# Patient Record
Sex: MALE | Race: WHITE | NOT HISPANIC OR LATINO | Employment: OTHER | URBAN - METROPOLITAN AREA
[De-identification: names, ages, dates, MRNs, and addresses within clinical notes are randomized per-mention and may not be internally consistent; named-entity substitution may affect disease eponyms.]

---

## 2017-02-27 ENCOUNTER — OFFICE VISIT (OUTPATIENT)
Dept: CARDIOLOGY | Facility: CLINIC | Age: 77
End: 2017-02-27

## 2017-02-27 VITALS
WEIGHT: 212 LBS | HEART RATE: 92 BPM | OXYGEN SATURATION: 98 % | SYSTOLIC BLOOD PRESSURE: 118 MMHG | DIASTOLIC BLOOD PRESSURE: 64 MMHG | HEIGHT: 68 IN | BODY MASS INDEX: 32.13 KG/M2

## 2017-02-27 DIAGNOSIS — I51.89 DIASTOLIC DYSFUNCTION: Primary | ICD-10-CM

## 2017-02-27 DIAGNOSIS — I77.9 BILATERAL CAROTID ARTERY DISEASE (HCC): ICD-10-CM

## 2017-02-27 DIAGNOSIS — E11.59 TYPE 2 DIABETES MELLITUS WITH OTHER CIRCULATORY COMPLICATION: ICD-10-CM

## 2017-02-27 PROCEDURE — 99213 OFFICE O/P EST LOW 20 MIN: CPT | Performed by: INTERNAL MEDICINE

## 2017-02-27 NOTE — PROGRESS NOTES
Date of Office Visit: 2017  Encounter Provider: Bennie Paul MD  Place of Service: [facility] CARDIOLOGY  Patient Name: Santino Retana  :1940    Chief complaint: Follow-up for diastolic dysfunction, carotid artery disease    History of Present Illness:    Dear Dr. Christina:      I again had the pleasure of seeing your patient in cardiology office on 2017. As  you well know, he is a very pleasant, 76 year-old white male with a past medical history  significant for diabetes, hypertension, and chronic kidney disease who presents for  follow-up. The patient initially saw me on 2014, with complaints of dyspnea on  exertion, which had not changed significantly. However, given that he was diabetic, he  did undergo an exercise stress echocardiogram on 2014. This showed an ejection  fraction of 58% with grade 1 diastolic dysfunction and no significant valvular disease.   There was no evidence of ischemia on the study.    The patient was to undergo an elective right hip replacement, and as part of his work-up,   had screening ultrasound of his carotid arteries.  This showed significant bilateral   disease.  A CT angiogram in 2016 confirmed 85-90% stenosis in the right   carotid artery and 70-75% stenosis in the left carotid artery.  He subsequently   underwent a right carotid endarterectomy on 2016 at [facility].    He did eventually also have his right hip replacement on 2016 at [facility].     The patient presents today for follow-up.  He is still regaining strength from his   surgeries.  However, he has not had any new chest discomfort.  He also states that   his shortness of breath has remained at baseline and has not changed in the last   several years.    Past Medical History   Diagnosis Date   • Arthritis    • CKD (chronic kidney disease)    • Diabetes mellitus    • Hyperlipidemia    • Hypertension    • SOB  (shortness of breath)        Past Surgical History   Procedure Laterality Date   • Cholecystectomy  11/1989     [facility]   • Appendectomy  02/19/2016     [facility], Dr. Zimmerman   • Total hip arthroplasty Right 11/16/2016     [facility]   • Carotid endarterectomy Right 09/26/2016     [facility]        Current Outpatient Prescriptions on File Prior to Visit   Medication Sig Dispense Refill   • amLODIPine (NORVASC) 5 MG tablet   0   • aspirin 81 MG tablet Take 81 mg by mouth daily.     • atorvastatin (LIPITOR) 20 MG tablet   0   • Cholecalciferol (VITAMIN D3) 2000 UNITS tablet Take  by mouth.     • gabapentin (NEURONTIN) 400 MG capsule Take 400 mg by mouth.     • glipiZIDE (GLUCOTROL) 5 MG tablet take 1 tablet by mouth twice a day with food  0   • ketoconazole (NIZORAL) 2 % cream apply to affected area once daily  0   • lisinopril (PRINIVIL,ZESTRIL) 5 MG tablet Take 5 mg by mouth daily.     • mupirocin (BACTROBAN) 2 % ointment apply to NOSTRIL WITH Q-TIP TWICE DAILY FOR 5 CONSECUTIVE DAYS PRIOR TO SURGERY  0   • Omega-3 Fatty Acids (FISH OIL CONCENTRATE PO) Take  by mouth.     • TRADJENTA 5 MG tablet tablet   0   • VITAMIN D, CHOLECALCIFEROL, PO Take  by mouth.       No current facility-administered medications on file prior to visit.      Allergies as of 02/27/2017 - Miah as Reviewed 02/27/2017   Allergen Reaction Noted   • Lamisil [terbinafine hcl] Rash 02/26/2016     Social History     Social History   • Marital status:      Spouse name: Kesha   • Number of children: N/A   • Years of education: N/A     Occupational History   • Retired       Social History Main Topics   • Smoking status: Former Smoker     Quit date: 1989   • Smokeless tobacco: Never Used   • Alcohol use No   • Drug use: No   • Sexual activity: Not on file     Other Topics Concern   • Not on file     Social History Narrative    Enjoys golf.     Family History   Problem Relation  "Age of Onset   • Lymphoma Brother 65      at age 71   • Coronary artery disease Neg Hx        Review of Systems   Respiratory: Positive for shortness of breath, snoring and wheezing.    Musculoskeletal: Positive for joint pain and myalgias.   Neurological: Positive for excessive daytime sleepiness.   All other systems reviewed and are negative.    Objective:     Vitals:    17 1310   BP: 118/64   Pulse: 92   SpO2: 98%   Weight: 212 lb (96.2 kg)   Height: 68.11\" (173 cm)     Body mass index is 32.13 kg/(m^2).    Physical Exam   Constitutional: He is oriented to person, place, and time. He appears well-developed and well-nourished.   HENT:   Head: Normocephalic and atraumatic.   Eyes: Conjunctivae are normal.   Neck: Neck supple.   Cardiovascular: Normal rate and regular rhythm.  Exam reveals no gallop and no friction rub.    No murmur heard.  Pulmonary/Chest: Effort normal and breath sounds normal.   Abdominal: Soft. There is no tenderness.   Musculoskeletal: He exhibits no edema.   Neurological: He is alert and oriented to person, place, and time.   Skin: Skin is warm.   Psychiatric: He has a normal mood and affect. His behavior is normal.     Lab Review:   Procedures    Cardiac Procedures:  1. Stress echocardiogram on 2014, showed the following. There was no evidence   of ischemia. The ejection fraction was 58%. There was mild concentric left ventricular  hypertrophy with grade 1 diastolic dysfunction. There was no significant valvular disease.      Assessment:       Diagnosis Plan   1. Diastolic dysfunction     2. Type 2 diabetes mellitus with other circulatory complication     3. Bilateral carotid artery disease       Plan:       As noted, the patient did recently have a carotid endarterectomy on the right.  He still has   residual disease of up to 70-75% on the left.  He is going to follow-up with vascular surgery   at [facility] regarding this.  He has not had any chest " discomfort.  His   shortness of breath has remained at baseline, and has been stable for years.  He is   already taking aspirin and Lipitor, and he will remain on these given the vascular history.    His blood pressure is excellent on lisinopril and amlodipine.  For now, I do not feel he   requires further cardiac testing.  I did not change any of his medications.  I will see him   back in the office within one year unless other issues arise.

## 2017-03-15 ENCOUNTER — HOSPITAL ENCOUNTER (EMERGENCY)
Facility: HOSPITAL | Age: 77
Discharge: HOME | End: 2017-03-15
Attending: EMERGENCY MEDICINE | Admitting: EMERGENCY MEDICINE

## 2017-03-15 ENCOUNTER — APPOINTMENT (OUTPATIENT)
Dept: GENERAL RADIOLOGY | Facility: HOSPITAL | Age: 77
End: 2017-03-15

## 2017-03-15 VITALS
BODY MASS INDEX: 32.58 KG/M2 | OXYGEN SATURATION: 94 % | HEIGHT: 68 IN | WEIGHT: 215 LBS | SYSTOLIC BLOOD PRESSURE: 108 MMHG | HEART RATE: 86 BPM | DIASTOLIC BLOOD PRESSURE: 71 MMHG | RESPIRATION RATE: 18 BRPM | TEMPERATURE: 97.2 F

## 2017-03-15 DIAGNOSIS — S86.912A KNEE STRAIN, LEFT, INITIAL ENCOUNTER: Primary | ICD-10-CM

## 2017-03-15 PROCEDURE — 73590 X-RAY EXAM OF LOWER LEG: CPT

## 2017-03-15 PROCEDURE — 99283 EMERGENCY DEPT VISIT LOW MDM: CPT

## 2017-03-15 NOTE — ED NOTES
Pt had hip replaced in November. Pt was out golfing on Thursday and twisted his left leg. Pt now c/o stiffness, pain, and difficulty walking.      Sujey Burns RN  03/15/17 1123

## 2017-03-15 NOTE — ED PROVIDER NOTES
EMERGENCY DEPARTMENT ENCOUNTER    CHIEF COMPLAINT  Chief Complaint: LLE pain  History given by:patient, spouse  History limited by:none  Room Number: 23/23  PMD: Santino Christina MD   Ortho - Dr. Mays      HPI:  Pt is a 76 y.o. male  with h/o right hip replacement in nov 2016 by Dr. Almaguer, c/o LLE pain and stiffness onset 6 days ago. The pt states he was golfing 6 days ago and twisted his left leg and now c/o pain and stiffness just below his left knee. Denies falling during trauma.  Denies hip pain, back pain, head trauma or LOC.  The pt denies other injury or trauma.     HTN diabetest chronic kidney dz.     Duration: 6 days  Timing:constant  Location:Below left knee  Radiation: none  Quality:aching, stiffness  Intensity/Severity:mild  Progression:unchanged  Associated Symptoms:denies  Aggravating Factors:bearing weight, ambulation  Alleviating Factors:rest  Previous Episodes:h/o right hip replacement 11/2016  Treatment before arrival:none    MEDICAL RECORD REVIEW  HTN   diabetes   chronic kidney dz.     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   • Appendectomy  02/19/2016     [facility], Dr. Zimmerman   • Total hip arthroplasty Right 11/16/2016     [facility]   • Carotid endarterectomy  Right 2016     [facility]        FAMILY HISTORY  Family History   Problem Relation Age of Onset   • Lymphoma Brother 65      at age 71   • Coronary artery disease Neg Hx        SOCIAL HISTORY  Social History     Social History   • Marital status:      Spouse name: Kesha   • Number of children: N/A   • Years of education: N/A     Occupational History   • Retired       Social History Main Topics   • Smoking status: Former Smoker     Quit date:    • Smokeless tobacco: Never Used   • Alcohol use No   • Drug use: No   • Sexual activity: Not on file     Other Topics Concern   • Not on file     Social History Narrative    Enjoys golf.       ALLERGIES  Lamisil [terbinafine hcl]    REVIEW OF SYSTEMS  Review of Systems   Constitutional: Negative.    HENT: Negative.    Respiratory: Negative.    Cardiovascular: Negative.    Gastrointestinal: Negative.    Genitourinary: Negative.    Musculoskeletal: Positive for arthralgias and myalgias.        LLE   All other systems reviewed and are negative.      PHYSICAL EXAM  ED Triage Vitals   Temp Heart Rate Resp BP SpO2   03/15/17 1108 03/15/17 1108 03/15/17 1108 03/15/17 1126 03/15/17 1108   96.6 °F (35.9 °C) 104 18 106/66 94 %      Temp src Heart Rate Source Patient Position BP Location FiO2 (%)   03/15/17 1108 03/15/17 1108 03/15/17 1126 03/15/17 1126 --   Tympanic Monitor Sitting Right arm        Physical Exam   Constitutional: He is oriented to person, place, and time and well-developed, well-nourished, and in no distress. No distress.   HENT:   Head: Normocephalic and atraumatic.   Mouth/Throat: Oropharynx is clear and moist.   Eyes: EOM are normal.   Neck: Normal range of motion.   Pulmonary/Chest: Effort normal. No respiratory distress.   Musculoskeletal: Normal range of motion. He exhibits tenderness.   NVID of LLE.   Prominence on lateral aspect of tibia that is tender.  No medial fibula or tibia tenderness.  No joint effusion of  left knee  No medial or lateral joint line tenderness.    Neurological: He is alert and oriented to person, place, and time.   Skin: Skin is warm and dry. No rash noted. No pallor.   Psychiatric: Mood, memory, affect and judgment normal.   Nursing note and vitals reviewed.      RADIOLOGY  XR Tibia Fibula 2 View Left   Final Result        XR Left Tib Fib - negative acute    I ordered the above noted radiological studies and reviewed the images on the PACS system.       PROCEDURES      COURSE & MEDICAL DECISION MAKING  Pertinent Labs and Imaging studies that were ordered and reviewed are noted above.  Results were reviewed/discussed with the patient and they were also made aware of online assess.  Pt also made aware that some labs, such as cultures, will not be resulted during ER visit and follow up with PMD is necessary.       PROGRESS AND CONSULTS    Progress Notes:    1412  Ordered XR Tibia Fibula Left to r/o fx.     1423  Reviewed pt's history and workup with Dr. Liu.  After a bedside evaluation; Dr. Liu agrees with the plan of care.    1535  I offered the patient crutches.  He declined, stating that he liked using cane.  He has a cane at home.  We discussed the imaging studies at bedside.  The patient's history, physical exam, and image findings were discussed with the physician, who also performed a face to face history and physical exam.  I discussed all results and noted any abnormalities with patient.  Discussed absoute need to recheck abnormalities with their family physician.  I answered any of the patient's questions.  Discussed plan for discharge, as there is no emergent indication for admission.  Pt is agreeable and understands need for follow up and repeat testing.  Pt is aware that discharge does not mean that nothing is wrong but it indicates no emergency is present and they must continue care with their family physician.  Pt is discharged with instructions to follow up with primary care  "doctor to have their blood pressure rechecked.         FOLLOW-UP  Jamarcus Mays MD  [address] [id number] [phone]    Schedule an appointment as soon as possible for a visit  For follow-up         Medication List      Notice     No changes were made to your prescriptions during this visit.            MEDICATIONS GIVEN IN ER  Medications - No data to display    Visit Vitals   • /66 (BP Location: Right arm, Patient Position: Sitting)   • Pulse 104   • Temp 96.6 °F (35.9 °C) (Tympanic)   • Resp 18   • Ht 68\" (172.7 cm)   • Wt 215 lb (97.5 kg)   • SpO2 94%   • BMI 32.69 kg/m2         DIAGNOSIS  Final diagnoses:   Knee strain, left, initial encounter       FOLLOW UP   Jamarcus Mays MD  [address] [address]  [phone]    Schedule an appointment as soon as possible for a visit  For follow-up      RX     Medication List      Notice     No changes were made to your prescriptions during this visit.          I personally scribed for Faith Love PA-C on 3/15/2017 at 3:34 PM.  Electronically signed by Nate Owens on 3/15/2017 at time 3:34 PM         Nate Owens  03/15/17 1455         Nate Owens  03/15/17 1534       Faith Love PA-C  03/15/17 1539       Faith Love PA-C  03/15/17 1557    "

## 2017-03-15 NOTE — ED NOTES
Pt able to walk back to room from lobby. Pt states when he puts pressure on left leg, it hurts.     Sujey Burns RN  03/15/17 1682

## 2017-03-15 NOTE — ED PROVIDER NOTES
I supervised care provided by the midlevel provider.    We have discussed this patient's history, physical exam, and treatment plan.   I have reviewed the note and personally saw and examined the patient and agree with the plan of care.    Pt reports that he is s/p left hip replacement performed in 11/2016. Pt presents to the ED c/o left leg pain onset about a week ago after pt twisted it while playing golf last week. Pt denies CP, dyspnea, and focal weakness/numbness, but reports that he has had painful ROM of the LLE. On physical exam, there is mild swelling to the upper portion of the left lower leg (directly inferior to the left knee). There is no erythema. Left DP and PT pulses are palpable. Left tib/fib X-Ray shows degenerative changes at the knee, but no acute fracture.             Documentation assistance provided by Aiden Mcmanus. Information recorded by the scribe was done at my direction and has been verified and validated by me.     Entered by Aiden Mcmanus, acting as scribe for Dr. Alxea MD.        Aiden Mcmanus  03/15/17 2228       Vikash Liu MD  03/15/17 0320

## 2017-03-15 NOTE — DISCHARGE INSTRUCTIONS
PLEASE READ AND REVIEW ALL DISCHARGE INSTRUCTIONS.     Please follow up with your primary care physician for any further evaluation/treatment and further management of your blood pressure.     Recheck in emergency department for any worsening and/or concerning symptoms.     Take all prescribed medicine as written and continue chronic medication.    Follow-up with Dr. Almaguer for recheck of no improvement in one week.

## 2017-05-24 VITALS
OXYGEN SATURATION: 97 % | DIASTOLIC BLOOD PRESSURE: 82 MMHG | HEART RATE: 93 BPM | HEART RATE: 97 BPM | RESPIRATION RATE: 16 BRPM | HEART RATE: 88 BPM | DIASTOLIC BLOOD PRESSURE: 80 MMHG | RESPIRATION RATE: 25 BRPM | HEIGHT: 68 IN | SYSTOLIC BLOOD PRESSURE: 146 MMHG | OXYGEN SATURATION: 95 % | DIASTOLIC BLOOD PRESSURE: 61 MMHG | DIASTOLIC BLOOD PRESSURE: 76 MMHG | SYSTOLIC BLOOD PRESSURE: 142 MMHG | HEART RATE: 90 BPM | RESPIRATION RATE: 18 BRPM | OXYGEN SATURATION: 83 % | RESPIRATION RATE: 17 BRPM | SYSTOLIC BLOOD PRESSURE: 119 MMHG | HEART RATE: 95 BPM | HEART RATE: 82 BPM | HEART RATE: 77 BPM | SYSTOLIC BLOOD PRESSURE: 157 MMHG | OXYGEN SATURATION: 99 % | RESPIRATION RATE: 24 BRPM | SYSTOLIC BLOOD PRESSURE: 168 MMHG | SYSTOLIC BLOOD PRESSURE: 117 MMHG | TEMPERATURE: 97.9 F | HEART RATE: 79 BPM | DIASTOLIC BLOOD PRESSURE: 78 MMHG | WEIGHT: 215 LBS | DIASTOLIC BLOOD PRESSURE: 62 MMHG | DIASTOLIC BLOOD PRESSURE: 59 MMHG | DIASTOLIC BLOOD PRESSURE: 81 MMHG | HEART RATE: 76 BPM | OXYGEN SATURATION: 93 % | SYSTOLIC BLOOD PRESSURE: 153 MMHG | OXYGEN SATURATION: 92 % | DIASTOLIC BLOOD PRESSURE: 68 MMHG | TEMPERATURE: 96.1 F

## 2017-05-24 PROBLEM — Z86.010 PERSONAL HISTORY OF COLONIC POLYPS: Status: ACTIVE | Noted: 2017-05-26

## 2017-05-26 ENCOUNTER — AMBULATORY SURGICAL CENTER (AMBULATORY)
Dept: URBAN - METROPOLITAN AREA SURGERY 17 | Facility: SURGERY | Age: 77
End: 2017-05-26
Payer: COMMERCIAL

## 2017-05-26 ENCOUNTER — OFFICE (AMBULATORY)
Dept: URBAN - METROPOLITAN AREA CLINIC 64 | Facility: CLINIC | Age: 77
End: 2017-05-26
Payer: COMMERCIAL

## 2017-05-26 DIAGNOSIS — D12.0 BENIGN NEOPLASM OF CECUM: ICD-10-CM

## 2017-05-26 DIAGNOSIS — D12.7 BENIGN NEOPLASM OF RECTOSIGMOID JUNCTION: ICD-10-CM

## 2017-05-26 DIAGNOSIS — Z86.010 PERSONAL HISTORY OF COLONIC POLYPS: ICD-10-CM

## 2017-05-26 DIAGNOSIS — K63.5 POLYP OF COLON: ICD-10-CM

## 2017-05-26 LAB
GI HISTOLOGY: A. UNSPECIFIED: (no result)
GI HISTOLOGY: B. UNSPECIFIED: (no result)
GI HISTOLOGY: PDF REPORT: (no result)

## 2017-05-26 PROCEDURE — 45385 COLONOSCOPY W/LESION REMOVAL: CPT | Mod: 33 | Performed by: INTERNAL MEDICINE

## 2017-05-26 PROCEDURE — 45380 COLONOSCOPY AND BIOPSY: CPT | Mod: 59 | Performed by: INTERNAL MEDICINE

## 2017-05-26 PROCEDURE — 88305 TISSUE EXAM BY PATHOLOGIST: CPT | Performed by: INTERNAL MEDICINE

## 2017-05-26 RX ADMIN — LIDOCAINE HYDROCHLORIDE 25 MG: 10 INJECTION, SOLUTION EPIDURAL; INFILTRATION; INTRACAUDAL; PERINEURAL at 09:47

## 2017-05-26 RX ADMIN — PROPOFOL 50 MG: 10 INJECTION, EMULSION INTRAVENOUS at 09:53

## 2017-05-26 RX ADMIN — PROPOFOL 100 MG: 10 INJECTION, EMULSION INTRAVENOUS at 09:47

## 2017-05-26 RX ADMIN — PROPOFOL 25 MG: 10 INJECTION, EMULSION INTRAVENOUS at 10:02

## 2017-05-26 RX ADMIN — PROPOFOL 50 MG: 10 INJECTION, EMULSION INTRAVENOUS at 10:02

## 2017-08-03 ENCOUNTER — TELEPHONE (OUTPATIENT)
Dept: CARDIOLOGY | Facility: CLINIC | Age: 77
End: 2017-08-03

## 2017-08-03 NOTE — TELEPHONE ENCOUNTER
Spoke with Dr Paul. He advises to monitor weight and he will evaluate the need for medication changes at his visit on Monday.     Spoke with Ginny and advised.

## 2017-08-03 NOTE — TELEPHONE ENCOUNTER
Ginny from [facility] called. Pt was recently admitted to [facility] for carotid surgery, stroke, and MI. While there they dx'd him with chf. He was not placed on a diuretic.     Per her, pt does have some coughing and she can hear fluid in his lungs. Pt has not been weighing himself but she did advise him to begin.     Please advise. Records from [facility] are in your cubby.     Ginny can be reached at [phone].

## 2017-08-07 ENCOUNTER — OFFICE VISIT (OUTPATIENT)
Dept: CARDIOLOGY | Facility: CLINIC | Age: 77
End: 2017-08-07

## 2017-08-07 VITALS
HEIGHT: 68 IN | RESPIRATION RATE: 22 BRPM | HEART RATE: 90 BPM | SYSTOLIC BLOOD PRESSURE: 128 MMHG | DIASTOLIC BLOOD PRESSURE: 58 MMHG | WEIGHT: 213 LBS | BODY MASS INDEX: 32.28 KG/M2

## 2017-08-07 DIAGNOSIS — I77.9 BILATERAL CAROTID ARTERY DISEASE (HCC): ICD-10-CM

## 2017-08-07 DIAGNOSIS — I63.132 CEREBROVASCULAR ACCIDENT (CVA) DUE TO EMBOLISM OF LEFT CAROTID ARTERY (HCC): ICD-10-CM

## 2017-08-07 DIAGNOSIS — Z00.00 HEALTHCARE MAINTENANCE: ICD-10-CM

## 2017-08-07 DIAGNOSIS — I51.89 DIASTOLIC DYSFUNCTION: ICD-10-CM

## 2017-08-07 DIAGNOSIS — I21.4 NSTEMI (NON-ST ELEVATED MYOCARDIAL INFARCTION) (HCC): Primary | ICD-10-CM

## 2017-08-07 PROCEDURE — 99214 OFFICE O/P EST MOD 30 MIN: CPT | Performed by: INTERNAL MEDICINE

## 2017-08-07 PROCEDURE — 93000 ELECTROCARDIOGRAM COMPLETE: CPT | Performed by: INTERNAL MEDICINE

## 2017-08-07 RX ORDER — CLOPIDOGREL BISULFATE 75 MG/1
75 TABLET ORAL DAILY
COMMUNITY
End: 2018-12-12

## 2017-08-07 RX ORDER — TAMSULOSIN HYDROCHLORIDE 0.4 MG/1
1 CAPSULE ORAL NIGHTLY
COMMUNITY
End: 2018-01-30 | Stop reason: SDUPTHER

## 2017-08-07 RX ORDER — CARVEDILOL 3.12 MG/1
3.12 TABLET ORAL
Qty: 30 TABLET | Refills: 11 | Status: SHIPPED | OUTPATIENT
Start: 2017-08-07 | End: 2018-07-24 | Stop reason: SDUPTHER

## 2017-08-07 RX ORDER — MELATONIN
1000 DAILY
COMMUNITY

## 2017-08-14 NOTE — PROGRESS NOTES
Date of Office Visit: 2017  Encounter Provider: Bennie Paul MD  Place of Service: [facility] CARDIOLOGY  Patient Name: Santino Retana  :1940    Chief complaint: Follow-up for carotid artery disease.  Recent CVA and NSTEMI.      History of Present Illness:    I again had the pleasure of seeing your patient in cardiology office on 2017. As  you well know, he is a very pleasant, 77 year-old white male with a past medical history  significant for diabetes, hypertension, and chronic kidney disease who presents for  follow-up. The patient initially saw me on 2014, with complaints of dyspnea on  exertion, which had not changed significantly. However, given that he was diabetic, he  did undergo an exercise stress echocardiogram on 2014. This showed an   ejection fraction of 58% with grade 1 diastolic dysfunction and no significant valvular   disease. There was no evidence of ischemia on the study.     The patient was to undergo an elective right hip replacement, and as part of his   work-up, had screening ultrasound of his carotid arteries.  This showed significant   bilateral disease.  A CT angiogram in 2016 confirmed 85-90% stenosis in   the right carotid artery and 70-75% stenosis in the left carotid artery.  He subsequently   underwent a right carotid endarterectomy on 2016 at [facility].    He did eventually also have his right hip replacement on 2016 at [facility].    The patient underwent a left carotid endarterectomy electively on 2017 by Dr. Washington   at [facility].  Postoperatively, he was found to have right sided   hemiparesis and aphasia.  He was subsequently found to have a significant   perioperative stroke.  He had multiple complications afterwards, including hypoxic   respiratory failure, urinary retention, acute on chronic kidney injury, and a non-ST   elevation myocardial  infarction.  His troponin peaked at 1.04, and he did have some   EKG changes as well.  He was treated aggressively with heparin, IV Lopressor, and   aspirin.  An echocardiogram performed on 7/19/2017 showed an ejection fraction of   62%, mild LVH, and no significant valve disease.  He did not undergo a cardiac   catheterization at that time given his lack of symptoms and the recent stroke.    The patient presents today for follow-up.  He is still recovering from his stroke.  He   still does have residual right arm and hand weakness, as well as some speech   issues.  He has not had any chest discomfort since being discharged from the hospital,   and actually states that he did not feel any chest discomfort while in the hospital.  He is   taking aspirin and Plavix currently.    Past Medical History:   Diagnosis Date   • Arthritis    • Carotid artery disease     Right CEA 9/26/16.  Left CEA 7/18/17 (with dionte-op CVA)   • CKD (chronic kidney disease)    • CVA (cerebral vascular accident)     on 7/18/17 dionte-operatively from left CEA.     • Diabetes mellitus    • Hyperlipidemia    • Hypertension    • NSTEMI (non-ST elevated myocardial infarction)     NSTEMI following left CEA and dionte-op stroke in 7/18 ([facility]).     • SOB (shortness of breath)        Past Surgical History:   Procedure Laterality Date   • [facility]   • CAROTID ENDARTERECTOMY Right 09/26/2016    [facility]    • CAROTID ENDARTERECTOMY Left 07/18/2017    [facility]    • CHOLECYSTECTOMY  11/1989    [facility]   • TOTAL HIP ARTHROPLASTY Right 11/16/2016    [facility]       Current Outpatient Prescriptions on File Prior to Visit   Medication Sig Dispense Refill   • amLODIPine (NORVASC) 5 MG tablet Take 5 mg by mouth Daily.  0   • aspirin 81 MG tablet Take 81 mg by mouth daily.     • atorvastatin (LIPITOR) 20 MG tablet Take 20 mg by mouth  "Every Night.  0   • CINNAMON PO Take  by mouth Daily.     • gabapentin (NEURONTIN) 400 MG capsule Take 400 mg by mouth 2 (Two) Times a Day.     • glipiZIDE (GLUCOTROL) 5 MG tablet take 1 tablet by mouth twice a day with food  0   • lisinopril (PRINIVIL,ZESTRIL) 5 MG tablet Take 5 mg by mouth daily.       No current facility-administered medications on file prior to visit.      Allergies as of 2017 - Miah as Reviewed 2017   Allergen Reaction Noted   • Lamisil [terbinafine hcl] Rash 2016     Social History     Social History   • Marital status:      Spouse name: Kesha   • Number of children: N/A   • Years of education: N/A     Occupational History   • Retired       Social History Main Topics   • Smoking status: Former Smoker     Quit date:    • Smokeless tobacco: Never Used   • Alcohol use No   • Drug use: No   • Sexual activity: Not on file     Other Topics Concern   • Not on file     Social History Narrative    Enjoys golf.     Family History   Problem Relation Age of Onset   • Lymphoma Brother 65      at age 71   • Coronary artery disease Neg Hx        Review of Systems   Neurological: Positive for numbness.   All other systems reviewed and are negative.    Objective:     Vitals:    17 1456   BP: 128/58   Pulse: 90   Resp: 22   Weight: 213 lb (96.6 kg)   Height: 68\" (172.7 cm)     Body mass index is 32.39 kg/(m^2).    Physical Exam   Constitutional: He is oriented to person, place, and time. He appears well-developed and well-nourished.   HENT:   Head: Normocephalic and atraumatic.   Eyes: Conjunctivae are normal.   Neck: Neck supple.   Cardiovascular: Normal rate and regular rhythm.  Exam reveals no gallop and no friction rub.    No murmur heard.  Pulmonary/Chest: Effort normal and breath sounds normal.   Abdominal: Soft. There is no tenderness.   Musculoskeletal: He exhibits no edema.   Neurological: He is alert and oriented to person, place, and time.   Right " arm and hand weakness.     Skin: Skin is warm.   Psychiatric: He has a normal mood and affect. His behavior is normal.     Lab Review:     ECG 12 Lead  Date/Time: 8/7/2017 2:55 PM  Performed by: MICAELA ZEPEDA  Authorized by: MICAELA ZEPEDA   Comparison: compared with previous ECG from 7/13/2017  Similar to previous ECG  Rhythm: sinus rhythm  Rate: normal  BPM: 90  Other findings: LAE  Clinical impression: non-specific ECG            Cardiac Procedures:  1. Stress echocardiogram on 02/28/2014, showed the following. There was no   evidence of ischemia. The ejection fraction was 58%. There was mild concentric   left ventricular hypertrophy with grade 1 diastolic dysfunction. There was no   significant valvular disease.   2.  Echocardiogram on 7/19/2017: The ejection fraction was 62%.  There was   mild LVH.  There was no significant valvular disease.    Assessment:       Diagnosis Plan   1. NSTEMI (non-ST elevated myocardial infarction)     2. [facility] maintenance  Ambulatory Referral to Internal Medicine   3. Cerebrovascular accident (CVA) due to embolism of left carotid artery     4. Bilateral carotid artery disease     5. Diastolic dysfunction       Plan:       Again, the patient did suffer a substantial stroke perioperatively with the left carotid   endarterectomy on 7/18/2017.  He also had a subsequent non-ST elevation   myocardial infarction.  He has been completely asymptomatic since being   discharged from a heart perspective, and has had no chest pain or significant   shortness of breath.  He is taking aspirin and Lipitor currently, as well as Plavix.    I do feel that a beta blocker would be beneficial in this circumstance, and I have   started him on carvedilol 3.125 mg once a day for now (which can be increased   to twice a day if he tolerates it).  His echocardiogram showed no evidence of   wall motion abnormalities.  However, his troponin did peak at over 1 at [facility].  Given  his recent surgery and stroke, I do not feel that a   cardiac catheterization is warranted at this point (he has also been completely   asymptomatic without chest pain).  However, I will see him back in the office in   one month, and if he is doing well, I would consider a Lexiscan Myoview stress   test as an outpatient.  I did discuss this in detail with the patient and his wife,   and they agree with the plan.  He will call in the meantime with any issues.

## 2017-09-08 ENCOUNTER — OFFICE VISIT (OUTPATIENT)
Dept: CARDIOLOGY | Facility: CLINIC | Age: 77
End: 2017-09-08

## 2017-09-08 VITALS
HEART RATE: 88 BPM | DIASTOLIC BLOOD PRESSURE: 62 MMHG | HEIGHT: 68 IN | SYSTOLIC BLOOD PRESSURE: 122 MMHG | BODY MASS INDEX: 32.28 KG/M2 | WEIGHT: 213 LBS | OXYGEN SATURATION: 96 %

## 2017-09-08 DIAGNOSIS — I63.132 CEREBROVASCULAR ACCIDENT (CVA) DUE TO EMBOLISM OF LEFT CAROTID ARTERY (HCC): ICD-10-CM

## 2017-09-08 DIAGNOSIS — I77.9 BILATERAL CAROTID ARTERY DISEASE (HCC): ICD-10-CM

## 2017-09-08 DIAGNOSIS — E11.59 TYPE 2 DIABETES MELLITUS WITH OTHER CIRCULATORY COMPLICATION: ICD-10-CM

## 2017-09-08 DIAGNOSIS — I21.4 NSTEMI (NON-ST ELEVATED MYOCARDIAL INFARCTION) (HCC): Primary | ICD-10-CM

## 2017-09-08 PROCEDURE — 99214 OFFICE O/P EST MOD 30 MIN: CPT | Performed by: INTERNAL MEDICINE

## 2017-09-12 NOTE — PROGRESS NOTES
Date of Office Visit: 2017  Encounter Provider: Bennie Paul MD  Place of Service: [facility] CARDIOLOGY  Patient Name: Santino Retana  :1940    Chief complaint: Follow-up for carotid artery disease, CVA, NSTEMI.    History of Present Illness:    I again had the pleasure of seeing your patient in cardiology office on 2017. As  you well know, he is a very pleasant, 77 year-old white male with a medical history  significant for diabetes, hypertension, and chronic kidney disease who presents for  follow-up. The patient initially saw me on 2014, with complaints of dyspnea on  exertion, which had not changed significantly. However, given that he was diabetic, he  did undergo an exercise stress echocardiogram on 2014. This showed an   ejection fraction of 58% with grade 1 diastolic dysfunction and no significant valvular   disease. There was no evidence of ischemia on the study.      The patient was to undergo an elective right hip replacement, and as part of his   work-up, had screening ultrasound of his carotid arteries.  This showed significant   bilateral disease.  A CT angiogram in 2016 confirmed 85-90% stenosis in   the right carotid artery and 70-75% stenosis in the left carotid artery.  He subsequently   underwent a right carotid endarterectomy on 2016 at [facility].    He did eventually also have his right hip replacement on 2016 at [facility].     The patient underwent a left carotid endarterectomy electively on 2017 by Dr. Washington   at [facility].  Postoperatively, he was found to have right sided   hemiparesis and aphasia.  He was subsequently found to have a significant   perioperative stroke.  He had multiple complications afterwards, including hypoxic   respiratory failure, urinary retention, acute on chronic kidney injury, and a non-ST   elevation myocardial infarction.  His  troponin peaked at 1.04, and he did have some   EKG changes as well.  He was treated aggressively with heparin, IV Lopressor, and   aspirin.  An echocardiogram performed on 7/19/2017 showed an ejection fraction of   62%, mild LVH, and no significant valve disease.  He did not undergo a cardiac   catheterization at that time given his lack of symptoms and the recent stroke.     The patient presents today for follow-up.  He is doing much better.  His speech is   improved.  However, he is still having difficulty moving his right hand following the   stroke.  He has had no chest pain or exertional shortness of breath since his last visit.    Past Medical History:   Diagnosis Date   • Arthritis    • Carotid artery disease     Right CEA 9/26/16.  Left CEA 7/18/17 (with dionte-op CVA)   • CKD (chronic kidney disease)    • CVA (cerebral vascular accident)     on 7/18/17 dionte-operatively from left CEA.     • Diabetes mellitus    • Hyperlipidemia    • Hypertension    • NSTEMI (non-ST elevated myocardial infarction)     NSTEMI following left CEA and dionte-op stroke in 7/18 ([facility]).     • SOB (shortness of breath)        Past Surgical History:   Procedure Laterality Date   • APPENDECTOMY  02/19/2016    [facility], Dr. Zimmerman   • CAROTID ENDARTERECTOMY Right 09/26/2016    [facility]    • CAROTID ENDARTERECTOMY Left 07/18/2017    [facility]    • CHOLECYSTECTOMY  11/1989    [facility]   • TOTAL HIP ARTHROPLASTY Right 11/16/2016    [facility]       Current Outpatient Prescriptions on File Prior to Visit   Medication Sig Dispense Refill   • amLODIPine (NORVASC) 5 MG tablet Take 5 mg by mouth Daily.  0   • aspirin 81 MG tablet Take 81 mg by mouth daily.     • atorvastatin (LIPITOR) 20 MG tablet Take 20 mg by mouth Every Night.  0   • carvedilol (COREG) 3.125 MG tablet Take 1 tablet by mouth Every Morning Before Breakfast. 30 tablet 11   •  "cholecalciferol (VITAMIN D3) 1000 UNITS tablet Take 1,000 Units by mouth Daily.     • CINNAMON PO Take  by mouth Daily.     • clopidogrel (PLAVIX) 75 MG tablet Take 75 mg by mouth Daily.     • gabapentin (NEURONTIN) 400 MG capsule Take 400 mg by mouth 2 (Two) Times a Day.     • glipiZIDE (GLUCOTROL) 5 MG tablet take 1 tablet by mouth twice a day with food  0   • lisinopril (PRINIVIL,ZESTRIL) 5 MG tablet Take 5 mg by mouth daily.     • tamsulosin (FLOMAX) 0.4 MG capsule 24 hr capsule Take 1 capsule by mouth Every Night.       No current facility-administered medications on file prior to visit.      Allergies as of 2017 - Miah as Reviewed 2017   Allergen Reaction Noted   • Lamisil [terbinafine hcl] Rash 2016     Social History     Social History   • Marital status:      Spouse name: Kesha   • Number of children: N/A   • Years of education: N/A     Occupational History   • Retired       Social History Main Topics   • Smoking status: Former Smoker     Quit date:    • Smokeless tobacco: Never Used   • Alcohol use No   • Drug use: No   • Sexual activity: Not on file     Other Topics Concern   • Not on file     Social History Narrative    Enjoys golf.     Family History   Problem Relation Age of Onset   • Lymphoma Brother 65      at age 71   • Coronary artery disease Neg Hx        Review of Systems   Neurological: Positive for numbness and paresthesias.   All other systems reviewed and are negative.    Objective:     Vitals:    17 1301   BP: 122/62   Pulse: 88   SpO2: 96%   Weight: 213 lb (96.6 kg)   Height: 68\" (172.7 cm)     Body mass index is 32.39 kg/(m^2).    Physical Exam   Constitutional: He is oriented to person, place, and time. He appears well-developed and well-nourished.   HENT:   Head: Normocephalic and atraumatic.   Eyes: Conjunctivae are normal.   Neck: Neck supple.   Cardiovascular: Normal rate and regular rhythm.  Exam reveals no gallop and no friction " rub.    No murmur heard.  Pulmonary/Chest: Effort normal and breath sounds normal.   Abdominal: Soft. There is no tenderness.   Musculoskeletal: He exhibits no edema.   Neurological: He is alert and oriented to person, place, and time.   Right arm and hand weakness.   Skin: Skin is warm.   Psychiatric: He has a normal mood and affect. His behavior is normal.     Lab Review:   Procedures    Cardiac Procedures:  1. Stress echocardiogram on 02/28/2014, showed the following. There was no   evidence of ischemia. The ejection fraction was 58%. There was mild concentric   left ventricular hypertrophy with grade 1 diastolic dysfunction. There was no   significant valvular disease.   2.  Echocardiogram on 7/19/2017: The ejection fraction was 62%.  There was   mild LVH.  There was no significant valvular disease.    Assessment:       Diagnosis Plan   1. NSTEMI (non-ST elevated myocardial infarction)  Stress Test With Myocardial Perfusion One Day   2. Bilateral carotid artery disease     3. Cerebrovascular accident (CVA) due to embolism of left carotid artery     4. Type 2 diabetes mellitus with other circulatory complication       Plan:       The patient seems to be more stable at this point.  He did suffer a substantial   stroke perioperatively with his left carotid endarterectomy on 7/18/2017.  He also   had a subsequent non-ST elevation myocardial infarction.  He is going to continue   on aspirin, Plavix, and Lipitor.  He is also taking Coreg at 3.125 mg per day, which   I added at his last visit.  He has had no issues with this.  I am going to proceed   with a Lexiscan Myoview stress test at this point now that he has recovered more   fully from his stroke.  This will be scheduled in the near future.  Otherwise, I will   see him back in 4 months unless other issues arise.

## 2017-10-02 ENCOUNTER — HOSPITAL ENCOUNTER (OUTPATIENT)
Dept: CARDIOLOGY | Facility: HOSPITAL | Age: 77
Discharge: HOME | End: 2017-10-02
Attending: INTERNAL MEDICINE | Admitting: INTERNAL MEDICINE

## 2017-10-02 DIAGNOSIS — I21.4 NSTEMI (NON-ST ELEVATED MYOCARDIAL INFARCTION) (HCC): ICD-10-CM

## 2017-10-02 DIAGNOSIS — R94.39 ABNORMAL NUCLEAR STRESS TEST: Primary | ICD-10-CM

## 2017-10-02 DIAGNOSIS — I25.5 ISCHEMIC CARDIOMYOPATHY: ICD-10-CM

## 2017-10-02 LAB
BH CV NUCLEAR PRIOR STUDY: 3
LV EF NUC BP: 44 %
MAXIMAL PREDICTED HEART RATE: 145 BPM
STRESS TARGET HR: 123 BPM

## 2017-10-02 PROCEDURE — 93017 CV STRESS TEST TRACING ONLY: CPT

## 2017-10-02 PROCEDURE — 93018 CV STRESS TEST I&R ONLY: CPT | Performed by: INTERNAL MEDICINE

## 2017-10-02 PROCEDURE — 78451 HT MUSCLE IMAGE SPECT SING: CPT

## 2017-10-02 PROCEDURE — 0 TECHNETIUM TETROFOSMIN KIT: Performed by: INTERNAL MEDICINE

## 2017-10-02 PROCEDURE — 93016 CV STRESS TEST SUPVJ ONLY: CPT | Performed by: INTERNAL MEDICINE

## 2017-10-02 PROCEDURE — 78452 HT MUSCLE IMAGE SPECT MULT: CPT | Performed by: INTERNAL MEDICINE

## 2017-10-02 PROCEDURE — A9502 TC99M TETROFOSMIN: HCPCS | Performed by: INTERNAL MEDICINE

## 2017-10-02 PROCEDURE — 25010000002 REGADENOSON 0.4 MG/5ML SOLUTION: Performed by: INTERNAL MEDICINE

## 2017-10-02 PROCEDURE — 78452 HT MUSCLE IMAGE SPECT MULT: CPT

## 2017-10-02 RX ADMIN — TETROFOSMIN 1 DOSE: 1.38 INJECTION, POWDER, LYOPHILIZED, FOR SOLUTION INTRAVENOUS at 08:50

## 2017-10-02 RX ADMIN — REGADENOSON 0.4 MG: 0.08 INJECTION, SOLUTION INTRAVENOUS at 08:50

## 2017-10-02 RX ADMIN — TETROFOSMIN 1 DOSE: 1.38 INJECTION, POWDER, LYOPHILIZED, FOR SOLUTION INTRAVENOUS at 07:42

## 2017-10-04 ENCOUNTER — TELEPHONE (OUTPATIENT)
Dept: CARDIOLOGY | Facility: CLINIC | Age: 77
End: 2017-10-04

## 2017-10-04 NOTE — TELEPHONE ENCOUNTER
Faith   Will you please scheduled pt for Echo. Dr Paul has placed order.   Pt's wife called because they had not heard anything about it being scheduled.   Thanks Moses [facility]

## 2017-10-12 ENCOUNTER — HOSPITAL ENCOUNTER (OUTPATIENT)
Dept: CARDIOLOGY | Facility: HOSPITAL | Age: 77
Discharge: HOME | End: 2017-10-12
Attending: INTERNAL MEDICINE | Admitting: INTERNAL MEDICINE

## 2017-10-12 VITALS
DIASTOLIC BLOOD PRESSURE: 60 MMHG | WEIGHT: 213 LBS | HEIGHT: 68 IN | SYSTOLIC BLOOD PRESSURE: 128 MMHG | HEART RATE: 60 BPM | BODY MASS INDEX: 32.28 KG/M2

## 2017-10-12 DIAGNOSIS — I25.5 ISCHEMIC CARDIOMYOPATHY: ICD-10-CM

## 2017-10-12 DIAGNOSIS — R94.39 ABNORMAL NUCLEAR STRESS TEST: ICD-10-CM

## 2017-10-12 LAB
BH CV ECHO MEAS - ACS: 1.6 CM
BH CV ECHO MEAS - AO MAX PG (FULL): 3.7 MMHG
BH CV ECHO MEAS - AO MAX PG: 6 MMHG
BH CV ECHO MEAS - AO MEAN PG (FULL): 1.9 MMHG
BH CV ECHO MEAS - AO MEAN PG: 3 MMHG
BH CV ECHO MEAS - AO ROOT AREA (BSA CORRECTED): 1.7
BH CV ECHO MEAS - AO ROOT AREA: 10 CM^2
BH CV ECHO MEAS - AO ROOT DIAM: 3.6 CM
BH CV ECHO MEAS - AO V2 MAX: 122.7 CM/SEC
BH CV ECHO MEAS - AO V2 MEAN: 79.8 CM/SEC
BH CV ECHO MEAS - AO V2 VTI: 23.3 CM
BH CV ECHO MEAS - AVA(I,A): 3.1 CM^2
BH CV ECHO MEAS - AVA(I,D): 3.1 CM^2
BH CV ECHO MEAS - AVA(V,A): 2.1 CM^2
BH CV ECHO MEAS - AVA(V,D): 2.1 CM^2
BH CV ECHO MEAS - BSA(HAYCOCK): 2.2 M^2
BH CV ECHO MEAS - BSA: 2.1 M^2
BH CV ECHO MEAS - BZI_BMI: 32.4 KILOGRAMS/M^2
BH CV ECHO MEAS - BZI_METRIC_HEIGHT: 172.7 CM
BH CV ECHO MEAS - BZI_METRIC_WEIGHT: 96.6 KG
BH CV ECHO MEAS - CONTRAST EF (2CH): 55.4 ML/M^2
BH CV ECHO MEAS - CONTRAST EF 4CH: 56 ML/M^2
BH CV ECHO MEAS - EDV(MOD-SP2): 83 ML
BH CV ECHO MEAS - EDV(MOD-SP4): 91 ML
BH CV ECHO MEAS - EDV(TEICH): 127.6 ML
BH CV ECHO MEAS - EF(CUBED): 79.4 %
BH CV ECHO MEAS - EF(MOD-SP2): 55.4 %
BH CV ECHO MEAS - EF(MOD-SP4): 56 %
BH CV ECHO MEAS - EF(TEICH): 71.4 %
BH CV ECHO MEAS - ESV(MOD-SP2): 37 ML
BH CV ECHO MEAS - ESV(MOD-SP4): 40 ML
BH CV ECHO MEAS - ESV(TEICH): 36.5 ML
BH CV ECHO MEAS - FS: 40.9 %
BH CV ECHO MEAS - IVS/LVPW: 1
BH CV ECHO MEAS - IVSD: 1.3 CM
BH CV ECHO MEAS - LAT PEAK E' VEL: 7 CM/SEC
BH CV ECHO MEAS - LV DIASTOLIC VOL/BSA (35-75): 43.3 ML/M^2
BH CV ECHO MEAS - LV MASS(C)D: 258.1 GRAMS
BH CV ECHO MEAS - LV MASS(C)DI: 122.9 GRAMS/M^2
BH CV ECHO MEAS - LV MAX PG: 2.3 MMHG
BH CV ECHO MEAS - LV MEAN PG: 1.1 MMHG
BH CV ECHO MEAS - LV SYSTOLIC VOL/BSA (12-30): 19.1 ML/M^2
BH CV ECHO MEAS - LV V1 MAX: 76.3 CM/SEC
BH CV ECHO MEAS - LV V1 MEAN: 47.5 CM/SEC
BH CV ECHO MEAS - LV V1 VTI: 21.1 CM
BH CV ECHO MEAS - LVIDD: 5.2 CM
BH CV ECHO MEAS - LVIDS: 3.1 CM
BH CV ECHO MEAS - LVLD AP2: 7.9 CM
BH CV ECHO MEAS - LVLD AP4: 7.4 CM
BH CV ECHO MEAS - LVLS AP2: 6.4 CM
BH CV ECHO MEAS - LVLS AP4: 6.8 CM
BH CV ECHO MEAS - LVOT AREA (M): 3.5 CM^2
BH CV ECHO MEAS - LVOT AREA: 3.4 CM^2
BH CV ECHO MEAS - LVOT DIAM: 2.1 CM
BH CV ECHO MEAS - LVPWD: 1.2 CM
BH CV ECHO MEAS - MED PEAK E' VEL: 6 CM/SEC
BH CV ECHO MEAS - MR MAX PG: 60.4 MMHG
BH CV ECHO MEAS - MR MAX VEL: 388.6 CM/SEC
BH CV ECHO MEAS - MV A DUR: 0.12 SEC
BH CV ECHO MEAS - MV A MAX VEL: 100.9 CM/SEC
BH CV ECHO MEAS - MV DEC SLOPE: 294.2 CM/SEC^2
BH CV ECHO MEAS - MV DEC TIME: 0.21 SEC
BH CV ECHO MEAS - MV E MAX VEL: 62.1 CM/SEC
BH CV ECHO MEAS - MV E/A: 0.62
BH CV ECHO MEAS - MV P1/2T MAX VEL: 64 CM/SEC
BH CV ECHO MEAS - MV P1/2T: 63.7 MSEC
BH CV ECHO MEAS - MVA P1/2T LCG: 3.4 CM^2
BH CV ECHO MEAS - MVA(P1/2T): 3.5 CM^2
BH CV ECHO MEAS - PA MAX PG (FULL): 2.4 MMHG
BH CV ECHO MEAS - PA MAX PG: 3.1 MMHG
BH CV ECHO MEAS - PA V2 MAX: 88 CM/SEC
BH CV ECHO MEAS - PULM A REVS DUR: 0.12 SEC
BH CV ECHO MEAS - PULM A REVS VEL: 28.4 CM/SEC
BH CV ECHO MEAS - PULM DIAS VEL: 28.9 CM/SEC
BH CV ECHO MEAS - PULM S/D: 1.5
BH CV ECHO MEAS - PULM SYS VEL: 41.9 CM/SEC
BH CV ECHO MEAS - PVA(V,A): 1.4 CM^2
BH CV ECHO MEAS - PVA(V,D): 1.4 CM^2
BH CV ECHO MEAS - QP/QS: 0.32
BH CV ECHO MEAS - RAP SYSTOLE: 3 MMHG
BH CV ECHO MEAS - RV MAX PG: 0.73 MMHG
BH CV ECHO MEAS - RV MEAN PG: 0.3 MMHG
BH CV ECHO MEAS - RV V1 MAX: 42.7 CM/SEC
BH CV ECHO MEAS - RV V1 MEAN: 23.6 CM/SEC
BH CV ECHO MEAS - RV V1 VTI: 8.2 CM
BH CV ECHO MEAS - RVOT AREA: 2.8 CM^2
BH CV ECHO MEAS - RVOT DIAM: 1.9 CM
BH CV ECHO MEAS - SI(AO): 111.1 ML/M^2
BH CV ECHO MEAS - SI(CUBED): 52.2 ML/M^2
BH CV ECHO MEAS - SI(LVOT): 34.4 ML/M^2
BH CV ECHO MEAS - SI(MOD-SP2): 21.9 ML/M^2
BH CV ECHO MEAS - SI(MOD-SP4): 24.3 ML/M^2
BH CV ECHO MEAS - SI(TEICH): 43.4 ML/M^2
BH CV ECHO MEAS - SUP REN AO DIAM: 1.9 CM
BH CV ECHO MEAS - SV(AO): 233.2 ML
BH CV ECHO MEAS - SV(CUBED): 109.5 ML
BH CV ECHO MEAS - SV(LVOT): 72.2 ML
BH CV ECHO MEAS - SV(MOD-SP2): 46 ML
BH CV ECHO MEAS - SV(MOD-SP4): 51 ML
BH CV ECHO MEAS - SV(RVOT): 23.1 ML
BH CV ECHO MEAS - SV(TEICH): 91.1 ML
BH CV ECHO MEAS - TAPSE (>1.6): 2.4 CM2
BH CV XLRA - RV BASE: 3.3 CM
BH CV XLRA - TDI S': 12 CM/SEC
E/E' RATIO: 9.5
LEFT ATRIUM VOLUME INDEX: 19 ML/M2
SINUS: 3.2 CM
STJ: 2.7 CM

## 2017-10-12 PROCEDURE — 93306 TTE W/DOPPLER COMPLETE: CPT

## 2017-10-12 PROCEDURE — 93306 TTE W/DOPPLER COMPLETE: CPT | Performed by: INTERNAL MEDICINE

## 2017-10-12 PROCEDURE — 25010000002 PERFLUTREN (DEFINITY) 8.476 MG IN SODIUM CHLORIDE 0.9 % 10 ML INJECTION: Performed by: INTERNAL MEDICINE

## 2017-10-12 RX ADMIN — PERFLUTREN 1.5 ML: 6.52 INJECTION, SUSPENSION INTRAVENOUS at 09:15

## 2017-10-27 ENCOUNTER — OFFICE VISIT (OUTPATIENT)
Dept: INTERNAL MEDICINE | Facility: CLINIC | Age: 77
End: 2017-10-27

## 2017-10-27 VITALS
TEMPERATURE: 96.7 F | BODY MASS INDEX: 33.04 KG/M2 | WEIGHT: 218 LBS | HEART RATE: 88 BPM | OXYGEN SATURATION: 95 % | HEIGHT: 68 IN | DIASTOLIC BLOOD PRESSURE: 56 MMHG | SYSTOLIC BLOOD PRESSURE: 124 MMHG

## 2017-10-27 DIAGNOSIS — Z86.73 HISTORY OF ISCHEMIC STROKE WITHOUT RESIDUAL DEFICITS: ICD-10-CM

## 2017-10-27 DIAGNOSIS — D63.1 ANEMIA IN STAGE 3 CHRONIC KIDNEY DISEASE (HCC): ICD-10-CM

## 2017-10-27 DIAGNOSIS — I10 ESSENTIAL HYPERTENSION: Primary | ICD-10-CM

## 2017-10-27 DIAGNOSIS — J01.10 ACUTE FRONTAL SINUSITIS, RECURRENCE NOT SPECIFIED: ICD-10-CM

## 2017-10-27 DIAGNOSIS — N18.30 CKD (CHRONIC KIDNEY DISEASE) STAGE 3, GFR 30-59 ML/MIN (HCC): ICD-10-CM

## 2017-10-27 DIAGNOSIS — N18.30 ANEMIA IN STAGE 3 CHRONIC KIDNEY DISEASE (HCC): ICD-10-CM

## 2017-10-27 DIAGNOSIS — E11.40 TYPE 2 DIABETES MELLITUS WITH DIABETIC NEUROPATHY, WITHOUT LONG-TERM CURRENT USE OF INSULIN (HCC): ICD-10-CM

## 2017-10-27 PROCEDURE — 99214 OFFICE O/P EST MOD 30 MIN: CPT | Performed by: FAMILY MEDICINE

## 2017-10-27 RX ORDER — AMOXICILLIN 500 MG/1
500 CAPSULE ORAL 2 TIMES DAILY
Qty: 20 CAPSULE | Refills: 0 | Status: SHIPPED | OUTPATIENT
Start: 2017-10-27 | End: 2018-02-06 | Stop reason: DRUGHIGH

## 2017-10-27 RX ORDER — AMOXICILLIN AND CLAVULANATE POTASSIUM 875; 125 MG/1; MG/1
1 TABLET, FILM COATED ORAL 2 TIMES DAILY
Qty: 20 TABLET | Refills: 0 | Status: SHIPPED | OUTPATIENT
Start: 2017-10-27 | End: 2018-02-06 | Stop reason: DRUGHIGH

## 2017-10-27 NOTE — PROGRESS NOTES
Subjective   Santino Retana is a 77 y.o. male.     Chief Complaint   Patient presents with   • Coronary Artery Disease   • Cerebrovascular Accident   • Hypertension   • Hyperlipidemia   • Sinusitis         History of Present Illness   Patient is a new patient with a history of cerebrovascular accident with right hemiparesis following carotid endarterectomy and he also had CAD.  He sees Dr. Deshpande cardiology.  He is here as a new patient.  Since being in the hospital in July he has had persistent postnasal sinus drainage.  He has clinical evidence of a sinusitis and frontal sinuses.  Otherwise he is quite sharp.  Medications are reviewed including for hypertension hyperlipidemia and also diabetes type 2.  He will come back in 6 months for labs.  Labs were done recently was in the hospital as well.    He is up-to-date on immunizations.      The following portions of the patient's history were reviewed and updated as appropriate: allergies, current medications, past social history and problem list.    Review of Systems   HENT: Positive for postnasal drip and rhinorrhea.    Eyes: Negative.    Respiratory: Negative.    Cardiovascular: Negative.    Gastrointestinal: Negative.    Endocrine: Negative.    Genitourinary: Negative.    Musculoskeletal: Negative.    Skin: Negative.    Allergic/Immunologic: Negative.    Neurological: Positive for weakness.   Hematological: Negative.    Psychiatric/Behavioral: Negative.        Objective   Vitals:    10/27/17 1315   BP: 124/56   Pulse: 88   Temp: 96.7 °F (35.9 °C)   SpO2: 95%     Physical Exam   Constitutional: He is oriented to person, place, and time. He appears well-developed and well-nourished.   HENT:   Head: Normocephalic and atraumatic.   Right Ear: Tympanic membrane and external ear normal.   Left Ear: Tympanic membrane and external ear normal.   Nose: Nose normal.   Mouth/Throat: Posterior oropharyngeal edema and posterior oropharyngeal erythema present.   Eyes:  Conjunctivae and EOM are normal. Pupils are equal, round, and reactive to light.   Neck: Normal range of motion. Neck supple. No JVD present. No thyromegaly present.   Cardiovascular: Normal rate, regular rhythm, normal heart sounds and intact distal pulses.    Pulmonary/Chest: Effort normal and breath sounds normal.   Abdominal: Soft. Bowel sounds are normal.   Musculoskeletal: Normal range of motion.   Lymphadenopathy:     He has no cervical adenopathy.   Neurological: He is alert and oriented to person, place, and time. No cranial nerve deficit. He exhibits abnormal muscle tone. Coordination and gait abnormal.   Right hemiparesis which is partial.   Skin: Skin is warm and dry. No rash noted.   Psychiatric: He has a normal mood and affect. His behavior is normal. Judgment and thought content normal.   Vitals reviewed.      Assessment/Plan   Problem List Items Addressed This Visit        Cardiovascular and Mediastinum    Hypertension - Primary       Endocrine    Type 2 diabetes mellitus with diabetic neuropathy, without long-term current use of insulin       Genitourinary    CKD (chronic kidney disease) stage 3, GFR 30-59 ml/min       Hematopoietic and Hemostatic    Anemia in stage 3 chronic kidney disease       Other    History of ischemic stroke without residual deficits      Other Visit Diagnoses     Acute frontal sinusitis, recurrence not specified          Plan: Meds continued add amoxicillin 500 twice a day plus Augmentin 875 twice a day for 10 days for sinusitis.  Recheck in 6 months with labs that time.  Advised shingles vaccine at the pharmacy.  He is up-to-date on flu and Prevnar 13 as well as Pneumovax.

## 2017-12-22 RX ORDER — GABAPENTIN 400 MG/1
CAPSULE ORAL
Qty: 180 CAPSULE | Refills: 1 | Status: SHIPPED | OUTPATIENT
Start: 2017-12-22 | End: 2018-05-16

## 2018-01-30 RX ORDER — TAMSULOSIN HYDROCHLORIDE 0.4 MG/1
1 CAPSULE ORAL NIGHTLY
Qty: 90 CAPSULE | Refills: 1 | Status: SHIPPED | OUTPATIENT
Start: 2018-01-30 | End: 2018-07-23 | Stop reason: SDUPTHER

## 2018-02-06 ENCOUNTER — OFFICE VISIT (OUTPATIENT)
Dept: INTERNAL MEDICINE | Facility: CLINIC | Age: 78
End: 2018-02-06

## 2018-02-06 VITALS
TEMPERATURE: 97 F | BODY MASS INDEX: 34.21 KG/M2 | DIASTOLIC BLOOD PRESSURE: 52 MMHG | HEART RATE: 85 BPM | WEIGHT: 225 LBS | SYSTOLIC BLOOD PRESSURE: 116 MMHG | OXYGEN SATURATION: 95 %

## 2018-02-06 DIAGNOSIS — I10 ESSENTIAL HYPERTENSION: ICD-10-CM

## 2018-02-06 DIAGNOSIS — D63.1 ANEMIA IN STAGE 3 CHRONIC KIDNEY DISEASE (HCC): ICD-10-CM

## 2018-02-06 DIAGNOSIS — N18.30 ANEMIA IN STAGE 3 CHRONIC KIDNEY DISEASE (HCC): ICD-10-CM

## 2018-02-06 DIAGNOSIS — N18.30 CKD (CHRONIC KIDNEY DISEASE) STAGE 3, GFR 30-59 ML/MIN (HCC): ICD-10-CM

## 2018-02-06 DIAGNOSIS — E11.40 TYPE 2 DIABETES MELLITUS WITH DIABETIC NEUROPATHY, WITHOUT LONG-TERM CURRENT USE OF INSULIN (HCC): ICD-10-CM

## 2018-02-06 DIAGNOSIS — M47.26 OSTEOARTHRITIS OF SPINE WITH RADICULOPATHY, LUMBAR REGION: Primary | ICD-10-CM

## 2018-02-06 LAB
ALBUMIN SERPL-MCNC: 4.3 G/DL (ref 3.5–5.2)
ALBUMIN/GLOB SERPL: 1.7 G/DL
ALP SERPL-CCNC: 82 U/L (ref 39–117)
ALT SERPL-CCNC: 14 U/L (ref 1–41)
APPEARANCE UR: CLEAR
AST SERPL-CCNC: 10 U/L (ref 1–40)
BACTERIA #/AREA URNS HPF: NORMAL /HPF
BASOPHILS # BLD AUTO: 0.03 10*3/MM3 (ref 0–0.2)
BASOPHILS NFR BLD AUTO: 0.4 % (ref 0–1.5)
BILIRUB SERPL-MCNC: 0.3 MG/DL (ref 0.1–1.2)
BILIRUB UR QL STRIP: NEGATIVE
BUN SERPL-MCNC: 31 MG/DL (ref 8–23)
BUN/CREAT SERPL: 18.1 (ref 7–25)
CALCIUM SERPL-MCNC: 9.6 MG/DL (ref 8.6–10.5)
CASTS URNS MICRO: NORMAL
CHLORIDE SERPL-SCNC: 104 MMOL/L (ref 98–107)
CHOLEST SERPL-MCNC: 152 MG/DL (ref 0–200)
CHOLEST/HDLC SERPL: 3.9 {RATIO}
CO2 SERPL-SCNC: 30.6 MMOL/L (ref 22–29)
COLOR UR: YELLOW
CREAT SERPL-MCNC: 1.71 MG/DL (ref 0.76–1.27)
EOSINOPHIL # BLD AUTO: 0.25 10*3/MM3 (ref 0–0.7)
EOSINOPHIL NFR BLD AUTO: 3.2 % (ref 0.3–6.2)
EPI CELLS #/AREA URNS HPF: NORMAL /HPF
ERYTHROCYTE [DISTWIDTH] IN BLOOD BY AUTOMATED COUNT: 13.3 % (ref 11.5–14.5)
GFR SERPLBLD CREATININE-BSD FMLA CKD-EPI: 39 ML/MIN/1.73
GFR SERPLBLD CREATININE-BSD FMLA CKD-EPI: 48 ML/MIN/1.73
GLOBULIN SER CALC-MCNC: 2.5 GM/DL
GLUCOSE SERPL-MCNC: 283 MG/DL (ref 65–99)
GLUCOSE UR QL: ABNORMAL
HBA1C MFR BLD: 8.76 % (ref 4.8–5.6)
HCT VFR BLD AUTO: 41.3 % (ref 40.4–52.2)
HDLC SERPL-MCNC: 39 MG/DL (ref 40–60)
HGB BLD-MCNC: 12.6 G/DL (ref 13.7–17.6)
HGB UR QL STRIP: NEGATIVE
IMM GRANULOCYTES # BLD: 0.02 10*3/MM3 (ref 0–0.03)
IMM GRANULOCYTES NFR BLD: 0.3 % (ref 0–0.5)
KETONES UR QL STRIP: NEGATIVE
LDLC SERPL CALC-MCNC: 68 MG/DL (ref 0–100)
LEUKOCYTE ESTERASE UR QL STRIP: NEGATIVE
LYMPHOCYTES # BLD AUTO: 2.16 10*3/MM3 (ref 0.9–4.8)
LYMPHOCYTES NFR BLD AUTO: 28 % (ref 19.6–45.3)
MCH RBC QN AUTO: 30.3 PG (ref 27–32.7)
MCHC RBC AUTO-ENTMCNC: 30.5 G/DL (ref 32.6–36.4)
MCV RBC AUTO: 99.3 FL (ref 79.8–96.2)
MONOCYTES # BLD AUTO: 0.71 10*3/MM3 (ref 0.2–1.2)
MONOCYTES NFR BLD AUTO: 9.2 % (ref 5–12)
NEUTROPHILS # BLD AUTO: 4.54 10*3/MM3 (ref 1.9–8.1)
NEUTROPHILS NFR BLD AUTO: 58.9 % (ref 42.7–76)
NITRITE UR QL STRIP: NEGATIVE
PH UR STRIP: 5.5 [PH] (ref 5–8)
PLATELET # BLD AUTO: 238 10*3/MM3 (ref 140–500)
POTASSIUM SERPL-SCNC: 5.5 MMOL/L (ref 3.5–5.2)
PROT SERPL-MCNC: 6.8 G/DL (ref 6–8.5)
PROT UR QL STRIP: ABNORMAL
RBC # BLD AUTO: 4.16 10*6/MM3 (ref 4.6–6)
RBC #/AREA URNS HPF: NORMAL /HPF
SODIUM SERPL-SCNC: 143 MMOL/L (ref 136–145)
SP GR UR: 1.02 (ref 1–1.03)
TRIGL SERPL-MCNC: 225 MG/DL (ref 0–150)
UROBILINOGEN UR STRIP-MCNC: ABNORMAL MG/DL
VLDLC SERPL CALC-MCNC: 45 MG/DL (ref 5–40)
WBC # BLD AUTO: 7.71 10*3/MM3 (ref 4.5–10.7)
WBC #/AREA URNS HPF: NORMAL /HPF

## 2018-02-06 PROCEDURE — 99214 OFFICE O/P EST MOD 30 MIN: CPT | Performed by: FAMILY MEDICINE

## 2018-02-06 RX ORDER — KETOCONAZOLE 20 MG/G
CREAM TOPICAL DAILY
Qty: 30 G | Refills: 1 | Status: SHIPPED | OUTPATIENT
Start: 2018-02-06 | End: 2018-02-07 | Stop reason: SDUPTHER

## 2018-02-06 RX ORDER — KETOCONAZOLE 20 MG/G
CREAM TOPICAL DAILY
Qty: 30 G | Refills: 1 | Status: SHIPPED | OUTPATIENT
Start: 2018-02-06 | End: 2018-02-06 | Stop reason: SDUPTHER

## 2018-02-06 RX ORDER — TRAMADOL HYDROCHLORIDE 50 MG/1
TABLET ORAL
Qty: 24 TABLET | Refills: 0 | Status: SHIPPED | OUTPATIENT
Start: 2018-02-06 | End: 2018-05-16

## 2018-02-06 RX ORDER — LISINOPRIL 10 MG/1
TABLET ORAL
Refills: 0 | COMMUNITY
Start: 2017-11-17 | End: 2018-05-20 | Stop reason: HOSPADM

## 2018-02-06 NOTE — PROGRESS NOTES
Subjective   Santino Retana is a 77 y.o. male.     Chief Complaint   Patient presents with   • Back Pain   • Diabetes         History of Present Illness   Patient has progressive continued back pain with a distant x-ray done in 2013 showing multilevel DJD with a history of heavy lifting with plumbing.  He has increasing back pain every day and has at night is worse with standing.    Otherwise he has diabetic peripheral neuropathy unrelated to back pain.    Reviewed his treatment of diabetes hypertension hyperlipidemia we'll get labs today as well.      The following portions of the patient's history were reviewed and updated as appropriate: allergies, current medications, past social history and problem list.    Review of Systems   Constitutional: Negative.    HENT: Negative.    Eyes: Negative.    Respiratory: Negative.    Cardiovascular: Negative.    Gastrointestinal: Negative.    Endocrine: Negative.    Genitourinary: Negative.    Musculoskeletal: Positive for back pain.   Skin: Negative.    Allergic/Immunologic: Negative.    Neurological: Positive for numbness.   Hematological: Negative.    Psychiatric/Behavioral: Negative.        Objective   Vitals:    02/06/18 1303   BP: 116/52   Pulse: 85   Temp: 97 °F (36.1 °C)   SpO2: 95%     Physical Exam   Constitutional: He is oriented to person, place, and time. He appears well-developed and well-nourished.   HENT:   Head: Normocephalic and atraumatic.   Right Ear: Tympanic membrane and external ear normal.   Left Ear: Tympanic membrane and external ear normal.   Nose: Nose normal.   Mouth/Throat: Oropharynx is clear and moist.   Eyes: Conjunctivae and EOM are normal. Pupils are equal, round, and reactive to light.   Neck: Normal range of motion. Neck supple. No JVD present. No thyromegaly present.   Cardiovascular: Normal rate, regular rhythm, normal heart sounds and intact distal pulses.    Pulmonary/Chest: Effort normal and breath sounds normal.   Abdominal: Soft.  Bowel sounds are normal.   Musculoskeletal:        Lumbar back: He exhibits decreased range of motion.   Lymphadenopathy:     He has no cervical adenopathy.   Neurological: He is alert and oriented to person, place, and time. No cranial nerve deficit. Coordination normal.   Skin: Skin is warm and dry. No rash noted.   Psychiatric: He has a normal mood and affect. His behavior is normal. Judgment and thought content normal.   Vitals reviewed.      Assessment/Plan   Problem List Items Addressed This Visit        Cardiovascular and Mediastinum    Hypertension    Relevant Medications    lisinopril (PRINIVIL,ZESTRIL) 10 MG tablet    Other Relevant Orders    CBC & Differential    Comprehensive Metabolic Panel    Hemoglobin A1c    Lipid Panel With / Chol / HDL Ratio    Urinalysis With / Microscopic If Indicated - Urine, Clean Catch       Endocrine    Type 2 diabetes mellitus with diabetic neuropathy, without long-term current use of insulin    Relevant Orders    CBC & Differential    Comprehensive Metabolic Panel    Hemoglobin A1c    Lipid Panel With / Chol / HDL Ratio    Urinalysis With / Microscopic If Indicated - Urine, Clean Catch       Genitourinary    CKD (chronic kidney disease) stage 3, GFR 30-59 ml/min    Relevant Orders    CBC & Differential    Comprehensive Metabolic Panel    Hemoglobin A1c    Lipid Panel With / Chol / HDL Ratio    Urinalysis With / Microscopic If Indicated - Urine, Clean Catch       Hematopoietic and Hemostatic    Anemia in stage 3 chronic kidney disease    Relevant Orders    CBC & Differential    Comprehensive Metabolic Panel    Hemoglobin A1c    Lipid Panel With / Chol / HDL Ratio    Urinalysis With / Microscopic If Indicated - Urine, Clean Catch      Other Visit Diagnoses     Osteoarthritis of spine with radiculopathy, lumbar region    -  Primary    Relevant Orders    MRI Lumbar Spine Without Contrast    CBC & Differential    Comprehensive Metabolic Panel    Hemoglobin A1c    Lipid Panel  With / Chol / HDL Ratio    Urinalysis With / Microscopic If Indicated - Urine, Clean Catch      Plan: Screening labs regarding diabetes type 2 diabetes hyperlipidemia.  Meds when the same.  According to [place] state law will add 3 days of tramadol 50 mg 1-2 every 4 when necessary #24 no refill.  We have an MRI him back in a month.  See if any further intervention is necessary and consider chronic pain management agreement with tramadol.  Continue gabapentin for diabetic peripheral neuropathy.

## 2018-02-07 RX ORDER — KETOCONAZOLE 20 MG/G
CREAM TOPICAL DAILY
Qty: 30 G | Refills: 1 | OUTPATIENT
Start: 2018-02-07 | End: 2018-06-14 | Stop reason: ALTCHOICE

## 2018-02-13 ENCOUNTER — HOSPITAL ENCOUNTER (OUTPATIENT)
Dept: MRI IMAGING | Facility: HOSPITAL | Age: 78
Discharge: HOME | End: 2018-02-13
Admitting: FAMILY MEDICINE

## 2018-02-13 DIAGNOSIS — M47.26 OSTEOARTHRITIS OF SPINE WITH RADICULOPATHY, LUMBAR REGION: ICD-10-CM

## 2018-02-13 PROCEDURE — 72148 MRI LUMBAR SPINE W/O DYE: CPT

## 2018-02-14 ENCOUNTER — TELEPHONE (OUTPATIENT)
Dept: INTERNAL MEDICINE | Facility: CLINIC | Age: 78
End: 2018-02-14

## 2018-02-14 NOTE — TELEPHONE ENCOUNTER
Please send the pt a copy of his labs with a note explaining his results and if any changes need to be made.

## 2018-02-19 DIAGNOSIS — M79.605 LEFT LEG PAIN: Primary | ICD-10-CM

## 2018-02-19 DIAGNOSIS — R93.89 ABNORMAL MRI: ICD-10-CM

## 2018-02-19 RX ORDER — GLIPIZIDE 5 MG/1
TABLET ORAL
Qty: 60 TABLET | Refills: 5 | Status: SHIPPED | OUTPATIENT
Start: 2018-02-19 | End: 2018-05-16

## 2018-02-27 ENCOUNTER — OFFICE VISIT (OUTPATIENT)
Dept: CARDIOLOGY | Facility: CLINIC | Age: 78
End: 2018-02-27

## 2018-02-27 VITALS
BODY MASS INDEX: 33.95 KG/M2 | DIASTOLIC BLOOD PRESSURE: 74 MMHG | WEIGHT: 224 LBS | HEART RATE: 76 BPM | HEIGHT: 68 IN | SYSTOLIC BLOOD PRESSURE: 122 MMHG

## 2018-02-27 DIAGNOSIS — I21.4 NSTEMI (NON-ST ELEVATION MYOCARDIAL INFARCTION) (HCC): Primary | ICD-10-CM

## 2018-02-27 DIAGNOSIS — E11.59 TYPE 2 DIABETES MELLITUS WITH OTHER CIRCULATORY COMPLICATION, WITHOUT LONG-TERM CURRENT USE OF INSULIN (HCC): ICD-10-CM

## 2018-02-27 DIAGNOSIS — I77.9 BILATERAL CAROTID ARTERY DISEASE (HCC): ICD-10-CM

## 2018-02-27 DIAGNOSIS — I63.132 CEREBROVASCULAR ACCIDENT (CVA) DUE TO EMBOLISM OF LEFT CAROTID ARTERY (HCC): ICD-10-CM

## 2018-02-27 PROCEDURE — 93000 ELECTROCARDIOGRAM COMPLETE: CPT | Performed by: INTERNAL MEDICINE

## 2018-02-27 PROCEDURE — 99213 OFFICE O/P EST LOW 20 MIN: CPT | Performed by: INTERNAL MEDICINE

## 2018-03-02 DIAGNOSIS — R93.89 ABNORMAL MRI: ICD-10-CM

## 2018-03-02 DIAGNOSIS — M79.605 LEFT LEG PAIN: Primary | ICD-10-CM

## 2018-03-09 ENCOUNTER — OFFICE VISIT (OUTPATIENT)
Dept: INTERNAL MEDICINE | Facility: CLINIC | Age: 78
End: 2018-03-09

## 2018-03-09 VITALS
SYSTOLIC BLOOD PRESSURE: 132 MMHG | TEMPERATURE: 97.6 F | DIASTOLIC BLOOD PRESSURE: 60 MMHG | BODY MASS INDEX: 34.06 KG/M2 | HEART RATE: 87 BPM | OXYGEN SATURATION: 93 % | WEIGHT: 224 LBS

## 2018-03-09 DIAGNOSIS — M47.26 OSTEOARTHRITIS OF SPINE WITH RADICULOPATHY, LUMBAR REGION: ICD-10-CM

## 2018-03-09 DIAGNOSIS — M54.40 CHRONIC LOW BACK PAIN WITH SCIATICA, SCIATICA LATERALITY UNSPECIFIED, UNSPECIFIED BACK PAIN LATERALITY: ICD-10-CM

## 2018-03-09 DIAGNOSIS — E08.41 DIABETIC MONONEUROPATHY ASSOCIATED WITH DIABETES MELLITUS DUE TO UNDERLYING CONDITION (HCC): ICD-10-CM

## 2018-03-09 DIAGNOSIS — R13.12 OROPHARYNGEAL DYSPHAGIA: Primary | ICD-10-CM

## 2018-03-09 DIAGNOSIS — Z86.73 HISTORY OF ISCHEMIC STROKE WITHOUT RESIDUAL DEFICITS: ICD-10-CM

## 2018-03-09 DIAGNOSIS — G89.29 CHRONIC LOW BACK PAIN WITH SCIATICA, SCIATICA LATERALITY UNSPECIFIED, UNSPECIFIED BACK PAIN LATERALITY: ICD-10-CM

## 2018-03-09 PROCEDURE — 99214 OFFICE O/P EST MOD 30 MIN: CPT | Performed by: FAMILY MEDICINE

## 2018-03-09 NOTE — PROGRESS NOTES
Subjective   Santino Retana is a 77 y.o. male.     Chief Complaint   Patient presents with   • Back Pain   • GI Problem   • Cerebrovascular Accident         History of Present Illness     Patient with the follow-up on MRI with history of DJD and DDD lumbar spine multi-level and evidence of sciatica and evidence clinically of neuro claudication with ambulation or standing.    He is also had history of stroke and does have some dysphagia for solids and liquids at times.  We discussed getting of video swallow.  The following portions of the patient's history were reviewed and updated as appropriate: allergies, current medications, past social history and problem list.    Review of Systems   HENT: Negative.    Eyes: Negative.    Respiratory: Negative.    Cardiovascular: Negative.    Gastrointestinal: Negative.    Endocrine: Negative.    Genitourinary: Negative.    Musculoskeletal: Positive for back pain.   Skin: Negative.    Allergic/Immunologic: Negative.    Neurological: Positive for weakness.   Hematological: Negative.    Psychiatric/Behavioral: Negative.        Objective   Vitals:    03/09/18 1608   BP: 132/60   Pulse: 87   Temp: 97.6 °F (36.4 °C)   SpO2: 93%     Physical Exam   Constitutional: He is oriented to person, place, and time. He appears well-developed and well-nourished.   HENT:   Head: Normocephalic and atraumatic.   Right Ear: Tympanic membrane and external ear normal.   Left Ear: Tympanic membrane and external ear normal.   Nose: Nose normal.   Mouth/Throat: Oropharynx is clear and moist.   Eyes: Conjunctivae and EOM are normal. Pupils are equal, round, and reactive to light.   Neck: Normal range of motion. Neck supple. No JVD present. No thyromegaly present.   Cardiovascular: Normal rate, regular rhythm, normal heart sounds and intact distal pulses.    Pulmonary/Chest: Effort normal and breath sounds normal.   Abdominal: Soft. Bowel sounds are normal.   Musculoskeletal:        Thoracic back: He  exhibits decreased range of motion.        Lumbar back: He exhibits decreased range of motion.   Lymphadenopathy:     He has no cervical adenopathy.   Neurological: He is alert and oriented to person, place, and time. No cranial nerve deficit. Coordination and gait abnormal.   Skin: Skin is warm and dry. No rash noted.   Psychiatric: He has a normal mood and affect. His behavior is normal. Judgment and thought content normal.   Vitals reviewed.      Assessment/Plan   Problem List Items Addressed This Visit        Endocrine    Diabetic mononeuropathy associated with diabetes mellitus due to underlying condition       Nervous and Auditory    Osteoarthritis of spine with radiculopathy, lumbar region    Chronic low back pain with sciatica       Other    History of ischemic stroke without residual deficits    Relevant Orders    FL Video Swallow With Speech      Other Visit Diagnoses     Oropharyngeal dysphagia    -  Primary    Relevant Orders    FL Video Swallow With Speech      Plan: Schedule video swallow under fluoroscopy with speech therapy.  He is to see the nurse practitioner with neurosurgery.  We'll see him back in about 3 months for review.  He is on gabapentin and tramadol when necessary for pain.  Gabapentin 6 anymore effective for diabetic nerve pain.

## 2018-03-12 RX ORDER — ATORVASTATIN CALCIUM 20 MG/1
20 TABLET, FILM COATED ORAL NIGHTLY
Qty: 90 TABLET | Refills: 3 | Status: SHIPPED | OUTPATIENT
Start: 2018-03-12 | End: 2019-03-07 | Stop reason: SDUPTHER

## 2018-03-12 NOTE — PROGRESS NOTES
Date of Office Visit: 2018  Encounter Provider: Bennie Paul MD  Place of Service: [facility] CARDIOLOGY  Patient Name: Santino Retana  :1940    Chief complaint: Follow-up for NSTEMI, CVA, carotid artery disease.    History of Present Illness:    I again had the pleasure of seeing your patient in cardiology office on 2018. As  you well know, he is a very pleasant, 77 year-old white male with a medical history  significant for diabetes, hypertension, and chronic kidney disease who presents for  follow-up. The patient initially saw me on 2014, with complaints of dyspnea on  exertion, which had not changed significantly. However, given that he was diabetic, he  did undergo an exercise stress echocardiogram on 2014. This showed an   ejection fraction of 58% with grade 1 diastolic dysfunction and no significant valvular   disease. There was no evidence of ischemia on the study.    The patient was to undergo an elective right hip replacement, and as part of his   work-up, had screening ultrasound of his carotid arteries.  This showed significant   bilateral disease.  A CT angiogram in 2016 confirmed 85-90% stenosis in   the right carotid artery and 70-75% stenosis in the left carotid artery.  He subsequently   underwent a right carotid endarterectomy on 2016 at [facility].    He did eventually also have his right hip replacement on 2016 at [facility].    The patient underwent a left carotid endarterectomy electively on 2017 by Dr. Washington   at [facility].  Postoperatively, he was found to have right sided   hemiparesis and aphasia.  He was subsequently found to have a significant   perioperative stroke.  He had multiple complications afterwards, including hypoxic   respiratory failure, urinary retention, acute on chronic kidney injury, and a non-ST   elevation myocardial infarction.  His  troponin peaked at 1.04, and he did have some   EKG changes as well.  He was treated aggressively with heparin, IV Lopressor, and   aspirin.  An echocardiogram performed on 7/19/2017 showed an ejection fraction of   62%, mild LVH, and no significant valve disease.  He did not undergo a cardiac   catheterization at that time given his lack of symptoms and the recent stroke.    After being treated medically, the patient did eventually undergo a Lexiscan Myoview   stress test on 10/2/2017.  This showed a medium-sized infarct in the inferior wall with   no significant ischemia.  The ejection fraction was calculated at 44%.  He later   underwent an echocardiogram on 10/12/2017, which showed ejection fraction at 56%.    There was severe hypokinesis of the basal inferior wall and basal inferoseptum at that   time.  The decision was made to continue medical management as he was not having   any symptoms, and there was no ischemia on the stress test.    The patient presents today for follow-up.  He is still in therapy for his right hand and   right arm weakness.  However, these are improving somewhat, but very slowly.  He   does have a cyst at the level of his fifth lumbar vertebrae which is causing some   compression.  He is seeing a surgeon soon for this.  He has not had any exertional   symptoms, and denied any chest pain or shortness of breath.    Past Medical History:   Diagnosis Date   • Arthritis    • Carotid artery disease     Right CEA 9/26/16.  Left CEA 7/18/17 (with dionte-op CVA)   • CKD (chronic kidney disease)    • CVA (cerebral vascular accident)     on 7/18/17 dionte-operatively from left CEA.     • Diabetes mellitus    • Hyperlipidemia    • Hypertension    • NSTEMI (non-ST elevated myocardial infarction)     NSTEMI following left CEA and dionte-op stroke in 7/18 ([facility]).     • SOB (shortness of breath)        Past Surgical History:   Procedure Laterality Date   • APPENDECTOMY  02/19/2016     [facility], Dr. Zimmerman   • CAROTID ENDARTERECTOMY Right 09/26/2016    [facility]    • CAROTID ENDARTERECTOMY Left 07/18/2017    [facility]    • CHOLECYSTECTOMY  11/1989    [facility]   • TOTAL HIP ARTHROPLASTY Right 11/16/2016    [facility]       Current Outpatient Prescriptions on File Prior to Visit   Medication Sig Dispense Refill   • amLODIPine (NORVASC) 5 MG tablet Take 5 mg by mouth Daily.  0   • aspirin 81 MG tablet Take 81 mg by mouth daily.     • carvedilol (COREG) 3.125 MG tablet Take 1 tablet by mouth Every Morning Before Breakfast. 30 tablet 11   • cholecalciferol (VITAMIN D3) 1000 UNITS tablet Take 1,000 Units by mouth Daily.     • CINNAMON PO Take  by mouth Daily.     • clopidogrel (PLAVIX) 75 MG tablet Take 75 mg by mouth Daily.     • gabapentin (NEURONTIN) 400 MG capsule take 1 capsule by mouth twice a day 180 capsule 1   • glipiZIDE (GLUCOTROL) 5 MG tablet take 1 tablet by mouth twice a day with food 60 tablet 5   • ketoconazole (NIZORAL) 2 % cream Apply  topically Daily. Twice daily as needed for right ear irritation 30 g 1   • lisinopril (PRINIVIL,ZESTRIL) 10 MG tablet   0   • tamsulosin (FLOMAX) 0.4 MG capsule 24 hr capsule Take 1 capsule by mouth Every Night. 90 capsule 1   • traMADol (ULTRAM) 50 MG tablet One to two tablets very six hours as needed for severe back pain 24 tablet 0   • [DISCONTINUED] atorvastatin (LIPITOR) 20 MG tablet Take 20 mg by mouth Every Night.  0     No current facility-administered medications on file prior to visit.      Allergies as of 02/27/2018 - Reviewed 02/06/2018   Allergen Reaction Noted   • Lamisil [terbinafine hcl] Rash 02/26/2016     Social History     Social History   • Marital status:      Spouse name: Kesha   • Number of children: N/A   • Years of education: N/A     Occupational History   • Retired       Social History Main Topics   • Smoking status: Former Smoker     Quit  "date:    • Smokeless tobacco: Never Used   • Alcohol use No   • Drug use: No   • Sexual activity: Not on file     Other Topics Concern   • Not on file     Social History Narrative    Enjoys golf.     Family History   Problem Relation Age of Onset   • Lymphoma Brother 65      at age 71   • Coronary artery disease Neg Hx        Review of Systems   Musculoskeletal: Positive for muscle weakness.   Neurological: Positive for focal weakness.   All other systems reviewed and are negative.     Objective:     Vitals:    18 1019   BP: 122/74   BP Location: Right arm   Patient Position: Sitting   Pulse: 76   Weight: 102 kg (224 lb)   Height: 172.7 cm (68\")     Body mass index is 34.06 kg/m².    Physical Exam   Constitutional: He is oriented to person, place, and time. He appears well-developed and well-nourished.   HENT:   Head: Normocephalic and atraumatic.   Eyes: Conjunctivae are normal.   Neck: Neck supple.   Cardiovascular: Normal rate and regular rhythm.  Exam reveals no gallop and no friction rub.    No murmur heard.  Pulmonary/Chest: Effort normal and breath sounds normal.   Abdominal: Soft. There is no tenderness.   Musculoskeletal: He exhibits no edema.   Neurological: He is alert and oriented to person, place, and time.   Right arm and hand weakness    Skin: Skin is warm.   Psychiatric: He has a normal mood and affect. His behavior is normal.     Lab Review:     ECG 12 Lead  Date/Time: 2018 10:19 AM  Performed by: MICAELA ZEPEDA  Authorized by: MICAELA ZEPEDA   Comparison: compared with previous ECG from 2017  Similar to previous ECG  Rhythm: sinus rhythm  Rate: normal  BPM: 76  Other findings: LAE  Clinical impression: non-specific ECG          Cardiac Procedures:  1. Echocardiogram on 2017: The ejection fraction was 62%.  There was mild LVH.    There was no significant valvular disease.  2. Lexiscan Myoview stress test on 10/2/2017: There was a medium-sized infarct in the "   inferior wall with no significant ischemia.  Ejection fraction was calculated at 44%.  3. Echocardiogram on 10/12/2017: The ejection fraction was 56%.  There was severe   hypokinesis of the basal inferior wall and basal inferoseptum.  There was mild to   moderate left ventricular hypertrophy area there was grade 1 diastolic dysfunction.    There was aortic sclerosis without stenosis.    Assessment:       Diagnosis Plan   1. NSTEMI (non-ST elevation myocardial infarction)     2. Cerebrovascular accident (CVA) due to embolism of left carotid artery     3. Bilateral carotid artery disease     4. Type 2 diabetes mellitus with other circulatory complication, without long-term current use of insulin       Plan:       Again, the patient is asymptomatic from a cardiovascular standpoint.  He has not had any   chest discomfort or shortness of breath recently.  He did have what appears to be an   infarct in the basal inferior wall and inferoseptum based on his echocardiogram and   Myoview stress test.  However, his ejection fraction appears to be intact at 56% on the   echocardiogram.  I do not feel that a cardiac catheterization is warranted that this time.    He will continue with medical therapy on aspirin, Plavix, Lipitor, lisinopril, and carvedilol.    He is only taking the carvedilol once a day, although he is tolerating this, and his blood   pressure is doing well.  Therefore, I am not going to change this.  I will plan on seeing   him back in the office in the next 6 months unless other issues arise.    Coronary Artery Disease  Assessment  • The patient has no angina    Plan  • Lifestyle modifications discussed include adhering to a heart healthy diet, avoidance of tobacco products, maintenance of a healthy weight, medication compliance, regular exercise and regular monitoring of cholesterol and blood pressure    Subjective - Objective  • There is a history of past MI on or around 7/18/2017  • Current antiplatelet  therapy includes aspirin 81 mg and clopidogrel 75 mg

## 2018-03-15 ENCOUNTER — HOSPITAL ENCOUNTER (OUTPATIENT)
Dept: GENERAL RADIOLOGY | Facility: HOSPITAL | Age: 78
Discharge: HOME | End: 2018-03-15
Admitting: FAMILY MEDICINE

## 2018-03-15 DIAGNOSIS — Z86.73 HISTORY OF ISCHEMIC STROKE WITHOUT RESIDUAL DEFICITS: ICD-10-CM

## 2018-03-15 DIAGNOSIS — R13.12 OROPHARYNGEAL DYSPHAGIA: ICD-10-CM

## 2018-03-15 PROCEDURE — 74230 X-RAY XM SWLNG FUNCJ C+: CPT

## 2018-03-15 PROCEDURE — A9270 NON-COVERED ITEM OR SERVICE: HCPCS | Performed by: FAMILY MEDICINE

## 2018-03-15 PROCEDURE — 63710000001 BARIUM SULFATE 40 % SUSPENSION: Performed by: FAMILY MEDICINE

## 2018-03-15 PROCEDURE — 92611 MOTION FLUOROSCOPY/SWALLOW: CPT

## 2018-03-15 PROCEDURE — G8996 SWALLOW CURRENT STATUS: HCPCS

## 2018-03-15 PROCEDURE — 63710000001 BARIUM SULFATE 96 % RECONSTITUTED SUSPENSION: Performed by: FAMILY MEDICINE

## 2018-03-15 PROCEDURE — 63710000001 BARIUM SULFATE 98 % RECONSTITUTED SUSPENSION: Performed by: FAMILY MEDICINE

## 2018-03-15 PROCEDURE — G8997 SWALLOW GOAL STATUS: HCPCS

## 2018-03-15 PROCEDURE — G8998 SWALLOW D/C STATUS: HCPCS

## 2018-03-15 RX ADMIN — BARIUM SULFATE 50 ML: 400 SUSPENSION ORAL at 10:45

## 2018-03-15 RX ADMIN — BARIUM SULFATE 65 ML: 960 POWDER, FOR SUSPENSION ORAL at 10:45

## 2018-03-15 RX ADMIN — BARIUM SULFATE 4 ML: 980 POWDER, FOR SUSPENSION ORAL at 10:45

## 2018-03-15 NOTE — MBS/VFSS/FEES
Outpatient Speech Language Pathology   Adult Swallow VFSS  [facility]     Patient Name: Santino Retana  : 1940  MRN: [id number]  Today's Date: 3/15/2018       SPEECH-LANGUAGE PATHOLOGY EVALUTION - VFSS    Subjective: The patient was seen on this date for a VFSS(Videofluoroscopic Swallowing Study).  Patient was alert and cooperative.    Significant history: Pt c/o choking with liquids and mixed consistencies as well as saliva since stroke last July. Pt reports residual R arm weakness, but unaware of the location of infarct. Pt reported episode of pna when admitted with stroke diagnosis during July.     Objective: Risks/benefits were reviewed with the patient, and consent was obtained. The study was completed with SLP present and Radiologist review. The patient was seen in lateral view(s). Textures given included thin liquid, nectar thick liquid, puree consistency, mechanical soft consistency and regular consistency.    Assessment: Difficulties were noted with thin liquid and mechanical soft consistency.    Pt presents with mild oropharyngeal dysphagia characterized by swallow delay and reduced hyolaryngeal elevation/excursion with incomplete epiglottic deflection with liquids. No penetration/aspiration with nectar. No penetration with thins via cup. Transient, shallow penetration with thins via straw. Non transient penetration with thins via straw as liquid wash with solids with throat clear. Pt required cues to clear mild pharyngeal residue post swallow with thins, concern for posterior penetration present. Age appropriate bolus preparation and manipulation with puree and regular. Spillage to pyriforms before the swallow with mixed with transient penetration and throat clear.     Comments: SLP reviewed entire video swallow with patient and provided patient with swallow precaution handout and home exercise program. Pt is to complete 3 times a day. If no improvement noted with precautions and compliance  with exercise program, patient was instructed to contact MD after 8 weeks and request outpatient dysphagia therapy order.    Recommendations: Diet Textures: thin liquid, regular consistency food. Medications should be taken whole as tolerated with thins or puree.     Recommended Strategies: Upright for PO, small bites and sips, double swallow with liquids and no straw. Oral care before breakfast, after all meals and PRN.    Dysphagia therapy is not recommended. Rationale: d/t mild dysphagia and overall adequate tolerance of trials, SLP recs home exercise program.        Visit Date: 03/15/2018   Patient Active Problem List   Diagnosis   • Type 2 diabetes mellitus with diabetic neuropathy, without long-term current use of insulin   • Hypertension   • Carcinoid tumor of appendix   • Arthritis   • Neuroendocrine carcinoma of small bowel   • CKD (chronic kidney disease) stage 3, GFR 30-59 ml/min   • Anemia in stage 3 chronic kidney disease   • History of ischemic stroke without residual deficits   • Osteoarthritis of spine with radiculopathy, lumbar region   • Chronic low back pain with sciatica   • Diabetic mononeuropathy associated with diabetes mellitus due to underlying condition        Past Medical History:   Diagnosis Date   • Arthritis    • Carotid artery disease     Right CEA 9/26/16.  Left CEA 7/18/17 (with dionte-op CVA)   • CKD (chronic kidney disease)    • CVA (cerebral vascular accident)     on 7/18/17 dionte-operatively from left CEA.     • Diabetes mellitus    • Hyperlipidemia    • Hypertension    • NSTEMI (non-ST elevated myocardial infarction)     NSTEMI following left CEA and dionte-op stroke in 7/18 ([facility]).     • SOB (shortness of breath)         Past Surgical History:   Procedure Laterality Date   • [facility]   • CAROTID ENDARTERECTOMY Right 09/26/2016    [facility]    • CAROTID ENDARTERECTOMY Left 07/18/2017    [place]  [facility]    • CHOLECYSTECTOMY  11/1989    [facility]   • TOTAL HIP ARTHROPLASTY Right 11/16/2016    [facility]         Visit Dx:     ICD-10-CM ICD-9-CM   1. Oropharyngeal dysphagia R13.12 787.22   2. History of ischemic stroke without residual deficits Z86.73 V12.54                 SLP Adult Swallow Evaluation - 03/15/18 1000        Rehab Evaluation    Document Type evaluation  -    Total Evaluation Minutes, SLP 60  -SH    Subjective Information complains of   choking/coughing  -    Patient Observations cooperative  -    Patient Effort excellent  -    Symptoms Noted During/After Treatment none  -       General Information    Patient Profile Reviewed yes  -    Current Method of Nutrition regular textures;thin liquids  -    Prior Level of Function-Swallowing other (see comments)   coughing on thins and mixed  -    Plans/Goals Discussed with patient  -    Barriers to Rehab none identified  -    Patient's Goals for Discharge eat/drink without coughing/choking  -       Pain Assessment    Additional Documentation Pain Scale: Numbers Pre/Post-Treatment (Group)  -       Pain Scale: Numbers Pre/Post-Treatment    Pain Scale: Numbers, Pretreatment 7/10  -    Pain Scale: Numbers, Post-Treatment 7/10  -    Pain Location - Side Bilateral  -    Pain Location - Orientation lower  -    Pain Location back  -    Pre/Post Treatment Pain Comment Pain is recently chronic, follow up with MD in a few weeks  -       Oral Motor and Function    Dentition Assessment missing teeth;other (see comments)   no bottom molars  -    Secretion Management WNL/WFL;other (see comments)   pt reports choking on saliva at times  -    Mucosal Quality moist, healthy  -    Volitional Swallow WFL  -    Volitional Cough WFL  -       Oral Musculature and Cranial Nerve Assessment    Oral Motor General Assessment WFL  -       SLP Communication to Radiology    Summary Statement Pt  presents with mild oropharyngeal dysphagia characterized by swallow delay and reduced hyolaryngeal elevation/excursion with incomplete epiglottic deflection with liquids. No penetration/aspiration with nectar. No penetration with thins via cup. Transient, shallow penetration with thins via straw. Non transient penetration with thins via straw as liquid wash with solids with throat clear. Pt required cues to clear mild pharyngeal residue post swallow with thins, concern for posterior penetration present. Age appropriate bolus preparation and manipulation with puree and regular. Spillage to pyriforms before the swallow with mixed with transient penetration and throat clear.   -       Clinical Impression    SLP Swallowing Diagnosis mild;oral dysfunction;pharyngeal dysfunction  -    Functional Impact risk of aspiration/pneumonia  -    Rehab Potential/Prognosis, Swallowing good, to achieve stated therapy goals  -    Criteria for Skilled Therapeutic Interventions Met demonstrates skilled criteria  -       Recommendations    Therapy Frequency (SLP) evaluation only;other (see comments)   recs for home exercise program  -    SLP Diet Recommendation regular textures;thin liquids  -    Recommended Precautions and Strategies multiple swallows per sip of liquid;small bites of food and sips of liquid;upright posture during/after eating;no straw  -    SLP Rec. for Method of Medication Administration meds whole;as tolerated;with thin liquids;with pudding or applesauce  -    Monitor for Signs of Aspiration yes;notify SLP if any concerns;cough;gurgly voice;throat clearing;fever;upper respiratory;pneumonia  -      User Key  (r) = Recorded By, (t) = Taken By, (c) = Cosigned By    Initials Name Provider Type     Jennifer Michel MS CCC-SLP Speech and Language Pathologist                              OP SLP Education     Row Name 03/15/18 5537       Education    Barriers to Learning Hearing deficit  -    Education  Provided Described results of evaluation  -    Assessed Learning needs;Learning motivation;Learning preferences;Learning readiness  -    Learning Motivation Strong  -    Learning Method Explanation;Demonstration;Teach back;Written materials  -    Teaching Response Verbalized understanding  -      User Key  (r) = Recorded By, (t) = Taken By, (c) = Cosigned By    Initials Name Effective Dates     Jennifer Michel MS CCC-SLP 03/07/18 -                     OP SLP Assessment/Plan - 03/15/18 1107        SLP Assessment    Functional Problems Swallowing  -    Clinical Impression: Swallowing Mild:;oropharyngeal phase dysphagia  -    Please refer to paper survey for additional self-reported information Yes  -    Please refer to items scanned into chart for additional diagnostic informaiton and handouts as provided by clinician Yes  -    Prognosis Excellent (comment)  -    Patient/caregiver participated in establishment of treatment plan and goals Yes  -    Patient would benefit from skilled therapy intervention Yes  -SH       SLP Plan    Frequency HEP  -      User Key  (r) = Recorded By, (t) = Taken By, (c) = Cosigned By    Initials Name Provider Type     Jennifer MichelMS CHEUNG-SLP Speech and Language Pathologist              SLP Outcome Measures (last 72 hours)      SLP Outcome Measures     Row Name 03/15/18 1100             SLP Outcome Measures    Outcome Measure Used? Adult NOMS  -         FCM Scores    FCM Chosen Swallowing  -      Swallowing FCM Score 6  -        User Key  (r) = Recorded By, (t) = Taken By, (c) = Cosigned By    Initials Name Effective Dates     Jennifer Michel, MS CCC-SLP 03/07/18 -                Time Calculation:   SLP Start Time: 1000  SLP Stop Time: 1100  SLP Time Calculation (min): 60 min    Therapy Charges for Today     Code Description Service Date Service Provider Modifiers Qty    23982288993 HC ST SWALLOWING CURRENT STATUS 3/15/2018 Jennifer PUCKETT MS SKYE Michel-SLP GN, CI 1     30354693093 HC ST SWALLOWING PROJECTED 3/15/2018 Jennifer A MS Anand CCC-SLP GN, CI 1    68417510779 HC ST SWALLOWING DISCHARGE 3/15/2018 Jennifer Michel MS CCC-SLP GN, CI 1    42911575500 HC ST MOTION FLUORO EVAL SWALLOW 4 3/15/2018 Jennifer Michel MS CCC-SLP GN 1          SLP G-Codes  Functional Limitations: Swallowing  Swallow Current Status (): At least 1 percent but less than 20 percent impaired, limited or restricted  Swallow Goal Status (): At least 1 percent but less than 20 percent impaired, limited or restricted  Swallow Discharge Status (): At least 1 percent but less than 20 percent impaired, limited or restricted        Jennifer Michel MS CCC-SLP  3/15/2018

## 2018-04-04 ENCOUNTER — TELEPHONE (OUTPATIENT)
Dept: INTERNAL MEDICINE | Facility: CLINIC | Age: 78
End: 2018-04-04

## 2018-04-04 ENCOUNTER — OFFICE VISIT (OUTPATIENT)
Dept: NEUROSURGERY | Facility: CLINIC | Age: 78
End: 2018-04-04

## 2018-04-04 ENCOUNTER — TELEPHONE (OUTPATIENT)
Dept: NEUROSURGERY | Facility: CLINIC | Age: 78
End: 2018-04-04

## 2018-04-04 VITALS
BODY MASS INDEX: 33.95 KG/M2 | WEIGHT: 224 LBS | DIASTOLIC BLOOD PRESSURE: 56 MMHG | HEART RATE: 85 BPM | SYSTOLIC BLOOD PRESSURE: 108 MMHG | HEIGHT: 68 IN

## 2018-04-04 DIAGNOSIS — M51.17 INTERVERTEBRAL DISC DISORDER WITH RADICULOPATHY OF LUMBOSACRAL REGION: Primary | ICD-10-CM

## 2018-04-04 PROCEDURE — 99203 OFFICE O/P NEW LOW 30 MIN: CPT | Performed by: NURSE PRACTITIONER

## 2018-04-04 NOTE — TELEPHONE ENCOUNTER
Left message on Marialuisa's voicemail regarding needing a clearance to come off Plavix for 7 days prior to an epidural injection.

## 2018-04-04 NOTE — TELEPHONE ENCOUNTER
Ayesha Price's office called wanting to know if its ok for the pt to stop his Plavix 6 days before his appt for hi epidural

## 2018-04-06 NOTE — TELEPHONE ENCOUNTER
Spoke with patient and let him know that Dr Murray office cleared him to come off Plavix for 7 days before epidural injection.  Did let patient know we will need to get clearance for each injection.

## 2018-04-13 ENCOUNTER — TELEPHONE (OUTPATIENT)
Dept: CARDIOLOGY | Facility: CLINIC | Age: 78
End: 2018-04-13

## 2018-04-13 NOTE — TELEPHONE ENCOUNTER
Pt is scheduled for a lumbar epidural on 4-18-18, he was told to d/c his Plavix for 7 days, but didn't say anything about his ASA, should he be off both for 7 days? Pt # [phone]. dmk

## 2018-04-13 NOTE — TELEPHONE ENCOUNTER
Stephanie,    3-4 days should be enough on the ASA.  Can you please let him know?  Thanks     BRITTON

## 2018-04-18 ENCOUNTER — HOSPITAL ENCOUNTER (OUTPATIENT)
Dept: PAIN MEDICINE | Facility: HOSPITAL | Age: 78
Discharge: HOME | End: 2018-04-18
Admitting: NURSE PRACTITIONER

## 2018-04-18 ENCOUNTER — ANESTHESIA EVENT (OUTPATIENT)
Dept: PAIN MEDICINE | Facility: HOSPITAL | Age: 78
End: 2018-04-18

## 2018-04-18 ENCOUNTER — TRANSCRIBE ORDERS (OUTPATIENT)
Dept: PAIN MEDICINE | Facility: HOSPITAL | Age: 78
End: 2018-04-18

## 2018-04-18 ENCOUNTER — HOSPITAL ENCOUNTER (OUTPATIENT)
Dept: GENERAL RADIOLOGY | Facility: HOSPITAL | Age: 78
Discharge: HOME | End: 2018-04-18

## 2018-04-18 ENCOUNTER — ANESTHESIA (OUTPATIENT)
Dept: PAIN MEDICINE | Facility: HOSPITAL | Age: 78
End: 2018-04-18

## 2018-04-18 VITALS
HEIGHT: 68 IN | WEIGHT: 220 LBS | SYSTOLIC BLOOD PRESSURE: 129 MMHG | DIASTOLIC BLOOD PRESSURE: 67 MMHG | BODY MASS INDEX: 33.34 KG/M2 | TEMPERATURE: 98 F | RESPIRATION RATE: 16 BRPM | OXYGEN SATURATION: 94 % | HEART RATE: 81 BPM

## 2018-04-18 DIAGNOSIS — M51.17 INTERVERTEBRAL DISC DISORDER WITH RADICULOPATHY OF LUMBOSACRAL REGION: Primary | ICD-10-CM

## 2018-04-18 DIAGNOSIS — M51.17 INTERVERTEBRAL DISC DISORDER WITH RADICULOPATHY OF LUMBOSACRAL REGION: ICD-10-CM

## 2018-04-18 DIAGNOSIS — R52 PAIN: ICD-10-CM

## 2018-04-18 PROCEDURE — 77003 FLUOROGUIDE FOR SPINE INJECT: CPT

## 2018-04-18 PROCEDURE — 25010000002 METHYLPREDNISOLONE PER 80 MG: Performed by: ANESTHESIOLOGY

## 2018-04-18 PROCEDURE — C1755 CATHETER, INTRASPINAL: HCPCS

## 2018-04-18 RX ORDER — LIDOCAINE HYDROCHLORIDE 10 MG/ML
1 INJECTION, SOLUTION INFILTRATION; PERINEURAL ONCE AS NEEDED
Status: DISCONTINUED | OUTPATIENT
Start: 2018-04-18 | End: 2018-04-19 | Stop reason: HOSPADM

## 2018-04-18 RX ORDER — METHYLPREDNISOLONE ACETATE 80 MG/ML
80 INJECTION, SUSPENSION INTRA-ARTICULAR; INTRALESIONAL; INTRAMUSCULAR; SOFT TISSUE ONCE
Status: COMPLETED | OUTPATIENT
Start: 2018-04-18 | End: 2018-04-18

## 2018-04-18 RX ADMIN — METHYLPREDNISOLONE ACETATE 80 MG: 80 INJECTION, SUSPENSION INTRA-ARTICULAR; INTRALESIONAL; INTRAMUSCULAR; SOFT TISSUE at 08:25

## 2018-04-18 NOTE — ANESTHESIA PROCEDURE NOTES
PAIN Epidural block    Patient location during procedure: pain clinic  Indication:procedure for pain  Performed By  Anesthesiologist: NANCY MARTINEZ  Preanesthetic Checklist  Completed: patient identified, site marked, surgical consent, pre-op evaluation, timeout performed, IV checked, risks and benefits discussed and monitors and equipment checked  Additional Notes  LDDD/LRAD- NO DYE DUE TO CRI  Prep:  Pt Position:prone  Sterile Tech:cap, gloves and sterile barrier  Prep:chlorhexidine gluconate and isopropyl alcohol  Monitoring:EKG, continuous pulse oximetry and blood pressure monitoring  Procedure:  Sedation: no   Approach:midline  Guidance: fluoroscopy  Location:lumbar  Level:4-5  Needle Type:Tuohy  Needle Gauge:20  Aspiration:negative  Medications:  Depomedrol:80  Preservative Free Saline:3mL    Post Assessment:  Dressing:secured with tape  Pt Tolerance:patient tolerated the procedure well with no apparent complications  Complications:no

## 2018-04-18 NOTE — H&P
[facility]    History and Physical    Patient Name: Caroline Retana  :  1940  MRN:  [id number]  Date of Admission: 2018    Subjective     Patient is a 77 y.o. male presents with chief complaint of chronic low back pain.  Onset of symptoms was gradual starting several years ago.  Symptoms are associated/aggravated by activity. Symptoms improve with rest.  Pain in lower back, radiate to right side and buttock.  Pain 0-10/10.  PT did not help.  MRI showed LDDD and synovial cyst at L45.    The following portions of the patients history were reviewed and updated as appropriate: current medications, allergies, past medical history, past surgical history, past family history, past social history and problem list                Objective     Past Medical History:   Past Medical History:   Diagnosis Date   • Arthritis    • Carotid artery disease     Right CEA 16.  Left CEA 17 (with dionte-op CVA)   • Chronic kidney disease    • CKD (chronic kidney disease)    • CVA (cerebral vascular accident)     on 17 dionte-operatively from left CEA.     • Diabetes mellitus     DR CAROLINE DURON   • Hyperlipidemia    • Hypertension    • Low back pain    • NSTEMI (non-ST elevated myocardial infarction)     NSTEMI following left CEA and dionte-op stroke in  ([facility]).     • SOB (shortness of breath)      Past Surgical History:   Past Surgical History:   Procedure Laterality Date   • APPENDECTOMY  2016    [facility], Dr. Zimmerman   • CAROTID ENDARTERECTOMY Right 2016    [facility]    • CAROTID ENDARTERECTOMY Left 2017    [facility]    • CATARACT EXTRACTION     • CHOLECYSTECTOMY  1989    [facility]   • TOTAL HIP ARTHROPLASTY Right 2016    [facility]     Family History:   Family History   Problem Relation Age of Onset   • Lymphoma Brother 65      at age 71   • Coronary artery disease Neg Hx   "    Social History:   Social History   Substance Use Topics   • Smoking status: Former Smoker     Packs/day: 2.00     Years: 25.00     Quit date: 1989   • Smokeless tobacco: Never Used   • Alcohol use No       Vital Signs Range for the last 24 hours  Temperature: Temp:  [36.7 °C (98 °F)] 36.7 °C (98 °F)   Temp Source: Temp src: Oral   BP: BP: (135)/(62) 135/62   Pulse: Heart Rate:  [82] 82   Respirations: Resp:  [16] 16   SPO2: SpO2:  [93 %] 93 %   O2 Amount (l/min):     O2 Devices Device (Oxygen Therapy): room air   Weight: Weight:  [99.8 kg (220 lb)] 99.8 kg (220 lb)     Flowsheet Rows    Flowsheet Row First Filed Value   Admission Height 172.7 cm (68\") Documented at 04/18/2018 0749   Admission Weight 99.8 kg (220 lb) Documented at 04/18/2018 0749          --------------------------------------------------------------------------------    Current Outpatient Prescriptions   Medication Sig Dispense Refill   • amLODIPine (NORVASC) 5 MG tablet Take 5 mg by mouth Daily.  0   • atorvastatin (LIPITOR) 20 MG tablet Take 1 tablet by mouth Every Night. 90 tablet 3   • carvedilol (COREG) 3.125 MG tablet Take 1 tablet by mouth Every Morning Before Breakfast. 30 tablet 11   • cholecalciferol (VITAMIN D3) 1000 UNITS tablet Take 1,000 Units by mouth Daily.     • CINNAMON PO Take  by mouth Daily.     • gabapentin (NEURONTIN) 400 MG capsule take 1 capsule by mouth twice a day 180 capsule 1   • glipiZIDE (GLUCOTROL) 5 MG tablet take 1 tablet by mouth twice a day with food 60 tablet 5   • ketoconazole (NIZORAL) 2 % cream Apply  topically Daily. Twice daily as needed for right ear irritation 30 g 1   • lisinopril (PRINIVIL,ZESTRIL) 10 MG tablet   0   • tamsulosin (FLOMAX) 0.4 MG capsule 24 hr capsule Take 1 capsule by mouth Every Night. 90 capsule 1   • traMADol (ULTRAM) 50 MG tablet One to two tablets very six hours as needed for severe back pain 24 tablet 0   • aspirin 81 MG tablet Take 81 mg by mouth daily.     • clopidogrel " (PLAVIX) 75 MG tablet Take 75 mg by mouth Daily.       No current facility-administered medications for this encounter.        --------------------------------------------------------------------------------  Assessment/Plan      Anesthesia Evaluation     Patient summary reviewed and Nursing notes reviewed                Airway   Mallampati: II  TM distance: >3 FB  Neck ROM: full  no difficulty expected  Dental - normal exam     Pulmonary     breath sounds clear to auscultation  (+) shortness of breath,   Cardiovascular - normal exam  Exercise tolerance: good (4-7 METS)    Rhythm: regular  Rate: normal    (+) hypertension, past MI , PVD, hyperlipidemia,       Neuro/Psych- neuro exam normal  (+) CVA, numbness,     GI/Hepatic/Renal/Endo    (+)   renal disease CRI, diabetes mellitus,     Musculoskeletal (-) normal exam    (+) back pain,   Abdominal  - normal exam   Substance History - negative use     OB/GYN          Other   (+) arthritis   history of cancer             Diagnosis and Plan    Treatment Plan  ASA 3      Procedures: Lumbar Epidural Steroid Injection(LESI), With fluoroscopy,       Anesthetic plan and risks discussed with patient.          Diagnosis     * DDD (degenerative disc disease), lumbar [M51.36]     * Lumbar radicular pain [M54.16]

## 2018-05-02 ENCOUNTER — TELEPHONE (OUTPATIENT)
Dept: NEUROSURGERY | Facility: CLINIC | Age: 78
End: 2018-05-02

## 2018-05-02 NOTE — TELEPHONE ENCOUNTER
KINDRA THEY SCHEDULED PATIENT CAROLINE GERONIMO FOR 2ND EPIDURAL FOR 5/9/18 7:30AM AT [facility] [phone]

## 2018-05-09 ENCOUNTER — HOSPITAL ENCOUNTER (OUTPATIENT)
Dept: GENERAL RADIOLOGY | Facility: HOSPITAL | Age: 78
Discharge: HOME | End: 2018-05-09

## 2018-05-09 ENCOUNTER — ANESTHESIA (OUTPATIENT)
Dept: PAIN MEDICINE | Facility: HOSPITAL | Age: 78
End: 2018-05-09

## 2018-05-09 ENCOUNTER — ANESTHESIA EVENT (OUTPATIENT)
Dept: PAIN MEDICINE | Facility: HOSPITAL | Age: 78
End: 2018-05-09

## 2018-05-09 ENCOUNTER — TRANSCRIBE ORDERS (OUTPATIENT)
Dept: PAIN MEDICINE | Facility: HOSPITAL | Age: 78
End: 2018-05-09

## 2018-05-09 ENCOUNTER — HOSPITAL ENCOUNTER (OUTPATIENT)
Dept: PAIN MEDICINE | Facility: HOSPITAL | Age: 78
Discharge: HOME | End: 2018-05-09
Admitting: NURSE PRACTITIONER

## 2018-05-09 VITALS
DIASTOLIC BLOOD PRESSURE: 66 MMHG | OXYGEN SATURATION: 93 % | SYSTOLIC BLOOD PRESSURE: 131 MMHG | HEART RATE: 85 BPM | TEMPERATURE: 97.6 F | RESPIRATION RATE: 16 BRPM

## 2018-05-09 DIAGNOSIS — M51.17 INTERVERTEBRAL DISC DISORDER WITH RADICULOPATHY OF LUMBOSACRAL REGION: ICD-10-CM

## 2018-05-09 DIAGNOSIS — R52 PAIN: ICD-10-CM

## 2018-05-09 DIAGNOSIS — M51.17 INTERVERTEBRAL DISC DISORDER WITH RADICULOPATHY OF LUMBOSACRAL REGION: Primary | ICD-10-CM

## 2018-05-09 LAB — GLUCOSE BLDC GLUCOMTR-MCNC: 142 MG/DL (ref 70–130)

## 2018-05-09 PROCEDURE — C1755 CATHETER, INTRASPINAL: HCPCS

## 2018-05-09 PROCEDURE — 25010000002 METHYLPREDNISOLONE PER 80 MG: Performed by: ANESTHESIOLOGY

## 2018-05-09 PROCEDURE — 82962 GLUCOSE BLOOD TEST: CPT

## 2018-05-09 PROCEDURE — 77003 FLUOROGUIDE FOR SPINE INJECT: CPT

## 2018-05-09 RX ORDER — METHYLPREDNISOLONE ACETATE 80 MG/ML
80 INJECTION, SUSPENSION INTRA-ARTICULAR; INTRALESIONAL; INTRAMUSCULAR; SOFT TISSUE ONCE
Status: COMPLETED | OUTPATIENT
Start: 2018-05-09 | End: 2018-05-09

## 2018-05-09 RX ADMIN — METHYLPREDNISOLONE ACETATE 80 MG: 80 INJECTION, SUSPENSION INTRA-ARTICULAR; INTRALESIONAL; INTRAMUSCULAR; SOFT TISSUE at 08:11

## 2018-05-09 NOTE — H&P
[facility]    History and Physical    Patient Name: Caroline Retana  :  1940  MRN:  [id number]  Date of Admission: 2018    Subjective     The patient is a 78-year-old gentleman with pain in his lower back which occasionally radiates to his left lower extremity.  He reports approximate 15% relief following his last lumbar epidural steroid injection.  His pain level today is a 5/10.  He is here for lumbar epidural steroid injection #2.    The following portions of the patients history were reviewed and updated as appropriate: current medications, allergies, past medical history, past surgical history, past family history, past social history and problem list                Objective     Past Medical History:   Past Medical History:   Diagnosis Date   • Arthritis    • Carotid artery disease     Right CEA 16.  Left CEA 17 (with dionte-op CVA)   • Chronic kidney disease    • CKD (chronic kidney disease)    • CVA (cerebral vascular accident)     on 17 dionte-operatively from left CEA.     • Diabetes mellitus     DR CAROLINE DURON   • Hyperlipidemia    • Hypertension    • Low back pain    • NSTEMI (non-ST elevated myocardial infarction)     NSTEMI following left CEA and dionte-op stroke in  ([facility]).     • SOB (shortness of breath)      Past Surgical History:   Past Surgical History:   Procedure Laterality Date   • APPENDECTOMY  2016    [facility], Dr. Zimmerman   • CAROTID ENDARTERECTOMY Right 2016    [facility]    • CAROTID ENDARTERECTOMY Left 2017    [facility]    • CATARACT EXTRACTION     • CHOLECYSTECTOMY  1989    [facility]   • TOTAL HIP ARTHROPLASTY Right 2016    [facility]     Family History:   Family History   Problem Relation Age of Onset   • Lymphoma Brother 65      at age 71   • Coronary artery disease Neg Hx      Social History:   Social History   Substance Use  Topics   • Smoking status: Former Smoker     Packs/day: 2.00     Years: 25.00     Quit date: 1989   • Smokeless tobacco: Never Used   • Alcohol use No       Vital Signs Range for the last 24 hours  Temperature: Temp:  [36.4 °C (97.6 °F)] 36.4 °C (97.6 °F)   Temp Source: Temp src: Oral   BP: BP: (126)/(61) 126/61   Pulse: Heart Rate:  [100] 100   Respirations: Resp:  [16] 16   SPO2: SpO2:  [92 %] 92 %   O2 Amount (l/min):     O2 Devices     Weight:           --------------------------------------------------------------------------------    Current Outpatient Prescriptions   Medication Sig Dispense Refill   • amLODIPine (NORVASC) 5 MG tablet Take 5 mg by mouth Daily.  0   • atorvastatin (LIPITOR) 20 MG tablet Take 1 tablet by mouth Every Night. 90 tablet 3   • carvedilol (COREG) 3.125 MG tablet Take 1 tablet by mouth Every Morning Before Breakfast. 30 tablet 11   • cholecalciferol (VITAMIN D3) 1000 UNITS tablet Take 1,000 Units by mouth Daily.     • CINNAMON PO Take  by mouth Daily.     • gabapentin (NEURONTIN) 400 MG capsule take 1 capsule by mouth twice a day 180 capsule 1   • glipiZIDE (GLUCOTROL) 5 MG tablet take 1 tablet by mouth twice a day with food 60 tablet 5   • ketoconazole (NIZORAL) 2 % cream Apply  topically Daily. Twice daily as needed for right ear irritation 30 g 1   • lisinopril (PRINIVIL,ZESTRIL) 10 MG tablet   0   • tamsulosin (FLOMAX) 0.4 MG capsule 24 hr capsule Take 1 capsule by mouth Every Night. 90 capsule 1   • traMADol (ULTRAM) 50 MG tablet One to two tablets very six hours as needed for severe back pain 24 tablet 0   • aspirin 81 MG tablet Take 81 mg by mouth daily.     • clopidogrel (PLAVIX) 75 MG tablet Take 75 mg by mouth Daily.       No current facility-administered medications for this encounter.        --------------------------------------------------------------------------------  Assessment/Plan      Anesthesia Evaluation     Patient summary reviewed and Nursing notes reviewed    NPO Solid Status: > 8 hours  NPO Liquid Status: > 2 hours           Airway   Mallampati: II  TM distance: >3 FB  Neck ROM: full  Dental - normal exam     Pulmonary - normal exam    breath sounds clear to auscultation  (+) shortness of breath,   Cardiovascular - normal exam    Rhythm: regular  Rate: normal    (+) hypertension, past MI , PVD, hyperlipidemia,  carotid artery disease  (-) angina, orthopnea, PND, HI      Neuro/Psych  (+) CVA, numbness,     GI/Hepatic/Renal/Endo    (+)   diabetes mellitus,     Musculoskeletal     (+) back pain, chronic pain,   Abdominal  - normal exam    Abdomen: soft.  Bowel sounds: normal.   Substance History - negative use     OB/GYN negative ob/gyn ROS         Other   (+) arthritis   history of cancer               Diagnosis and Plan    Treatment Plan  ASA 3   Patient has had previous injection/procedure with 10-25% improvement.   Procedures: Lumbar Epidural Steroid Injection(LESI), With fluoroscopy,       Anesthetic plan and risks discussed with patient.          Diagnosis     * Degeneration of lumbar intervertebral disc [M51.36]     * Lumbar radiculopathy [M54.16]

## 2018-05-09 NOTE — ANESTHESIA PROCEDURE NOTES
PAIN Epidural block    Patient location during procedure: pain clinic  Start Time: 5/9/2018 8:05 AM  Stop Time: 5/9/2018 8:12 AM  Indication:at surgeon's request and procedure for pain  Performed By  Anesthesiologist: RENDER, ROSIBEL RAY  Preanesthetic Checklist  Completed: patient identified, site marked, surgical consent, pre-op evaluation, timeout performed, risks and benefits discussed and monitors and equipment checked  Additional Notes  Post-Op Diagnosis Codes:     * Degeneration of lumbar intervertebral disc (M51.36)     * Lumbar radiculopathy (M54.16)    Prep:  Pt Position:prone  Sterile Tech:sterile barrier, mask and gloves  Prep:chlorhexidine gluconate and isopropyl alcohol  Monitoring:blood pressure monitoring, continuous pulse oximetry and EKG  Procedure:  Approach:left paramedian  Guidance: fluoroscopy  Location:lumbar  Level:4-5  Needle Gauge:20 G  Aspiration:negative  Test Dose:negative  Medications:  Depomedrol:80 mg  Comments:Steroid injections performed at the L4 5 level using a left paramedian approach.  There was an excellent loss resistance to injection with no laceration of his pain.  There is no return red blood cells or cerebral spinal fluid.  No grams of Depo-Medrol were injected and the needle was withdrawn.  Creatinine is 1.71 therefore no contrast media was utilized.  Post Assessment:  Pt Tolerance:patient tolerated the procedure well with no apparent complications  Complications:no

## 2018-05-16 ENCOUNTER — APPOINTMENT (OUTPATIENT)
Dept: CT IMAGING | Facility: HOSPITAL | Age: 78
End: 2018-05-16

## 2018-05-16 ENCOUNTER — HOSPITAL ENCOUNTER (OUTPATIENT)
Facility: HOSPITAL | Age: 78
Setting detail: OBSERVATION
Discharge: HOME | End: 2018-05-20
Attending: FAMILY MEDICINE | Admitting: HOSPITALIST

## 2018-05-16 ENCOUNTER — APPOINTMENT (OUTPATIENT)
Dept: MRI IMAGING | Facility: HOSPITAL | Age: 78
End: 2018-05-16
Attending: FAMILY MEDICINE

## 2018-05-16 DIAGNOSIS — N18.30 CKD (CHRONIC KIDNEY DISEASE), STAGE III (HCC): ICD-10-CM

## 2018-05-16 DIAGNOSIS — G47.33 OBSTRUCTIVE SLEEP APNEA: ICD-10-CM

## 2018-05-16 DIAGNOSIS — I63.9 CEREBROVASCULAR ACCIDENT (CVA), UNSPECIFIED MECHANISM (HCC): Primary | ICD-10-CM

## 2018-05-16 DIAGNOSIS — R06.81 APNEA: ICD-10-CM

## 2018-05-16 PROBLEM — G45.9 TIA (TRANSIENT ISCHEMIC ATTACK): Status: ACTIVE | Noted: 2018-05-16

## 2018-05-16 PROBLEM — I77.9 CAROTID ARTERY DISEASE (HCC): Status: ACTIVE | Noted: 2018-05-16

## 2018-05-16 PROBLEM — I10 HTN (HYPERTENSION): Status: ACTIVE | Noted: 2018-05-16

## 2018-05-16 LAB
ALBUMIN SERPL-MCNC: 3.9 G/DL (ref 3.5–5.2)
ALBUMIN/GLOB SERPL: 1.6 G/DL
ALP SERPL-CCNC: 69 U/L (ref 39–117)
ALT SERPL W P-5'-P-CCNC: 11 U/L (ref 1–41)
ANION GAP SERPL CALCULATED.3IONS-SCNC: 11.3 MMOL/L
AST SERPL-CCNC: 8 U/L (ref 1–40)
BASOPHILS # BLD AUTO: 0.02 10*3/MM3 (ref 0–0.2)
BASOPHILS NFR BLD AUTO: 0.2 % (ref 0–1.5)
BILIRUB SERPL-MCNC: 0.4 MG/DL (ref 0.1–1.2)
BUN BLD-MCNC: 45 MG/DL (ref 8–23)
BUN/CREAT SERPL: 21.2 (ref 7–25)
CALCIUM SPEC-SCNC: 9.4 MG/DL (ref 8.6–10.5)
CHLORIDE SERPL-SCNC: 106 MMOL/L (ref 98–107)
CO2 SERPL-SCNC: 25.7 MMOL/L (ref 22–29)
CREAT BLD-MCNC: 2.12 MG/DL (ref 0.76–1.27)
DEPRECATED RDW RBC AUTO: 46.7 FL (ref 37–54)
EOSINOPHIL # BLD AUTO: 0.18 10*3/MM3 (ref 0–0.7)
EOSINOPHIL NFR BLD AUTO: 2.1 % (ref 0.3–6.2)
ERYTHROCYTE [DISTWIDTH] IN BLOOD BY AUTOMATED COUNT: 13.3 % (ref 11.5–14.5)
GFR SERPL CREATININE-BSD FRML MDRD: 31 ML/MIN/1.73
GLOBULIN UR ELPH-MCNC: 2.4 GM/DL
GLUCOSE BLD-MCNC: 81 MG/DL (ref 65–99)
GLUCOSE BLDC GLUCOMTR-MCNC: 189 MG/DL (ref 70–130)
GLUCOSE BLDC GLUCOMTR-MCNC: 78 MG/DL (ref 70–130)
GLUCOSE BLDC GLUCOMTR-MCNC: 80 MG/DL (ref 70–130)
HCT VFR BLD AUTO: 36.8 % (ref 40.4–52.2)
HGB BLD-MCNC: 11.5 G/DL (ref 13.7–17.6)
IMM GRANULOCYTES # BLD: 0.02 10*3/MM3 (ref 0–0.03)
IMM GRANULOCYTES NFR BLD: 0.2 % (ref 0–0.5)
INR PPP: 1.17 (ref 0.9–1.1)
LYMPHOCYTES # BLD AUTO: 2.37 10*3/MM3 (ref 0.9–4.8)
LYMPHOCYTES NFR BLD AUTO: 28.2 % (ref 19.6–45.3)
MCH RBC QN AUTO: 30.4 PG (ref 27–32.7)
MCHC RBC AUTO-ENTMCNC: 31.3 G/DL (ref 32.6–36.4)
MCV RBC AUTO: 97.4 FL (ref 79.8–96.2)
MONOCYTES # BLD AUTO: 0.81 10*3/MM3 (ref 0.2–1.2)
MONOCYTES NFR BLD AUTO: 9.6 % (ref 5–12)
NEUTROPHILS # BLD AUTO: 5.02 10*3/MM3 (ref 1.9–8.1)
NEUTROPHILS NFR BLD AUTO: 59.9 % (ref 42.7–76)
PLATELET # BLD AUTO: 217 10*3/MM3 (ref 140–500)
PMV BLD AUTO: 10.8 FL (ref 6–12)
POTASSIUM BLD-SCNC: 5.9 MMOL/L (ref 3.5–5.2)
PROT SERPL-MCNC: 6.3 G/DL (ref 6–8.5)
PROTHROMBIN TIME: 14.7 SECONDS (ref 11.7–14.2)
RBC # BLD AUTO: 3.78 10*6/MM3 (ref 4.6–6)
SODIUM BLD-SCNC: 143 MMOL/L (ref 136–145)
TSH SERPL DL<=0.05 MIU/L-ACNC: 1.25 MIU/ML (ref 0.27–4.2)
VIT B12 BLD-MCNC: 561 PG/ML (ref 211–946)
WBC NRBC COR # BLD: 8.4 10*3/MM3 (ref 4.5–10.7)

## 2018-05-16 PROCEDURE — G0378 HOSPITAL OBSERVATION PER HR: HCPCS

## 2018-05-16 PROCEDURE — 96361 HYDRATE IV INFUSION ADD-ON: CPT

## 2018-05-16 PROCEDURE — 85025 COMPLETE CBC W/AUTO DIFF WBC: CPT | Performed by: FAMILY MEDICINE

## 2018-05-16 PROCEDURE — 82962 GLUCOSE BLOOD TEST: CPT

## 2018-05-16 PROCEDURE — 99285 EMERGENCY DEPT VISIT HI MDM: CPT

## 2018-05-16 PROCEDURE — 82607 VITAMIN B-12: CPT | Performed by: PSYCHIATRY & NEUROLOGY

## 2018-05-16 PROCEDURE — 70450 CT HEAD/BRAIN W/O DYE: CPT

## 2018-05-16 PROCEDURE — 70551 MRI BRAIN STEM W/O DYE: CPT

## 2018-05-16 PROCEDURE — 93010 ELECTROCARDIOGRAM REPORT: CPT | Performed by: INTERNAL MEDICINE

## 2018-05-16 PROCEDURE — 63710000001 INSULIN ASPART PER 5 UNITS: Performed by: HOSPITALIST

## 2018-05-16 PROCEDURE — 70544 MR ANGIOGRAPHY HEAD W/O DYE: CPT

## 2018-05-16 PROCEDURE — 93005 ELECTROCARDIOGRAM TRACING: CPT | Performed by: FAMILY MEDICINE

## 2018-05-16 PROCEDURE — 80053 COMPREHEN METABOLIC PANEL: CPT | Performed by: FAMILY MEDICINE

## 2018-05-16 PROCEDURE — 85610 PROTHROMBIN TIME: CPT | Performed by: FAMILY MEDICINE

## 2018-05-16 PROCEDURE — 70547 MR ANGIOGRAPHY NECK W/O DYE: CPT

## 2018-05-16 PROCEDURE — 84443 ASSAY THYROID STIM HORMONE: CPT | Performed by: PSYCHIATRY & NEUROLOGY

## 2018-05-16 PROCEDURE — 99221 1ST HOSP IP/OBS SF/LOW 40: CPT | Performed by: PSYCHIATRY & NEUROLOGY

## 2018-05-16 RX ORDER — ASPIRIN 325 MG
325 TABLET ORAL DAILY
Status: DISCONTINUED | OUTPATIENT
Start: 2018-05-16 | End: 2018-05-20 | Stop reason: HOSPADM

## 2018-05-16 RX ORDER — DEXTROSE MONOHYDRATE 25 G/50ML
25 INJECTION, SOLUTION INTRAVENOUS
Status: DISCONTINUED | OUTPATIENT
Start: 2018-05-16 | End: 2018-05-20 | Stop reason: HOSPADM

## 2018-05-16 RX ORDER — TRAMADOL HYDROCHLORIDE 50 MG/1
50-100 TABLET ORAL EVERY 6 HOURS PRN
COMMUNITY
End: 2018-06-19

## 2018-05-16 RX ORDER — GABAPENTIN 400 MG/1
400 CAPSULE ORAL 2 TIMES DAILY
Status: DISCONTINUED | OUTPATIENT
Start: 2018-05-16 | End: 2018-05-20 | Stop reason: HOSPADM

## 2018-05-16 RX ORDER — ONDANSETRON 2 MG/ML
4 INJECTION INTRAMUSCULAR; INTRAVENOUS EVERY 6 HOURS PRN
Status: DISCONTINUED | OUTPATIENT
Start: 2018-05-16 | End: 2018-05-20 | Stop reason: HOSPADM

## 2018-05-16 RX ORDER — ONDANSETRON 4 MG/1
4 TABLET, FILM COATED ORAL EVERY 6 HOURS PRN
Status: DISCONTINUED | OUTPATIENT
Start: 2018-05-16 | End: 2018-05-20 | Stop reason: HOSPADM

## 2018-05-16 RX ORDER — SODIUM CHLORIDE 0.9 % (FLUSH) 0.9 %
1-10 SYRINGE (ML) INJECTION AS NEEDED
Status: DISCONTINUED | OUTPATIENT
Start: 2018-05-16 | End: 2018-05-20 | Stop reason: HOSPADM

## 2018-05-16 RX ORDER — ATORVASTATIN CALCIUM 80 MG/1
80 TABLET, FILM COATED ORAL NIGHTLY
Status: DISCONTINUED | OUTPATIENT
Start: 2018-05-16 | End: 2018-05-17

## 2018-05-16 RX ORDER — NICOTINE POLACRILEX 4 MG
15 LOZENGE BUCCAL
Status: DISCONTINUED | OUTPATIENT
Start: 2018-05-16 | End: 2018-05-20 | Stop reason: HOSPADM

## 2018-05-16 RX ORDER — CLOPIDOGREL BISULFATE 75 MG/1
75 TABLET ORAL DAILY
Status: DISCONTINUED | OUTPATIENT
Start: 2018-05-16 | End: 2018-05-20 | Stop reason: HOSPADM

## 2018-05-16 RX ORDER — TRAMADOL HYDROCHLORIDE 50 MG/1
50 TABLET ORAL EVERY 6 HOURS PRN
Status: DISCONTINUED | OUTPATIENT
Start: 2018-05-16 | End: 2018-05-20 | Stop reason: HOSPADM

## 2018-05-16 RX ORDER — CARVEDILOL 3.12 MG/1
3.12 TABLET ORAL
Status: DISCONTINUED | OUTPATIENT
Start: 2018-05-17 | End: 2018-05-20 | Stop reason: HOSPADM

## 2018-05-16 RX ORDER — ACETAMINOPHEN 325 MG/1
650 TABLET ORAL EVERY 4 HOURS PRN
Status: DISCONTINUED | OUTPATIENT
Start: 2018-05-16 | End: 2018-05-20 | Stop reason: HOSPADM

## 2018-05-16 RX ORDER — GABAPENTIN 400 MG/1
400 CAPSULE ORAL 2 TIMES DAILY
COMMUNITY
End: 2018-06-20 | Stop reason: SDUPTHER

## 2018-05-16 RX ORDER — TAMSULOSIN HYDROCHLORIDE 0.4 MG/1
0.4 CAPSULE ORAL NIGHTLY
Status: DISCONTINUED | OUTPATIENT
Start: 2018-05-16 | End: 2018-05-20 | Stop reason: HOSPADM

## 2018-05-16 RX ORDER — ASPIRIN 325 MG
325 TABLET ORAL ONCE
Status: DISCONTINUED | OUTPATIENT
Start: 2018-05-16 | End: 2018-05-16 | Stop reason: SDUPTHER

## 2018-05-16 RX ORDER — CLOPIDOGREL BISULFATE 75 MG/1
75 TABLET ORAL DAILY
Status: DISCONTINUED | OUTPATIENT
Start: 2018-05-16 | End: 2018-05-16

## 2018-05-16 RX ORDER — SODIUM CHLORIDE 9 MG/ML
100 INJECTION, SOLUTION INTRAVENOUS CONTINUOUS
Status: DISCONTINUED | OUTPATIENT
Start: 2018-05-16 | End: 2018-05-17

## 2018-05-16 RX ORDER — ASPIRIN 300 MG/1
300 SUPPOSITORY RECTAL DAILY
Status: DISCONTINUED | OUTPATIENT
Start: 2018-05-16 | End: 2018-05-17

## 2018-05-16 RX ORDER — ONDANSETRON 4 MG/1
4 TABLET, ORALLY DISINTEGRATING ORAL EVERY 6 HOURS PRN
Status: DISCONTINUED | OUTPATIENT
Start: 2018-05-16 | End: 2018-05-20 | Stop reason: HOSPADM

## 2018-05-16 RX ORDER — GLIPIZIDE 5 MG/1
5 TABLET ORAL
COMMUNITY
End: 2018-06-14

## 2018-05-16 RX ADMIN — GABAPENTIN 400 MG: 400 CAPSULE ORAL at 20:34

## 2018-05-16 RX ADMIN — INSULIN ASPART 2 UNITS: 100 INJECTION, SOLUTION INTRAVENOUS; SUBCUTANEOUS at 22:04

## 2018-05-16 RX ADMIN — TAMSULOSIN HYDROCHLORIDE 0.4 MG: 0.4 CAPSULE ORAL at 22:03

## 2018-05-16 RX ADMIN — ASPIRIN 325 MG: 325 TABLET ORAL at 20:34

## 2018-05-16 RX ADMIN — ATORVASTATIN CALCIUM 80 MG: 80 TABLET, FILM COATED ORAL at 20:34

## 2018-05-16 RX ADMIN — SODIUM CHLORIDE 100 ML/HR: 9 INJECTION, SOLUTION INTRAVENOUS at 20:34

## 2018-05-16 NOTE — PROGRESS NOTES
Clinical Pharmacy Services: Medication History    Santino Retana is a 78 y.o. male presenting to [facility] for   Chief Complaint   Patient presents with   • Speech Problem     around 1130 , pt was talking to wife and started having slurred speech and difficulty finding words. pt reports that he has had some improvement since onset of symptoms but is not at baseline,       He  has a past medical history of Arthritis; Blind left eye; Carotid artery disease; Chronic kidney disease; CKD (chronic kidney disease); CVA (cerebral vascular accident); Diabetes mellitus (1998); Hyperlipidemia; Hypertension; Low back pain; NSTEMI (non-ST elevated myocardial infarction); Renal disorder; and SOB (shortness of breath).    Allergies as of 05/16/2018 - Reviewed 05/16/2018   Allergen Reaction Noted   • Lamisil [terbinafine hcl] Rash 02/26/2016       Medication information was obtained from: Pharmacy  Pharmacy and Phone Number: [facility] [phone]    Prior to Admission Medications     Prescriptions Last Dose Informant Patient Reported? Taking?    aspirin 81 MG tablet  Pharmacy Yes Yes    Take 81 mg by mouth daily.    atorvastatin (LIPITOR) 20 MG tablet  Pharmacy No Yes    Take 1 tablet by mouth Every Night.    carvedilol (COREG) 3.125 MG tablet  Pharmacy No Yes    Take 1 tablet by mouth Every Morning Before Breakfast.    cholecalciferol (VITAMIN D3) 1000 UNITS tablet  Pharmacy Yes Yes    Take 1,000 Units by mouth Daily.    Cinnamon 500 MG tablet  Pharmacy Yes Yes    Take 1 tablet by mouth Daily.    clopidogrel (PLAVIX) 75 MG tablet  Pharmacy Yes Yes    Take 75 mg by mouth Daily.    gabapentin (NEURONTIN) 400 MG capsule  Pharmacy Yes Yes    Take 400 mg by mouth 2 (Two) Times a Day.    glipiZIDE (GLUCOTROL) 5 MG tablet  Pharmacy Yes Yes    Take 5 mg by mouth 2 (Two) Times a Day Before Meals.    tamsulosin (FLOMAX) 0.4 MG capsule 24 hr capsule  Pharmacy No Yes    Take 1 capsule by mouth Every Night.    traMADol (ULTRAM)  50 MG tablet  Spouse/Significant Other Yes Yes    Take  mg by mouth Every 6 (Six) Hours As Needed for Severe Pain .    amLODIPine (NORVASC) 5 MG tablet  Pharmacy Yes No    Take 5 mg by mouth Daily.    ketoconazole (NIZORAL) 2 % cream  Pharmacy No No    Apply  topically Daily. Twice daily as needed for right ear irritation    lisinopril (PRINIVIL,ZESTRIL) 10 MG tablet  Pharmacy Yes No            Medication notes: Removed per pharmacy records:  Amlodipine-last dispensed in September  Lisinopril-last dispensed November  Ketoconazole-therapy complete    This medication list is complete to the best of my knowledge as of 5/16/2018    Please call if questions.    Jennifer Iverson, Medication History Technician  5/16/2018 3:20 PM

## 2018-05-16 NOTE — CONSULTS
Encounter Date: 5/16/2018    CHIEF COMPLAINT: Dysarthria at 11:30 today.  Per time patient came to the emergency room symptoms had resolved significantly.    Patient Active Problem List   Diagnosis   • Type 2 diabetes mellitus with diabetic neuropathy, without long-term current use of insulin   • Hypertension   • Carcinoid tumor of appendix   • Arthritis   • Neuroendocrine carcinoma of small bowel   • CKD (chronic kidney disease) stage 3, GFR 30-59 ml/min   • Anemia in stage 3 chronic kidney disease   • History of ischemic stroke without residual deficits   • Osteoarthritis of spine with radiculopathy, lumbar region   • Chronic low back pain with sciatica   • Diabetic mononeuropathy associated with diabetes mellitus due to underlying condition   • Cerebrovascular accident (CVA)       PRESENT ILLNESS:    Portions of this notes is copied from previous physician encounters, reviewed and edited appropriately.    Background:     Santino Retana is a 78 y.o. male with history of right CEA September 2016 left CEA July 2017 at dionte-op CVA now admitted with dysarthria and aphasia. Dysarthria at 11:30 today.  Per time patient came to the emergency room symptoms had resolved significantly.  Patient cannot have CT of the head and neck because of creatinine.  Patient is on aspirin and Plavix at home.  Patient is also on gabapentin 400 twice a day and tramadol 50 mg 4 times a day when necessary.    Patient's blood sugar was in the 80s.  By the time I saw the patient he was back to his baseline.  Family present by the bedside.  Patient denies any problems with his vision or weakness in his arm or leg.  Recently in the last few days patient has been exerting himself a lot physically lifting heavy weight and doing a lot of physical activities around the house.    Review of systems   No neck stiffness  No photophobia  All systems reviewed and are negative.         Past Medical History:   Diagnosis Date   • Arthritis    • Blind left  eye    • Carotid artery disease     Right CEA 9/26/16.  Left CEA 7/18/17 (with dionte-op CVA)   • Chronic kidney disease    • CKD (chronic kidney disease)    • CVA (cerebral vascular accident)     on 7/18/17 dionte-operatively from left CEA.     • Diabetes mellitus 1998    DR CAROLINE DURON   • Hyperlipidemia    • Hypertension    • Low back pain    • NSTEMI (non-ST elevated myocardial infarction)     NSTEMI following left CEA and dionte-op stroke in 7/18 ([facility]).     • Renal disorder    • SOB (shortness of breath)        Past Surgical History:   Procedure Laterality Date   • APPENDECTOMY  02/19/2016    [facility], Dr. Zimmerman   • CAROTID ENDARTERECTOMY Right 09/26/2016    [facility]    • CAROTID ENDARTERECTOMY Left 07/18/2017    [facility]    • CATARACT EXTRACTION     • CHOLECYSTECTOMY  11/1989    [facility]   • JOINT REPLACEMENT     • LUMBAR EPIDURAL INJECTION     • TOTAL HIP ARTHROPLASTY Right 11/16/2016    [facility]       No current facility-administered medications for this encounter.      Current Outpatient Prescriptions   Medication Sig Dispense Refill   • amLODIPine (NORVASC) 5 MG tablet Take 5 mg by mouth Daily.  0   • aspirin 81 MG tablet Take 81 mg by mouth daily.     • atorvastatin (LIPITOR) 20 MG tablet Take 1 tablet by mouth Every Night. 90 tablet 3   • carvedilol (COREG) 3.125 MG tablet Take 1 tablet by mouth Every Morning Before Breakfast. 30 tablet 11   • cholecalciferol (VITAMIN D3) 1000 UNITS tablet Take 1,000 Units by mouth Daily.     • CINNAMON PO Take  by mouth Daily.     • clopidogrel (PLAVIX) 75 MG tablet Take 75 mg by mouth Daily.     • gabapentin (NEURONTIN) 400 MG capsule take 1 capsule by mouth twice a day 180 capsule 1   • glipiZIDE (GLUCOTROL) 5 MG tablet take 1 tablet by mouth twice a day with food 60 tablet 5   • lisinopril (PRINIVIL,ZESTRIL) 10 MG tablet   0   • tamsulosin (FLOMAX) 0.4 MG capsule 24 hr capsule  Take 1 capsule by mouth Every Night. 90 capsule 1   • traMADol (ULTRAM) 50 MG tablet One to two tablets very six hours as needed for severe back pain 24 tablet 0   • ketoconazole (NIZORAL) 2 % cream Apply  topically Daily. Twice daily as needed for right ear irritation 30 g 1       Allergies   Allergen Reactions   • Lamisil [Terbinafine Hcl] Rash       Social History     Social History   • Marital status:      Spouse name: Gallito   • Number of children: 2   • Years of education: GED     Occupational History   • Retired       Social History Main Topics   • Smoking status: Former Smoker     Packs/day: 2.00     Years: 25.00     Quit date:    • Smokeless tobacco: Never Used   • Alcohol use No   • Drug use: No   • Sexual activity: Defer     Other Topics Concern   • Not on file     Social History Narrative    Enjoys golf.    LIVES WITH GALLITO GERONIMO       Family History   Problem Relation Age of Onset   • Lymphoma Brother 65         at age 71   • Coronary artery disease Neg Hx        Family Status   Relation Status   • Brother (Not Specified)   • Neg Hx (Not Specified)       No current facility-administered medications on file prior to encounter.      Current Outpatient Prescriptions on File Prior to Encounter   Medication Sig   • amLODIPine (NORVASC) 5 MG tablet Take 5 mg by mouth Daily.   • aspirin 81 MG tablet Take 81 mg by mouth daily.   • atorvastatin (LIPITOR) 20 MG tablet Take 1 tablet by mouth Every Night.   • carvedilol (COREG) 3.125 MG tablet Take 1 tablet by mouth Every Morning Before Breakfast.   • cholecalciferol (VITAMIN D3) 1000 UNITS tablet Take 1,000 Units by mouth Daily.   • CINNAMON PO Take  by mouth Daily.   • clopidogrel (PLAVIX) 75 MG tablet Take 75 mg by mouth Daily.   • gabapentin (NEURONTIN) 400 MG capsule take 1 capsule by mouth twice a day   • glipiZIDE (GLUCOTROL) 5 MG tablet take 1 tablet by mouth twice a day with food   • lisinopril (PRINIVIL,ZESTRIL) 10 MG tablet   "  • tamsulosin (FLOMAX) 0.4 MG capsule 24 hr capsule Take 1 capsule by mouth Every Night.   • traMADol (ULTRAM) 50 MG tablet One to two tablets very six hours as needed for severe back pain   • ketoconazole (NIZORAL) 2 % cream Apply  topically Daily. Twice daily as needed for right ear irritation           PHYSICAL EXAMINATION:    Vitals: Patient Vitals for the past 4 hrs:   BP Temp Temp src Pulse Resp SpO2 Height Weight   05/16/18 1307 - - - 82 - 95 % - -   05/16/18 1205 - 98.3 °F (36.8 °C) Tympanic - - - - -   05/16/18 1204 137/60 - - - - - - -   05/16/18 1202 - - - 88 16 92 % 172.7 cm (68\") 98.4 kg (217 lb)     Temp:  [98.3 °F (36.8 °C)] 98.3 °F (36.8 °C)  Heart Rate:  [82-88] 82  Resp:  [16] 16  BP: (137)/(60) 137/60    General:  pleasant in no acute distress.  HEENT: No pallor or icterus. Neck supple. No Carotid bruits.  Heart: S1 and S2 normal with regular rhythm.  No murmur.       GENERAL NEUROLOGIC EXAM    NIHSS     Level Of Consciousness 0   0=Alert; keenly responsive 1=Not alert, but arousable by minor stimulation 2=Not alert, requires repeated stimulation 3=Responds only with reflex movements     LOC Questions to Month and age 0   0=Answers both questions correctly 1=Answers one question correctly 2=Answers neither question correctly     LOC Commands -Open/Close eyes -Open/close  0   0=Performs both tasks correctly 1=Performs one task correctly 2=Performs neighter task correctly     Best Gaze 0   0=Normal 1=Partial gaze palsy 2=Forced deviation, or total gaze paresis     Visual 0   0=No visual loss 1=Partial hemianopia 2=Complete hemianopia 3=Bilateral hemianopia (blind including cortical blindness)     Facial Palsy 0   0=Normal symmetrical movement 1=Minor paralysis (asymmetry) 2=Partial paralysis (lower facde) 3=Complete paralysis (upper and lower face)     Motor: Arm and Leg 0   0=No drift, limb holds posture for full 10 seconds 1=Drift, limb holds posture, no drift to bed 2=Some antigravity " effort, cannot maintain posture, drifts to bed 3=No effort against gravity, limb falls 4=No movement     Limb Ataxia 0   0=Absent 1=Present in one limb 2=Present in two limbs     Sensory 0   1=Normal 2=Mild to moderate sensory loss 3=Severe to total sensory loss     Best Language 0   0=No aphasia, normal 1=Mild to moderate aphasia 2=Mute, global aphasia 3=Multe, global aphasia     Dysarthria 0   0=Normal 1=Mild to moderate 2=Severe, unintelligible or mute/anarthric     Extinction/Neglect 0   0=No abnormality 1=Extinction to bilateral simultaneous stimulation 2=Profound neglect     Total  0          Labs:     Lab Results   Component Value Date    HGB 11.5 (L) 05/16/2018    HCT 36.8 (L) 05/16/2018    WBC 8.40 05/16/2018     05/16/2018     Lab Results   Component Value Date    BUN 45 (H) 05/16/2018    CALCIUM 9.4 05/16/2018     05/16/2018    K 5.9 (H) 05/16/2018     05/16/2018    CO2 25.7 05/16/2018     (H) 02/06/2018     Lab Results   Component Value Date    ALT 11 05/16/2018    AST 8 05/16/2018    BILITOT 0.4 05/16/2018     Lab Results   Component Value Date    PHOS 3.8 10/29/2015    PROTIME 14.7 (H) 05/16/2018    INR 1.17 (H) 05/16/2018     No components found for: POCGLUC  No components found for: A1C  Lab Results   Component Value Date    HDL 39 (L) 02/06/2018    LDL 68 02/06/2018     No components found for: B12  No results found for: TSH    Lab Results (last 24 hours)     Procedure Component Value Units Date/Time    Comprehensive Metabolic Panel [[id number]]  (Abnormal) Collected:  05/16/18 1234    Specimen:  Blood Updated:  05/16/18 1311     Glucose 81 mg/dL      BUN 45 (H) mg/dL      Creatinine 2.12 (H) mg/dL      Sodium 143 mmol/L      Potassium 5.9 (H) mmol/L      Chloride 106 mmol/L      CO2 25.7 mmol/L      Calcium 9.4 mg/dL      Total Protein 6.3 g/dL      Albumin 3.90 g/dL      ALT (SGPT) 11 U/L      AST (SGOT) 8 U/L      Alkaline Phosphatase 69 U/L      Total Bilirubin 0.4 mg/dL       eGFR Non African Amer 31 (L) mL/min/1.73      Globulin 2.4 gm/dL      A/G Ratio 1.6 g/dL      BUN/Creatinine Ratio 21.2     Anion Gap 11.3 mmol/L     Narrative:       The MDRD GFR formula is only valid for adults with stable renal function between ages 18 and 70.    Protime-INR [[id number]]  (Abnormal) Collected:  05/16/18 1235    Specimen:  Blood Updated:  05/16/18 1258     Protime 14.7 (H) Seconds      INR 1.17 (H)    CBC & Differential [[id number]] Collected:  05/16/18 1234    Specimen:  Blood Updated:  05/16/18 1252    Narrative:       The following orders were created for panel order CBC & Differential.  Procedure                               Abnormality         Status                     ---------                               -----------         ------                     CBC Auto Differential[[id number]]        Abnormal            Final result                 Please view results for these tests on the individual orders.    CBC Auto Differential [[id number]]  (Abnormal) Collected:  05/16/18 1234    Specimen:  Blood Updated:  05/16/18 1252     WBC 8.40 10*3/mm3      RBC 3.78 (L) 10*6/mm3      Hemoglobin 11.5 (L) g/dL      Hematocrit 36.8 (L) %      MCV 97.4 (H) fL      MCH 30.4 pg      MCHC 31.3 (L) g/dL      RDW 13.3 %      RDW-SD 46.7 fl      MPV 10.8 fL      Platelets 217 10*3/mm3      Neutrophil % 59.9 %      Lymphocyte % 28.2 %      Monocyte % 9.6 %      Eosinophil % 2.1 %      Basophil % 0.2 %      Immature Grans % 0.2 %      Neutrophils, Absolute 5.02 10*3/mm3      Lymphocytes, Absolute 2.37 10*3/mm3      Monocytes, Absolute 0.81 10*3/mm3      Eosinophils, Absolute 0.18 10*3/mm3      Basophils, Absolute 0.02 10*3/mm3      Immature Grans, Absolute 0.02 10*3/mm3     POC Glucose Once [[id number]]  (Normal) Collected:  05/16/18 1203    Specimen:  Blood Updated:  05/16/18 1205     Glucose 80 mg/dL     Narrative:       Meter: [id number] : [id number] Corrienas Marcus  [employer]          .  Imaging Results (last  24 hours)     ** No results found for the last 24 hours. **          For this problem, the following was ordered:  Orders Placed This Encounter   Procedures   • MRI Brain With & Without Contrast   • MRI Angiogram Neck With & Without Contrast   • CT Head Without Contrast   • Comprehensive Metabolic Panel   • CBC Auto Differential   • Protime-INR   • Neurology (on-call MD unless specified)   • [facility] (on-call MD unless specified)   • POC Glucose Once   • ECG 12 Lead   • Inpatient Admission   • Inpatient Admission   • CBC & Differential       Problem List Items Addressed This Visit     Cerebrovascular accident (CVA) - Primary          ASSESSMENT/PLAN: This is a 78 y.o. male who has   Past Medical History:   Diagnosis Date   • Arthritis    • Blind left eye    • Carotid artery disease     Right CEA 9/26/16.  Left CEA 7/18/17 (with dionte-op CVA)   • Chronic kidney disease    • CKD (chronic kidney disease)    • CVA (cerebral vascular accident)     on 7/18/17 dionte-operatively from left CEA.     • Diabetes mellitus 1998    DR CAROLINE DURON   • Hyperlipidemia    • Hypertension    • Low back pain    • NSTEMI (non-ST elevated myocardial infarction)     NSTEMI following left CEA and dionte-op stroke in 7/18 ([facility]).     • Renal disorder    • SOB (shortness of breath)     presents with Left hemispheric symptoms.  Not a TPA candidate because of low NIH and rapidly improving symptoms. Patient is on dual antiplatelet therapy at home.    1.  Transient dysarthria and aphasia to rule out left hemispheric TIA-differentials hypoglycemia versus cardiac source:    - Stroke labs; B12, TSH, lipid and A1c.A1c 8.7, LDL 68 B12 and TSH pending  - MRI brain no acute stroke.  MRA head and neck pending  - Telemetry   - PTOT speech rehabilitation assessment  - DVT prophylaxis  - Swallow study  - Statins  - Echocardiogram.  - Antiplatelet therapy and statins.  - Cardiac key (Holter) to monitor heart for arrhythmias at discharge.    2.   Severe sleep apnea:  - Pulmonary consult for sleep studies    3. At discharge Secondary stroke prophylaxis: Ideal targets for stroke prevention would be Blood pressure < 130/80; B12 > 500 TSH in normal range and LDL < 70; HbA1c < 6.5 and smoking cessation if applicable.        Thank you for allowing us to participate in the care of this patient.    Ricardo Iniguez MD  05/16/18  1:23 PM

## 2018-05-16 NOTE — ED PROVIDER NOTES
EMERGENCY DEPARTMENT ENCOUNTER    CHIEF COMPLAINT  Chief Complaint: Dysarthria  History given by: Patient  History limited by: Nothing  Room Number: 37/37  PMD: Fahad Murray Jr., MD      HPI:  Pt is a 78 y.o. male who presents complaining of dysarthria that began at 1130. The pt was talking to his wife at 1130 when he began to develop dysarthria and aphasia. The pt's last known normal was 1130. The pt states his dysarthria has improved since the beginning of the symptoms, but it has not totally resolved. Pt denies BOBBY and visual disturbance. Pt reports right arm weakness since a previous stroke that is unchanged from his baseline. The pt is anticoagulated with Plavix and ASA.    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   • Arthritis 03/03/2016   • 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   • CKD (chronic kidney disease) stage 3, GFR 30-59 ml/min 03/18/2016   • Anemia in stage 3 chronic kidney disease 09/02/2016   • History of ischemic stroke without residual deficits 10/27/2017   • Osteoarthritis of spine with radiculopathy, lumbar region 03/09/2018   • Chronic low back pain with sciatica 03/09/2018   • Diabetic mononeuropathy associated with diabetes mellitus due to underlying condition 03/09/2018     Resolved Ambulatory Problems     Diagnosis Date Noted   • No Resolved Ambulatory Problems     Past Medical History:   Diagnosis Date   • Arthritis    • Blind left eye    • Carotid artery  disease    • Chronic kidney disease    • CKD (chronic kidney disease)    • CVA (cerebral vascular accident)    • Diabetes mellitus    • Hyperlipidemia    • Hypertension    • Low back pain    • NSTEMI (non-ST elevated myocardial infarction)    • Renal disorder    • SOB (shortness of breath)        PAST SURGICAL HISTORY  Past Surgical History:   Procedure Laterality Date   • APPENDECTOMY  2016    [facility], Dr. Zimmerman   • CAROTID ENDARTERECTOMY Right 2016    [facility]    • CAROTID ENDARTERECTOMY Left 2017    [facility]    • CATARACT EXTRACTION     • CHOLECYSTECTOMY  1989    [facility]   • JOINT REPLACEMENT     • LUMBAR EPIDURAL INJECTION     • TOTAL HIP ARTHROPLASTY Right 2016    [facility]       FAMILY HISTORY  Family History   Problem Relation Age of Onset   • Lymphoma Brother 65         at age 71   • Coronary artery disease Neg Hx        SOCIAL HISTORY  Social History     Social History   • Marital status:      Spouse name: Gallito   • Number of children: 2   • Years of education: GED     Occupational History   • Retired       Social History Main Topics   • Smoking status: Former Smoker     Packs/day: 2.00     Years: 25.00     Quit date:    • Smokeless tobacco: Never Used   • Alcohol use No   • Drug use: No   • Sexual activity: Defer     Other Topics Concern   • Not on file     Social History Narrative    Enjoys golf.    LIVES WITH GALLITO GERONIMO       ALLERGIES  Lamisil [terbinafine hcl]    REVIEW OF SYSTEMS  Review of Systems   Constitutional: Negative for activity change, appetite change and fever.   HENT: Negative for congestion and sore throat.    Eyes: Negative.    Respiratory: Negative for cough and shortness of breath.    Cardiovascular: Negative for chest pain and leg swelling.   Gastrointestinal: Negative for abdominal pain, diarrhea and vomiting.   Endocrine: Negative.     Genitourinary: Negative for decreased urine volume and dysuria.   Musculoskeletal: Negative for neck pain.   Skin: Negative for rash and wound.   Allergic/Immunologic: Negative.    Neurological: Positive for speech difficulty (Dysarthria and aphasia that has improved). Negative for weakness, numbness and headaches.   Hematological: Negative.    Psychiatric/Behavioral: Negative.    All other systems reviewed and are negative.      PHYSICAL EXAM  ED Triage Vitals   Temp Pulse Resp BP SpO2   -- -- -- -- --      Temp src Heart Rate Source Patient Position BP Location FiO2 (%)   -- -- -- -- --       Physical Exam   Constitutional: He is oriented to person, place, and time and well-developed, well-nourished, and in no distress.   HENT:   Head: Normocephalic and atraumatic.   Eyes: EOM are normal. Pupils are equal, round, and reactive to light.   Neck: Normal range of motion. Neck supple.   Cardiovascular: Normal rate, regular rhythm and normal heart sounds.    Pulmonary/Chest: Effort normal and breath sounds normal. No respiratory distress.   Abdominal: Soft. There is no tenderness. There is no rebound and no guarding.   Musculoskeletal: Normal range of motion. He exhibits no edema.   Neurological: He is alert and oriented to person, place, and time. He has normal sensation. He displays weakness (Right hand that is chronic from a previous stroke) and abnormal speech (Mild dysarthria). He displays facial symmetry.   Skin: Skin is warm and dry.   Psychiatric: Mood and affect normal.   Nursing note and vitals reviewed.      LAB RESULTS  Lab Results (last 24 hours)     Procedure Component Value Units Date/Time    POC Glucose Once [[id number]]  (Normal) Collected:  05/16/18 1203    Specimen:  Blood Updated:  05/16/18 1205     Glucose 80 mg/dL     Narrative:       Meter: [id number] : [id number] [facility]rien[facility]    CBC & Differential [[id number]] Collected:  05/16/18 1234    Specimen:  Blood Updated:  05/16/18 1252     Narrative:       The following orders were created for panel order CBC & Differential.  Procedure                               Abnormality         Status                     ---------                               -----------         ------                     CBC Auto Differential[[id number]]        Abnormal            Final result                 Please view results for these tests on the individual orders.    Comprehensive Metabolic Panel [[id number]] Collected:  05/16/18 1234    Specimen:  Blood Updated:  05/16/18 1245    CBC Auto Differential [[id number]]  (Abnormal) Collected:  05/16/18 1234    Specimen:  Blood Updated:  05/16/18 1252     WBC 8.40 10*3/mm3      RBC 3.78 (L) 10*6/mm3      Hemoglobin 11.5 (L) g/dL      Hematocrit 36.8 (L) %      MCV 97.4 (H) fL      MCH 30.4 pg      MCHC 31.3 (L) g/dL      RDW 13.3 %      RDW-SD 46.7 fl      MPV 10.8 fL      Platelets 217 10*3/mm3      Neutrophil % 59.9 %      Lymphocyte % 28.2 %      Monocyte % 9.6 %      Eosinophil % 2.1 %      Basophil % 0.2 %      Immature Grans % 0.2 %      Neutrophils, Absolute 5.02 10*3/mm3      Lymphocytes, Absolute 2.37 10*3/mm3      Monocytes, Absolute 0.81 10*3/mm3      Eosinophils, Absolute 0.18 10*3/mm3      Basophils, Absolute 0.02 10*3/mm3      Immature Grans, Absolute 0.02 10*3/mm3     Protime-INR [[id number]]  (Abnormal) Collected:  05/16/18 1235    Specimen:  Blood Updated:  05/16/18 1258     Protime 14.7 (H) Seconds      INR 1.17 (H)          I ordered the above labs and reviewed the results    RADIOLOGY  MRI Brain With & Without Contrast    (Results Pending)   MRI Angiogram Neck With & Without Contrast    (Results Pending)   CT Head Without Contrast    (Results Pending)        I ordered the above noted radiological studies. Interpreted by radiologist. Reviewed by me in PACS.       PROCEDURES  Procedures    Interval: baseline  1a. Level of Consciousness: 0-->Alert, keenly responsive  1b. LOC Questions: 0-->Answers both questions  correctly  1c. LOC Commands: 0-->Performs both tasks correctly  2. Best Gaze: 0-->Normal  3. Visual: 0-->No visual loss  4. Facial Palsy: 0-->Normal symmetrical movements  5a. Motor Arm, Left: 0-->No drift, limb holds 90 (or 45) degrees for full 10 secs  5b. Motor Arm, Right: 0-->No drift, limb holds 90 (or 45) degrees for full 10 secs  6a. Motor Leg, Left: 0-->No drift, leg holds 30 degree position for full 5 secs  6b. Motor Leg, Right: 0-->No drift, leg holds 30 degree position for full 5 secs  7. Limb Ataxia: 0-->Absent  8. Sensory: 0-->Normal, no sensory loss  9. Best Language: 0-->No aphasia, normal  10. Dysarthria: 1-->Mild-to-moderate dysarthria, patient slurs at least some words and, at worst, can be understood with some difficulty  11. Extinction and Inattention (formerly Neglect): 0-->No abnormality    Total (NIH Stroke Scale): 1    EKG           EKG time: 1238  Rhythm/Rate: 80 Normal Sinus  P waves and CT: Normal  QRS, axis: Q waves in leads III and AVF   ST and T waves: Normal     Interpreted Contemporaneously by me, independently viewed  No prior for comparison    PROGRESS AND CONSULTS       1216  Call placed to Neurology.    1225  Received a call from Dr. Iniguez and discussed pt's case. Dr. Iniguez agreed with plan to order the CT and CTA Head for further evaluation. He would also like to make the pt a team D.    1231  CT Head, CTA Head, CTA Neck, CT Cerebral Perfusion, EKG, and labs ordered for further evaluation.    1235  BP- 137/60 HR- 88 Temp- 98.3 °F (36.8 °C) (Tympanic) O2 sat- 92%    Rechecked pt, he and family believe that his dysarthria has improved more since the initial visit.    1236  Received a call from Dr. Iniguez and informed him of the pt's creatinine being elevated. Dr. Iniguez agreed with the plan to avoid the CT's due to improving symptoms and elevated creatinine.  The CT scans are discontinued.    1238  MRI Brain ordered for further evaluation.    1239  Call placed to  [facility].    [id number]  Received a call from Dr. Velez and discussed pt's case. Dr. Velez agreed with plan to admit the pt to Telemetry.      MEDICAL DECISION MAKING  Results were reviewed/discussed with the patient and they were also made aware of online access. Pt also made aware that some labs, such as cultures, will not be resulted during ER visit and follow up with PMD is necessary.     MDM  Number of Diagnoses or Management Options  Cerebrovascular accident (CVA), unspecified mechanism:      Amount and/or Complexity of Data Reviewed  Clinical lab tests: ordered and reviewed (Unremarkable)  Tests in the radiology section of CPT®: ordered and reviewed (MRI will be reviewed by Dr. Velez)  Tests in the medicine section of CPT®: ordered and reviewed (Refer to the procedure section of the note for EKG results  )  Review and summarize past medical records: yes (The pt had a carotid endarterectomy in July 2017. The pt also has a hx of stroke without residual effects.)  Discuss the patient with other providers: yes (Dr. Iniguez, Neurology  Dr. Velez, [facility])    Patient Progress  Patient progress: stable         DIAGNOSIS  Final diagnoses:   Cerebrovascular accident (CVA), unspecified mechanism       DISPOSITION  ADMISSION    Discussed treatment plan and reason for admission with pt/family and admitting physician.  Pt/family voiced understanding of the plan for admission for further testing/treatment as needed.         Latest Documented Vital Signs:  As of 1:04 PM  BP- 137/60 HR- 88 Temp- 98.3 °F (36.8 °C) (Tympanic) O2 sat- 92%    --  Documentation assistance provided by mabel Honeycutt for Dr. Loyd.  Information recorded by the mabel was done at my direction and has been verified and validated by me.       Matt Honeycutt  05/16/18 7103       Santino Loyd MD  05/16/18 9533

## 2018-05-16 NOTE — H&P
HISTORY AND PHYSICAL   [facility]        Patient Identification:  Name: Caroline Retana  Age: 78 y.o.  Sex: male  :  1940  MRN: [id number]                     Primary Care Physician: Fahad Murray Jr., MD    Chief Complaint:  Difficulty speaking.    History of Present Illness:   Pleasant 78-year-old gentleman presents after a very short spell of slurred speech.  He states it lasted only about 3 minutes.  His wife at bedside also notes that his speech was very slurred for a short period of time..    After resolution there has been no recurrence.  No other neurologic symptoms during the period of time.  He has a previous history of cerebral vascular disease and in fact had a right carotid endarterectomy in .  He is on dual antiplatelet therapy.   He has a history of coronary disease.  He is also diabetic and notes that he's been having mildly low blood sugars last few days also.  He is believed to have obstructive sleep apnea or certainly has a symptomatology of it and is scheduled for a sleep study very near future.        Past Medical History:  Past Medical History:   Diagnosis Date   • Arthritis    • Blind left eye    • Carotid artery disease     Right CEA 16.  Left CEA 17 (with dionte-op CVA)   • Chronic kidney disease    • CKD (chronic kidney disease)    • CVA (cerebral vascular accident)     on 17 dionte-operatively from left CEA.     • Diabetes mellitus     DR CAROLINE DURON   • Hyperlipidemia    • Hypertension    • Low back pain    • NSTEMI (non-ST elevated myocardial infarction)     NSTEMI following left CEA and dionte-op stroke in  ([facility]).     • Renal disorder    • SOB (shortness of breath)      Past Surgical History:  Past Surgical History:   Procedure Laterality Date   • APPENDECTOMY  2016    [facility], Dr. Zimmerman   • CAROTID ENDARTERECTOMY Right 2016    [facility]    • CAROTID ENDARTERECTOMY Left 2017     [facility]    • CATARACT EXTRACTION     • CHOLECYSTECTOMY  11/1989    [facility]   • JOINT REPLACEMENT     • LUMBAR EPIDURAL INJECTION     • TOTAL HIP ARTHROPLASTY Right 11/16/2016    [facility]      Home Meds:  Prescriptions Prior to Admission   Medication Sig Dispense Refill Last Dose   • aspirin 81 MG tablet Take 81 mg by mouth daily.   Not Taking   • atorvastatin (LIPITOR) 20 MG tablet Take 1 tablet by mouth Every Night. 90 tablet 3 Taking   • carvedilol (COREG) 3.125 MG tablet Take 1 tablet by mouth Every Morning Before Breakfast. 30 tablet 11 Taking   • cholecalciferol (VITAMIN D3) 1000 UNITS tablet Take 1,000 Units by mouth Daily.   Taking   • Cinnamon 500 MG tablet Take 1 tablet by mouth Daily.      • clopidogrel (PLAVIX) 75 MG tablet Take 75 mg by mouth Daily.   Not Taking   • gabapentin (NEURONTIN) 400 MG capsule Take 400 mg by mouth 2 (Two) Times a Day.      • glipiZIDE (GLUCOTROL) 5 MG tablet Take 5 mg by mouth 2 (Two) Times a Day Before Meals.      • tamsulosin (FLOMAX) 0.4 MG capsule 24 hr capsule Take 1 capsule by mouth Every Night. 90 capsule 1 Taking   • traMADol (ULTRAM) 50 MG tablet Take  mg by mouth Every 6 (Six) Hours As Needed for Severe Pain .      • amLODIPine (NORVASC) 5 MG tablet Take 5 mg by mouth Daily.  0 Taking   • ketoconazole (NIZORAL) 2 % cream Apply  topically Daily. Twice daily as needed for right ear irritation 30 g 1 Taking   • lisinopril (PRINIVIL,ZESTRIL) 10 MG tablet   0 Taking       Allergies:  Allergies   Allergen Reactions   • Lamisil [Terbinafine Hcl] Rash     Immunizations:  Immunization History   Administered Date(s) Administered   • Pneumococcal Conjugate 13-Valent (PCV13) 10/11/2017     Social History:   Social History     Social History Narrative    Enjoys golf.    LIVES WITH GALLITO GERONIMO     Social History   Substance Use Topics   • Smoking status: Former Smoker     Packs/day: 2.00     Years: 25.00     Quit date: 1989    • Smokeless tobacco: Never Used   • Alcohol use No     Family History:  Family History   Problem Relation Age of Onset   • Lymphoma Brother 65         at age 71   • Coronary artery disease Neg Hx         Review of Systems  Review of Systems   Constitutional:        As per history of present illness.  Otherwise negative.   HENT: Negative.    Eyes: Negative.    Respiratory: Negative.    Cardiovascular: Negative.    Gastrointestinal: Negative.    Endocrine:        As per history of present illness.  Otherwise negative.   Genitourinary: Negative.    Musculoskeletal: Negative.    Skin: Negative.    Allergic/Immunologic: Negative.    Neurological:        As per history of present illness.  Otherwise negative.   Hematological: Negative.    Psychiatric/Behavioral: Negative.        Objective:  tMax 24 hrs: Temp (24hrs), Av.9 °F (36.6 °C), Min:97.4 °F (36.3 °C), Max:98.3 °F (36.8 °C)    Vitals Ranges:   Temp:  [97.4 °F (36.3 °C)-98.3 °F (36.8 °C)] 97.4 °F (36.3 °C)  Heart Rate:  [77-88] 78  Resp:  [15-16] 16  BP: (109-142)/(60-70) 109/69      Exam:  Physical Exam   Constitutional: He is oriented to person, place, and time. He appears well-developed and well-nourished. No distress.   Obese.   HENT:   Head: Normocephalic and atraumatic.   Right Ear: External ear normal.   Left Ear: External ear normal.   Nose: Nose normal.   Mouth/Throat: Oropharynx is clear and moist.   Eyes: Conjunctivae and EOM are normal. Right eye exhibits no discharge. Left eye exhibits no discharge. No scleral icterus.   Neck: Neck supple. No JVD present. No tracheal deviation present. No thyromegaly present.   Cardiovascular: Normal rate, regular rhythm, normal heart sounds and intact distal pulses.    Pulmonary/Chest: Effort normal and breath sounds normal. No stridor. No respiratory distress. He exhibits no tenderness.   Abdominal: Soft. Bowel sounds are normal. He exhibits no distension and no mass. There is no tenderness. There is no  rebound and no guarding.   Musculoskeletal: He exhibits no edema.   Neurological: He is alert and oriented to person, place, and time. No cranial nerve deficit. Coordination normal.   Skin: Skin is warm and dry. He is not diaphoretic.   Psychiatric: He has a normal mood and affect. His behavior is normal. Thought content normal.       Data Review:  All labs and radiology reviewed.    Assessment:  Active Problems:    TIA (transient ischemic attack):   Continue with CVA protocol.  Neurology input appreciated.    Carotid artery disease    Diabetes mellitus: Hold Glucotrol.  Provide low-dose sliding scale insulin.    CKD (chronic kidney disease), stage III:  Creatinine appears be a bit above baseline.  We'll hydrate overnight, hold ACE inhibitor's.  Patient advised to see his nephrologist, who is at [facility], after discharge.  If there is worsening creatinine level will have nephrology consult done in house.    HTN (hypertension)      Plan:  Please see above.    Jourdan Velez MD  5/16/2018  7:20 PM    EMR Dragon/Transcription disclaimer:   Much of this encounter note is an electronic transcription/translation of spoken language to printed text. The electronic translation of spoken language may permit erroneous, or at times, nonsensical words or phrases to be inadvertently transcribed; Although I have reviewed the note for such errors, some may still exist.

## 2018-05-16 NOTE — SIGNIFICANT NOTE
05/16/18 1458   Rehab Time/Intention   Evaluation Not Performed (Pt. currently off the floor for MRI. Regular diet per MD order.)   Rehab Treatment   Discipline speech language pathologist   Recommendations   SLP - Next Appointment (5/17/18 )

## 2018-05-17 ENCOUNTER — APPOINTMENT (OUTPATIENT)
Dept: CARDIOLOGY | Facility: HOSPITAL | Age: 78
End: 2018-05-17
Attending: HOSPITALIST

## 2018-05-17 PROBLEM — E87.5 HYPERKALEMIA: Status: ACTIVE | Noted: 2018-05-17

## 2018-05-17 LAB
ANION GAP SERPL CALCULATED.3IONS-SCNC: 8.3 MMOL/L
ANION GAP SERPL CALCULATED.3IONS-SCNC: 9.3 MMOL/L
AORTIC DIMENSIONLESS INDEX: 0.6 (DI)
BH CV ECHO MEAS - ACS: 1.5 CM
BH CV ECHO MEAS - AO MEAN PG (FULL): 3 MMHG
BH CV ECHO MEAS - AO MEAN PG: 5 MMHG
BH CV ECHO MEAS - AO ROOT AREA (BSA CORRECTED): 1.6
BH CV ECHO MEAS - AO ROOT AREA: 8.6 CM^2
BH CV ECHO MEAS - AO ROOT DIAM: 3.3 CM
BH CV ECHO MEAS - AO V2 MAX: 152 CM/SEC
BH CV ECHO MEAS - AO V2 MEAN: 105 CM/SEC
BH CV ECHO MEAS - AO V2 VTI: 31.1 CM
BH CV ECHO MEAS - AVA(I,A): 2.7 CM^2
BH CV ECHO MEAS - AVA(I,D): 2.7 CM^2
BH CV ECHO MEAS - BSA(HAYCOCK): 2.2 M^2
BH CV ECHO MEAS - BSA: 2.1 M^2
BH CV ECHO MEAS - BZI_BMI: 33.3 KILOGRAMS/M^2
BH CV ECHO MEAS - BZI_METRIC_HEIGHT: 172.7 CM
BH CV ECHO MEAS - BZI_METRIC_WEIGHT: 99.3 KG
BH CV ECHO MEAS - CONTRAST EF (2CH): 66.3 ML/M^2
BH CV ECHO MEAS - CONTRAST EF 4CH: 63.3 ML/M^2
BH CV ECHO MEAS - EDV(CUBED): 103.8 ML
BH CV ECHO MEAS - EDV(MOD-SP2): 101 ML
BH CV ECHO MEAS - EDV(MOD-SP4): 98 ML
BH CV ECHO MEAS - EDV(TEICH): 102.4 ML
BH CV ECHO MEAS - EF(CUBED): 62.1 %
BH CV ECHO MEAS - EF(MOD-BP): 65 %
BH CV ECHO MEAS - EF(MOD-SP2): 66.3 %
BH CV ECHO MEAS - EF(MOD-SP4): 63.3 %
BH CV ECHO MEAS - EF(TEICH): 53.7 %
BH CV ECHO MEAS - ESV(CUBED): 39.3 ML
BH CV ECHO MEAS - ESV(MOD-SP2): 34 ML
BH CV ECHO MEAS - ESV(MOD-SP4): 36 ML
BH CV ECHO MEAS - ESV(TEICH): 47.4 ML
BH CV ECHO MEAS - FS: 27.7 %
BH CV ECHO MEAS - IVS/LVPW: 1
BH CV ECHO MEAS - IVSD: 1.2 CM
BH CV ECHO MEAS - LAT PEAK E' VEL: 10 CM/SEC
BH CV ECHO MEAS - LV DIASTOLIC VOL/BSA (35-75): 46.1 ML/M^2
BH CV ECHO MEAS - LV MASS(C)D: 212 GRAMS
BH CV ECHO MEAS - LV MASS(C)DI: 99.8 GRAMS/M^2
BH CV ECHO MEAS - LV MEAN PG: 2 MMHG
BH CV ECHO MEAS - LV SYSTOLIC VOL/BSA (12-30): 16.9 ML/M^2
BH CV ECHO MEAS - LV V1 MAX: 91 CM/SEC
BH CV ECHO MEAS - LV V1 MEAN: 67.6 CM/SEC
BH CV ECHO MEAS - LV V1 VTI: 18.3 CM
BH CV ECHO MEAS - LVIDD: 4.7 CM
BH CV ECHO MEAS - LVIDS: 3.4 CM
BH CV ECHO MEAS - LVLD AP2: 8.4 CM
BH CV ECHO MEAS - LVLD AP4: 8.1 CM
BH CV ECHO MEAS - LVLS AP2: 7.1 CM
BH CV ECHO MEAS - LVLS AP4: 6.8 CM
BH CV ECHO MEAS - LVOT AREA (M): 4.5 CM^2
BH CV ECHO MEAS - LVOT AREA: 4.5 CM^2
BH CV ECHO MEAS - LVOT DIAM: 2.4 CM
BH CV ECHO MEAS - LVPWD: 1.2 CM
BH CV ECHO MEAS - MED PEAK E' VEL: 9 CM/SEC
BH CV ECHO MEAS - MV A DUR: 0.12 SEC
BH CV ECHO MEAS - MV A MAX VEL: 99.2 CM/SEC
BH CV ECHO MEAS - MV DEC SLOPE: 306 CM/SEC^2
BH CV ECHO MEAS - MV DEC TIME: 0.22 SEC
BH CV ECHO MEAS - MV E MAX VEL: 46.4 CM/SEC
BH CV ECHO MEAS - MV E/A: 0.47
BH CV ECHO MEAS - MV MEAN PG: 2 MMHG
BH CV ECHO MEAS - MV P1/2T MAX VEL: 76 CM/SEC
BH CV ECHO MEAS - MV P1/2T: 72.7 MSEC
BH CV ECHO MEAS - MV V2 MEAN: 55.8 CM/SEC
BH CV ECHO MEAS - MV V2 VTI: 24.1 CM
BH CV ECHO MEAS - MVA P1/2T LCG: 2.9 CM^2
BH CV ECHO MEAS - MVA(P1/2T): 3 CM^2
BH CV ECHO MEAS - MVA(VTI): 3.4 CM^2
BH CV ECHO MEAS - PA ACC SLOPE: 30.8 CM/SEC^2
BH CV ECHO MEAS - PA ACC TIME: 0.09 SEC
BH CV ECHO MEAS - PA MAX PG: 4 MMHG
BH CV ECHO MEAS - PA PR(ACCEL): 39.4 MMHG
BH CV ECHO MEAS - PA V2 MAX: 100 CM/SEC
BH CV ECHO MEAS - PULM A REVS DUR: 0.09 SEC
BH CV ECHO MEAS - PULM A REVS VEL: 26.4 CM/SEC
BH CV ECHO MEAS - PULM DIAS VEL: 48.5 CM/SEC
BH CV ECHO MEAS - PULM S/D: 1.5
BH CV ECHO MEAS - PULM SYS VEL: 71 CM/SEC
BH CV ECHO MEAS - QP/QS: 0.4
BH CV ECHO MEAS - RAP SYSTOLE: 8 MMHG
BH CV ECHO MEAS - RV MEAN PG: 1 MMHG
BH CV ECHO MEAS - RV V1 MEAN: 38.5 CM/SEC
BH CV ECHO MEAS - RV V1 VTI: 10.5 CM
BH CV ECHO MEAS - RVOT AREA: 3.1 CM^2
BH CV ECHO MEAS - RVOT DIAM: 2 CM
BH CV ECHO MEAS - RVSP: 33 MMHG
BH CV ECHO MEAS - SI(AO): 125.2 ML/M^2
BH CV ECHO MEAS - SI(CUBED): 30.4 ML/M^2
BH CV ECHO MEAS - SI(LVOT): 39 ML/M^2
BH CV ECHO MEAS - SI(MOD-SP2): 31.5 ML/M^2
BH CV ECHO MEAS - SI(MOD-SP4): 29.2 ML/M^2
BH CV ECHO MEAS - SI(TEICH): 25.9 ML/M^2
BH CV ECHO MEAS - SV(AO): 266 ML
BH CV ECHO MEAS - SV(CUBED): 64.5 ML
BH CV ECHO MEAS - SV(LVOT): 82.8 ML
BH CV ECHO MEAS - SV(MOD-SP2): 67 ML
BH CV ECHO MEAS - SV(MOD-SP4): 62 ML
BH CV ECHO MEAS - SV(RVOT): 33 ML
BH CV ECHO MEAS - SV(TEICH): 54.9 ML
BH CV ECHO MEAS - TAPSE (>1.6): 2 CM2
BH CV ECHO MEAS - TR MAX VEL: 286 CM/SEC
BH CV ECHO MEASUREMENTS AVERAGE E/E' RATIO: 4.88
BH CV VAS BP RIGHT ARM: NORMAL MMHG
BH CV XLRA - RV BASE: 3.5 CM
BH CV XLRA - TDI S': 20 CM/SEC
BUN BLD-MCNC: 43 MG/DL (ref 8–23)
BUN BLD-MCNC: 48 MG/DL (ref 8–23)
BUN/CREAT SERPL: 21.3 (ref 7–25)
BUN/CREAT SERPL: 24 (ref 7–25)
CALCIUM SPEC-SCNC: 8.6 MG/DL (ref 8.6–10.5)
CALCIUM SPEC-SCNC: 8.7 MG/DL (ref 8.6–10.5)
CHLORIDE SERPL-SCNC: 108 MMOL/L (ref 98–107)
CHLORIDE SERPL-SCNC: 109 MMOL/L (ref 98–107)
CHOLEST SERPL-MCNC: 104 MG/DL (ref 0–200)
CO2 SERPL-SCNC: 23.7 MMOL/L (ref 22–29)
CO2 SERPL-SCNC: 26.7 MMOL/L (ref 22–29)
CREAT BLD-MCNC: 2 MG/DL (ref 0.76–1.27)
CREAT BLD-MCNC: 2.02 MG/DL (ref 0.76–1.27)
GFR SERPL CREATININE-BSD FRML MDRD: 32 ML/MIN/1.73
GFR SERPL CREATININE-BSD FRML MDRD: 33 ML/MIN/1.73
GLUCOSE BLD-MCNC: 145 MG/DL (ref 65–99)
GLUCOSE BLD-MCNC: 153 MG/DL (ref 65–99)
GLUCOSE BLDC GLUCOMTR-MCNC: 111 MG/DL (ref 70–130)
GLUCOSE BLDC GLUCOMTR-MCNC: 135 MG/DL (ref 70–130)
GLUCOSE BLDC GLUCOMTR-MCNC: 172 MG/DL (ref 70–130)
GLUCOSE BLDC GLUCOMTR-MCNC: 291 MG/DL (ref 70–130)
HBA1C MFR BLD: 8.33 % (ref 4.8–5.6)
HDLC SERPL-MCNC: 34 MG/DL (ref 40–60)
LDLC SERPL CALC-MCNC: 51 MG/DL (ref 0–100)
LDLC/HDLC SERPL: 1.5 {RATIO}
LEFT ATRIUM VOLUME INDEX: 16.5 ML/M2
LV EF 2D ECHO EST: 65 %
MAXIMAL PREDICTED HEART RATE: 144 BPM
POTASSIUM BLD-SCNC: 5.7 MMOL/L (ref 3.5–5.2)
POTASSIUM BLD-SCNC: 5.9 MMOL/L (ref 3.5–5.2)
SODIUM BLD-SCNC: 142 MMOL/L (ref 136–145)
SODIUM BLD-SCNC: 143 MMOL/L (ref 136–145)
STRESS TARGET HR: 122 BPM
TRIGL SERPL-MCNC: 95 MG/DL (ref 0–150)
TSH SERPL DL<=0.05 MIU/L-ACNC: 1.03 MIU/ML (ref 0.27–4.2)
VLDLC SERPL-MCNC: 19 MG/DL (ref 5–40)

## 2018-05-17 PROCEDURE — 63710000001 INSULIN ASPART PER 5 UNITS: Performed by: HOSPITALIST

## 2018-05-17 PROCEDURE — 96361 HYDRATE IV INFUSION ADD-ON: CPT

## 2018-05-17 PROCEDURE — 99214 OFFICE O/P EST MOD 30 MIN: CPT | Performed by: NURSE PRACTITIONER

## 2018-05-17 PROCEDURE — G0378 HOSPITAL OBSERVATION PER HR: HCPCS

## 2018-05-17 PROCEDURE — G8979 MOBILITY GOAL STATUS: HCPCS

## 2018-05-17 PROCEDURE — G8980 MOBILITY D/C STATUS: HCPCS

## 2018-05-17 PROCEDURE — 80048 BASIC METABOLIC PNL TOTAL CA: CPT | Performed by: HOSPITALIST

## 2018-05-17 PROCEDURE — 80061 LIPID PANEL: CPT | Performed by: PSYCHIATRY & NEUROLOGY

## 2018-05-17 PROCEDURE — 83036 HEMOGLOBIN GLYCOSYLATED A1C: CPT | Performed by: PSYCHIATRY & NEUROLOGY

## 2018-05-17 PROCEDURE — G8997 SWALLOW GOAL STATUS: HCPCS

## 2018-05-17 PROCEDURE — G8978 MOBILITY CURRENT STATUS: HCPCS

## 2018-05-17 PROCEDURE — 92610 EVALUATE SWALLOWING FUNCTION: CPT

## 2018-05-17 PROCEDURE — 25010000002 PERFLUTREN (DEFINITY) 8.476 MG IN SODIUM CHLORIDE 0.9 % 10 ML INJECTION: Performed by: HOSPITALIST

## 2018-05-17 PROCEDURE — 97161 PT EVAL LOW COMPLEX 20 MIN: CPT

## 2018-05-17 PROCEDURE — 93306 TTE W/DOPPLER COMPLETE: CPT

## 2018-05-17 PROCEDURE — 82962 GLUCOSE BLOOD TEST: CPT

## 2018-05-17 PROCEDURE — G8998 SWALLOW D/C STATUS: HCPCS

## 2018-05-17 PROCEDURE — G8996 SWALLOW CURRENT STATUS: HCPCS

## 2018-05-17 PROCEDURE — 93306 TTE W/DOPPLER COMPLETE: CPT | Performed by: INTERNAL MEDICINE

## 2018-05-17 PROCEDURE — 84443 ASSAY THYROID STIM HORMONE: CPT | Performed by: HOSPITALIST

## 2018-05-17 RX ORDER — SODIUM BICARBONATE 650 MG/1
1300 TABLET ORAL ONCE
Status: COMPLETED | OUTPATIENT
Start: 2018-05-17 | End: 2018-05-17

## 2018-05-17 RX ORDER — SODIUM CHLORIDE 9 MG/ML
50 INJECTION, SOLUTION INTRAVENOUS CONTINUOUS
Status: DISCONTINUED | OUTPATIENT
Start: 2018-05-17 | End: 2018-05-18

## 2018-05-17 RX ORDER — SODIUM BICARBONATE 650 MG/1
650 TABLET ORAL 3 TIMES DAILY
Status: DISCONTINUED | OUTPATIENT
Start: 2018-05-17 | End: 2018-05-18

## 2018-05-17 RX ORDER — SODIUM CHLORIDE 9 MG/ML
150 INJECTION, SOLUTION INTRAVENOUS CONTINUOUS
Status: DISCONTINUED | OUTPATIENT
Start: 2018-05-17 | End: 2018-05-17

## 2018-05-17 RX ORDER — ATORVASTATIN CALCIUM 20 MG/1
40 TABLET, FILM COATED ORAL NIGHTLY
Status: DISCONTINUED | OUTPATIENT
Start: 2018-05-17 | End: 2018-05-20 | Stop reason: HOSPADM

## 2018-05-17 RX ADMIN — GABAPENTIN 400 MG: 400 CAPSULE ORAL at 21:19

## 2018-05-17 RX ADMIN — ATORVASTATIN CALCIUM 40 MG: 20 TABLET, FILM COATED ORAL at 21:19

## 2018-05-17 RX ADMIN — SODIUM CHLORIDE 100 ML/HR: 9 INJECTION, SOLUTION INTRAVENOUS at 09:54

## 2018-05-17 RX ADMIN — SODIUM BICARBONATE 1300 MG: 650 TABLET ORAL at 14:20

## 2018-05-17 RX ADMIN — INSULIN ASPART 2 UNITS: 100 INJECTION, SOLUTION INTRAVENOUS; SUBCUTANEOUS at 21:28

## 2018-05-17 RX ADMIN — SODIUM BICARBONATE 650 MG: 650 TABLET ORAL at 14:20

## 2018-05-17 RX ADMIN — SODIUM BICARBONATE 650 MG: 650 TABLET ORAL at 21:19

## 2018-05-17 RX ADMIN — ASPIRIN 325 MG: 325 TABLET ORAL at 09:53

## 2018-05-17 RX ADMIN — CLOPIDOGREL 75 MG: 75 TABLET, FILM COATED ORAL at 09:53

## 2018-05-17 RX ADMIN — SODIUM CHLORIDE 100 ML/HR: 9 INJECTION, SOLUTION INTRAVENOUS at 06:48

## 2018-05-17 RX ADMIN — CARVEDILOL 3.12 MG: 3.12 TABLET, FILM COATED ORAL at 06:48

## 2018-05-17 RX ADMIN — PERFLUTREN 3 ML: 6.52 INJECTION, SUSPENSION INTRAVENOUS at 13:52

## 2018-05-17 RX ADMIN — SODIUM CHLORIDE 150 ML/HR: 9 INJECTION, SOLUTION INTRAVENOUS at 14:28

## 2018-05-17 RX ADMIN — GABAPENTIN 400 MG: 400 CAPSULE ORAL at 09:53

## 2018-05-17 RX ADMIN — INSULIN ASPART 4 UNITS: 100 INJECTION, SOLUTION INTRAVENOUS; SUBCUTANEOUS at 14:23

## 2018-05-17 RX ADMIN — TAMSULOSIN HYDROCHLORIDE 0.4 MG: 0.4 CAPSULE ORAL at 21:19

## 2018-05-17 NOTE — THERAPY EVALUATION
Acute Care - Speech Language Pathology   Swallow Initial Evaluation  [facility]     Patient Name: Caroline Retana  : 1940  MRN: [id number]  Today's Date: 2018  Onset of Illness/Injury or Date of Surgery: 18     Referring Physician: Hira      Admit Date: 2018    Visit Dx:     ICD-10-CM ICD-9-CM   1. Cerebrovascular accident (CVA), unspecified mechanism I63.9 434.91     Patient Active Problem List   Diagnosis   • Type 2 diabetes mellitus with diabetic neuropathy, without long-term current use of insulin   • Hypertension   • Carcinoid tumor of appendix   • Arthritis   • Neuroendocrine carcinoma of small bowel   • CKD (chronic kidney disease) stage 3, GFR 30-59 ml/min   • Anemia in stage 3 chronic kidney disease   • History of ischemic stroke without residual deficits   • Osteoarthritis of spine with radiculopathy, lumbar region   • Chronic low back pain with sciatica   • Diabetic mononeuropathy associated with diabetes mellitus due to underlying condition   • TIA (transient ischemic attack)   • Carotid artery disease   • Diabetes mellitus   • CKD (chronic kidney disease), stage III   • HTN (hypertension)     Past Medical History:   Diagnosis Date   • Arthritis    • Blind left eye    • Carotid artery disease     Right CEA 16.  Left CEA 17 (with dionte-op CVA)   • Chronic kidney disease    • CKD (chronic kidney disease)    • CVA (cerebral vascular accident)     on 17 dionte-operatively from left CEA.     • Diabetes mellitus     DR CAROLINE DURON   • Hyperlipidemia    • Hypertension    • Low back pain    • NSTEMI (non-ST elevated myocardial infarction)     NSTEMI following left CEA and dionte-op stroke in  ([facility]).     • Renal disorder    • SOB (shortness of breath)      Past Surgical History:   Procedure Laterality Date   • APPENDECTOMY  2016    [facility], Dr. Zimmerman   • CAROTID ENDARTERECTOMY Right 2016    [facility]    •  CAROTID ENDARTERECTOMY Left 07/18/2017    [facility]    • CATARACT EXTRACTION     • CHOLECYSTECTOMY  11/1989    [facility]   • JOINT REPLACEMENT     • LUMBAR EPIDURAL INJECTION     • TOTAL HIP ARTHROPLASTY Right 11/16/2016    [facility]          SWALLOW EVALUATION (last 72 hours)      SLP Adult Swallow Evaluation     Row Name 05/17/18 1400                   Rehab Evaluation    Document Type evaluation  -AW        Subjective Information no complaints  -AW        Patient Observations alert;cooperative;agree to therapy  -AW        Patient Effort good  -AW        Symptoms Noted During/After Treatment none  -AW           General Information    Patient Profile Reviewed yes  -AW        Pertinent History Of Current Problem Admitted with neuro symptoms, MRI negative. Pt with previous CVA (July 2017) and reported inconsistent choking since.  -AW        Current Method of Nutrition regular textures;thin liquids  -AW        Prior Level of Function-Swallowing regular textures;thin liquids;other (see comments)   difficulty with straws and mixed per VFSS in 3/18.  -AW        Plans/Goals Discussed with patient;spouse/S.O.;family;agreed upon  -AW        Barriers to Rehab none identified  -AW        Patient's Goals for Discharge return to all previous roles/activities  -AW           Pain Scale: Numbers Pre/Post-Treatment    Pain Scale: Numbers, Pretreatment 0/10 - no pain  -AW           Oral Motor and Function    Dentition Assessment natural, present and adequate  -AW        Secretion Management WNL/WFL  -AW        Mucosal Quality moist, healthy  -AW        Volitional Swallow WFL  -AW        Volitional Cough WFL  -AW           Oral Musculature and Cranial Nerve Assessment    Oral Motor General Assessment WFL  -AW           General Eating/Swallowing Observations    Respiratory Support Currently in Use room air  -AW        Eating/Swallowing Skills self-fed  -AW        Positioning During Eating  upright in chair  -AW        Utensils Used spoon;cup  -AW        Consistencies Trialed regular textures;soft textures;pureed;thin liquids  -AW           Clinical Swallow Eval    Oral Prep Phase WFL  -AW        Oral Transit WFL  -AW        Oral Residue WFL  -AW        Pharyngeal Phase no overt signs/symptoms of pharyngeal impairment  -AW        Clinical Swallow Evaluation Summary Pt tolerated all consistencies. Pt reported choking episodes 2/week, not always with food. Laryngeal elevation appeared adequate. Straws were not tested due to penetration with straw sips on outpatient VFSS in 3/2018. Pt was given exercises at that VFSS visit, however, he reported he didn't do them. Pt and family feel swallowing difficulties have decreased since then. ST to follow for tolerance.   -AW           Clinical Impression    SLP Swallowing Diagnosis functional oral phase;functional pharyngeal phase  -AW        Functional Impact risk of aspiration/pneumonia  -AW        Rehab Potential/Prognosis, Swallowing good, to achieve stated therapy goals  -AW        Criteria for Skilled Therapeutic Interventions Met demonstrates skilled criteria;other (see comments)   diet tolerance  -AW           Recommendations    Therapy Frequency (Swallow) PRN  -AW        Predicted Duration Therapy Intervention (Days) until discharge  -AW        SLP Diet Recommendation regular textures;thin liquids  -AW        Recommended Precautions and Strategies upright posture during/after eating;small bites of food and sips of liquid;no straw  -AW        SLP Rec. for Method of Medication Administration meds whole;with thin liquids;with pudding or applesauce;as tolerated  -AW        Monitor for Signs of Aspiration yes;notify SLP if any concerns;cough;elevated WBC count;gurgly voice;throat clearing;fever;right lower lobe infiltrates;pneumonia;upper respiratory  -AW        Anticipated Dischage Disposition home with assist  -AW          User Key  (r) = Recorded By, (t) =  Taken By, (c) = Cosigned By    Initials Name Effective Dates    DEO Issa MS [facility]-SLP 04/13/15 -         EDUCATION  The patient has been educated in the following areas:   Home Exercise Program (HEP) Dysphagia (Swallowing Impairment) Oral Care/Hydration.    SLP Recommendation and Plan  SLP Swallowing Diagnosis: functional oral phase, functional pharyngeal phase  SLP Diet Recommendation: regular textures, thin liquids  Recommended Precautions and Strategies: upright posture during/after eating, small bites of food and sips of liquid, no straw     Monitor for Signs of Aspiration: yes, notify SLP if any concerns, cough, elevated WBC count, gurgly voice, throat clearing, fever, right lower lobe infiltrates, pneumonia, upper respiratory     Criteria for Skilled Therapeutic Interventions Met: demonstrates skilled criteria, other (see comments) (diet tolerance)  Anticipated Dischage Disposition: home with assist  Rehab Potential/Prognosis, Swallowing: good, to achieve stated therapy goals  Therapy Frequency (Swallow): PRN  Predicted Duration Therapy Intervention (Days): until discharge       Plan of Care Reviewed With: patient, spouse, daughter  Plan of Care Review  Plan of Care Reviewed With: patient, spouse, daughter  Progress: improving  Outcome Summary: Bedside Swallow eval completed. No overt s/s of aspiration. Recommend continue on regular diet with thins, no straws. Pt has h/o swallowing problems inconsistently since CVA  last July. Pt and family reported they have improved. ST to follow for diet tolerance.           SLP Outcome Measures (last 72 hours)      SLP Outcome Measures     Row Name 05/17/18 1400             SLP Outcome Measures    Outcome Measure Used? Adult NOMS  -AW         FCM Scores    FCM Chosen Swallowing  -AW      Swallowing FCM Score 6  -AW        User Key  (r) = Recorded By, (t) = Taken By, (c) = Cosigned By    Initials Name Effective Dates    DEO Issa MS [facility]-SLP 04/13/15 -             Time Calculation:         Time Calculation- SLP     Row Name 05/17/18 1449             Time Calculation- SLP    SLP Start Time 1415  -AW      SLP Stop Time 1500  -AW      SLP Time Calculation (min) 45 min  -AW      SLP Received On 05/17/18  -        User Key  (r) = Recorded By, (t) = Taken By, (c) = Cosigned By    Initials Name Provider Type    AW Nadja Issa MS CCC-SLP Speech and Language Pathologist          Therapy Charges for Today     Code Description Service Date Service Provider Modifiers Qty    08364741117 HC ST SWALLOWING CURRENT STATUS 5/17/2018 Nadja Issa MS CCC-SLP GN, CI 1    [id number] HC ST SWALLOWING PROJECTED 5/17/2018 Nadja Issa MS CCC-SLP GN, CI 1    [id number] HC ST SWALLOWING DISCHARGE 5/17/2018 Nadja Issa MS CCC-SLP GN, CI 1    [id number] HC ST EVAL ORAL PHARYNG SWALLOW 3 5/17/2018 Nadja Issa MS CCC-SLP GN, KX 1          SLP G-Codes  SLP NOMS Used?: Yes  Functional Limitations: Swallowing  Swallow Current Status (): At least 1 percent but less than 20 percent impaired, limited or restricted  Swallow Goal Status (): At least 1 percent but less than 20 percent impaired, limited or restricted  Swallow Discharge Status (): At least 1 percent but less than 20 percent impaired, limited or restricted    Nadja Issa MS CCC-SLP  5/17/2018

## 2018-05-17 NOTE — PROGRESS NOTES
Discharge Planning Assessment   [facility]     Patient Name: Santino Retana  MRN: [id number]  Today's Date: 5/17/2018    Admit Date: 5/16/2018          Discharge Needs Assessment     Row Name 05/17/18 1529       Living Environment    Lives With spouse    Current Living Arrangements home/apartment/condo   SS home    Primary Care Provided by self    Able to Return to Prior Arrangements yes       Transition Planning    Patient/Family Anticipates Transition to home    Patient/Family Anticipated Services at Transition none    Transportation Anticipated family or friend will provide       Discharge Needs Assessment    Equipment Currently Used at Home none    Equipment Needed After Discharge none            Discharge Plan     Row Name 05/17/18 1530       Plan    Plan Plans are home// no needs.     Plan Comments Spoke with pt and wife, Kesha ([phone]) @ bedside.  Confirmed face sheet and pharmacy info/ no changes needed.  Pt states he is IDAL's prior to adm.  Plans are home.  Pt walked with PT today, and they have signed off.  Plans are home.  Instructed that CCP would cont to follow and offer assist as needed.        Destination     No service coordination in this encounter.      Durable Medical Equipment     No service coordination in this encounter.      Dialysis/Infusion     No service coordination in this encounter.      Home Medical Care     No service coordination in this encounter.      Social Care     No service coordination in this encounter.                Demographic Summary    No documentation.           Functional Status     Row Name 05/17/18 1528       Functional Status, IADL    Medications independent    Meal Preparation independent    Housekeeping independent    Laundry independent    Shopping independent            Psychosocial    No documentation.           Abuse/Neglect    No documentation.           Legal     Row Name 05/17/18 1528       Financial/Legal    Who Manages Finances if Patient Unable Pt  states that living will scanned in EPIC is up to date.              Substance Abuse    No documentation.           Patient Forms     Row Name 05/17/18 1532       Patient Forms    Patient Observation Letter Delivered   CMS MEZA on 5/17    Delivered to Patient    Method of delivery In person          Lyudmila Palacios RN

## 2018-05-17 NOTE — PLAN OF CARE
Problem: Patient Care Overview  Goal: Plan of Care Review  Outcome: Ongoing (interventions implemented as appropriate)   05/17/18 0511   Coping/Psychosocial   Plan of Care Reviewed With patient   Plan of Care Review   Progress improving   OTHER   Outcome Summary Admitted from the ER with Slurred speech.Denies any pain.Ambulating to and from bathroom. VSS. Will continue to monitor.     Goal: Individualization and Mutuality  Outcome: Ongoing (interventions implemented as appropriate)    Goal: Discharge Needs Assessment  Outcome: Ongoing (interventions implemented as appropriate)    Goal: Interprofessional Rounds/Family Conf  Outcome: Ongoing (interventions implemented as appropriate)      Problem: Stroke (Ischemic) (Adult)  Goal: Signs and Symptoms of Listed Potential Problems Will be Absent, Minimized or Managed (Stroke)  Outcome: Ongoing (interventions implemented as appropriate)      Problem: Fall Risk (Adult)  Goal: Identify Related Risk Factors and Signs and Symptoms  Outcome: Outcome(s) achieved Date Met: 05/17/18    Goal: Absence of Fall  Outcome: Ongoing (interventions implemented as appropriate)

## 2018-05-17 NOTE — PROGRESS NOTES
"DOS: 2018  NAME: Santino Retana   : 1940  PCP: Fahad Murray Jr., MD  Chief Complaint   Patient presents with   • Speech Problem     around 1130 , pt was talking to wife and started having slurred speech and difficulty finding words. pt reports that he has had some improvement since onset of symptoms but is not at baseline,     Stroke    Historian: Chart, Nursing and patient     Subjective: No events overnight. No complaints and/or concerns on my exam.     Patient denies HA, double/blurred vision, dizziness, numbness or loss of sensation or any other new neurological complaints at this time.       Objective:  Vital signs: BP 99/64 (BP Location: Right arm, Patient Position: Lying)   Pulse 82   Temp 97.7 °F (36.5 °C) (Oral)   Resp 18   Ht 172.7 cm (68\")   Wt 99.1 kg (218 lb 6.4 oz)   SpO2 95%   BMI 33.21 kg/m²       HEENT: Normocephalic, atraumatic   COR: RRR  Resp: Even and unlabored  Extremities: Equal pulses, non distal embolization    Neurological:   MS: AO. Language normal. No neglect. Higher integrative function normal  CN: II-XII normal  Motor: 5/5, normal tone except RUE 4-/5   Sensory: Intact on my exam  Coordination: Normal (finger to nose and heel to shin)     Laboratory results:  Lab Results   Component Value Date    GLUCOSE 153 (H) 2018    CALCIUM 8.7 2018     2018    K 5.9 (H) 2018    CO2 23.7 2018     (H) 2018    BUN 48 (H) 2018    CREATININE 2.00 (H) 2018    EGFRIFAFRI 48 (L) 2018    EGFRIFNONA 33 (L) 2018    BCR 24.0 2018    ANIONGAP 9.3 2018     Lab Results   Component Value Date    WBC 8.40 2018    HGB 11.5 (L) 2018    HCT 36.8 (L) 2018    MCV 97.4 (H) 2018     2018     Lab Results   Component Value Date    CHOL 104 2018     Lab Results   Component Value Date    HDL 34 (L) 2018    HDL 39 (L) 2018     Lab Results   Component Value Date    LDL " 51 05/17/2018    LDL 68 02/06/2018     Lab Results   Component Value Date    TRIG 95 05/17/2018    TRIG 225 (H) 02/06/2018     Lab Results   Component Value Date    TSH 1.030 05/17/2018     Lab Results   Component Value Date    MVNDZGJV24 561 05/16/2018     Lab Results   Component Value Date    HGBA1C 8.33 (H) 05/17/2018     Lab Results   Component Value Date    CHOL 104 05/17/2018    CHLPL 152 02/06/2018    TRIG 95 05/17/2018    HDL 34 (L) 05/17/2018    LDL 51 05/17/2018       Review and interpretation of imaging:  Reviewed Care Everywhere   04/12  Carotid Duplex US   Conclusions: Bilateral internal carotid arteries with plaque but <50% stenosis.  Bilateral common carotid arteries with elevated velocities.  Right vertebral artery with retrograde flow.   Left vertebral artery with antegrade flow.   Bilateral endarterectomies are patent.    05/16  CT Head   IMPRESSION:     Findings compatible with chronic infarcts within the left precentral  gyrus extending into the adjacent superior frontal gyrus and centrum  semiovale as well as the right cerebellar hemisphere. No acute  intracranial pathology is evident.    05/16  MRI Brain/Head and Neck      IMPRESSION:  1. No evidence of acute infarction or hemorrhage.  2. Scattered areas of bright signal intensity in the bilateral cerebral  hemisphere on FLAIR images are consistent with areas of chronic infarct.  3. Diminished flow signal in the right vertebral artery of uncertain  significance. The distal right vertebral artery does appear to  demonstrate flow signal. If further evaluation is clinically indicated  followup CT angiogram of the neck could be obtained.   4. Mild narrowing of the A1 segment of the right anterior cerebral  artery and minimal narrowing at the origin of the A1 segment of the left  anterior cerebral artery.    Impression:This is a 79 yo male with history of right CEA September 2016 left CEA July 2017 at dionte-op CVA  who presented to [facility] on 05/16 with  complaint of dysarthria and aphasia with rapid improvement. NIHSS 0 (per neurology). MRI revealed no acute stroke. MRA Head and Neck revealed no acute pathology and mild narrowing of the right TIARA and minimal narrowing of the left TIARA.  TTE ordered/pending. Continue DAPT as written. Statin decreased to 40mg daily d/t LDL. Sleep consult completed; planned OP sleep study. PT/OT/ST. CCP to assist w/ d/c planning.     Plan:  -DOAP (ASA 325mg and Plavix 75mg) for now; will readdress after TTE completed       -Prior to the event patient was on ASA 81mg/Plavix 75mg    -Stroke labs: (results above) (LDL 51) atorvastatin decreased 40mg   - MRI brain no acute stroke (results above)   - MRA head and neck (results above)   - Telemetry   - PTOT speech rehabilitation assessment  - DVT prophylaxis  - Swallow study  - Statins  - Echocardiogram (ordered)   - Antiplatelet therapy and statins.  - Cardiac key (Holter) to monitor heart for arrhythmias at discharge.  - Pulmonary consult for sleep studies (complete)        Ideal targets for stroke prevention would be :  Blood pressure < 130/80; B12 > 500 TSH in normal range and LDL < 70; HbA1c < 6.5 and smoking cessation if applicable.      Case reviewed with Dr. Iniguez and he agrees with plan above.

## 2018-05-17 NOTE — THERAPY DISCHARGE NOTE
Acute Care - Physical Therapy Initial Eval/Discharge   [facility]     Patient Name: Caroline Retana  : 1940  MRN: [id number]  Today's Date: 2018   Onset of Illness/Injury or Date of Surgery: 18     Referring Physician: Hira      Admit Date: 2018    Visit Dx:    ICD-10-CM ICD-9-CM   1. Cerebrovascular accident (CVA), unspecified mechanism I63.9 434.91     Patient Active Problem List   Diagnosis   • Type 2 diabetes mellitus with diabetic neuropathy, without long-term current use of insulin   • Hypertension   • Carcinoid tumor of appendix   • Arthritis   • Neuroendocrine carcinoma of small bowel   • CKD (chronic kidney disease) stage 3, GFR 30-59 ml/min   • Anemia in stage 3 chronic kidney disease   • History of ischemic stroke without residual deficits   • Osteoarthritis of spine with radiculopathy, lumbar region   • Chronic low back pain with sciatica   • Diabetic mononeuropathy associated with diabetes mellitus due to underlying condition   • TIA (transient ischemic attack)   • Carotid artery disease   • Diabetes mellitus   • CKD (chronic kidney disease), stage III   • HTN (hypertension)     Past Medical History:   Diagnosis Date   • Arthritis    • Blind left eye    • Carotid artery disease     Right CEA 16.  Left CEA 17 (with dionte-op CVA)   • Chronic kidney disease    • CKD (chronic kidney disease)    • CVA (cerebral vascular accident)     on 17 dionte-operatively from left CEA.     • Diabetes mellitus     DR CAROLINE DURON   • Hyperlipidemia    • Hypertension    • Low back pain    • NSTEMI (non-ST elevated myocardial infarction)     NSTEMI following left CEA and dionte-op stroke in  ([facility]).     • Renal disorder    • SOB (shortness of breath)      Past Surgical History:   Procedure Laterality Date   • APPENDECTOMY  2016    [facility], Dr. Zimmerman   • CAROTID ENDARTERECTOMY Right 2016    [facility]    • CAROTID  ENDARTERECTOMY Left 07/18/2017    [facility]    • CATARACT EXTRACTION     • CHOLECYSTECTOMY  11/1989    [facility]   • JOINT REPLACEMENT     • LUMBAR EPIDURAL INJECTION     • TOTAL HIP ARTHROPLASTY Right 11/16/2016    [facility]          PT ASSESSMENT (last 12 hours)      Physical Therapy Evaluation     Row Name 05/17/18 1035          PT Evaluation Time/Intention    Subjective Information no complaints  -EE     Document Type discharge evaluation/summary  -EE     Patient Effort good  -EE     Symptoms Noted During/After Treatment none  -EE     Row Name 05/17/18 1035          General Information    Onset of Illness/Injury or Date of Surgery 05/16/18  -EE     Referring Physician Hedna  -EE     Patient Observations alert;cooperative;agree to therapy  -EE     Patient/Family Observations Pt supine in bed in no acute distress. Family at bedside.  -EE     Prior Level of Function independent:;all household mobility;community mobility  -EE     Equipment Currently Used at Home none  -EE     Pertinent History of Current Functional Problem TIA; admitted w/short period of slurred speech, now resolved; pt reports history of low back pain and prior CVA last year  -EE     Existing Precautions/Restrictions no known precautions/restrictions  -EE     Barriers to Rehab none identified  -EE     Row Name 05/17/18 1035          Relationship/Environment    Lives With spouse  -EE     Row Name 05/17/18 1035          Resource/Environmental Concerns    Current Living Arrangements home/apartment/condo  -EE     Row Name 05/17/18 1035          Cognitive Assessment/Intervention- PT/OT    Orientation Status (Cognition) oriented x 4  -EE     Follows Commands (Cognition) WNL  -EE     Row Name 05/17/18 1035          Bed Mobility Assessment/Treatment    Bed Mobility Assessment/Treatment supine-sit  -EE     Supine-Sit [place] (Bed Mobility) contact guard  -EE     Comment (Bed Mobility) states he normally sleeps  in recliner; increased difficulty getting OOB due to LBP  -[facility] Name 05/17/18 1035          Transfer Assessment/Treatment    Transfer Assessment/Treatment sit-stand transfer;stand-sit transfer;bed-chair transfer  -EE     Bed-Chair [place] (Transfers) supervision  -EE     Sit-Stand [place] (Transfers) supervision  -EE     Stand-Sit [place] (Transfers) supervision  -EE     Row Name 05/17/18 1035          Gait/Stairs Assessment/Training    [place] Level (Gait) supervision  -EE     Assistive Device (Gait) other (see comments)   pushing IV pole  -EE     Distance in Feet (Gait) 150  -EE     Pattern (Gait) step-through  -EE     Deviations/Abnormal Patterns (Gait) fla[place] decreased;stride length decreased  -EE     Comment (Gait/Stairs) no LOB or difficulty negotiating turns in hallway  -[facility] Name 05/17/18 1035          General ROM    GENERAL ROM COMMENTS B LEs grossly WFL  -EE     Row Name 05/17/18 1035          General Assessment (Manual Muscle Testing)    Comment, General Manual Muscle Testing (MMT) Assessment LEs functional  -EE     Row Name 05/17/18 1035          Sensory Assessment/Intervention    Sensory General Assessment --   denies numbness/tingling  -EE     Row Name 05/17/18 1035          Pain Assessment    Additional Documentation Pain Scale: Numbers Pre/Post-Treatment (Group)  -EE     Row Name 05/17/18 1035          Pain Scale: Numbers Pre/Post-Treatment    Pain Scale: Numbers, Pretreatment 0/10 - no pain  -EE     Row Name 05/17/18 1035          Plan of Care Review    Plan of Care Reviewed With patient;spouse;daughter  -EE     Row Name 05/17/18 1035          Physical Therapy Clinical Impression    Patient/Family Goals Statement (PT Clinical Impression) Go home  -     Criteria for Skilled Interventions Met (PT Clinical Impression) no problems identified which require skilled intervention  -[facility] Name 05/17/18 1035          Positioning and Restraints    Pre-Treatment Position  in bed  -EE     Post Treatment Position chair  -EE     In Chair sitting;call light within reach;encouraged to call for assist;with family/caregiver;notified nsg  -EE       User Key  (r) = Recorded By, (t) = Taken By, (c) = Cosigned By    Initials Name Provider Type    EE Kellie Carr PT Physical Therapist          Physical Therapy Education     Title: PT OT SLP Therapies (Resolved)     Topic: Physical Therapy (Resolved)     Point: Mobility training (Resolved)    Learning Progress Summary     Learner Status Readiness Method Response Comment Documented by    Patient Done Acceptance E VU Reviewed activity recommendations; encouraged pt to continue ambulating with family or nsg while inpatient EE 05/17/18 1125                      User Key     Initials Effective Dates Name Provider Type Discipline    EE 04/03/18 -  Kellie Carr PT Physical Therapist PT                PT Recommendation and Plan  Anticipated Discharge Disposition (PT): home or self care  Therapy Frequency (PT Clinical Impression): evaluation only  Outcome Summary/Treatment Plan (PT)  Anticipated Discharge Disposition (PT): home or self care  Plan of Care Reviewed With: patient  Outcome Summary: Pt presents from home w/TIA; brief period of slurred speech that has now resolved. Pt also has history of LBP as well as prior CVA. Upon exam, pt demonstrates no unilateral or focal deficits in strength or sensation; ambulated in hallway with supervision. No LOB or safety concerns observed w/mobility; pt appears to be at baseline mobility. Pt denies need for further acute PT services; states he has had PT in the past following CVA. Reviewed activity recommendations and encouraged pt to continue ambulating with family or nsg staff during remainder of stay. No further acute PT concerns; will sign off.           Outcome Measures     Row Name 05/17/18 1100             How much help from another person do you currently need...    Turning from your back to your side  while in flat bed without using bedrails? 4  -EE      Moving from lying on back to sitting on the side of a flat bed without bedrails? 4  -EE      Moving to and from a bed to a chair (including a wheelchair)? 4  -EE      Standing up from a chair using your arms (e.g., wheelchair, bedside chair)? 4  -EE      Climbing 3-5 steps with a railing? 3  -EE      To walk in hospital room? 4  -EE      AM-PAC 6 Clicks Score 23  -EE         Functional Assessment    Outcome Measure Options AM-PAC 6 Clicks Basic Mobility (PT)  -EE        User Key  (r) = Recorded By, (t) = Taken By, (c) = Cosigned By    Initials Name Provider Type    EE Kellie Carr PT Physical Therapist           Time Calculation:         PT Charges     Row Name 05/17/18 1128             Time Calculation    Start Time 1035  -EE      Stop Time 1051  -EE      Time Calculation (min) 16 min  -EE      PT Received On 05/17/18  -EE        User Key  (r) = Recorded By, (t) = Taken By, (c) = Cosigned By    Initials Name Provider Type    EE Kellie Carr PT Physical Therapist          Therapy Charges for Today     Code Description Service Date Service Provider Modifiers Qty    92905301955 HC PT MOBILITY CURRENT 5/17/2018 Kellie Carr, PT GP, CI 1    96650195744 HC PT MOBILITY PROJECTED 5/17/2018 Kellie Carr PT GP, CI 1    26837216367 HC PT MOBILITY DISCHARGE 5/17/2018 Kellie Carr PT GP, CI 1    [id number] HC PT EVAL LOW COMPLEXITY 1 5/17/2018 Kellie Carr PT GP 1          PT G-Codes  Outcome Measure Options: AM-PAC 6 Clicks Basic Mobility (PT)  Functional Limitation: Mobility: Walking and moving around  Mobility: Walking and Moving Around Current Status (): At least 1 percent but less than 20 percent impaired, limited or restricted  Mobility: Walking and Moving Around Goal Status (): At least 1 percent but less than 20 percent impaired, limited or restricted  Mobility: Walking and Moving Around Discharge Status (): At least 1 percent but less than 20 percent  impaired, limited or restricted    PT Discharge Summary  Anticipated Discharge Disposition (PT): home or self care  Reason for Discharge: Independent, At baseline function    Kellie Carr, PT  5/17/2018

## 2018-05-17 NOTE — SIGNIFICANT NOTE
05/17/18 1121   Rehab Time/Intention   Evaluation Not Performed (Pt and family report admit symptoms resolved and denies OT eval needs or concern for d/c home regarding ADL's.  Pt with h/o CVA, family has observed pt mobility and agree pt at baseline. )   Rehab Treatment   Discipline occupational therapist

## 2018-05-17 NOTE — CONSULTS
Group: [facility]         CONSULT NOTE    Patient Identification:    Santino Retana  78 y.o.  male  1940  [id number]            Patient Care Team:  Fahad Murray Jr., MD as PCP - General (Family Medicine)  Alex Simpson MD as Consulting Physician (Nephrology)    Requesting physician:     Reason for Consultation:  Suspected ROHINI    CC: Snoring     History of Present Illness: Mr. Retana is a morbidly obese 78-year-old elderly white male with past medical history significant for carotid artery disease, CVA, diabetes but is, hypertension, hyperlipidemia, coronary artery disease who was admitted to the hospital for concern for Neurological deficits.  He was well dated by neurology and is being treated appropriately.  Patient noted to be hypoxic when sleeping and complains of severe snoring along with early morning headaches and excessive daytime sleepiness.  This has been confirmed by his wife.  He is not able to provide significant history at this point as he is asleep and the wife would not want him to wake him up as he had a rough morning.  Patient never was assessed for sleep apnea in his life.  He obviously is apneic and hypoxic during my assessment as well.         Review of Systems:  Unable to obtain due to patient's mental status.    Past Medical History:  Past Medical History:   Diagnosis Date   • Arthritis    • Blind left eye    • Carotid artery disease     Right CEA 9/26/16.  Left CEA 7/18/17 (with dionte-op CVA)   • Chronic kidney disease    • CKD (chronic kidney disease)    • CVA (cerebral vascular accident)     on 7/18/17 dionte-operatively from left CEA.     • Diabetes mellitus 1998    DR SANTINO DURON   • Hyperlipidemia    • Hypertension    • Low back pain    • NSTEMI (non-ST elevated myocardial infarction)     NSTEMI following left CEA and dionte-op stroke in 7/18 ([facility]).     • Renal disorder    • SOB (shortness of breath)        Past Surgical History:  Past  Surgical History:   Procedure Laterality Date   • APPENDECTOMY  02/19/2016    [facility], Dr. Zimmerman   • CAROTID ENDARTERECTOMY Right 09/26/2016    [facility]    • CAROTID ENDARTERECTOMY Left 07/18/2017    [facility]    • CATARACT EXTRACTION     • CHOLECYSTECTOMY  11/1989    [facility]   • JOINT REPLACEMENT     • LUMBAR EPIDURAL INJECTION     • TOTAL HIP ARTHROPLASTY Right 11/16/2016    [facility]        Home Meds:  Prescriptions Prior to Admission   Medication Sig Dispense Refill Last Dose   • aspirin 81 MG tablet Take 81 mg by mouth daily.   Not Taking   • atorvastatin (LIPITOR) 20 MG tablet Take 1 tablet by mouth Every Night. 90 tablet 3 Taking   • carvedilol (COREG) 3.125 MG tablet Take 1 tablet by mouth Every Morning Before Breakfast. 30 tablet 11 Taking   • cholecalciferol (VITAMIN D3) 1000 UNITS tablet Take 1,000 Units by mouth Daily.   Taking   • Cinnamon 500 MG tablet Take 1 tablet by mouth Daily.      • clopidogrel (PLAVIX) 75 MG tablet Take 75 mg by mouth Daily.   Not Taking   • gabapentin (NEURONTIN) 400 MG capsule Take 400 mg by mouth 2 (Two) Times a Day.      • glipiZIDE (GLUCOTROL) 5 MG tablet Take 5 mg by mouth 2 (Two) Times a Day Before Meals.      • tamsulosin (FLOMAX) 0.4 MG capsule 24 hr capsule Take 1 capsule by mouth Every Night. 90 capsule 1 Taking   • traMADol (ULTRAM) 50 MG tablet Take  mg by mouth Every 6 (Six) Hours As Needed for Severe Pain .      • amLODIPine (NORVASC) 5 MG tablet Take 5 mg by mouth Daily.  0 Taking   • ketoconazole (NIZORAL) 2 % cream Apply  topically Daily. Twice daily as needed for right ear irritation 30 g 1 Taking   • lisinopril (PRINIVIL,ZESTRIL) 10 MG tablet   0 Taking       Allergies:  Allergies   Allergen Reactions   • Lamisil [Terbinafine Hcl] Rash       Social History:   Social History     Social History   • Marital status:      Spouse name: Kesha   • Number of children: 2   •  "Years of education: GED     Occupational History   • Retired       Social History Main Topics   • Smoking status: Former Smoker     Packs/day: 2.00     Years: 25.00     Quit date:    • Smokeless tobacco: Never Used   • Alcohol use No   • Drug use: No   • Sexual activity: Defer     Other Topics Concern   • Not on file     Social History Narrative    Enjoys golf.    LIVES WITH GALLITO GERONIMO       Family History:  Family History   Problem Relation Age of Onset   • Lymphoma Brother 65         at age 71   • Coronary artery disease Neg Hx        Physical Exam:  /74 (BP Location: Right arm, Patient Position: Sitting)   Pulse 79   Temp 98.3 °F (36.8 °C) (Oral)   Resp 18   Ht 172.7 cm (68\")   Wt 101 kg (223 lb 8 oz)   SpO2 94%   BMI 33.98 kg/m²  Body mass index is 33.98 kg/m². 94% 101 kg (223 lb 8 oz)    Physical Exam     Constitutional: Morbidly obese white male in No acute distress  Head: - NCAT  Eyes: No pallor, Anicteric conjunctiva, EOMI.  ENMT:  Mallampati 4 ,No oral thrush. No palpable cervical LApathy  Heart: RRR, no murmur  Lungs: TODD +, No wheezes/ crackles heard    Abdomen: Obese. Soft. No tenderness, guarding or rigidity  Extremities: No cyanosis or clubbing. trace Pitting edema  Neuro: Conscious, alert, oriented x3, no gross focal neuro deficits     Lab Review:   LABS:  Lab Results   Component Value Date    CALCIUM 8.8 2018    PHOS 3.8 10/29/2015     Results from last 7 days  Lab Units 18  0556 18  1852 18  0437 18  1234   SODIUM mmol/L 143 143 142 143   POTASSIUM mmol/L 5.8* 5.7* 5.9* 5.9*   CHLORIDE mmol/L 110* 108* 109* 106   CO2 mmol/L 25.0 26.7 23.7 25.7   BUN mg/dL 37* 43* 48* 45*   CREATININE mg/dL 1.63* 2.02* 2.00* 2.12*   GLUCOSE mg/dL 155* 145* 153* 81   CALCIUM mg/dL 8.8 8.6 8.7 9.4   WBC 10*3/mm3  --   --   --  8.40   HEMOGLOBIN g/dL  --   --   --  11.5*   PLATELETS 10*3/mm3  --   --   --  217   ALT (SGPT) U/L  --   --   --  11   AST " (SGOT) U/L  --   --   --  8     No results found for: CKTOTAL, CKMB, CKMBINDEX, TROPONINI, TROPONINT                Results from last 7 days  Lab Units 05/16/18  1235   INR  1.17*     Lab Results   Component Value Date    TSH 1.030 05/17/2018     Estimated Creatinine Clearance: 44.4 mL/min (A) (by C-G formula based on SCr of 1.63 mg/dL (H)).    Results from last 7 days  Lab Units 05/18/18  0855   NITRITE UA  Negative        Imaging: I personally visualized the images of scans/x-rays performed within last 3 days.    Imaging Results (all)     Procedure Component Value Units Date/Time    MRI Angiogram Head Without Contrast [[id number]] Collected:  05/16/18 1737     Updated:  05/17/18 1618    Narrative:       MRI OF THE BRAIN WITHOUT CONTRAST, MR ANGIOGRAPHY OF THE NECK WITHOUT  CONTRAST AND MR ANGIOGRAPHY OF THE HEAD WITHOUT CONTRAST 05/16/2018      HISTORY: Difficulty with speech. Right-sided weakness.     FINDINGS: Multiple noncontrast sagittal and axial images were obtained  through the brain.. The diffusion-weighted images show no evidence of  acute infarction. FLAIR images show scattered areas of bright signal  intensity in the bilateral cerebral hemisphere including a moderate size  area of bright signal in the left superior frontoparietal region  measuring approximately 2.8 cm. This likely represents an area of old  infarction.     No intracranial hemorrhage is seen. There is mild diffuse atrophy.     Normal-appearing vascular flow voids are seen.     3D time-of-flight MR angiography of the neck was performed without  contrast.     NASCET criteria was used.     No significant common carotid or internal carotid artery stenosis is  seen bilaterally.     There is near complete absence of flow signal in the right vertebral  artery with some flow signal in the most distal aspect of the right  vertebral artery. There is flow signal in the basilar artery. 3D  time-of-flight MR angiography of the head was performed.  There is flow  signal in the distal right vertebral artery and distal left vertebral  artery with the distal right vertebral artery appearing smaller than the  left.     There is some mild diminished size of the A1 segment of the right  anterior cerebral artery. There is flow signal in the distal bilateral  internal carotid arteries as well as in the bilateral middle and  anterior cerebral arteries. Minimal narrowing at the origin of the A1  segment of the left anterior cerebral artery is seen. Basilar artery  branches appear patent.       Impression:       1. No evidence of acute infarction or hemorrhage.  2. Scattered areas of bright signal intensity in the bilateral cerebral  hemisphere on FLAIR images are consistent with areas of chronic infarct.  3. Diminished flow signal in the right vertebral artery of uncertain  significance. The distal right vertebral artery does appear to  demonstrate flow signal. If further evaluation is clinically indicated  followup CT angiogram of the neck could be obtained.   4. Mild narrowing of the A1 segment of the right anterior cerebral  artery and minimal narrowing at the origin of the A1 segment of the left  anterior cerebral artery.     This report was finalized on 5/17/2018 4:15 PM by Dr. Nate Brooke M.D.       MRI Brain Without Contrast [[id number]] Collected:  05/16/18 1737     Updated:  05/17/18 1618    Narrative:       MRI OF THE BRAIN WITHOUT CONTRAST, MR ANGIOGRAPHY OF THE NECK WITHOUT  CONTRAST AND MR ANGIOGRAPHY OF THE HEAD WITHOUT CONTRAST 05/16/2018      HISTORY: Difficulty with speech. Right-sided weakness.     FINDINGS: Multiple noncontrast sagittal and axial images were obtained  through the brain.. The diffusion-weighted images show no evidence of  acute infarction. FLAIR images show scattered areas of bright signal  intensity in the bilateral cerebral hemisphere including a moderate size  area of bright signal in the left superior frontoparietal  region  measuring approximately 2.8 cm. This likely represents an area of old  infarction.     No intracranial hemorrhage is seen. There is mild diffuse atrophy.     Normal-appearing vascular flow voids are seen.     3D time-of-flight MR angiography of the neck was performed without  contrast.     NASCET criteria was used.     No significant common carotid or internal carotid artery stenosis is  seen bilaterally.     There is near complete absence of flow signal in the right vertebral  artery with some flow signal in the most distal aspect of the right  vertebral artery. There is flow signal in the basilar artery. 3D  time-of-flight MR angiography of the head was performed. There is flow  signal in the distal right vertebral artery and distal left vertebral  artery with the distal right vertebral artery appearing smaller than the  left.     There is some mild diminished size of the A1 segment of the right  anterior cerebral artery. There is flow signal in the distal bilateral  internal carotid arteries as well as in the bilateral middle and  anterior cerebral arteries. Minimal narrowing at the origin of the A1  segment of the left anterior cerebral artery is seen. Basilar artery  branches appear patent.       Impression:       1. No evidence of acute infarction or hemorrhage.  2. Scattered areas of bright signal intensity in the bilateral cerebral  hemisphere on FLAIR images are consistent with areas of chronic infarct.  3. Diminished flow signal in the right vertebral artery of uncertain  significance. The distal right vertebral artery does appear to  demonstrate flow signal. If further evaluation is clinically indicated  followup CT angiogram of the neck could be obtained.   4. Mild narrowing of the A1 segment of the right anterior cerebral  artery and minimal narrowing at the origin of the A1 segment of the left  anterior cerebral artery.     This report was finalized on 5/17/2018 4:15 PM by Dr. Sullivan  MARQUIS Brooke.       MRI Angiogram Neck Without Contrast [[id number]] Collected:  05/16/18 1737     Updated:  05/17/18 1618    Narrative:       MRI OF THE BRAIN WITHOUT CONTRAST, MR ANGIOGRAPHY OF THE NECK WITHOUT  CONTRAST AND MR ANGIOGRAPHY OF THE HEAD WITHOUT CONTRAST 05/16/2018      HISTORY: Difficulty with speech. Right-sided weakness.     FINDINGS: Multiple noncontrast sagittal and axial images were obtained  through the brain.. The diffusion-weighted images show no evidence of  acute infarction. FLAIR images show scattered areas of bright signal  intensity in the bilateral cerebral hemisphere including a moderate size  area of bright signal in the left superior frontoparietal region  measuring approximately 2.8 cm. This likely represents an area of old  infarction.     No intracranial hemorrhage is seen. There is mild diffuse atrophy.     Normal-appearing vascular flow voids are seen.     3D time-of-flight MR angiography of the neck was performed without  contrast.     NASCET criteria was used.     No significant common carotid or internal carotid artery stenosis is  seen bilaterally.     There is near complete absence of flow signal in the right vertebral  artery with some flow signal in the most distal aspect of the right  vertebral artery. There is flow signal in the basilar artery. 3D  time-of-flight MR angiography of the head was performed. There is flow  signal in the distal right vertebral artery and distal left vertebral  artery with the distal right vertebral artery appearing smaller than the  left.     There is some mild diminished size of the A1 segment of the right  anterior cerebral artery. There is flow signal in the distal bilateral  internal carotid arteries as well as in the bilateral middle and  anterior cerebral arteries. Minimal narrowing at the origin of the A1  segment of the left anterior cerebral artery is seen. Basilar artery  branches appear patent.       Impression:       1. No  evidence of acute infarction or hemorrhage.  2. Scattered areas of bright signal intensity in the bilateral cerebral  hemisphere on FLAIR images are consistent with areas of chronic infarct.  3. Diminished flow signal in the right vertebral artery of uncertain  significance. The distal right vertebral artery does appear to  demonstrate flow signal. If further evaluation is clinically indicated  followup CT angiogram of the neck could be obtained.   4. Mild narrowing of the A1 segment of the right anterior cerebral  artery and minimal narrowing at the origin of the A1 segment of the left  anterior cerebral artery.     This report was finalized on 5/17/2018 4:15 PM by Dr. Nate Brooke M.D.       CT Head Without Contrast [[id number]] Collected:  05/16/18 1425     Updated:  05/17/18 0824    Narrative:       CT SCAN OF THE HEAD WITHOUT CONTRAST      CLINICAL HISTORY:  Headache and dizziness.     CT scan of the head was obtained with 3 mm axial images. No intravenous  contrast was administered.     FINDINGS:     There is a focus of encephalomalacia within the left precentral gyrus  which extends into the adjacent superior frontal gyrus as well as the  adjacent centrum semiovale. The findings are compatible with a chronic  infarct within the left MCA distribution and this area of  encephalomalacia measures up to 3.1 x 2.8 cm in greatest axial  dimensions. There is a calcific density appreciated within the right  sylvian fissure which is likely vascular in nature and is probably  within a vein. There are findings compatible with a small chronic  infarct within the right cerebellar hemisphere measuring up to  approximately 5 mm in diameter. The ventricles, sulci, and cisterns are  otherwise age appropriate. The basal ganglia and thalami are  unremarkable in appearance.       Impression:          Findings compatible with chronic infarcts within the left precentral  gyrus extending into the adjacent superior frontal gyrus  and centrum  semiovale as well as the right cerebellar hemisphere. No acute  intracranial pathology is evident.     Radiation dose reduction techniques were utilized, including automated  exposure control and exposure modulation based on body size.     This report was finalized on 5/17/2018 8:21 AM by Dr. Balaji Butler M.D.             ASSESSMENT:  Suspected obstructive sleep apnea  Morbid obesity  Transient ischemic attack  History of carotid artery disease  History of diabetes mellitus  Chronic kidney disease    RECOMMENDATIONS:  Clinically patient has a high likelihood of having a diagnosis of sleep apnea.  We will schedule an outpatient sleep study at discharge and he will follow up with our sleep clinic physicians.  Patient was encouraged to lose weight and eat a healthy diet  TIA management per neurology  Hypertension controlled would be benefited with the treatment of sleep apnea.  Compliant with CPAP was recommended  Nocturnal oximetry will be done while in the hospital and o2 prescribed at [place] accordingly.   Will follow peripherally. Appreciate the consult     Thank you for letting me participate in the care of this pleasant patient.  I have discussed my findings and recommendations with patient, family and nursing staff.     Bright Bose MD  5/18/2018  6:03 PM

## 2018-05-17 NOTE — PLAN OF CARE
Problem: Patient Care Overview  Goal: Plan of Care Review  Outcome: Ongoing (interventions implemented as appropriate)   05/17/18 5027   Coping/Psychosocial   Plan of Care Reviewed With patient;spouse   OTHER   Outcome Summary perform DM ed needs-assessment w/pt at bedside. pls see DM ed flowsheet for more detail. Pt is a candidate for insulin d/t hx CKD- pt expresses not being interested to learn about insulin/Inj at this time.

## 2018-05-17 NOTE — PLAN OF CARE
Problem: Patient Care Overview  Goal: Plan of Care Review   05/17/18 1125   Coping/Psychosocial   Plan of Care Reviewed With patient   OTHER   Outcome Summary Pt presents from home w/TIA; brief period of slurred speech that has now resolved. Pt also has history of LBP as well as prior CVA. Upon exam, pt demonstrates no unilateral or focal deficits in strength or sensation; ambulated in hallway with supervision. No LOB or safety concerns observed w/mobility; pt appears to be at baseline mobility. Pt denies need for further acute PT services; states he has had PT in the past following CVA. Reviewed activity recommendations and encouraged pt to continue ambulating with family or nsg staff during remainder of stay. No further acute PT concerns; will sign off.

## 2018-05-17 NOTE — NURSING NOTE
Referral through stroke screening order set.  Notes that PT has signed off as their services are not indicated.  Does not indicate a need for acute inpt rehab.

## 2018-05-17 NOTE — PROGRESS NOTES
"DAILY PROGRESS NOTE  [facility]    Patient Identification:  Name: Santino Retana  Age: 78 y.o.  Sex: male  :  1940  MRN: [id number]         Primary Care Physician: Fahad Murray Jr., MD      Subjective  No c/o.     Objective:  General Appearance:  Comfortable, well-appearing, in no acute distress and not in pain.    Vital signs: (most recent): Blood pressure 112/67, pulse 83, temperature 98 °F (36.7 °C), temperature source Oral, resp. rate 18, height 172.7 cm (68\"), weight 99.1 kg (218 lb 6.4 oz), SpO2 96 %.    Lungs:  Normal effort and normal respiratory rate.  He is not in respiratory distress.  No stridor.  There are rales (Occational faint basilar).  No decreased breath sounds, wheezes or rhonchi.    Heart: Normal rate.  Regular rhythm.    Extremities: There is no dependent edema.    Neurological: Patient is alert and oriented to person, place and time.  Normal strength.    Skin:  Warm and dry.                Vital signs in last 24 hours:  Temp:  [97.1 °F (36.2 °C)-98 °F (36.7 °C)] 98 °F (36.7 °C)  Heart Rate:  [78-93] 83  Resp:  [16-18] 18  BP: ()/(64-69) 112/67    Intake/Output:    Intake/Output Summary (Last 24 hours) at 18 1533  Last data filed at 18 0954   Gross per 24 hour   Intake             2700 ml   Output              550 ml   Net             2150 ml           Results from last 7 days  Lab Units 18  1234   WBC 10*3/mm3 8.40   HEMOGLOBIN g/dL 11.5*   PLATELETS 10*3/mm3 217     Results from last 7 days  Lab Units 18  0437 18  1234   SODIUM mmol/L 142 143   POTASSIUM mmol/L 5.9* 5.9*   CHLORIDE mmol/L 109* 106   CO2 mmol/L 23.7 25.7   BUN mg/dL 48* 45*   CREATININE mg/dL 2.00* 2.12*   GLUCOSE mg/dL 153* 81   Estimated Creatinine Clearance: 35.9 mL/min (A) (by C-G formula based on SCr of 2 mg/dL (H)).  Results from last 7 days  Lab Units 18  0437 18  1234   CALCIUM mg/dL 8.7 9.4   ALBUMIN g/dL  --  3.90     Results from last 7 " days  Lab Units 05/16/18  1234   ALBUMIN g/dL 3.90   BILIRUBIN mg/dL 0.4   ALK PHOS U/L 69   AST (SGOT) U/L 8   ALT (SGPT) U/L 11       Assessment:  Principal Problem:    TIA (transient ischemic attack):  W/u in progress.   Active Problems:    Hyperkalemia:  Acute on chronic w CKD.  Lisinopril D/Jakob.  Will start bicarb today and watch closely.  Check Renin, Aldosterone in AM.     Carotid artery disease    Diabetes mellitus    CKD (chronic kidney disease), stage III    HTN (hypertension)        Plan:  Please see above.  Over 35 min spent w over 1/2 in counseling and medical management.     Jourdan Velez MD  5/17/2018  3:33 PM

## 2018-05-17 NOTE — PLAN OF CARE
Problem: Patient Care Overview  Goal: Plan of Care Review  Outcome: Ongoing (interventions implemented as appropriate)   05/17/18 5489   Coping/Psychosocial   Plan of Care Reviewed With patient;spouse;daughter   Plan of Care Review   Progress improving   OTHER   Outcome Summary Bedside Swallow eval completed. No overt s/s of aspiration. Recommend continue on regular diet with thins, no straws. Pt has h/o swallowing problems inconsistently since CVA last July. Pt and family reported they have improved. ST to follow for diet tolerance.

## 2018-05-17 NOTE — NURSING NOTE
"Diabetes Education  Assessment/Teaching    Patient Name:  Santino Retana  YOB: 1940  MRN: [id number]  Admit Date:  5/16/2018      Assessment Date:  5/17/2018    Most Recent Value   General Information    Referral From:  A1c   Height  172.7 cm (68\")   Height Method  Stated   Weight  99.1 kg (218 lb 6.4 oz)   Diabetes History   What type of diabetes do you have?  Type 2   Length of Diabetes Diagnosis  10 + years   Assessment Topics   Taking Medication - Assessment  Needs education   Healthy Coping - Assessment  Competent         Most Recent Value   DM Education Needs   Meter  Has own   Medication  Insulin [advise pt to d/w PCP appropriateness to continue oral agent.]   Healthy Coping  Appropriate   Discharge Plan  Home, Follow-up with MD   Teaching Method  Discussion   Patient Response  Verbalized understanding          Electronically signed by:  Linda Krishnamurthy RN, BSN, CDE   05/17/18 3:55 PM  "

## 2018-05-18 PROBLEM — E27.40 ADRENAL INSUFFICIENCY (HCC): Status: ACTIVE | Noted: 2018-05-18

## 2018-05-18 LAB
ANION GAP SERPL CALCULATED.3IONS-SCNC: 8 MMOL/L
BILIRUB UR QL STRIP: NEGATIVE
BUN BLD-MCNC: 37 MG/DL (ref 8–23)
BUN/CREAT SERPL: 22.7 (ref 7–25)
CALCIUM SPEC-SCNC: 8.8 MG/DL (ref 8.6–10.5)
CHLORIDE SERPL-SCNC: 110 MMOL/L (ref 98–107)
CLARITY UR: CLEAR
CO2 SERPL-SCNC: 25 MMOL/L (ref 22–29)
COLOR UR: YELLOW
CORTIS SERPL-MCNC: 0.89 MCG/DL
CREAT BLD-MCNC: 1.63 MG/DL (ref 0.76–1.27)
CREAT UR-MCNC: 62.6 MG/DL
GFR SERPL CREATININE-BSD FRML MDRD: 41 ML/MIN/1.73
GLUCOSE BLD-MCNC: 155 MG/DL (ref 65–99)
GLUCOSE BLDC GLUCOMTR-MCNC: 150 MG/DL (ref 70–130)
GLUCOSE BLDC GLUCOMTR-MCNC: 164 MG/DL (ref 70–130)
GLUCOSE BLDC GLUCOMTR-MCNC: 197 MG/DL (ref 70–130)
GLUCOSE BLDC GLUCOMTR-MCNC: 228 MG/DL (ref 70–130)
GLUCOSE UR STRIP-MCNC: NEGATIVE MG/DL
HGB UR QL STRIP.AUTO: NEGATIVE
KETONES UR QL STRIP: NEGATIVE
LEUKOCYTE ESTERASE UR QL STRIP.AUTO: NEGATIVE
NITRITE UR QL STRIP: NEGATIVE
PH UR STRIP.AUTO: <=5 [PH] (ref 5–8)
PH UR STRIP.AUTO: <=5 [PH] (ref 5–8)
POTASSIUM BLD-SCNC: 5.8 MMOL/L (ref 3.5–5.2)
PROT UR QL STRIP: NEGATIVE
PROT UR-MCNC: 14 MG/DL
PROT/CREAT UR: 223.6 MG/G CREA (ref 0–200)
SODIUM BLD-SCNC: 143 MMOL/L (ref 136–145)
SP GR UR STRIP: 1.01 (ref 1–1.03)
UROBILINOGEN UR QL STRIP: NORMAL

## 2018-05-18 PROCEDURE — 84244 ASSAY OF RENIN: CPT | Performed by: HOSPITALIST

## 2018-05-18 PROCEDURE — 99214 OFFICE O/P EST MOD 30 MIN: CPT | Performed by: NURSE PRACTITIONER

## 2018-05-18 PROCEDURE — 81003 URINALYSIS AUTO W/O SCOPE: CPT | Performed by: HOSPITALIST

## 2018-05-18 PROCEDURE — 84156 ASSAY OF PROTEIN URINE: CPT | Performed by: INTERNAL MEDICINE

## 2018-05-18 PROCEDURE — 82962 GLUCOSE BLOOD TEST: CPT

## 2018-05-18 PROCEDURE — G0378 HOSPITAL OBSERVATION PER HR: HCPCS

## 2018-05-18 PROCEDURE — 82533 TOTAL CORTISOL: CPT | Performed by: HOSPITALIST

## 2018-05-18 PROCEDURE — 63710000001 INSULIN ASPART PER 5 UNITS: Performed by: HOSPITALIST

## 2018-05-18 PROCEDURE — 82088 ASSAY OF ALDOSTERONE: CPT | Performed by: HOSPITALIST

## 2018-05-18 PROCEDURE — 80048 BASIC METABOLIC PNL TOTAL CA: CPT | Performed by: HOSPITALIST

## 2018-05-18 PROCEDURE — 82570 ASSAY OF URINE CREATININE: CPT | Performed by: INTERNAL MEDICINE

## 2018-05-18 RX ORDER — COSYNTROPIN 0.25 MG/ML
0.25 INJECTION, POWDER, FOR SOLUTION INTRAMUSCULAR; INTRAVENOUS ONCE
Status: COMPLETED | OUTPATIENT
Start: 2018-05-19 | End: 2018-05-19

## 2018-05-18 RX ORDER — COSYNTROPIN 0.25 MG/ML
0.25 INJECTION, POWDER, FOR SOLUTION INTRAMUSCULAR; INTRAVENOUS ONCE
Status: DISCONTINUED | OUTPATIENT
Start: 2018-05-18 | End: 2018-05-18

## 2018-05-18 RX ORDER — SODIUM POLYSTYRENE SULFONATE 15 G/60ML
15 SUSPENSION ORAL; RECTAL ONCE
Status: COMPLETED | OUTPATIENT
Start: 2018-05-18 | End: 2018-05-18

## 2018-05-18 RX ORDER — FUROSEMIDE 40 MG/1
40 TABLET ORAL DAILY
Status: DISCONTINUED | OUTPATIENT
Start: 2018-05-18 | End: 2018-05-20 | Stop reason: HOSPADM

## 2018-05-18 RX ADMIN — TAMSULOSIN HYDROCHLORIDE 0.4 MG: 0.4 CAPSULE ORAL at 20:24

## 2018-05-18 RX ADMIN — SODIUM BICARBONATE 650 MG: 650 TABLET ORAL at 08:54

## 2018-05-18 RX ADMIN — INSULIN ASPART 2 UNITS: 100 INJECTION, SOLUTION INTRAVENOUS; SUBCUTANEOUS at 12:09

## 2018-05-18 RX ADMIN — CLOPIDOGREL 75 MG: 75 TABLET, FILM COATED ORAL at 08:54

## 2018-05-18 RX ADMIN — GABAPENTIN 400 MG: 400 CAPSULE ORAL at 08:54

## 2018-05-18 RX ADMIN — CARVEDILOL 3.12 MG: 3.12 TABLET, FILM COATED ORAL at 06:50

## 2018-05-18 RX ADMIN — FUROSEMIDE 40 MG: 40 TABLET ORAL at 12:09

## 2018-05-18 RX ADMIN — ATORVASTATIN CALCIUM 40 MG: 20 TABLET, FILM COATED ORAL at 20:24

## 2018-05-18 RX ADMIN — INSULIN ASPART 2 UNITS: 100 INJECTION, SOLUTION INTRAVENOUS; SUBCUTANEOUS at 08:54

## 2018-05-18 RX ADMIN — ASPIRIN 325 MG: 325 TABLET ORAL at 08:54

## 2018-05-18 RX ADMIN — SODIUM POLYSTYRENE SULFONATE 15 G: 15 SUSPENSION ORAL; RECTAL at 12:09

## 2018-05-18 RX ADMIN — GABAPENTIN 400 MG: 400 CAPSULE ORAL at 20:24

## 2018-05-18 RX ADMIN — INSULIN ASPART 3 UNITS: 100 INJECTION, SOLUTION INTRAVENOUS; SUBCUTANEOUS at 20:44

## 2018-05-18 NOTE — PLAN OF CARE
Problem: Patient Care Overview  Goal: Plan of Care Review  Outcome: Outcome(s) achieved Date Met: 05/18/18 05/18/18 0611   Coping/Psychosocial   Plan of Care Reviewed With patient   Plan of Care Review   Progress improving   OTHER   Outcome Summary Patient is A&O. NIH 0. No c/o pain. VSS. Will continue to monitor     Goal: Individualization and Mutuality  Outcome: Ongoing (interventions implemented as appropriate)    Goal: Discharge Needs Assessment  Outcome: Outcome(s) achieved Date Met: 05/18/18    Goal: Interprofessional Rounds/Family Conf  Outcome: Ongoing (interventions implemented as appropriate)      Problem: Stroke (Ischemic) (Adult)  Goal: Signs and Symptoms of Listed Potential Problems Will be Absent, Minimized or Managed (Stroke)  Outcome: Ongoing (interventions implemented as appropriate)      Problem: Fall Risk (Adult)  Goal: Absence of Fall  Outcome: Ongoing (interventions implemented as appropriate)

## 2018-05-18 NOTE — CONSULTS
Referring Provider: Jourdan Velez MD  Reason for Consultation: Evaluation of patient with chronic kidney disease and persistent hyperkalemia    Subjective     Chief complaint   Chief Complaint   Patient presents with   • Speech Problem     around 1130 , pt was talking to wife and started having slurred speech and difficulty finding words. pt reports that he has had some improvement since onset of symptoms but is not at baseline,       History of present illness:    This is a very pleasant 78-year-old  male who is well-known to our service, he is followed as an outpatient by my partner Dr. Mars Simpson, he saw him last in September 2017 in the office.  He was admitted the this time with difficulty speaking at some dysarthria, he had apparently a TIA.  Now he is asymptomatic.  He had history of carotid disease with bilateral carotid endarterectomy with right was done in 2016 and the left in 2017 and he has perioperative stroke when he had his left carotid endarterectomy, no significant residual left dysthymic.  He has history of diabetes mellitus type 2, hypertension, peripheral neuropathy, he has blindness of the left eye.  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  At present he is feeling reasonably well, denies any chest pain or shortness of air, no orthopnea or PND, no nausea or vomiting, denies any dysuria or gross hematuria.  No lower extremity edema.  His potassium on admission was 5.9 and today it's 5.8, he was on ACE inhibitor which was discontinued on admission.  Looking back his potassium was 5.6 in the March 2016 and 5.5 in February 2018.  His serum creatinine since 2015 has been in the 1.6 range with multiple occasion where he had the rise in his creatinine at the, M February was 1.71.  On admission creatinine was 2.12 and the state at the same level until yesterday and  today back to baseline of 1.63.  Past Medical History:   Diagnosis Date   • Arthritis    • Blind left eye    • Carotid artery disease     Right CEA 16.  Left CEA 17 (with dionte-op CVA)   • Chronic kidney disease    • CKD (chronic kidney disease)    • CVA (cerebral vascular accident)     on 17 dionte-operatively from left CEA.     • Diabetes mellitus     DR CAROLINE DURON   • Hyperlipidemia    • Hypertension    • Low back pain    • NSTEMI (non-ST elevated myocardial infarction)     NSTEMI following left CEA and dionte-op stroke in  ([facility]).     • Renal disorder    • SOB (shortness of breath)      Past Surgical History:   Procedure Laterality Date   • APPENDECTOMY  2016    [facility], Dr. Zimmerman   • CAROTID ENDARTERECTOMY Right 2016    [facility]    • CAROTID ENDARTERECTOMY Left 2017    [facility]    • CATARACT EXTRACTION     • CHOLECYSTECTOMY  1989    [facility]   • JOINT REPLACEMENT     • LUMBAR EPIDURAL INJECTION     • TOTAL HIP ARTHROPLASTY Right 2016    [facility]     Family History   Problem Relation Age of Onset   • Lymphoma Brother 65         at age 71   • Coronary artery disease Neg Hx      Negative family history for kidney disease  Social History   Substance Use Topics   • Smoking status: Former Smoker     Packs/day: 2.00     Years: 25.00     Quit date:    • Smokeless tobacco: Never Used   • Alcohol use No     Prescriptions Prior to Admission   Medication Sig Dispense Refill Last Dose   • aspirin 81 MG tablet Take 81 mg by mouth daily.   Not Taking   • atorvastatin (LIPITOR) 20 MG tablet Take 1 tablet by mouth Every Night. 90 tablet 3 Taking   • carvedilol (COREG) 3.125 MG tablet Take 1 tablet by mouth Every Morning Before Breakfast. 30 tablet 11 Taking   • cholecalciferol (VITAMIN D3) 1000 UNITS tablet Take 1,000 Units by mouth Daily.   Taking   • Cinnamon 500 MG tablet  "Take 1 tablet by mouth Daily.      • clopidogrel (PLAVIX) 75 MG tablet Take 75 mg by mouth Daily.   Not Taking   • gabapentin (NEURONTIN) 400 MG capsule Take 400 mg by mouth 2 (Two) Times a Day.      • glipiZIDE (GLUCOTROL) 5 MG tablet Take 5 mg by mouth 2 (Two) Times a Day Before Meals.      • tamsulosin (FLOMAX) 0.4 MG capsule 24 hr capsule Take 1 capsule by mouth Every Night. 90 capsule 1 Taking   • traMADol (ULTRAM) 50 MG tablet Take  mg by mouth Every 6 (Six) Hours As Needed for Severe Pain .      • amLODIPine (NORVASC) 5 MG tablet Take 5 mg by mouth Daily.  0 Taking   • ketoconazole (NIZORAL) 2 % cream Apply  topically Daily. Twice daily as needed for right ear irritation 30 g 1 Taking   • lisinopril (PRINIVIL,ZESTRIL) 10 MG tablet   0 Taking     Allergies:  Lamisil [terbinafine hcl]    Review of Systems  14 points review of system was performed and it was negative other than with noted above in the history of present illness    Objective     Vital Signs  Temp:  [97.9 °F (36.6 °C)-98.3 °F (36.8 °C)] 97.9 °F (36.6 °C)  Heart Rate:  [83-88] 85  Resp:  [16-18] 18  BP: (112-125)/(67-72) 125/72    Flowsheet Rows      First Filed Value   Admission Height  172.7 cm (68\") Documented at 05/16/2018 1202   Admission Weight  98.4 kg (217 lb) Documented at 05/16/2018 1202           No intake/output data recorded.  I/O last 3 completed shifts:  In: 3180 [P.O.:2030; I.V.:1150]  Out: 1050 [Urine:1050]    Intake/Output Summary (Last 24 hours) at 05/18/18 1008  Last data filed at 05/18/18 0507   Gross per 24 hour   Intake              720 ml   Output              500 ml   Net              220 ml       Physical Exam:  General Appearance: alert, oriented x 3, no acute distress, Obese  Skin: warm and dry  HEENT: pupils round and reactive to light, oral mucosa normal,   Neck: supple, no JVD, trachea midline, bilateral carotid bruits the right is louder than the  Lungs: CTA, unlabored breathing effort  Heart: RRR, normal S1 " and S2, no S3, no rub  Abdomen: soft, non-tender,  present bowel sounds to auscultation  : no palpable bladder,  Joints: No significant deformities noted, no crepitation over the knees or the ankles.  Lymphatics: No cervical or supraclavicular lymphadenopathy  Extremities: no edema, cyanosis or clubbing  Neuro: normal speech and mental status  Psych: Normal affect    Results Review:  Results for orders placed or performed during the hospital encounter of 05/16/18   Comprehensive Metabolic Panel   Result Value Ref Range    Glucose 81 65 - 99 mg/dL    BUN 45 (H) 8 - 23 mg/dL    Creatinine 2.12 (H) 0.76 - 1.27 mg/dL    Sodium 143 136 - 145 mmol/L    Potassium 5.9 (H) 3.5 - 5.2 mmol/L    Chloride 106 98 - 107 mmol/L    CO2 25.7 22.0 - 29.0 mmol/L    Calcium 9.4 8.6 - 10.5 mg/dL    Total Protein 6.3 6.0 - 8.5 g/dL    Albumin 3.90 3.50 - 5.20 g/dL    ALT (SGPT) 11 1 - 41 U/L    AST (SGOT) 8 1 - 40 U/L    Alkaline Phosphatase 69 39 - 117 U/L    Total Bilirubin 0.4 0.1 - 1.2 mg/dL    eGFR Non African Amer 31 (L) >60 mL/min/1.73    Globulin 2.4 gm/dL    A/G Ratio 1.6 g/dL    BUN/Creatinine Ratio 21.2 7.0 - 25.0    Anion Gap 11.3 mmol/L   CBC Auto Differential   Result Value Ref Range    WBC 8.40 4.50 - 10.70 10*3/mm3    RBC 3.78 (L) 4.60 - 6.00 10*6/mm3    Hemoglobin 11.5 (L) 13.7 - 17.6 g/dL    Hematocrit 36.8 (L) 40.4 - 52.2 %    MCV 97.4 (H) 79.8 - 96.2 fL    MCH 30.4 27.0 - 32.7 pg    MCHC 31.3 (L) 32.6 - 36.4 g/dL    RDW 13.3 11.5 - 14.5 %    RDW-SD 46.7 37.0 - 54.0 fl    MPV 10.8 6.0 - 12.0 fL    Platelets 217 140 - 500 10*3/mm3    Neutrophil % 59.9 42.7 - 76.0 %    Lymphocyte % 28.2 19.6 - 45.3 %    Monocyte % 9.6 5.0 - 12.0 %    Eosinophil % 2.1 0.3 - 6.2 %    Basophil % 0.2 0.0 - 1.5 %    Immature Grans % 0.2 0.0 - 0.5 %    Neutrophils, Absolute 5.02 1.90 - 8.10 10*3/mm3    Lymphocytes, Absolute 2.37 0.90 - 4.80 10*3/mm3    Monocytes, Absolute 0.81 0.20 - 1.20 10*3/mm3    Eosinophils, Absolute 0.18 0.00 - 0.70  10*3/mm3    Basophils, Absolute 0.02 0.00 - 0.20 10*3/mm3    Immature Grans, Absolute 0.02 0.00 - 0.03 10*3/mm3   Protime-INR   Result Value Ref Range    Protime 14.7 (H) 11.7 - 14.2 Seconds    INR 1.17 (H) 0.90 - 1.10   TSH   Result Value Ref Range    TSH 1.250 0.270 - 4.200 mIU/mL   Vitamin B12   Result Value Ref Range    Vitamin B-12 561 211 - 946 pg/mL   Hemoglobin A1c   Result Value Ref Range    Hemoglobin A1C 8.33 (H) 4.80 - 5.60 %   Lipid Panel   Result Value Ref Range    Total Cholesterol 104 0 - 200 mg/dL    Triglycerides 95 0 - 150 mg/dL    HDL Cholesterol 34 (L) 40 - 60 mg/dL    LDL Cholesterol  51 0 - 100 mg/dL    VLDL Cholesterol 19 5 - 40 mg/dL    LDL/HDL Ratio 1.50    Basic Metabolic Panel   Result Value Ref Range    Glucose 153 (H) 65 - 99 mg/dL    BUN 48 (H) 8 - 23 mg/dL    Creatinine 2.00 (H) 0.76 - 1.27 mg/dL    Sodium 142 136 - 145 mmol/L    Potassium 5.9 (H) 3.5 - 5.2 mmol/L    Chloride 109 (H) 98 - 107 mmol/L    CO2 23.7 22.0 - 29.0 mmol/L    Calcium 8.7 8.6 - 10.5 mg/dL    eGFR Non African Amer 33 (L) >60 mL/min/1.73    BUN/Creatinine Ratio 24.0 7.0 - 25.0    Anion Gap 9.3 mmol/L   TSH   Result Value Ref Range    TSH 1.030 0.270 - 4.200 mIU/mL   Basic Metabolic Panel   Result Value Ref Range    Glucose 145 (H) 65 - 99 mg/dL    BUN 43 (H) 8 - 23 mg/dL    Creatinine 2.02 (H) 0.76 - 1.27 mg/dL    Sodium 143 136 - 145 mmol/L    Potassium 5.7 (H) 3.5 - 5.2 mmol/L    Chloride 108 (H) 98 - 107 mmol/L    CO2 26.7 22.0 - 29.0 mmol/L    Calcium 8.6 8.6 - 10.5 mg/dL    eGFR Non African Amer 32 (L) >60 mL/min/1.73    BUN/Creatinine Ratio 21.3 7.0 - 25.0    Anion Gap 8.3 mmol/L   pH, Urine - Urine, Clean Catch   Result Value Ref Range    pH, UA <=5.0 5.0 - 8.0   Urinalysis With / Microscopic If Indicated - Urine, Clean Catch   Result Value Ref Range    Color, UA Yellow Yellow, Straw    Appearance, UA Clear Clear    pH, UA <=5.0 5.0 - 8.0    Specific Gravity, UA 1.013 1.005 - 1.030    Glucose, UA Negative  Negative    Ketones, UA Negative Negative    Bilirubin, UA Negative Negative    Blood, UA Negative Negative    Protein, UA Negative Negative    Leuk Esterase, UA Negative Negative    Nitrite, UA Negative Negative    Urobilinogen, UA 0.2 E.U./dL 0.2 - 1.0 E.U./dL   Basic Metabolic Panel   Result Value Ref Range    Glucose 155 (H) 65 - 99 mg/dL    BUN 37 (H) 8 - 23 mg/dL    Creatinine 1.63 (H) 0.76 - 1.27 mg/dL    Sodium 143 136 - 145 mmol/L    Potassium 5.8 (H) 3.5 - 5.2 mmol/L    Chloride 110 (H) 98 - 107 mmol/L    CO2 25.0 22.0 - 29.0 mmol/L    Calcium 8.8 8.6 - 10.5 mg/dL    eGFR Non African Amer 41 (L) >60 mL/min/1.73    BUN/Creatinine Ratio 22.7 7.0 - 25.0    Anion Gap 8.0 mmol/L   POC Glucose Once   Result Value Ref Range    Glucose 80 70 - 130 mg/dL   POC Glucose Once   Result Value Ref Range    Glucose 78 70 - 130 mg/dL   POC Glucose Once   Result Value Ref Range    Glucose 189 (H) 70 - 130 mg/dL   POC Glucose Once   Result Value Ref Range    Glucose 135 (H) 70 - 130 mg/dL   POC Glucose Once   Result Value Ref Range    Glucose 291 (H) 70 - 130 mg/dL   POC Glucose Once   Result Value Ref Range    Glucose 111 70 - 130 mg/dL   POC Glucose Once   Result Value Ref Range    Glucose 172 (H) 70 - 130 mg/dL   POC Glucose Once   Result Value Ref Range    Glucose 164 (H) 70 - 130 mg/dL   Adult Transthoracic Echo Complete W/ Cont if Necessary Per Protocol   Result Value Ref Range    BSA 2.1 m^2    IVSd 1.2 cm    LVIDd 4.7 cm    LVIDs 3.4 cm    LVPWd 1.2 cm    IVS/LVPW 1.0     FS 27.7 %    EDV(Teich) 102.4 ml    ESV(Teich) 47.4 ml    EF(Teich) 53.7 %    EDV(cubed) 103.8 ml    ESV(cubed) 39.3 ml    EF(cubed) 62.1 %    LV mass(C)d 212.0 grams    LV mass(C)dI 99.8 grams/m^2    SV(Teich) 54.9 ml    SI(Teich) 25.9 ml/m^2    SV(cubed) 64.5 ml    SI(cubed) 30.4 ml/m^2    Ao root diam 3.3 cm    Ao root area 8.6 cm^2    ACS 1.5 cm    LVOT diam 2.4 cm    LVOT area 4.5 cm^2    LVOT area(traced) 4.5 cm^2    RVOT diam 2.0 cm    RVOT  area 3.1 cm^2    LVLd ap4 8.1 cm    EDV(MOD-sp4) 98.0 ml    LVLs ap4 6.8 cm    ESV(MOD-sp4) 36.0 ml    EF(MOD-sp4) 63.3 %    LVLd ap2 8.4 cm    EDV(MOD-sp2) 101.0 ml    LVLs ap2 7.1 cm    ESV(MOD-sp2) 34.0 ml    EF(MOD-sp2) 66.3 %    SV(MOD-sp4) 62.0 ml    SI(MOD-sp4) 29.2 ml/m^2    SV(MOD-sp2) 67.0 ml    SI(MOD-sp2) 31.5 ml/m^2    Ao root area (BSA corrected) 1.6     Ao root area (BSA corrected) 66.3 ml/m^2    CONTRAST EF 4CH 63.3 ml/m^2    LV Diastolic corrected for BSA 46.1 ml/m^2    LV Systolic corrected for BSA 16.9 ml/m^2    MV A dur 0.12 sec    MV E max chaka 46.4 cm/sec    MV A max chaka 99.2 cm/sec    MV E/A 0.47     MV V2 mean 55.8 cm/sec    MV mean PG 2.0 mmHg    MV V2 VTI 24.1 cm    MVA(VTI) 3.4 cm^2    MV P1/2t max chaka 76.0 cm/sec    MV P1/2t 72.7 msec    MVA(P1/2t) 3.0 cm^2    MV dec slope 306.0 cm/sec^2    MV dec time 0.22 sec    Ao V2 mean 105.0 cm/sec    Ao mean PG 5.0 mmHg    Ao mean PG (full) 3.0 mmHg    Ao V2 VTI 31.1 cm    IRIS(I,A) 2.7 cm^2    IRIS(I,D) 2.7 cm^2    LV V1 mean PG 2.0 mmHg    LV V1 mean 67.6 cm/sec    LV V1 VTI 18.3 cm    SV(Ao) 266.0 ml    SI(Ao) 125.2 ml/m^2    SV(LVOT) 82.8 ml    SV(RVOT) 33.0 ml    SI(LVOT) 39.0 ml/m^2    PA V2 max 100.0 cm/sec    PA max PG 4.0 mmHg    PA acc slope 30.8 cm/sec^2    PA acc time 0.09 sec    RV V1 mean PG 1.0 mmHg    RV V1 mean 38.5 cm/sec    RV V1 VTI 10.5 cm    TR max chaka 286.0 cm/sec    PA pr(Accel) 39.4 mmHg    Pulm Sys Chaka 71.0 cm/sec    Pulm Vásquez Chaka 48.5 cm/sec    Pulm S/D 1.5     Qp/Qs 0.4     Pulm A Revs Dur 0.09 sec    Pulm A Revs Chaka 26.4 cm/sec    MVA P1/2T LCG 2.9 cm^2    BH CV ECHO SHAY - BZI_BMI 33.3 kilograms/m^2     CV ECHO SHAY - BSA([facility]) 2.2 m^2    BH CV ECHO SHAY - BZI_METRIC_WEIGHT 99.3 kg     CV ECHO SHAY - BZI_METRIC_HEIGHT 172.7 cm    Target HR (85%) 122 bpm    Max. Pred. HR (100%) 144 bpm     CV VAS BP RIGHT ARM 99/64 mmHg    TDI S' 20.00 cm/sec    RV Base 3.50 cm    Dimensionless Index 0.6 (DI)    LA Volume Index  16.5 mL/m2    Avg E/e' ratio 4.88     Ao pk chaka 152.0 cm/sec    EF(MOD-bp) 65 %    Lat Peak E' Chaka 10.0 cm/sec    LV V1 max 91.0 cm/sec    Med Peak E' Chaka 9.00 cm/sec    RAP systole 8 mmHg    RVSP(TR) 33 mmHg    TAPSE (>1.6) 2.00 cm2    Echo EF Estimated 65 %     Imaging Results (last 72 hours)     Procedure Component Value Units Date/Time    MRI Angiogram Head Without Contrast [[id number]] Collected:  05/16/18 1737     Updated:  05/17/18 1618    Narrative:       MRI OF THE BRAIN WITHOUT CONTRAST, MR ANGIOGRAPHY OF THE NECK WITHOUT  CONTRAST AND MR ANGIOGRAPHY OF THE HEAD WITHOUT CONTRAST 05/16/2018      HISTORY: Difficulty with speech. Right-sided weakness.     FINDINGS: Multiple noncontrast sagittal and axial images were obtained  through the brain.. The diffusion-weighted images show no evidence of  acute infarction. FLAIR images show scattered areas of bright signal  intensity in the bilateral cerebral hemisphere including a moderate size  area of bright signal in the left superior frontoparietal region  measuring approximately 2.8 cm. This likely represents an area of old  infarction.     No intracranial hemorrhage is seen. There is mild diffuse atrophy.     Normal-appearing vascular flow voids are seen.     3D time-of-flight MR angiography of the neck was performed without  contrast.     NASCET criteria was used.     No significant common carotid or internal carotid artery stenosis is  seen bilaterally.     There is near complete absence of flow signal in the right vertebral  artery with some flow signal in the most distal aspect of the right  vertebral artery. There is flow signal in the basilar artery. 3D  time-of-flight MR angiography of the head was performed. There is flow  signal in the distal right vertebral artery and distal left vertebral  artery with the distal right vertebral artery appearing smaller than the  left.     There is some mild diminished size of the A1 segment of the right  anterior  cerebral artery. There is flow signal in the distal bilateral  internal carotid arteries as well as in the bilateral middle and  anterior cerebral arteries. Minimal narrowing at the origin of the A1  segment of the left anterior cerebral artery is seen. Basilar artery  branches appear patent.       Impression:       1. No evidence of acute infarction or hemorrhage.  2. Scattered areas of bright signal intensity in the bilateral cerebral  hemisphere on FLAIR images are consistent with areas of chronic infarct.  3. Diminished flow signal in the right vertebral artery of uncertain  significance. The distal right vertebral artery does appear to  demonstrate flow signal. If further evaluation is clinically indicated  followup CT angiogram of the neck could be obtained.   4. Mild narrowing of the A1 segment of the right anterior cerebral  artery and minimal narrowing at the origin of the A1 segment of the left  anterior cerebral artery.     This report was finalized on 5/17/2018 4:15 PM by Dr. Nate Brooke M.D.       MRI Brain Without Contrast [[id number]] Collected:  05/16/18 1737     Updated:  05/17/18 1618    Narrative:       MRI OF THE BRAIN WITHOUT CONTRAST, MR ANGIOGRAPHY OF THE NECK WITHOUT  CONTRAST AND MR ANGIOGRAPHY OF THE HEAD WITHOUT CONTRAST 05/16/2018      HISTORY: Difficulty with speech. Right-sided weakness.     FINDINGS: Multiple noncontrast sagittal and axial images were obtained  through the brain.. The diffusion-weighted images show no evidence of  acute infarction. FLAIR images show scattered areas of bright signal  intensity in the bilateral cerebral hemisphere including a moderate size  area of bright signal in the left superior frontoparietal region  measuring approximately 2.8 cm. This likely represents an area of old  infarction.     No intracranial hemorrhage is seen. There is mild diffuse atrophy.     Normal-appearing vascular flow voids are seen.     3D time-of-flight MR angiography of  the neck was performed without  contrast.     NASCET criteria was used.     No significant common carotid or internal carotid artery stenosis is  seen bilaterally.     There is near complete absence of flow signal in the right vertebral  artery with some flow signal in the most distal aspect of the right  vertebral artery. There is flow signal in the basilar artery. 3D  time-of-flight MR angiography of the head was performed. There is flow  signal in the distal right vertebral artery and distal left vertebral  artery with the distal right vertebral artery appearing smaller than the  left.     There is some mild diminished size of the A1 segment of the right  anterior cerebral artery. There is flow signal in the distal bilateral  internal carotid arteries as well as in the bilateral middle and  anterior cerebral arteries. Minimal narrowing at the origin of the A1  segment of the left anterior cerebral artery is seen. Basilar artery  branches appear patent.       Impression:       1. No evidence of acute infarction or hemorrhage.  2. Scattered areas of bright signal intensity in the bilateral cerebral  hemisphere on FLAIR images are consistent with areas of chronic infarct.  3. Diminished flow signal in the right vertebral artery of uncertain  significance. The distal right vertebral artery does appear to  demonstrate flow signal. If further evaluation is clinically indicated  followup CT angiogram of the neck could be obtained.   4. Mild narrowing of the A1 segment of the right anterior cerebral  artery and minimal narrowing at the origin of the A1 segment of the left  anterior cerebral artery.     This report was finalized on 5/17/2018 4:15 PM by Dr. Nate Brooke M.D.       MRI Angiogram Neck Without Contrast [[id number]] Collected:  05/16/18 1737     Updated:  05/17/18 1618    Narrative:       MRI OF THE BRAIN WITHOUT CONTRAST, MR ANGIOGRAPHY OF THE NECK WITHOUT  CONTRAST AND MR ANGIOGRAPHY OF THE HEAD WITHOUT  CONTRAST 05/16/2018      HISTORY: Difficulty with speech. Right-sided weakness.     FINDINGS: Multiple noncontrast sagittal and axial images were obtained  through the brain.. The diffusion-weighted images show no evidence of  acute infarction. FLAIR images show scattered areas of bright signal  intensity in the bilateral cerebral hemisphere including a moderate size  area of bright signal in the left superior frontoparietal region  measuring approximately 2.8 cm. This likely represents an area of old  infarction.     No intracranial hemorrhage is seen. There is mild diffuse atrophy.     Normal-appearing vascular flow voids are seen.     3D time-of-flight MR angiography of the neck was performed without  contrast.     NASCET criteria was used.     No significant common carotid or internal carotid artery stenosis is  seen bilaterally.     There is near complete absence of flow signal in the right vertebral  artery with some flow signal in the most distal aspect of the right  vertebral artery. There is flow signal in the basilar artery. 3D  time-of-flight MR angiography of the head was performed. There is flow  signal in the distal right vertebral artery and distal left vertebral  artery with the distal right vertebral artery appearing smaller than the  left.     There is some mild diminished size of the A1 segment of the right  anterior cerebral artery. There is flow signal in the distal bilateral  internal carotid arteries as well as in the bilateral middle and  anterior cerebral arteries. Minimal narrowing at the origin of the A1  segment of the left anterior cerebral artery is seen. Basilar artery  branches appear patent.       Impression:       1. No evidence of acute infarction or hemorrhage.  2. Scattered areas of bright signal intensity in the bilateral cerebral  hemisphere on FLAIR images are consistent with areas of chronic infarct.  3. Diminished flow signal in the right vertebral artery of  uncertain  significance. The distal right vertebral artery does appear to  demonstrate flow signal. If further evaluation is clinically indicated  followup CT angiogram of the neck could be obtained.   4. Mild narrowing of the A1 segment of the right anterior cerebral  artery and minimal narrowing at the origin of the A1 segment of the left  anterior cerebral artery.     This report was finalized on 5/17/2018 4:15 PM by Dr. Nate Brooke M.D.       CT Head Without Contrast [[id number]] Collected:  05/16/18 1425     Updated:  05/17/18 0824    Narrative:       CT SCAN OF THE HEAD WITHOUT CONTRAST      CLINICAL HISTORY:  Headache and dizziness.     CT scan of the head was obtained with 3 mm axial images. No intravenous  contrast was administered.     FINDINGS:     There is a focus of encephalomalacia within the left precentral gyrus  which extends into the adjacent superior frontal gyrus as well as the  adjacent centrum semiovale. The findings are compatible with a chronic  infarct within the left MCA distribution and this area of  encephalomalacia measures up to 3.1 x 2.8 cm in greatest axial  dimensions. There is a calcific density appreciated within the right  sylvian fissure which is likely vascular in nature and is probably  within a vein. There are findings compatible with a small chronic  infarct within the right cerebellar hemisphere measuring up to  approximately 5 mm in diameter. The ventricles, sulci, and cisterns are  otherwise age appropriate. The basal ganglia and thalami are  unremarkable in appearance.       Impression:          Findings compatible with chronic infarcts within the left precentral  gyrus extending into the adjacent superior frontal gyrus and centrum  semiovale as well as the right cerebellar hemisphere. No acute  intracranial pathology is evident.     Radiation dose reduction techniques were utilized, including automated  exposure control and exposure modulation based on body size.      This report was finalized on 5/17/2018 8:21 AM by Dr. Balaji Butler M.D.                 aspirin 325 mg Oral Daily   atorvastatin 40 mg Oral Nightly   carvedilol 3.125 mg Oral QAM AC   clopidogrel 75 mg Oral Daily   gabapentin 400 mg Oral BID   insulin aspart 0-7 Units Subcutaneous 4x Daily With Meals & Nightly   sodium bicarbonate 650 mg Oral TID   sodium polystyrene 15 g Oral Once   tamsulosin 0.4 mg Oral Nightly          Assessment/Plan   1.  Hyperkalemia, persistent despite discontinuation of the ACE inhibitor and improvement of renal function.  The etiology not very clear at this time.  2.  Acute kidney injury on chronic kidney disease stage III, resolved spontaneously and since admission he is back to his baseline  3.  Chronic kidney disease stage III associated with diabetic and hypertensive glomerulosclerosis he is at baseline.  4.  Hypertension under excellent control.  5.  Diabetes mellitus type 2  6.  Carotid disease with prior stroke  7.  TIA, symptoms has resolved  8.  Coronary artery disease  9.  Peripheral neuropathy  10.  BPH treated      Plan:  1.  Check random urine for protein to creatinine ratio   2.  Check the bladder scan now    3. since the patient have no evidence of acidosis, I will discontinue the bicarbonate supplement   4.  We'll restrict potassium in the diet to 2 g daily   5.  Start the patient on loop diuretics   6.  Surveillance labs, if his potassium is improved tomorrow he could be discharged and will be followed very closely as an outpatient by my partner Dr. Mars Simpson       Thank you Dr. Velez for asking me to see this patient in consultation      I discussed the patients findings and my recommendations with the patient and his wife at the bedside     Donaldo Michele MD  05/18/18  10:08 AM

## 2018-05-18 NOTE — PROGRESS NOTES
"DOS: 2018  NAME: Santino Retana   : 1940  PCP: Fahad Murray Jr., MD  Chief Complaint   Patient presents with   • Speech Problem     around 1130 , pt was talking to wife and started having slurred speech and difficulty finding words. pt reports that he has had some improvement since onset of symptoms but is not at baseline,     Stroke    Historian: Chart, Nursing and patient     Subjective: No events overnight. Labs today revealed hyperkalemia; primary team consulted nephrology. Patient w/o complaint/concerns on my exam.     Patient denies HA, double/blurred vision, dizziness, numbness or loss of sensation or any other new neurological complaints at this time.       Objective:  Vital signs: /72 (BP Location: Right arm, Patient Position: Lying)   Pulse 85   Temp 97.9 °F (36.6 °C) (Oral)   Resp 18   Ht 172.7 cm (68\")   Wt 101 kg (223 lb 8 oz)   SpO2 93%   BMI 33.98 kg/m²       HEENT: Normocephalic, atraumatic   COR: RRR  Resp: Even and unlabored  Extremities: Equal pulses, non distal embolization    Neurological:   MS: AO. Language normal. No neglect. Higher integrative function normal  CN: II-XII normal  Motor: 5/5, normal tone except RUE 4-/5   Sensory: Intact on my exam  Coordination: Normal (finger to nose and heel to shin)     Laboratory results:  Lab Results   Component Value Date    GLUCOSE 155 (H) 2018    CALCIUM 8.8 2018     2018    K 5.8 (H) 2018    CO2 25.0 2018     (H) 2018    BUN 37 (H) 2018    CREATININE 1.63 (H) 2018    EGFRIFAFRI 48 (L) 2018    EGFRIFNONA 41 (L) 2018    BCR 22.7 2018    ANIONGAP 8.0 2018     Lab Results   Component Value Date    WBC 8.40 2018    HGB 11.5 (L) 2018    HCT 36.8 (L) 2018    MCV 97.4 (H) 2018     2018     Lab Results   Component Value Date    CHOL 104 2018     Lab Results   Component Value Date    HDL 34 (L) 2018    " HDL 39 (L) 02/06/2018     Lab Results   Component Value Date    LDL 51 05/17/2018    LDL 68 02/06/2018     Lab Results   Component Value Date    TRIG 95 05/17/2018    TRIG 225 (H) 02/06/2018     Lab Results   Component Value Date    TSH 1.030 05/17/2018     Lab Results   Component Value Date    LIEHZVZF64 561 05/16/2018     Lab Results   Component Value Date    HGBA1C 8.33 (H) 05/17/2018     Lab Results   Component Value Date    CHOL 104 05/17/2018    CHLPL 152 02/06/2018    TRIG 95 05/17/2018    HDL 34 (L) 05/17/2018    LDL 51 05/17/2018       Review and interpretation of imaging:  Reviewed Care Everywhere   04/12  Carotid Duplex US   Conclusions: Bilateral internal carotid arteries with plaque but <50% stenosis.  Bilateral common carotid arteries with elevated velocities.  Right vertebral artery with retrograde flow.   Left vertebral artery with antegrade flow.   Bilateral endarterectomies are patent.    05/16  CT Head   IMPRESSION:     Findings compatible with chronic infarcts within the left precentral  gyrus extending into the adjacent superior frontal gyrus and centrum  semiovale as well as the right cerebellar hemisphere. No acute  intracranial pathology is evident.    05/16  MRI Brain/Head and Neck      IMPRESSION:  1. No evidence of acute infarction or hemorrhage.  2. Scattered areas of bright signal intensity in the bilateral cerebral  hemisphere on FLAIR images are consistent with areas of chronic infarct.  3. Diminished flow signal in the right vertebral artery of uncertain  significance. The distal right vertebral artery does appear to  demonstrate flow signal. If further evaluation is clinically indicated  followup CT angiogram of the neck could be obtained.   4. Mild narrowing of the A1 segment of the right anterior cerebral  artery and minimal narrowing at the origin of the A1 segment of the left  anterior cerebral artery.      TTE   Interpretation Summary     · Left ventricular wall thickness is  consistent with mild concentric hypertrophy.  · Left ventricular systolic function is normal. Estimated EF = 65%.  · Saline test results are negative.  · Mild mitral valve regurgitation is present            Impression:This is a 79 yo male with history of right CEA September 2016 left CEA July 2017 at dionte-op CVA  who presented to [facility] on 05/16 with complaint of dysarthria and aphasia with rapid improvement MRI MRA head and neck negative for acute findings. TTE unremarkable. EF 65%. Normal LV and LA   No change in current plan we recommend DAPT and statin as written.  PT/OT/ST. CCP to assist w/ d/c planning. No further neurology workup indicated. We will sign off and see again upon request.      Plan:  -Follow-up in stroke clinic in 3 months w/ Ayesha BRODY   -DOAP (ASA 325mg and Plavix 75mg)       -Prior to the event patient was on ASA 81mg/Plavix 75mg    -Stroke labs: (results above) (LDL 51) atorvastatin decreased 40mg   - MRI brain no acute stroke (results above)   - MRA head and neck (results above)   - Telemetry   - PTOT speech rehabilitation assessment  - DVT prophylaxis  - Swallow study  - Statins  - Echocardiogram (results above)   - Antiplatelet therapy and statins.  - Cardiac key (Holter) to monitor heart for arrhythmias at discharge.  - Pulmonary consult for sleep studies (complete)        Ideal targets for stroke prevention would be :  Blood pressure < 130/80; B12 > 500 TSH in normal range and LDL < 70; HbA1c < 6.5 and smoking cessation if applicable.

## 2018-05-18 NOTE — PLAN OF CARE
Problem: Patient Care Overview  Goal: Plan of Care Review  Outcome: Ongoing (interventions implemented as appropriate)   05/18/18 4615   Coping/Psychosocial   Plan of Care Reviewed With patient   Plan of Care Review   Progress improving   OTHER   Outcome Summary Pt had renal consult today, added lasix and kayexelate with good results. endocrine consult pending, will ctm      Goal: Individualization and Mutuality  Outcome: Ongoing (interventions implemented as appropriate)    Goal: Interprofessional Rounds/Family Conf  Outcome: Ongoing (interventions implemented as appropriate)      Problem: Stroke (Ischemic) (Adult)  Goal: Signs and Symptoms of Listed Potential Problems Will be Absent, Minimized or Managed (Stroke)  Outcome: Ongoing (interventions implemented as appropriate)      Problem: Fall Risk (Adult)  Goal: Absence of Fall  Outcome: Ongoing (interventions implemented as appropriate)

## 2018-05-18 NOTE — PROGRESS NOTES
"DAILY PROGRESS NOTE  [facility]    Patient Identification:  Name: Santino Retana  Age: 78 y.o.  Sex: male  :  1940  MRN: [id number]         Primary Care Physician: Fahad Murray Jr., MD      Subjective  No c/o.     Objective:  General Appearance:  Comfortable, well-appearing, in no acute distress and not in pain.    Vital signs: (most recent): Blood pressure 130/74, pulse 79, temperature 98.3 °F (36.8 °C), temperature source Oral, resp. rate 18, height 172.7 cm (68\"), weight 101 kg (223 lb 8 oz), SpO2 94 %.    Lungs:  Normal effort and normal respiratory rate.  Breath sounds clear to auscultation.    Heart: Normal rate.  Regular rhythm.    Extremities: There is no dependent edema.    Neurological: Patient is alert and oriented to person, place and time.    Skin:  Warm and dry.                Vital signs in last 24 hours:  Temp:  [97.9 °F (36.6 °C)-98.3 °F (36.8 °C)] 98.3 °F (36.8 °C)  Heart Rate:  [79-88] 79  Resp:  [16-18] 18  BP: (113-130)/(67-74) 130/74    Intake/Output:    Intake/Output Summary (Last 24 hours) at 18 1716  Last data filed at 18 1312   Gross per 24 hour   Intake              600 ml   Output              500 ml   Net              100 ml           Results from last 7 days  Lab Units 18  1234   WBC 10*3/mm3 8.40   HEMOGLOBIN g/dL 11.5*   PLATELETS 10*3/mm3 217     Results from last 7 days  Lab Units 18  0556 18  1852 18  0437 18  1234   SODIUM mmol/L 143 143 142 143   POTASSIUM mmol/L 5.8* 5.7* 5.9* 5.9*   CHLORIDE mmol/L 110* 108* 109* 106   CO2 mmol/L 25.0 26.7 23.7 25.7   BUN mg/dL 37* 43* 48* 45*   CREATININE mg/dL 1.63* 2.02* 2.00* 2.12*   GLUCOSE mg/dL 155* 145* 153* 81   Estimated Creatinine Clearance: 44.4 mL/min (A) (by C-G formula based on SCr of 1.63 mg/dL (H)).  Results from last 7 days  Lab Units 18  0556 18  1852 18  0437 18  1234   CALCIUM mg/dL 8.8 8.6 8.7 9.4   ALBUMIN g/dL  --   --   --  3.90 "     Results from last 7 days  Lab Units 05/16/18  1234   ALBUMIN g/dL 3.90   BILIRUBIN mg/dL 0.4   ALK PHOS U/L 69   AST (SGOT) U/L 8   ALT (SGPT) U/L 11       Assessment:  Principal Problem:    TIA (transient ischemic attack):  W/u completed.  DAPT and Statin tx.    Active Problems:    Hyperkalemia:  Nephrology input appreciated.     Carotid artery disease    Diabetes mellitus    CKD (chronic kidney disease), stage III    HTN (hypertension)    Adrenal insufficiency:  Very low AM cortisol level.  Will check Cosyntropin studies and get Endocrine opinion.         Plan:  Please see above.  Discussed w Nephrology.     Jourdan Velez MD  5/18/2018  5:16 PM

## 2018-05-19 LAB
ALBUMIN SERPL-MCNC: 3.5 G/DL (ref 3.5–5.2)
ANION GAP SERPL CALCULATED.3IONS-SCNC: 8 MMOL/L
BUN BLD-MCNC: 36 MG/DL (ref 8–23)
BUN/CREAT SERPL: 22.9 (ref 7–25)
CALCIUM SPEC-SCNC: 9.2 MG/DL (ref 8.6–10.5)
CHLORIDE SERPL-SCNC: 105 MMOL/L (ref 98–107)
CO2 SERPL-SCNC: 29 MMOL/L (ref 22–29)
CORTIS SERPL-MCNC: 1.17 MCG/DL
CORTIS SERPL-MCNC: 4.65 MCG/DL
CORTIS SERPL-MCNC: 6.63 MCG/DL
CREAT BLD-MCNC: 1.57 MG/DL (ref 0.76–1.27)
GFR SERPL CREATININE-BSD FRML MDRD: 43 ML/MIN/1.73
GLUCOSE BLD-MCNC: 161 MG/DL (ref 65–99)
GLUCOSE BLDC GLUCOMTR-MCNC: 137 MG/DL (ref 70–130)
GLUCOSE BLDC GLUCOMTR-MCNC: 158 MG/DL (ref 70–130)
GLUCOSE BLDC GLUCOMTR-MCNC: 186 MG/DL (ref 70–130)
GLUCOSE BLDC GLUCOMTR-MCNC: 209 MG/DL (ref 70–130)
MAGNESIUM SERPL-MCNC: 1.9 MG/DL (ref 1.6–2.4)
PHOSPHATE SERPL-MCNC: 3.7 MG/DL (ref 2.5–4.5)
POTASSIUM BLD-SCNC: 5.5 MMOL/L (ref 3.5–5.2)
SODIUM BLD-SCNC: 142 MMOL/L (ref 136–145)
URATE SERPL-MCNC: 5.7 MG/DL (ref 3.4–7)

## 2018-05-19 PROCEDURE — G0378 HOSPITAL OBSERVATION PER HR: HCPCS

## 2018-05-19 PROCEDURE — 82533 TOTAL CORTISOL: CPT | Performed by: HOSPITALIST

## 2018-05-19 PROCEDURE — 25010000002 COSYNTROPIN PER 0.25 MG: Performed by: HOSPITALIST

## 2018-05-19 PROCEDURE — 25010000002 HYDROCORTISONE SODIUM SUCCINATE 100 MG RECONSTITUTED SOLUTION: Performed by: INTERNAL MEDICINE

## 2018-05-19 PROCEDURE — 96376 TX/PRO/DX INJ SAME DRUG ADON: CPT

## 2018-05-19 PROCEDURE — 82962 GLUCOSE BLOOD TEST: CPT

## 2018-05-19 PROCEDURE — 63710000001 INSULIN ASPART PER 5 UNITS: Performed by: HOSPITALIST

## 2018-05-19 PROCEDURE — 80069 RENAL FUNCTION PANEL: CPT | Performed by: INTERNAL MEDICINE

## 2018-05-19 PROCEDURE — 96375 TX/PRO/DX INJ NEW DRUG ADDON: CPT

## 2018-05-19 PROCEDURE — 99205 OFFICE O/P NEW HI 60 MIN: CPT | Performed by: INTERNAL MEDICINE

## 2018-05-19 PROCEDURE — 84550 ASSAY OF BLOOD/URIC ACID: CPT | Performed by: INTERNAL MEDICINE

## 2018-05-19 PROCEDURE — 83735 ASSAY OF MAGNESIUM: CPT | Performed by: INTERNAL MEDICINE

## 2018-05-19 PROCEDURE — 96374 THER/PROPH/DIAG INJ IV PUSH: CPT

## 2018-05-19 PROCEDURE — 63710000001 INSULIN ASPART PER 5 UNITS: Performed by: INTERNAL MEDICINE

## 2018-05-19 RX ADMIN — CARVEDILOL 3.12 MG: 3.12 TABLET, FILM COATED ORAL at 09:19

## 2018-05-19 RX ADMIN — ASPIRIN 325 MG: 325 TABLET ORAL at 09:19

## 2018-05-19 RX ADMIN — COSYNTROPIN 0.25 MG: 0.25 INJECTION, POWDER, FOR SOLUTION INTRAMUSCULAR; INTRAVENOUS at 04:33

## 2018-05-19 RX ADMIN — ATORVASTATIN CALCIUM 40 MG: 20 TABLET, FILM COATED ORAL at 21:00

## 2018-05-19 RX ADMIN — GABAPENTIN 400 MG: 400 CAPSULE ORAL at 21:00

## 2018-05-19 RX ADMIN — LINAGLIPTIN 5 MG: 5 TABLET, FILM COATED ORAL at 16:31

## 2018-05-19 RX ADMIN — INSULIN ASPART 2 UNITS: 100 INJECTION, SOLUTION INTRAVENOUS; SUBCUTANEOUS at 21:20

## 2018-05-19 RX ADMIN — CLOPIDOGREL 75 MG: 75 TABLET, FILM COATED ORAL at 09:19

## 2018-05-19 RX ADMIN — INSULIN ASPART 2 UNITS: 100 INJECTION, SOLUTION INTRAVENOUS; SUBCUTANEOUS at 09:20

## 2018-05-19 RX ADMIN — INSULIN ASPART 2 UNITS: 100 INJECTION, SOLUTION INTRAVENOUS; SUBCUTANEOUS at 11:47

## 2018-05-19 RX ADMIN — FUROSEMIDE 40 MG: 40 TABLET ORAL at 09:19

## 2018-05-19 RX ADMIN — HYDROCORTISONE SODIUM SUCCINATE 50 MG: 100 INJECTION, POWDER, FOR SOLUTION INTRAMUSCULAR; INTRAVENOUS at 16:32

## 2018-05-19 RX ADMIN — HYDROCORTISONE SODIUM SUCCINATE 50 MG: 100 INJECTION, POWDER, FOR SOLUTION INTRAMUSCULAR; INTRAVENOUS at 21:25

## 2018-05-19 RX ADMIN — TAMSULOSIN HYDROCHLORIDE 0.4 MG: 0.4 CAPSULE ORAL at 21:01

## 2018-05-19 RX ADMIN — GABAPENTIN 400 MG: 400 CAPSULE ORAL at 09:19

## 2018-05-19 NOTE — CONSULTS
"78 y.o.  Patient Care Team:  Fahad Murray Jr., MD as PCP - General (Family Medicine)  Alex Simpson MD as Consulting Physician (Nephrology)      Chief Complaint   Patient presents with   • Speech Problem     around 1130 , pt was talking to wife and started having slurred speech and difficulty finding words. pt reports that he has had some improvement since onset of symptoms but is not at baseline,   Uncontrolled type 2 diabetes mellitus    HPI   Patient is a 78-year-old white male admitted to the hospital for difficulty speaking which lasted about 3 minutes at home.  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  Patient does have hypertension and symptoms of orthostatic hypotension  Uncontrolled type 2 diabetes mellitus nephropathy  Patient has elevated creatinine  Patient denied any knowledge of diabetic retinopathy  Patient was never on any injectable therapy    Patient reports occasional hypoglycemia  Patient reported that he recently received epidural injections in the lower spine ×2 within the past 2 weeks  This could relate to low cortisol state as happened in the case  Patient had a very low baseline cortisol and subsequently underwent cosyntropin stimulation testing and the response was submaximal with a cortisol level of 6.6 at one hour time  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  Left CEA 17 (with dionte-op CVA)   • Chronic kidney disease    • CKD (chronic kidney disease)    • CVA (cerebral vascular accident)     on 17 dionte-operatively from left CEA.     • Diabetes mellitus     DR CAROLINE DURON   • Hyperlipidemia    • Hypertension    • Low back pain    • NSTEMI (non-ST elevated myocardial infarction)     NSTEMI following left CEA and dionte-op stroke in  ([facility]).     • Renal disorder    • SOB (shortness of breath)        Family History   Problem Relation Age of Onset   • Lymphoma Brother 65         at age 73   • Coronary artery disease Neg Hx        Social History     Social History   • Marital status:      Spouse name: Kesha   • Number of children: 2   • Years of education: GED     Occupational History   • Retired       Social History Main Topics   • Smoking status: Former Smoker     Packs/day: 2.00     Years: 25.00     Quit date:    • Smokeless tobacco: Never Used   • Alcohol use No   • Drug use: No   • Sexual activity: Defer     Other Topics Concern   • Not on file     Social History Narrative    Enjoys golf.    LIVES WITH KESHA GERONIMO       Allergies   Allergen Reactions   • Lamisil [Terbinafine Hcl] Rash         Current Facility-Administered Medications:   •  acetaminophen (TYLENOL) tablet 650 mg, 650 mg, Oral, Q4H PRN, Jourdan Velez MD  •  aspirin tablet 325 mg, 325 mg, Oral, Daily, 325 mg at 18 **OR** [DISCONTINUED] aspirin suppository 300 mg, 300 mg, Rectal, Daily, Ricardo Iniguez MD  •  atorvastatin (LIPITOR) tablet 40 mg, 40 mg, Oral, Nightly, EJ Shelton, 40 mg at 18  •  carvedilol (COREG) tablet 3.125 mg, 3.125 mg, Oral, QAM ,  Jourdan Velez MD, 3.125 mg at 05/19/18 0919  •  clopidogrel (PLAVIX) tablet 75 mg, 75 mg, Oral, Daily, Ricardo Iniguez MD, 75 mg at 05/19/18 0919  •  dextrose (D50W) solution 25 g, 25 g, Intravenous, Q15 Min PRN, Jourdan Velez MD  •  dextrose (GLUTOSE) oral gel 15 g, 15 g, Oral, Q15 Min PRN, Jourdan Velez MD  •  furosemide (LASIX) tablet 40 mg, 40 mg, Oral, Daily, Donaldo Michele MD, 40 mg at 05/19/18 0919  •  gabapentin (NEURONTIN) capsule 400 mg, 400 mg, Oral, BID, Jourdan Velez MD, 400 mg at 05/19/18 0919  •  glucagon (human recombinant) (GLUCAGEN DIAGNOSTIC) injection 1 mg, 1 mg, Subcutaneous, PRN, Jourdan Velez MD  •  insulin aspart (novoLOG) injection 0-7 Units, 0-7 Units, Subcutaneous, 4x Daily With Meals & Nightly, Jourdan Velez MD, 2 Units at 05/19/18 1147  •  ondansetron (ZOFRAN) tablet 4 mg, 4 mg, Oral, Q6H PRN **OR** ondansetron ODT (ZOFRAN-ODT) disintegrating tablet 4 mg, 4 mg, Oral, Q6H PRN **OR** ondansetron (ZOFRAN) injection 4 mg, 4 mg, Intravenous, Q6H PRN, Jourdan Velez MD  •  sodium chloride 0.9 % flush 1-10 mL, 1-10 mL, Intravenous, PRN, Ricardo Iniguez MD  •  sodium chloride 0.9 % flush 1-10 mL, 1-10 mL, Intravenous, PRN, Jourdan Velez MD  •  tamsulosin (FLOMAX) 24 hr capsule 0.4 mg, 0.4 mg, Oral, Nightly, Jourdan Velez MD, 0.4 mg at 05/18/18 2024  •  traMADol (ULTRAM) tablet 50 mg, 50 mg, Oral, Q6H PRN, Jourdan Velez MD         Review of Systems   Constitutional: Positive for appetite change and fatigue.   HENT: Negative.    Eyes: Negative for visual disturbance.   Respiratory: Negative.    Cardiovascular: Negative.    Gastrointestinal: Positive for abdominal distention. Negative for abdominal pain.   Endocrine: Negative.    Genitourinary: Negative.    Neurological: Positive for dizziness and numbness.   Hematological: Negative.    Psychiatric/Behavioral: Negative.    All other systems reviewed and are  negative.    Objective     Vital Signs  Temp:  [98 °F (36.7 °C)-99.1 °F (37.3 °C)] 98.9 °F (37.2 °C)  Heart Rate:  [86-93] 92  Resp:  [16-18] 18  BP: ()/(52-68) 130/52    Physical Exam  Physical Exam    Results Review:    I reviewed the patient's new clinical results.  Glucose   Date/Time Value Ref Range Status   05/19/2018 1138 186 (H) 70 - 130 mg/dL Final   05/19/2018 0545 158 (H) 70 - 130 mg/dL Final   05/18/2018 2039 228 (H) 70 - 130 mg/dL Final   05/18/2018 1605 150 (H) 70 - 130 mg/dL Final   05/18/2018 1057 197 (H) 70 - 130 mg/dL Final   05/18/2018 0607 164 (H) 70 - 130 mg/dL Final   05/17/2018 2121 172 (H) 70 - 130 mg/dL Final   05/17/2018 1638 111 70 - 130 mg/dL Final   05/17/2018 1117 291 (H) 70 - 130 mg/dL Final   05/17/2018 0556 135 (H) 70 - 130 mg/dL Final   05/16/2018 2057 189 (H) 70 - 130 mg/dL Final   05/16/2018 1734 78 70 - 130 mg/dL Final     Lab Results (last 72 hours)     Procedure Component Value Units Date/Time    POC Glucose Once [[id number]]  (Abnormal) Collected:  05/19/18 1138    Specimen:  Blood Updated:  05/19/18 1140     Glucose 186 (H) mg/dL     Narrative:       Meter: [id number] : [id number] Javier IRBY    Cortisol [[id number]]  (Normal) Collected:  05/19/18 0533    Specimen:  Blood Updated:  05/19/18 0628     Cortisol 6.63 mcg/dL     Narrative:       Cortisol Reference Ranges:    Cortisol 6AM - 10AM Range: 6.02-18.40 mcg/dl  Cortisol 4PM - 8PM Range: 2.68-10.50 mcg/dl  Critical AM/PM:    Less than 2 mcg/dl    Cortisol [[id number]]  (Normal) Collected:  05/19/18 0502    Specimen:  Blood Updated:  05/19/18 0625     Cortisol 4.65 mcg/dL     Narrative:       Cortisol Reference Ranges:    Cortisol 6AM - 10AM Range: 6.02-18.40 mcg/dl  Cortisol 4PM - 8PM Range: 2.68-10.50 mcg/dl  Critical AM/PM:    Less than 2 mcg/dl    POC Glucose Once [[id number]]  (Abnormal) Collected:  05/19/18 0545    Specimen:  Blood Updated:  05/19/18 0546     Glucose 158 (H) mg/dL     Narrative:       Meter:  [id number] : [id number] Jerry IRBY    Cortisol [[id number]]  (Abnormal) Collected:  05/19/18 0443    Specimen:  Blood Updated:  05/19/18 0529     Cortisol 1.17 (C) mcg/dL     Narrative:       Cortisol Reference Ranges:    Cortisol 6AM - 10AM Range: 6.02-18.40 mcg/dl  Cortisol 4PM - 8PM Range: 2.68-10.50 mcg/dl  Critical AM/PM:    Less than 2 mcg/dl    Renal Function Panel [[id number]]  (Abnormal) Collected:  05/19/18 0443    Specimen:  Blood Updated:  05/19/18 0516     Glucose 161 (H) mg/dL      BUN 36 (H) mg/dL      Creatinine 1.57 (H) mg/dL      Sodium 142 mmol/L      Potassium 5.5 (H) mmol/L      Chloride 105 mmol/L      CO2 29.0 mmol/L      Calcium 9.2 mg/dL      Albumin 3.50 g/dL      Phosphorus 3.7 mg/dL      Anion Gap 8.0 mmol/L      BUN/Creatinine Ratio 22.9     eGFR Non African Amer 43 (L) mL/min/1.73     Narrative:       The MDRD GFR formula is only valid for adults with stable renal function between ages 18 and 70.    Uric Acid [[id number]]  (Normal) Collected:  05/19/18 0443    Specimen:  Blood Updated:  05/19/18 0516     Uric Acid 5.7 mg/dL     Magnesium [[id number]]  (Normal) Collected:  05/19/18 0443    Specimen:  Blood Updated:  05/19/18 0516     Magnesium 1.9 mg/dL     POC Glucose Once [[id number]]  (Abnormal) Collected:  05/18/18 2039    Specimen:  Blood Updated:  05/18/18 2106     Glucose 228 (H) mg/dL     Narrative:       Meter: [id number] : [id number] Bryan Person SUREKHA    POC Glucose Once [[id number]]  (Abnormal) Collected:  05/18/18 1605    Specimen:  Blood Updated:  05/18/18 1606     Glucose 150 (H) mg/dL     Narrative:       Meter: [id number] : [id number] Deeritchie Callie KILLIAN    Cortisol [[id number]]  (Abnormal) Collected:  05/18/18 0904    Specimen:  Blood Updated:  05/18/18 1218     Cortisol 0.89 (C) mcg/dL     Narrative:       Cortisol Reference Ranges:    Cortisol 6AM - 10AM Range: 6.02-18.40 mcg/dl  Cortisol 4PM - 8PM Range: 2.68-10.50 mcg/dl  Critical AM/PM:    Less than 2  mcg/dl    POC Glucose Once [[id number]]  (Abnormal) Collected:  05/18/18 1057    Specimen:  Blood Updated:  05/18/18 1059     Glucose 197 (H) mg/dL     Narrative:       Meter: [id number] : [id number] Siria Ledesma CNA    Protein / Creatinine Ratio, Urine - [[id number]]  (Abnormal) Collected:  05/18/18 0855    Specimen:  Urine from Urine, Clean Catch Updated:  05/18/18 1056     Protein/Creatinine Ratio, Urine 223.6 (H) mg/G Crea      Creatinine, Urine 62.6 mg/dL      Total Protein, Urine 14.0 mg/dL     pH, Urine - Urine, Clean Catch [[id number]]  (Normal) Collected:  05/18/18 0855    Specimen:  Urine from Urine, Clean Catch Updated:  05/18/18 0930     pH, UA <=5.0    Urinalysis With / Microscopic If Indicated - Urine, Clean Catch [[id number]]  (Normal) Collected:  05/18/18 0855    Specimen:  Urine from Urine, Clean Catch Updated:  05/18/18 0930     Color, UA Yellow     Appearance, UA Clear     pH, UA <=5.0     Specific Gravity, UA 1.013     Glucose, UA Negative     Ketones, UA Negative     Bilirubin, UA Negative     Blood, UA Negative     Protein, UA Negative     Leuk Esterase, UA Negative     Nitrite, UA Negative     Urobilinogen, UA 0.2 E.U./dL    Narrative:       Urine microscopic not indicated.    Basic Metabolic Panel [[id number]]  (Abnormal) Collected:  05/18/18 0556    Specimen:  Blood Updated:  05/18/18 0710     Glucose 155 (H) mg/dL      BUN 37 (H) mg/dL      Creatinine 1.63 (H) mg/dL      Sodium 143 mmol/L      Potassium 5.8 (H) mmol/L      Chloride 110 (H) mmol/L      CO2 25.0 mmol/L      Calcium 8.8 mg/dL      eGFR Non African Amer 41 (L) mL/min/1.73      BUN/Creatinine Ratio 22.7     Anion Gap 8.0 mmol/L     Narrative:       The MDRD GFR formula is only valid for adults with stable renal function between ages 18 and 70.    Renin Activity & Aldosterone [[id number]] Collected:  05/18/18 0556    Specimen:  Blood Updated:  05/18/18 0634    POC Glucose Once [[id number]]  (Abnormal) Collected:  05/18/18 0607     Specimen:  Blood Updated:  05/18/18 0610     Glucose 164 (H) mg/dL     Narrative:       Meter: [id number] : [id number] Bryan IRBY    POC Glucose Once [[id number]]  (Abnormal) Collected:  05/17/18 2121    Specimen:  Blood Updated:  05/17/18 2123     Glucose 172 (H) mg/dL     Narrative:       Meter: [id number] : [id number] Bryan IRBY    Basic Metabolic Panel [[id number]]  (Abnormal) Collected:  05/17/18 1852    Specimen:  Blood Updated:  05/17/18 1928     Glucose 145 (H) mg/dL      BUN 43 (H) mg/dL      Creatinine 2.02 (H) mg/dL      Sodium 143 mmol/L      Potassium 5.7 (H) mmol/L      Chloride 108 (H) mmol/L      CO2 26.7 mmol/L      Calcium 8.6 mg/dL      eGFR Non African Amer 32 (L) mL/min/1.73      BUN/Creatinine Ratio 21.3     Anion Gap 8.3 mmol/L     Narrative:       The MDRD GFR formula is only valid for adults with stable renal function between ages 18 and 70.    POC Glucose Once [[id number]]  (Normal) Collected:  05/17/18 1638    Specimen:  Blood Updated:  05/17/18 1640     Glucose 111 mg/dL     Narrative:       Meter: [id number] : [id number] Siria Ledesma CNA    POC Glucose Once [[id number]]  (Abnormal) Collected:  05/17/18 1117    Specimen:  Blood Updated:  05/17/18 1118     Glucose 291 (H) mg/dL     Narrative:       Meter: [id number] : [id number] Siria KEEA    TSH [[id number]]  (Normal) Collected:  05/17/18 0437    Specimen:  Blood Updated:  05/17/18 0621     TSH 1.030 mIU/mL     Lipid Panel [[id number]]  (Abnormal) Collected:  05/17/18 0437    Specimen:  Blood Updated:  05/17/18 0609     Total Cholesterol 104 mg/dL      Triglycerides 95 mg/dL      HDL Cholesterol 34 (L) mg/dL      LDL Cholesterol  51 mg/dL      VLDL Cholesterol 19 mg/dL      LDL/HDL Ratio 1.50    Narrative:       Cholesterol Reference Ranges  (U.S. Department of Health and Human Services ATP III Classifications)    Desirable          <200 mg/dL  Borderline High    200-239 mg/dL  High Risk           >240 mg/dL      Triglyceride Reference Ranges  (U.S. Department of Health and Human Services ATP III Classifications)    Normal           <150 mg/dL  Borderline High  150-199 mg/dL  High             200-499 mg/dL  Very High        >500 mg/dL    HDL Reference Ranges  (U.S. Department of Health and Human Services ATP III Classifcations)    Low     <40 mg/dl (major risk factor for CHD)  High    >60 mg/dl ('negative' risk factor for CHD)        LDL Reference Ranges  (U.S. Department of Health and Human Services ATP III Classifcations)    Optimal          <100 mg/dL  Near Optimal     100-129 mg/dL  Borderline High  130-159 mg/dL  High             160-189 mg/dL  Very High        >189 mg/dL    Basic Metabolic Panel [[id number]]  (Abnormal) Collected:  05/17/18 0437    Specimen:  Blood Updated:  05/17/18 0609     Glucose 153 (H) mg/dL      BUN 48 (H) mg/dL      Creatinine 2.00 (H) mg/dL      Sodium 142 mmol/L      Potassium 5.9 (H) mmol/L      Chloride 109 (H) mmol/L      CO2 23.7 mmol/L      Calcium 8.7 mg/dL      eGFR Non African Amer 33 (L) mL/min/1.73      BUN/Creatinine Ratio 24.0     Anion Gap 9.3 mmol/L     Narrative:       The MDRD GFR formula is only valid for adults with stable renal function between ages 18 and 70.    POC Glucose Once [[id number]]  (Abnormal) Collected:  05/17/18 0556    Specimen:  Blood Updated:  05/17/18 0558     Glucose 135 (H) mg/dL     Narrative:       Meter: [id number] : [id number] Sidney Tracy IRBY    Hemoglobin A1c [[id number]]  (Abnormal) Collected:  05/17/18 0437    Specimen:  Blood Updated:  05/17/18 0552     Hemoglobin A1C 8.33 (H) %     Narrative:       Hemoglobin A1C Ranges:    Increased Risk for Diabetes  5.7% to 6.4%  Diabetes                     >= 6.5%  Diabetic Goal                < 7.0%    POC Glucose Once [[id number]]  (Abnormal) Collected:  05/16/18 2057    Specimen:  Blood Updated:  05/16/18 2058     Glucose 189 (H) mg/dL     Narrative:       Meter: [id number] : [id number]  Sidney IRBY    Vitamin B12 [[id number]]  (Normal) Collected:  05/16/18 1817    Specimen:  Blood Updated:  05/16/18 2020     Vitamin B-12 561 pg/mL     TSH [[id number]]  (Normal) Collected:  05/16/18 1234    Specimen:  Blood Updated:  05/16/18 1930     TSH 1.250 mIU/mL     POC Glucose Once [[id number]]  (Normal) Collected:  05/16/18 1734    Specimen:  Blood Updated:  05/16/18 1736     Glucose 78 mg/dL     Narrative:       Meter: [id number] : [id number] Blanka IRBY          Imaging Results (last 72 hours)     Procedure Component Value Units Date/Time    MRI Angiogram Head Without Contrast [[id number]] Collected:  05/16/18 1737     Updated:  05/17/18 1618    Narrative:       MRI OF THE BRAIN WITHOUT CONTRAST, MR ANGIOGRAPHY OF THE NECK WITHOUT  CONTRAST AND MR ANGIOGRAPHY OF THE HEAD WITHOUT CONTRAST 05/16/2018      HISTORY: Difficulty with speech. Right-sided weakness.     FINDINGS: Multiple noncontrast sagittal and axial images were obtained  through the brain.. The diffusion-weighted images show no evidence of  acute infarction. FLAIR images show scattered areas of bright signal  intensity in the bilateral cerebral hemisphere including a moderate size  area of bright signal in the left superior frontoparietal region  measuring approximately 2.8 cm. This likely represents an area of old  infarction.     No intracranial hemorrhage is seen. There is mild diffuse atrophy.     Normal-appearing vascular flow voids are seen.     3D time-of-flight MR angiography of the neck was performed without  contrast.     NASCET criteria was used.     No significant common carotid or internal carotid artery stenosis is  seen bilaterally.     There is near complete absence of flow signal in the right vertebral  artery with some flow signal in the most distal aspect of the right  vertebral artery. There is flow signal in the basilar artery. 3D  time-of-flight MR angiography of the head was performed. There is flow  signal in the  distal right vertebral artery and distal left vertebral  artery with the distal right vertebral artery appearing smaller than the  left.     There is some mild diminished size of the A1 segment of the right  anterior cerebral artery. There is flow signal in the distal bilateral  internal carotid arteries as well as in the bilateral middle and  anterior cerebral arteries. Minimal narrowing at the origin of the A1  segment of the left anterior cerebral artery is seen. Basilar artery  branches appear patent.       Impression:       1. No evidence of acute infarction or hemorrhage.  2. Scattered areas of bright signal intensity in the bilateral cerebral  hemisphere on FLAIR images are consistent with areas of chronic infarct.  3. Diminished flow signal in the right vertebral artery of uncertain  significance. The distal right vertebral artery does appear to  demonstrate flow signal. If further evaluation is clinically indicated  followup CT angiogram of the neck could be obtained.   4. Mild narrowing of the A1 segment of the right anterior cerebral  artery and minimal narrowing at the origin of the A1 segment of the left  anterior cerebral artery.     This report was finalized on 5/17/2018 4:15 PM by Dr. Nate Brooke M.D.       MRI Brain Without Contrast [[id number]] Collected:  05/16/18 1737     Updated:  05/17/18 1618    Narrative:       MRI OF THE BRAIN WITHOUT CONTRAST, MR ANGIOGRAPHY OF THE NECK WITHOUT  CONTRAST AND MR ANGIOGRAPHY OF THE HEAD WITHOUT CONTRAST 05/16/2018      HISTORY: Difficulty with speech. Right-sided weakness.     FINDINGS: Multiple noncontrast sagittal and axial images were obtained  through the brain.. The diffusion-weighted images show no evidence of  acute infarction. FLAIR images show scattered areas of bright signal  intensity in the bilateral cerebral hemisphere including a moderate size  area of bright signal in the left superior frontoparietal region  measuring approximately 2.8  cm. This likely represents an area of old  infarction.     No intracranial hemorrhage is seen. There is mild diffuse atrophy.     Normal-appearing vascular flow voids are seen.     3D time-of-flight MR angiography of the neck was performed without  contrast.     NASCET criteria was used.     No significant common carotid or internal carotid artery stenosis is  seen bilaterally.     There is near complete absence of flow signal in the right vertebral  artery with some flow signal in the most distal aspect of the right  vertebral artery. There is flow signal in the basilar artery. 3D  time-of-flight MR angiography of the head was performed. There is flow  signal in the distal right vertebral artery and distal left vertebral  artery with the distal right vertebral artery appearing smaller than the  left.     There is some mild diminished size of the A1 segment of the right  anterior cerebral artery. There is flow signal in the distal bilateral  internal carotid arteries as well as in the bilateral middle and  anterior cerebral arteries. Minimal narrowing at the origin of the A1  segment of the left anterior cerebral artery is seen. Basilar artery  branches appear patent.       Impression:       1. No evidence of acute infarction or hemorrhage.  2. Scattered areas of bright signal intensity in the bilateral cerebral  hemisphere on FLAIR images are consistent with areas of chronic infarct.  3. Diminished flow signal in the right vertebral artery of uncertain  significance. The distal right vertebral artery does appear to  demonstrate flow signal. If further evaluation is clinically indicated  followup CT angiogram of the neck could be obtained.   4. Mild narrowing of the A1 segment of the right anterior cerebral  artery and minimal narrowing at the origin of the A1 segment of the left  anterior cerebral artery.     This report was finalized on 5/17/2018 4:15 PM by Dr. Nate Brooke M.D.       MRI Angiogram Neck  Without Contrast [[id number]] Collected:  05/16/18 1737     Updated:  05/17/18 1618    Narrative:       MRI OF THE BRAIN WITHOUT CONTRAST, MR ANGIOGRAPHY OF THE NECK WITHOUT  CONTRAST AND MR ANGIOGRAPHY OF THE HEAD WITHOUT CONTRAST 05/16/2018      HISTORY: Difficulty with speech. Right-sided weakness.     FINDINGS: Multiple noncontrast sagittal and axial images were obtained  through the brain.. The diffusion-weighted images show no evidence of  acute infarction. FLAIR images show scattered areas of bright signal  intensity in the bilateral cerebral hemisphere including a moderate size  area of bright signal in the left superior frontoparietal region  measuring approximately 2.8 cm. This likely represents an area of old  infarction.     No intracranial hemorrhage is seen. There is mild diffuse atrophy.     Normal-appearing vascular flow voids are seen.     3D time-of-flight MR angiography of the neck was performed without  contrast.     NASCET criteria was used.     No significant common carotid or internal carotid artery stenosis is  seen bilaterally.     There is near complete absence of flow signal in the right vertebral  artery with some flow signal in the most distal aspect of the right  vertebral artery. There is flow signal in the basilar artery. 3D  time-of-flight MR angiography of the head was performed. There is flow  signal in the distal right vertebral artery and distal left vertebral  artery with the distal right vertebral artery appearing smaller than the  left.     There is some mild diminished size of the A1 segment of the right  anterior cerebral artery. There is flow signal in the distal bilateral  internal carotid arteries as well as in the bilateral middle and  anterior cerebral arteries. Minimal narrowing at the origin of the A1  segment of the left anterior cerebral artery is seen. Basilar artery  branches appear patent.       Impression:       1. No evidence of acute infarction or  hemorrhage.  2. Scattered areas of bright signal intensity in the bilateral cerebral  hemisphere on FLAIR images are consistent with areas of chronic infarct.  3. Diminished flow signal in the right vertebral artery of uncertain  significance. The distal right vertebral artery does appear to  demonstrate flow signal. If further evaluation is clinically indicated  followup CT angiogram of the neck could be obtained.   4. Mild narrowing of the A1 segment of the right anterior cerebral  artery and minimal narrowing at the origin of the A1 segment of the left  anterior cerebral artery.     This report was finalized on 5/17/2018 4:15 PM by Dr. Nate Brooke M.D.       CT Head Without Contrast [[id number]] Collected:  05/16/18 1425     Updated:  05/17/18 0824    Narrative:       CT SCAN OF THE HEAD WITHOUT CONTRAST      CLINICAL HISTORY:  Headache and dizziness.     CT scan of the head was obtained with 3 mm axial images. No intravenous  contrast was administered.     FINDINGS:     There is a focus of encephalomalacia within the left precentral gyrus  which extends into the adjacent superior frontal gyrus as well as the  adjacent centrum semiovale. The findings are compatible with a chronic  infarct within the left MCA distribution and this area of  encephalomalacia measures up to 3.1 x 2.8 cm in greatest axial  dimensions. There is a calcific density appreciated within the right  sylvian fissure which is likely vascular in nature and is probably  within a vein. There are findings compatible with a small chronic  infarct within the right cerebellar hemisphere measuring up to  approximately 5 mm in diameter. The ventricles, sulci, and cisterns are  otherwise age appropriate. The basal ganglia and thalami are  unremarkable in appearance.       Impression:          Findings compatible with chronic infarcts within the left precentral  gyrus extending into the adjacent superior frontal gyrus and centrum  semiovale as well  as the right cerebellar hemisphere. No acute  intracranial pathology is evident.     Radiation dose reduction techniques were utilized, including automated  exposure control and exposure modulation based on body size.     This report was finalized on 5/17/2018 8:21 AM by Dr. Balaji Butler M.D.             Assessment/Plan     Patient Active Problem List    Diagnosis   • Adrenal insufficiency [E27.40]   • Hyperkalemia [E87.5]   • TIA (transient ischemic attack) [G45.9]   • Carotid artery disease [I77.9]     Overview Note:     Right CEA 9/26/16.  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]   • CKD (chronic kidney disease) stage 3, GFR 30-59 ml/min [N18.3]   • Type 2 diabetes mellitus with diabetic neuropathy, without long-term current use of insulin [E11.40]   • Hypertension [I10]   • 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]   • Arthritis [M19.90]   • Diabetes mellitus [E11.9]     Overview Note:     DR CAROLINE DURON       Uncontrolled type 2 diabetes mellitus with hemoglobin A1c of 8.3  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  Uncontrolled type 2 diabetes mellitus with nephropathy  Chronic kidney disease  Hypotension  Hyperkalemia  Submaximal response to cosyntropin testing-suspicious for adrenal insufficiency    Patient reports receiving epidural injections ×2 within the past 2 weeks    Patient denies any prior history of elevated potassium  Patient has received IV fluids and his creatinine has improved from 2.4-1.8 but his potassium is  still remains to be elevated  Nephrology suspecting adrenal insufficiency as possible cause for hyperkalemia    Patient denies any previous history of hypotension  He is on medication for hypertension    Cosyntropin stim testing suggest the maximum response was only 6.6 cortisol level  This is still consistent with a recent epidural injections ×2 within the past 2 weeks  But apart from that hypotension, hyperkalemia cannot be explained    Patient does have peripheral autonomic neuropathy which could be manifesting with orthostatic symptoms and signs    I suspect emperic therapy with Solu-Cortef 50 mg every 8 hours ×2 doses is reasonable at this point  If patient has significantly improved potassium levels as well as blood pressure levels then we could justify continuation of a low-dose steroid for some time  On the contrary if patient has no significant response then it is highly unlikely that he has any primary adrenal insufficiency    In that instance we will need to consider type for RTA which  is hyporeninemic hypoaldosteronism as a possible etiology for hyperkalemia in addition to chronic kidney disease and suspected dietary intake of potassium rich foods    I discussed this at length with the patient and his wife and both verbalized understanding and are willing to try  I also recommended discontinuation of glipizide/Amaryl at home and recommend Tradjenta with or without Starlix to minimize incidence of hypoglycemia and compliance.    Will start Tradjenta 5 mg daily starting today  Starlix is not available for inpatient care  If necessary will start 5 mg glipizide in the morning  Will monitor the hemodynamics changes overnight    The total time spent for old record and lab review and floor time was more than 110 min of which greater than 60 min of time ( greater than 50% of the total time )  was spent face to face with the patient counseling and coordination of care on recommended evaluation and treatment  "options, instructions for management/treatment and /or follow up  and importance of compliance with chosen management or treatment options  Trevor Whyte MD FACE.  05/19/18  1:36 PM        EMR Dragon / transcription disclaimer:     \"Dictated utilizing Dragon dictation\".   "

## 2018-05-19 NOTE — PROGRESS NOTES
" LOS: 1 day   Patient Care Team:  Fahad Murray Jr., MD as PCP - General (Family Medicine)  Alex Simpson MD as Consulting Physician (Nephrology)    Chief Complaint: CKD3/Hyperkalemia    Subjective     Pt doing well without any new complaints.    Had ACTH stim test this AM.        Subjective:  Symptoms:  No shortness of breath, chest pain, chest pressure or anxiety.    Diet:  No nausea or vomiting.        History taken from: patient    Objective     Vital Sign Min/Max for last 24 hours  Temp  Min: 98 °F (36.7 °C)  Max: 99.1 °F (37.3 °C)   BP  Min: 93/62  Max: 159/65   Pulse  Min: 79  Max: 93   Resp  Min: 16  Max: 18   SpO2  Min: 91 %  Max: 95 %   Flow (L/min)  Min: 2  Max: 3   No Data Recorded     Flowsheet Rows      First Filed Value   Admission Height  172.7 cm (68\") Documented at 05/16/2018 1202   Admission Weight  98.4 kg (217 lb) Documented at 05/16/2018 1202          No intake/output data recorded.  I/O last 3 completed shifts:  In: 960 [P.O.:960]  Out: 200 [Urine:200]    Objective:  General Appearance:  Comfortable.    Vital signs: (most recent): Blood pressure 93/62, pulse 93, temperature 99.1 °F (37.3 °C), temperature source Oral, resp. rate 18, height 172.7 cm (68\"), weight 101 kg (223 lb 8 oz), SpO2 95 %.  No fever.    HEENT: Normal HEENT exam.    Lungs:  Normal effort and normal respiratory rate.  Breath sounds clear to auscultation.    Heart: Normal rate.  Regular rhythm.    Abdomen: Abdomen is soft.  Bowel sounds are normal.     Skin:  Warm and dry.              Results Review:     I reviewed the patient's new clinical results.    WBC WBC   Date Value Ref Range Status   05/16/2018 8.40 4.50 - 10.70 10*3/mm3 Final      HGB Hemoglobin   Date Value Ref Range Status   05/16/2018 11.5 (L) 13.7 - 17.6 g/dL Final      HCT Hematocrit   Date Value Ref Range Status   05/16/2018 36.8 (L) 40.4 - 52.2 % Final      Platlets No results found for: LABPLAT   MCV MCV   Date Value Ref Range Status   05/16/2018 97.4 " (H) 79.8 - 96.2 fL Final          Sodium Sodium   Date Value Ref Range Status   05/19/2018 142 136 - 145 mmol/L Final   05/18/2018 143 136 - 145 mmol/L Final   05/17/2018 143 136 - 145 mmol/L Final   05/17/2018 142 136 - 145 mmol/L Final   05/16/2018 143 136 - 145 mmol/L Final      Potassium Potassium   Date Value Ref Range Status   05/19/2018 5.5 (H) 3.5 - 5.2 mmol/L Final   05/18/2018 5.8 (H) 3.5 - 5.2 mmol/L Final   05/17/2018 5.7 (H) 3.5 - 5.2 mmol/L Final   05/17/2018 5.9 (H) 3.5 - 5.2 mmol/L Final   05/16/2018 5.9 (H) 3.5 - 5.2 mmol/L Final      Chloride Chloride   Date Value Ref Range Status   05/19/2018 105 98 - 107 mmol/L Final   05/18/2018 110 (H) 98 - 107 mmol/L Final   05/17/2018 108 (H) 98 - 107 mmol/L Final   05/17/2018 109 (H) 98 - 107 mmol/L Final   05/16/2018 106 98 - 107 mmol/L Final      CO2 CO2   Date Value Ref Range Status   05/19/2018 29.0 22.0 - 29.0 mmol/L Final   05/18/2018 25.0 22.0 - 29.0 mmol/L Final   05/17/2018 26.7 22.0 - 29.0 mmol/L Final   05/17/2018 23.7 22.0 - 29.0 mmol/L Final   05/16/2018 25.7 22.0 - 29.0 mmol/L Final      BUN BUN   Date Value Ref Range Status   05/19/2018 36 (H) 8 - 23 mg/dL Final   05/18/2018 37 (H) 8 - 23 mg/dL Final   05/17/2018 43 (H) 8 - 23 mg/dL Final   05/17/2018 48 (H) 8 - 23 mg/dL Final   05/16/2018 45 (H) 8 - 23 mg/dL Final      Creatinine Creatinine   Date Value Ref Range Status   05/19/2018 1.57 (H) 0.76 - 1.27 mg/dL Final   05/18/2018 1.63 (H) 0.76 - 1.27 mg/dL Final   05/17/2018 2.02 (H) 0.76 - 1.27 mg/dL Final   05/17/2018 2.00 (H) 0.76 - 1.27 mg/dL Final   05/16/2018 2.12 (H) 0.76 - 1.27 mg/dL Final      Calcium Calcium   Date Value Ref Range Status   05/19/2018 9.2 8.6 - 10.5 mg/dL Final   05/18/2018 8.8 8.6 - 10.5 mg/dL Final   05/17/2018 8.6 8.6 - 10.5 mg/dL Final   05/17/2018 8.7 8.6 - 10.5 mg/dL Final   05/16/2018 9.4 8.6 - 10.5 mg/dL Final      PO4 No results found for: CAPO4   Albumin Albumin   Date Value Ref Range Status   05/19/2018 3.50  3.50 - 5.20 g/dL Final   05/16/2018 3.90 3.50 - 5.20 g/dL Final      Magnesium Magnesium   Date Value Ref Range Status   05/19/2018 1.9 1.6 - 2.4 mg/dL Final      Uric Acid Uric Acid   Date Value Ref Range Status   05/19/2018 5.7 3.4 - 7.0 mg/dL Final        Medication Review: y    Assessment/Plan     Principal Problem:    TIA (transient ischemic attack)  Active Problems:    Carotid artery disease    Diabetes mellitus    CKD (chronic kidney disease), stage III    HTN (hypertension)    Hyperkalemia    Adrenal insufficiency      Assessment & Plan  1.  Hyperkalemia, persistent despite discontinuation of the ACE inhibitor and improvement of renal function.  Given poor response with ACTH stim test likely related to adrenal insufficiency.  Has improved at this time to 5.5 with oral lasix. Will defer treatment to endocrine.    2.  Acute kidney injury on chronic kidney disease stage III, resolved spontaneously and since admission he is back to his baseline  3.  Chronic kidney disease stage III associated with diabetic and hypertensive glomerulosclerosis.  4.  Hypertension.  Low now.  Holding acei.  5.  Diabetes mellitus type 2  6.  Carotid disease with prior stroke  7.  TIA, symptoms has resolved  8.  Coronary artery disease  9.  Peripheral neuropathy  10.  BPH treated        Plan:  1. Restrict potassium in the diet to 2 g daily   2. Loop diuretics   3.  Surveillance labs.   Bk Saldana MD  05/19/18  10:31 AM

## 2018-05-19 NOTE — PROGRESS NOTES
LOS: 1 day     Name: Santino Retana  Age/Sex: 78 y.o. male  :  1940        PCP: Fahad Murray Jr., MD    Subjective   He feels okay this morning.  Denies any other new issues or complaints other than just chronic fatigue and weakness.  He received an epidural injection in his back about 2 weeks ago but is otherwise not received any steroids recently has not been on and off oral steroids for quite some time.  General: No Fever or Chills, Cardiac: No Chest Pain or Palpitations, Resp: No Cough or SOA, GI: No Nausea, Vomiting, or Diarrhea and Other: No bleeding      aspirin 325 mg Oral Daily   atorvastatin 40 mg Oral Nightly   carvedilol 3.125 mg Oral QAM AC   clopidogrel 75 mg Oral Daily   furosemide 40 mg Oral Daily   gabapentin 400 mg Oral BID   insulin aspart 0-7 Units Subcutaneous 4x Daily With Meals & Nightly   tamsulosin 0.4 mg Oral Nightly          Objective   Vital Signs  Temp:  [98 °F (36.7 °C)-99.1 °F (37.3 °C)] 99.1 °F (37.3 °C)  Heart Rate:  [79-93] 93  Resp:  [16-18] 18  BP: ()/(62-74) 93/62  Body mass index is 33.98 kg/m².    Intake/Output Summary (Last 24 hours) at 18 1026  Last data filed at 18 1706   Gross per 24 hour   Intake              600 ml   Output                0 ml   Net              600 ml       Physical Exam   Constitutional: He is oriented to person, place, and time. He appears well-developed and well-nourished. No distress.   HENT:   Head: Normocephalic and atraumatic.   Mouth/Throat: No oropharyngeal exudate.   Eyes: Conjunctivae and EOM are normal.   Neck: Neck supple. No JVD present.   Cardiovascular: Normal rate, regular rhythm and normal heart sounds.    Pulmonary/Chest: Effort normal and breath sounds normal.   Abdominal: Soft. Bowel sounds are normal. He exhibits no distension.   Musculoskeletal: Normal range of motion. He exhibits no edema.   Neurological: He is alert and oriented to person, place, and time.   Skin: Skin is warm and dry.    Psychiatric: He has a normal mood and affect. His behavior is normal.   Nursing note and vitals reviewed.        Results Review:       I reviewed the patient's new clinical results.    Results from last 7 days  Lab Units 05/16/18  1234   WBC 10*3/mm3 8.40   HEMOGLOBIN g/dL 11.5*   PLATELETS 10*3/mm3 217     Results from last 7 days  Lab Units 05/19/18  0443 05/18/18  0556 05/17/18  1852 05/17/18  0437 05/16/18  1234   SODIUM mmol/L 142 143 143 142 143   POTASSIUM mmol/L 5.5* 5.8* 5.7* 5.9* 5.9*   CHLORIDE mmol/L 105 110* 108* 109* 106   CO2 mmol/L 29.0 25.0 26.7 23.7 25.7   BUN mg/dL 36* 37* 43* 48* 45*   CREATININE mg/dL 1.57* 1.63* 2.02* 2.00* 2.12*   CALCIUM mg/dL 9.2 8.8 8.6 8.7 9.4   MAGNESIUM mg/dL 1.9  --   --   --   --    PHOSPHORUS mg/dL 3.7  --   --   --   --    Estimated Creatinine Clearance: 46.1 mL/min (A) (by C-G formula based on SCr of 1.57 mg/dL (H)).      Assessment/Plan   Principal Problem:    TIA (transient ischemic attack)  Active Problems:    Carotid artery disease    Diabetes mellitus    CKD (chronic kidney disease), stage III    HTN (hypertension)    Hyperkalemia    Adrenal insufficiency      PLAN  - His cortisol was low.  Endocrinology's been consulted for assistance.  He had a cosyntropin stim test this morning that did not show very brisk response.  We'll await their further recommendations.  - He remains with elevated potassium nephrology's following.  I would like to see this down a little further before we let him go from the hospital.  He has underlying chronic kidney disease stage III.  - His blood pressures a little on the low side this morning.  He was started on diuretics but remains off his other blood pressure medications.    Disposition  I don't think easily be ready to go home today.  If endocrinology feels safe for discharge home be happy to come back by discharge her later this evening.  But again I like to see a little more improvement in his potassium before we let him  leave the hospital.      Chase Celeste MD  [facility]  05/19/18  10:26 AM

## 2018-05-19 NOTE — PLAN OF CARE
Problem: Patient Care Overview  Goal: Plan of Care Review  Outcome: Ongoing (interventions implemented as appropriate)   05/19/18 0182   Coping/Psychosocial   Plan of Care Reviewed With patient   Plan of Care Review   Progress improving   OTHER   Outcome Summary Pt A&Ox4 today. VSS. Endocrinology seeing. Steroids ordered. will continue to monitor.        Problem: Stroke (Ischemic) (Adult)  Goal: Signs and Symptoms of Listed Potential Problems Will be Absent, Minimized or Managed (Stroke)  Outcome: Outcome(s) achieved Date Met: 05/19/18      Problem: Fall Risk (Adult)  Goal: Absence of Fall  Outcome: Ongoing (interventions implemented as appropriate)

## 2018-05-19 NOTE — PLAN OF CARE
Problem: Patient Care Overview  Goal: Plan of Care Review  Outcome: Ongoing (interventions implemented as appropriate)   05/19/18 0320   Coping/Psychosocial   Plan of Care Reviewed With patient   Plan of Care Review   Progress improving   OTHER   Outcome Summary vss, 2l o2 required at hs, no c/o, wants to go home, endocrine consult pending, continue to monitor       Problem: Stroke (Ischemic) (Adult)  Goal: Signs and Symptoms of Listed Potential Problems Will be Absent, Minimized or Managed (Stroke)  Outcome: Ongoing (interventions implemented as appropriate)      Problem: Fall Risk (Adult)  Goal: Absence of Fall  Outcome: Ongoing (interventions implemented as appropriate)

## 2018-05-20 VITALS
SYSTOLIC BLOOD PRESSURE: 127 MMHG | RESPIRATION RATE: 20 BRPM | WEIGHT: 223.5 LBS | BODY MASS INDEX: 33.87 KG/M2 | OXYGEN SATURATION: 96 % | HEART RATE: 104 BPM | TEMPERATURE: 98.7 F | HEIGHT: 68 IN | DIASTOLIC BLOOD PRESSURE: 63 MMHG

## 2018-05-20 LAB
ALBUMIN SERPL-MCNC: 3.5 G/DL (ref 3.5–5.2)
ANION GAP SERPL CALCULATED.3IONS-SCNC: 11.5 MMOL/L
BUN BLD-MCNC: 44 MG/DL (ref 8–23)
BUN/CREAT SERPL: 23.8 (ref 7–25)
CALCIUM SPEC-SCNC: 9.5 MG/DL (ref 8.6–10.5)
CHLORIDE SERPL-SCNC: 101 MMOL/L (ref 98–107)
CO2 SERPL-SCNC: 26.5 MMOL/L (ref 22–29)
CREAT BLD-MCNC: 1.85 MG/DL (ref 0.76–1.27)
GFR SERPL CREATININE-BSD FRML MDRD: 36 ML/MIN/1.73
GLUCOSE BLD-MCNC: 217 MG/DL (ref 65–99)
GLUCOSE BLDC GLUCOMTR-MCNC: 199 MG/DL (ref 70–130)
PHOSPHATE SERPL-MCNC: 5.3 MG/DL (ref 2.5–4.5)
POTASSIUM BLD-SCNC: 5 MMOL/L (ref 3.5–5.2)
SODIUM BLD-SCNC: 139 MMOL/L (ref 136–145)

## 2018-05-20 PROCEDURE — 82962 GLUCOSE BLOOD TEST: CPT

## 2018-05-20 PROCEDURE — G0378 HOSPITAL OBSERVATION PER HR: HCPCS

## 2018-05-20 PROCEDURE — 63710000001 INSULIN ASPART PER 5 UNITS: Performed by: INTERNAL MEDICINE

## 2018-05-20 PROCEDURE — 99214 OFFICE O/P EST MOD 30 MIN: CPT | Performed by: INTERNAL MEDICINE

## 2018-05-20 PROCEDURE — 80069 RENAL FUNCTION PANEL: CPT | Performed by: HOSPITALIST

## 2018-05-20 RX ORDER — FLUDROCORTISONE ACETATE 0.1 MG/1
50 TABLET ORAL DAILY
Status: DISCONTINUED | OUTPATIENT
Start: 2018-05-20 | End: 2018-05-20 | Stop reason: HOSPADM

## 2018-05-20 RX ORDER — FLUDROCORTISONE ACETATE 0.1 MG/1
0.05 TABLET ORAL DAILY
Qty: 15 TABLET | Refills: 1 | Status: SHIPPED | OUTPATIENT
Start: 2018-05-20 | End: 2018-06-19

## 2018-05-20 RX ADMIN — CLOPIDOGREL 75 MG: 75 TABLET, FILM COATED ORAL at 08:33

## 2018-05-20 RX ADMIN — LINAGLIPTIN 5 MG: 5 TABLET, FILM COATED ORAL at 08:33

## 2018-05-20 RX ADMIN — INSULIN ASPART 2 UNITS: 100 INJECTION, SOLUTION INTRAVENOUS; SUBCUTANEOUS at 08:33

## 2018-05-20 RX ADMIN — ASPIRIN 325 MG: 325 TABLET ORAL at 08:33

## 2018-05-20 RX ADMIN — GABAPENTIN 400 MG: 400 CAPSULE ORAL at 08:33

## 2018-05-20 NOTE — PROGRESS NOTES
LOS: 1 day   Patient Care Team:  Fahad Murray Jr., MD as PCP - General (Family Medicine)  Alex Simpson MD as Consulting Physician (Nephrology)    Subjective     Impression is doing well I spoke with he and his wife he has terrible apneas she says she is always afraid he is going to take another breath when he sleeps this is been going on for years she's been beginning to get a sleep study he is agreeable the has hypersomnolence    Review of Systems:          Objective     Vital Signs  Vital Sign Min/Max for last 24 hours  Temp  Min: 98 °F (36.7 °C)  Max: 99.1 °F (37.3 °C)   BP  Min: 93/62  Max: 130/52   Pulse  Min: 87  Max: 93   Resp  Min: 18  Max: 18   SpO2  Min: 91 %  Max: 95 %   Flow (L/min)  Min: 2  Max: 3   No Data Recorded        Ventilator/Non-Invasive Ventilation Settings     None                       Body mass index is 33.98 kg/m².  I/O last 3 completed shifts:  In: 600 [P.O.:600]  Out: -   No intake/output data recorded.        Physical Exam:  General Appearance: Developed obese white male he is resting comfortably in bed in no apparent distress  Eyes: Conjunctiva are clear and anicteric  ENT: Mucous membranes are moist he has a Mallampati type IV airway  Neck: Short large trachea midline no visible jugular venous distention  Lungs: Clear nonlabored symmetric expansion  Cardiac: Regular rate and rhythm  Abdomen: Obese soft no palpable organomegaly  : Not examined  Musc/Skel: Grossly normal  Skin: No jaundice no petechiae  Neuro: Alert oriented cooperative following commands  Extremities/P Vascular: Doing cyanosis or edema  MSE: To be in pretty good spirits       Labs:    Results from last 7 days  Lab Units 05/19/18  0443 05/18/18  0556 05/17/18  1852 05/17/18  0437 05/16/18  1234   GLUCOSE mg/dL 161* 155* 145* 153* 81   SODIUM mmol/L 142 143 143 142 143   POTASSIUM mmol/L 5.5* 5.8* 5.7* 5.9* 5.9*   MAGNESIUM mg/dL 1.9  --   --   --   --    CO2 mmol/L 29.0 25.0 26.7 23.7 25.7   CHLORIDE  mmol/L 105 110* 108* 109* 106   ANION GAP mmol/L 8.0 8.0 8.3 9.3 11.3   CREATININE mg/dL 1.57* 1.63* 2.02* 2.00* 2.12*   BUN mg/dL 36* 37* 43* 48* 45*   BUN / CREAT RATIO  22.9 22.7 21.3 24.0 21.2   CALCIUM mg/dL 9.2 8.8 8.6 8.7 9.4   EGFR IF NONAFRICN AM mL/min/1.73 43* 41* 32* 33* 31*   ALK PHOS U/L  --   --   --   --  69   TOTAL PROTEIN g/dL  --   --   --   --  6.3   ALT (SGPT) U/L  --   --   --   --  11   AST (SGOT) U/L  --   --   --   --  8   BILIRUBIN mg/dL  --   --   --   --  0.4   ALBUMIN g/dL 3.50  --   --   --  3.90   GLOBULIN gm/dL  --   --   --   --  2.4   A/G RATIO g/dL  --   --   --   --  1.6     Estimated Creatinine Clearance: 46.1 mL/min (A) (by C-G formula based on SCr of 1.57 mg/dL (H)).        Results from last 7 days  Lab Units 05/16/18  1234   WBC 10*3/mm3 8.40   RBC 10*6/mm3 3.78*   HEMOGLOBIN g/dL 11.5*   HEMATOCRIT % 36.8*   MCV fL 97.4*   MCH pg 30.4   MCHC g/dL 31.3*   RDW % 13.3   RDW-SD fl 46.7   MPV fL 10.8   PLATELETS 10*3/mm3 217   NEUTROPHIL % % 59.9   LYMPHOCYTE % % 28.2   MONOCYTES % % 9.6   EOSINOPHIL % % 2.1   BASOPHIL % % 0.2   IMM GRAN % % 0.2   NEUTROS ABS 10*3/mm3 5.02   LYMPHS ABS 10*3/mm3 2.37   MONOS ABS 10*3/mm3 0.81   EOS ABS 10*3/mm3 0.18   BASOS ABS 10*3/mm3 0.02   IMMATURE GRANS (ABS) 10*3/mm3 0.02                   Results from last 7 days  Lab Units 05/17/18  0437 05/16/18  1234   TSH mIU/mL 1.030 1.250           Results from last 7 days  Lab Units 05/16/18  1235   INR  1.17*     Microbiology Results (last 10 days)     ** No results found for the last 240 hours. **                aspirin 325 mg Oral Daily   atorvastatin 40 mg Oral Nightly   carvedilol 3.125 mg Oral QAM AC   clopidogrel 75 mg Oral Daily   furosemide 40 mg Oral Daily   gabapentin 400 mg Oral BID   hydrocortisone sodium succinate 50 mg Intravenous Q8H   insulin aspart 2-5 Units Subcutaneous 4x Daily AC & at Bedtime   linagliptin 5 mg Oral Daily   tamsulosin 0.4 mg Oral Nightly          Diagnostics:  Ct  Head Without Contrast    Result Date: 5/17/2018  CT SCAN OF THE HEAD WITHOUT CONTRAST  CLINICAL HISTORY:  Headache and dizziness.  CT scan of the head was obtained with 3 mm axial images. No intravenous contrast was administered.  FINDINGS:  There is a focus of encephalomalacia within the left precentral gyrus which extends into the adjacent superior frontal gyrus as well as the adjacent centrum semiovale. 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 There is a calcific density appreciated within the right sylvian fissure which is likely vascular in nature and is probably within a vein. There are findings compatible with a small chronic infarct within the right cerebellar hemisphere measuring up to approximately 5 mm in diameter. The ventricles, sulci, and cisterns are otherwise age appropriate. The basal ganglia and thalami are unremarkable in appearance.       Findings compatible with chronic infarcts within the left precentral gyrus extending into the adjacent superior frontal gyrus and centrum semiovale as well as the right cerebellar hemisphere. No acute intracranial pathology is evident.  Radiation dose reduction techniques were utilized, including automated exposure control and exposure modulation based on body size.  This report was finalized on 5/17/2018 8:21 AM by Dr. Balaji Butler M.D.      Mri Angiogram Head Without Contrast    Result Date: 5/17/2018  MRI OF THE BRAIN WITHOUT CONTRAST, MR ANGIOGRAPHY OF THE NECK WITHOUT CONTRAST AND MR ANGIOGRAPHY OF THE HEAD WITHOUT CONTRAST 05/16/2018  HISTORY: Difficulty with speech. Right-sided weakness.  FINDINGS: Multiple noncontrast sagittal and axial images were obtained through the brain.. The diffusion-weighted images show no evidence of acute infarction. FLAIR images show scattered areas of bright signal intensity in the bilateral  cerebral hemisphere including a moderate size area of bright signal in the left superior frontoparietal region measuring approximately 2.8 cm. This likely represents an area of old infarction.  No intracranial hemorrhage is seen. There is mild diffuse atrophy.  Normal-appearing vascular flow voids are seen.  3D time-of-flight MR angiography of the neck was performed without contrast.  NASCET criteria was used.  No significant common carotid or internal carotid artery stenosis is seen bilaterally.  There is near complete absence of flow signal in the right vertebral artery with some flow signal in the most distal aspect of the right vertebral artery. There is flow signal in the basilar artery. 3D time-of-flight MR angiography of the head was performed. There is flow signal in the distal right vertebral artery and distal left vertebral artery with the distal right vertebral artery appearing smaller than the left.  There is some mild diminished size of the A1 segment of the right anterior cerebral artery. There is flow signal in the distal bilateral internal carotid arteries as well as in the bilateral middle and anterior cerebral arteries. Minimal narrowing at the origin of the A1 segment of the left anterior cerebral artery is seen. Basilar artery branches appear patent.      1. No evidence of acute infarction or hemorrhage. 2. Scattered areas of bright signal intensity in the bilateral cerebral hemisphere on FLAIR images are consistent with areas of chronic infarct. 3. Diminished flow signal in the right vertebral artery of uncertain significance. The distal right vertebral artery does appear to demonstrate flow signal. If further evaluation is clinically indicated followup CT angiogram of the neck could be obtained. 4. Mild narrowing of the A1 segment of the right anterior cerebral artery and minimal narrowing at the origin of the A1 segment of the left anterior cerebral artery.  This report was finalized on  5/17/2018 4:15 PM by Dr. Nate Brooke M.D.      Mri Angiogram Neck Without Contrast    Result Date: 5/17/2018  MRI OF THE BRAIN WITHOUT CONTRAST, MR ANGIOGRAPHY OF THE NECK WITHOUT CONTRAST AND MR ANGIOGRAPHY OF THE HEAD WITHOUT CONTRAST 05/16/2018  HISTORY: Difficulty with speech. Right-sided weakness.  FINDINGS: Multiple noncontrast sagittal and axial images were obtained through the brain.. The diffusion-weighted images show no evidence of acute infarction. FLAIR images show scattered areas of bright signal intensity in the bilateral cerebral hemisphere including a moderate size area of bright signal in the left superior frontoparietal region measuring approximately 2.8 cm. This likely represents an area of old infarction.  No intracranial hemorrhage is seen. There is mild diffuse atrophy.  Normal-appearing vascular flow voids are seen.  3D time-of-flight MR angiography of the neck was performed without contrast.  NASCET criteria was used.  No significant common carotid or internal carotid artery stenosis is seen bilaterally.  There is near complete absence of flow signal in the right vertebral artery with some flow signal in the most distal aspect of the right vertebral artery. There is flow signal in the basilar artery. 3D time-of-flight MR angiography of the head was performed. There is flow signal in the distal right vertebral artery and distal left vertebral artery with the distal right vertebral artery appearing smaller than the left.  There is some mild diminished size of the A1 segment of the right anterior cerebral artery. There is flow signal in the distal bilateral internal carotid arteries as well as in the bilateral middle and anterior cerebral arteries. Minimal narrowing at the origin of the A1 segment of the left anterior cerebral artery is seen. Basilar artery branches appear patent.      1. No evidence of acute infarction or hemorrhage. 2. Scattered areas of bright signal intensity in the  bilateral cerebral hemisphere on FLAIR images are consistent with areas of chronic infarct. 3. Diminished flow signal in the right vertebral artery of uncertain significance. The distal right vertebral artery does appear to demonstrate flow signal. If further evaluation is clinically indicated followup CT angiogram of the neck could be obtained. 4. Mild narrowing of the A1 segment of the right anterior cerebral artery and minimal narrowing at the origin of the A1 segment of the left anterior cerebral artery.  This report was finalized on 5/17/2018 4:15 PM by Dr. Nate Brooke M.D.      Mri Brain Without Contrast    Result Date: 5/17/2018  MRI OF THE BRAIN WITHOUT CONTRAST, MR ANGIOGRAPHY OF THE NECK WITHOUT CONTRAST AND MR ANGIOGRAPHY OF THE HEAD WITHOUT CONTRAST 05/16/2018  HISTORY: Difficulty with speech. Right-sided weakness.  FINDINGS: Multiple noncontrast sagittal and axial images were obtained through the brain.. The diffusion-weighted images show no evidence of acute infarction. FLAIR images show scattered areas of bright signal intensity in the bilateral cerebral hemisphere including a moderate size area of bright signal in the left superior frontoparietal region measuring approximately 2.8 cm. This likely represents an area of old infarction.  No intracranial hemorrhage is seen. There is mild diffuse atrophy.  Normal-appearing vascular flow voids are seen.  3D time-of-flight MR angiography of the neck was performed without contrast.  NASCET criteria was used.  No significant common carotid or internal carotid artery stenosis is seen bilaterally.  There is near complete absence of flow signal in the right vertebral artery with some flow signal in the most distal aspect of the right vertebral artery. There is flow signal in the basilar artery. 3D time-of-flight MR angiography of the head was performed. There is flow signal in the distal right vertebral artery and distal left vertebral artery with the  distal right vertebral artery appearing smaller than the left.  There is some mild diminished size of the A1 segment of the right anterior cerebral artery. There is flow signal in the distal bilateral internal carotid arteries as well as in the bilateral middle and anterior cerebral arteries. Minimal narrowing at the origin of the A1 segment of the left anterior cerebral artery is seen. Basilar artery branches appear patent.      1. No evidence of acute infarction or hemorrhage. 2. Scattered areas of bright signal intensity in the bilateral cerebral hemisphere on FLAIR images are consistent with areas of chronic infarct. 3. Diminished flow signal in the right vertebral artery of uncertain significance. The distal right vertebral artery does appear to demonstrate flow signal. If further evaluation is clinically indicated followup CT angiogram of the neck could be obtained. 4. Mild narrowing of the A1 segment of the right anterior cerebral artery and minimal narrowing at the origin of the A1 segment of the left anterior cerebral artery.  This report was finalized on 5/17/2018 4:15 PM by Dr. Nate Brooke M.D.      [facility] Guided Pain Management Spine    Result Date: 5/9/2018  This procedure was auto-finalized with no dictation required.    Results for orders placed during the hospital encounter of 05/16/18   Adult Transthoracic Echo Complete W/ Cont if Necessary Per Protocol    Narrative · Left ventricular wall thickness is consistent with mild concentric   hypertrophy.  · Left ventricular systolic function is normal. Estimated EF = 65%.  · Saline test results are negative.  · Mild mitral valve regurgitation is present              Active Hospital Problems (** Indicates Principal Problem)    Diagnosis Date Noted   • **TIA (transient ischemic attack) [G45.9] 05/16/2018   • Adrenal insufficiency [E27.40] 05/18/2018   • Hyperkalemia [E87.5] 05/17/2018   • Carotid artery disease [I77.9] 05/16/2018   • CKD (chronic kidney  disease), stage III [N18.3] 05/16/2018   • HTN (hypertension) [I10] 05/16/2018   • Diabetes mellitus [E11.9] 01/01/1998      Resolved Hospital Problems    Diagnosis Date Noted Date Resolved   No resolved problems to display.         Assessment/Plan     1. Obstructive sleep apnea strongly suspected clinically.  I have ordered a sleep study for discharge already in the computer and he will follow-up with us in the clinic post study.  2. obesity  3. TIA per neurology  4. Hypertension  5. Diabetes mellitus type 2 uncontrolled    Plan for disposition: Pulmonary will see when necessary    Donovan Canales MD  05/19/18  8:08 PM    Time:

## 2018-05-20 NOTE — PROGRESS NOTES
78 y.o.   LOS: 1 day   Patient Care Team:  Fahad Murray Jr., MD as PCP - General (Family Medicine)  Alex Simpson MD as Consulting Physician (Nephrology)    Chief Complaint:  Uncontrolled type 2 diabetes mellitus and hyperkalemia    Chief Complaint   Patient presents with   • Speech Problem     around 1130 , pt was talking to wife and started having slurred speech and difficulty finding words. pt reports that he has had some improvement since onset of symptoms but is not at baseline,       Subjective     HPI  Patient tolerated the Solu-Cortef injections ×2 yesterday  In fact he felt slightly better energy cazares  He denies any dizziness or nausea or symptoms of orthostasis  His potassium level is 5.0  Patient also tolerated Tradjenta 5 mg given yesterday  His blood sugars are also better today    Interval History:    Patient is a 78-year-old male admitted to the hospital for a slurred speech and was suspected to have TIA which he has recovered  For diabetes he was taking glipizide 5 mg twice daily and patient has moderate stage III chronic kidney disease and I recommended that he discontinue glipizide  I started him on Tradjenta yesterday  Patient tolerated the medication well  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  Patient's serum cortisol level was noted to be low subsequently the cosyntropin stimulation testing was also submaximal  Patient reported receiving epidural injections ×2 within the past 2 weeks which could explain the baseline low cortisol    Potassium level continues to be high at this particular improvement in renal function    Patient reports receiving epidural injections ×2 within the past 2 weeks     Patient denies any prior history of elevated potassium  Patient has received IV fluids and his creatinine has improved  from 2.4-1.8 but his potassium is still remains to be elevated  Nephrology suspecting adrenal insufficiency as possible cause for hyperkalemia     Patient denies any previous history of hypotension  He is on medication for hypertension     Cosyntropin stim testing suggest the maximum response was only 6.6 cortisol level  This is still consistent with a recent epidural injections ×2 within the past 2 weeks  But apart from that hypotension, hyperkalemia cannot be explained     Patient does have peripheral autonomic neuropathy which could be manifesting with orthostatic symptoms and signs     I suspect emperic therapy with Solu-Cortef 50 mg every 8 hours ×2 doses is reasonable at this point  If patient has significantly improved potassium levels as well as blood pressure levels then we could justify continuation of a low-dose steroid for some time  On the contrary if patient has no significant response then it is highly unlikely that he has any primary adrenal insufficiency     In that instance we will need to consider type for RTA which  is hyporeninemic hypoaldosteronism as a possible etiology for hyperkalemia in addition to chronic kidney disease and suspected dietary intake of potassium rich foods     I discussed this at length with the patient and his wife and both verbalized understanding and are willing to try  I also recommended discontinuation of glipizide/Amaryl at home and recommend Tradjenta with or without Starlix to minimize incidence of hypoglycemia and compliance.     Will start Tradjenta 5 mg daily starting today  Starlix is not available for inpatient care  If necessary will start 5 mg glipizide in the morning  Will monitor the hemodynamics changes overnight    Review of Systems:      Review of Systems   Constitutional: Positive for fatigue.   HENT: Negative.    Respiratory: Negative.    Cardiovascular: Negative.    Gastrointestinal: Positive for abdominal distention. Negative for abdominal pain.    Neurological: Negative.    Psychiatric/Behavioral: Negative.    All other systems reviewed and are negative.    Objective     Vital Signs   Temp:  [97.8 °F (36.6 °C)-98.9 °F (37.2 °C)] 98.7 °F (37.1 °C)  Heart Rate:  [] 104  Resp:  [18-20] 20  BP: ()/(52-72) 127/63    Physical Exam:  Physical Exam   Constitutional: He is oriented to person, place, and time.   HENT:   Head: Normocephalic and atraumatic.   Eyes: EOM are normal. Pupils are equal, round, and reactive to light.   Neck: Normal range of motion. Neck supple.   Cardiovascular: Normal rate, regular rhythm, normal heart sounds and intact distal pulses.    Pulmonary/Chest: Effort normal and breath sounds normal.   Abdominal: Soft. Bowel sounds are normal.   Musculoskeletal: Normal range of motion. He exhibits no edema.   Neurological: He is alert and oriented to person, place, and time.   Skin: Skin is warm and dry.   Psychiatric: He has a normal mood and affect. His behavior is normal.   Nursing note and vitals reviewed.  Results Review:     I reviewed the patient's new clinical results.      Glucose   Date/Time Value Ref Range Status   05/20/2018 0519 217 (H) 65 - 99 mg/dL Final   05/19/2018 0443 161 (H) 65 - 99 mg/dL Final   05/18/2018 0556 155 (H) 65 - 99 mg/dL Final   05/17/2018 1852 145 (H) 65 - 99 mg/dL Final     Lab Results (last 72 hours)     Procedure Component Value Units Date/Time    Renal Function Panel [[id number]]  (Abnormal) Collected:  05/20/18 0519    Specimen:  Blood Updated:  05/20/18 0640     Glucose 217 (H) mg/dL      BUN 44 (H) mg/dL      Creatinine 1.85 (H) mg/dL      Sodium 139 mmol/L      Potassium 5.0 mmol/L      Chloride 101 mmol/L      CO2 26.5 mmol/L      Calcium 9.5 mg/dL      Albumin 3.50 g/dL      Phosphorus 5.3 (H) mg/dL      Anion Gap 11.5 mmol/L      BUN/Creatinine Ratio 23.8     eGFR Non African Amer 36 (L) mL/min/1.73     Narrative:       The MDRD GFR formula is only valid for adults with stable renal function  between ages 18 and 70.    POC Glucose Once [[id number]]  (Abnormal) Collected:  05/20/18 0546    Specimen:  Blood Updated:  05/20/18 0547     Glucose 199 (H) mg/dL     Narrative:       Meter: [id number] : [id number] Jerry IRBY    POC Glucose Once [[id number]]  (Abnormal) Collected:  05/19/18 2052    Specimen:  Blood Updated:  05/19/18 2054     Glucose 209 (H) mg/dL     Narrative:       Meter: [id number] : [id number] Jerry IRBY    POC Glucose Once [[id number]]  (Abnormal) Collected:  05/19/18 1629    Specimen:  Blood Updated:  05/19/18 1630     Glucose 137 (H) mg/dL     Narrative:       Meter: [id number] : [id number] Javier IRBY    POC Glucose Once [[id number]]  (Abnormal) Collected:  05/19/18 1138    Specimen:  Blood Updated:  05/19/18 1140     Glucose 186 (H) mg/dL     Narrative:       Meter: [id number] : [id number] Javier IRBY    Cortisol [[id number]]  (Normal) Collected:  05/19/18 0533    Specimen:  Blood Updated:  05/19/18 0628     Cortisol 6.63 mcg/dL     Narrative:       Cortisol Reference Ranges:    Cortisol 6AM - 10AM Range: 6.02-18.40 mcg/dl  Cortisol 4PM - 8PM Range: 2.68-10.50 mcg/dl  Critical AM/PM:    Less than 2 mcg/dl    Cortisol [[id number]]  (Normal) Collected:  05/19/18 0502    Specimen:  Blood Updated:  05/19/18 0625     Cortisol 4.65 mcg/dL     Narrative:       Cortisol Reference Ranges:    Cortisol 6AM - 10AM Range: 6.02-18.40 mcg/dl  Cortisol 4PM - 8PM Range: 2.68-10.50 mcg/dl  Critical AM/PM:    Less than 2 mcg/dl    POC Glucose Once [[id number]]  (Abnormal) Collected:  05/19/18 0545    Specimen:  Blood Updated:  05/19/18 0546     Glucose 158 (H) mg/dL     Narrative:       Meter: [id number] : [id number] Jerry IRBY    Cortisol [[id number]]  (Abnormal) Collected:  05/19/18 0443    Specimen:  Blood Updated:  05/19/18 0529     Cortisol 1.17 (C) mcg/dL     Narrative:       Cortisol Reference Ranges:    Cortisol 6AM - 10AM Range: 6.02-18.40  mcg/dl  Cortisol 4PM - 8PM Range: 2.68-10.50 mcg/dl  Critical AM/PM:    Less than 2 mcg/dl    Renal Function Panel [[id number]]  (Abnormal) Collected:  05/19/18 0443    Specimen:  Blood Updated:  05/19/18 0516     Glucose 161 (H) mg/dL      BUN 36 (H) mg/dL      Creatinine 1.57 (H) mg/dL      Sodium 142 mmol/L      Potassium 5.5 (H) mmol/L      Chloride 105 mmol/L      CO2 29.0 mmol/L      Calcium 9.2 mg/dL      Albumin 3.50 g/dL      Phosphorus 3.7 mg/dL      Anion Gap 8.0 mmol/L      BUN/Creatinine Ratio 22.9     eGFR Non African Amer 43 (L) mL/min/1.73     Narrative:       The MDRD GFR formula is only valid for adults with stable renal function between ages 18 and 70.    Uric Acid [[id number]]  (Normal) Collected:  05/19/18 0443    Specimen:  Blood Updated:  05/19/18 0516     Uric Acid 5.7 mg/dL     Magnesium [[id number]]  (Normal) Collected:  05/19/18 0443    Specimen:  Blood Updated:  05/19/18 0516     Magnesium 1.9 mg/dL     POC Glucose Once [[id number]]  (Abnormal) Collected:  05/18/18 2039    Specimen:  Blood Updated:  05/18/18 2106     Glucose 228 (H) mg/dL     Narrative:       Meter: [id number] : [id number] Bryan IRBY    POC Glucose Once [[id number]]  (Abnormal) Collected:  05/18/18 1605    Specimen:  Blood Updated:  05/18/18 1606     Glucose 150 (H) mg/dL     Narrative:       Meter: [id number] : [id number] Siria Calliemarina KILLIAN    Cortisol [[id number]]  (Abnormal) Collected:  05/18/18 0904    Specimen:  Blood Updated:  05/18/18 1218     Cortisol 0.89 (C) mcg/dL     Narrative:       Cortisol Reference Ranges:    Cortisol 6AM - 10AM Range: 6.02-18.40 mcg/dl  Cortisol 4PM - 8PM Range: 2.68-10.50 mcg/dl  Critical AM/PM:    Less than 2 mcg/dl    POC Glucose Once [[id number]]  (Abnormal) Collected:  05/18/18 1057    Specimen:  Blood Updated:  05/18/18 1059     Glucose 197 (H) mg/dL     Narrative:       Meter: [id number] : [id number] Siria Ledesma CNA    Protein / Creatinine Ratio, Urine -  [[id number]]  (Abnormal) Collected:  05/18/18 0855    Specimen:  Urine from Urine, Clean Catch Updated:  05/18/18 1056     Protein/Creatinine Ratio, Urine 223.6 (H) mg/G Crea      Creatinine, Urine 62.6 mg/dL      Total Protein, Urine 14.0 mg/dL     pH, Urine - Urine, Clean Catch [[id number]]  (Normal) Collected:  05/18/18 0855    Specimen:  Urine from Urine, Clean Catch Updated:  05/18/18 0930     pH, UA <=5.0    Urinalysis With / Microscopic If Indicated - Urine, Clean Catch [[id number]]  (Normal) Collected:  05/18/18 0855    Specimen:  Urine from Urine, Clean Catch Updated:  05/18/18 0930     Color, UA Yellow     Appearance, UA Clear     pH, UA <=5.0     Specific Gravity, UA 1.013     Glucose, UA Negative     Ketones, UA Negative     Bilirubin, UA Negative     Blood, UA Negative     Protein, UA Negative     Leuk Esterase, UA Negative     Nitrite, UA Negative     Urobilinogen, UA 0.2 E.U./dL    Narrative:       Urine microscopic not indicated.    Basic Metabolic Panel [[id number]]  (Abnormal) Collected:  05/18/18 0556    Specimen:  Blood Updated:  05/18/18 0710     Glucose 155 (H) mg/dL      BUN 37 (H) mg/dL      Creatinine 1.63 (H) mg/dL      Sodium 143 mmol/L      Potassium 5.8 (H) mmol/L      Chloride 110 (H) mmol/L      CO2 25.0 mmol/L      Calcium 8.8 mg/dL      eGFR Non African Amer 41 (L) mL/min/1.73      BUN/Creatinine Ratio 22.7     Anion Gap 8.0 mmol/L     Narrative:       The MDRD GFR formula is only valid for adults with stable renal function between ages 18 and 70.    Renin Activity & Aldosterone [[id number]] Collected:  05/18/18 0556    Specimen:  Blood Updated:  05/18/18 0634    POC Glucose Once [[id number]]  (Abnormal) Collected:  05/18/18 0607    Specimen:  Blood Updated:  05/18/18 0610     Glucose 164 (H) mg/dL     Narrative:       Meter: [id number] : [id number] Bryan IRBY    POC Glucose Once [[id number]]  (Abnormal) Collected:  05/17/18 2121    Specimen:  Blood Updated:  05/17/18 2123      Glucose 172 (H) mg/dL     Narrative:       Meter: [id number] : [id number] Bryan IRBY    Basic Metabolic Panel [[id number]]  (Abnormal) Collected:  05/17/18 1852    Specimen:  Blood Updated:  05/17/18 1928     Glucose 145 (H) mg/dL      BUN 43 (H) mg/dL      Creatinine 2.02 (H) mg/dL      Sodium 143 mmol/L      Potassium 5.7 (H) mmol/L      Chloride 108 (H) mmol/L      CO2 26.7 mmol/L      Calcium 8.6 mg/dL      eGFR Non African Amer 32 (L) mL/min/1.73      BUN/Creatinine Ratio 21.3     Anion Gap 8.3 mmol/L     Narrative:       The MDRD GFR formula is only valid for adults with stable renal function between ages 18 and 70.    POC Glucose Once [[id number]]  (Normal) Collected:  05/17/18 1638    Specimen:  Blood Updated:  05/17/18 1640     Glucose 111 mg/dL     Narrative:       Meter: [id number] : [id number] Siria Ledesma CNITALO        Imaging Results (last 72 hours)     Procedure Component Value Units Date/Time    MRI Angiogram Head Without Contrast [[id number]] Collected:  05/16/18 1737     Updated:  05/17/18 1618    Narrative:       MRI OF THE BRAIN WITHOUT CONTRAST, MR ANGIOGRAPHY OF THE NECK WITHOUT  CONTRAST AND MR ANGIOGRAPHY OF THE HEAD WITHOUT CONTRAST 05/16/2018      HISTORY: Difficulty with speech. Right-sided weakness.     FINDINGS: Multiple noncontrast sagittal and axial images were obtained  through the brain.. The diffusion-weighted images show no evidence of  acute infarction. FLAIR images show scattered areas of bright signal  intensity in the bilateral cerebral hemisphere including a moderate size  area of bright signal in the left superior frontoparietal region  measuring approximately 2.8 cm. This likely represents an area of old  infarction.     No intracranial hemorrhage is seen. There is mild diffuse atrophy.     Normal-appearing vascular flow voids are seen.     3D time-of-flight MR angiography of the neck was performed without  contrast.     NASCET criteria was used.     No  significant common carotid or internal carotid artery stenosis is  seen bilaterally.     There is near complete absence of flow signal in the right vertebral  artery with some flow signal in the most distal aspect of the right  vertebral artery. There is flow signal in the basilar artery. 3D  time-of-flight MR angiography of the head was performed. There is flow  signal in the distal right vertebral artery and distal left vertebral  artery with the distal right vertebral artery appearing smaller than the  left.     There is some mild diminished size of the A1 segment of the right  anterior cerebral artery. There is flow signal in the distal bilateral  internal carotid arteries as well as in the bilateral middle and  anterior cerebral arteries. Minimal narrowing at the origin of the A1  segment of the left anterior cerebral artery is seen. Basilar artery  branches appear patent.       Impression:       1. No evidence of acute infarction or hemorrhage.  2. Scattered areas of bright signal intensity in the bilateral cerebral  hemisphere on FLAIR images are consistent with areas of chronic infarct.  3. Diminished flow signal in the right vertebral artery of uncertain  significance. The distal right vertebral artery does appear to  demonstrate flow signal. If further evaluation is clinically indicated  followup CT angiogram of the neck could be obtained.   4. Mild narrowing of the A1 segment of the right anterior cerebral  artery and minimal narrowing at the origin of the A1 segment of the left  anterior cerebral artery.     This report was finalized on 5/17/2018 4:15 PM by Dr. Nate Brooke M.D.       MRI Brain Without Contrast [[id number]] Collected:  05/16/18 1737     Updated:  05/17/18 1618    Narrative:       MRI OF THE BRAIN WITHOUT CONTRAST, MR ANGIOGRAPHY OF THE NECK WITHOUT  CONTRAST AND MR ANGIOGRAPHY OF THE HEAD WITHOUT CONTRAST 05/16/2018      HISTORY: Difficulty with speech. Right-sided weakness.      FINDINGS: Multiple noncontrast sagittal and axial images were obtained  through the brain.. The diffusion-weighted images show no evidence of  acute infarction. FLAIR images show scattered areas of bright signal  intensity in the bilateral cerebral hemisphere including a moderate size  area of bright signal in the left superior frontoparietal region  measuring approximately 2.8 cm. This likely represents an area of old  infarction.     No intracranial hemorrhage is seen. There is mild diffuse atrophy.     Normal-appearing vascular flow voids are seen.     3D time-of-flight MR angiography of the neck was performed without  contrast.     NASCET criteria was used.     No significant common carotid or internal carotid artery stenosis is  seen bilaterally.     There is near complete absence of flow signal in the right vertebral  artery with some flow signal in the most distal aspect of the right  vertebral artery. There is flow signal in the basilar artery. 3D  time-of-flight MR angiography of the head was performed. There is flow  signal in the distal right vertebral artery and distal left vertebral  artery with the distal right vertebral artery appearing smaller than the  left.     There is some mild diminished size of the A1 segment of the right  anterior cerebral artery. There is flow signal in the distal bilateral  internal carotid arteries as well as in the bilateral middle and  anterior cerebral arteries. Minimal narrowing at the origin of the A1  segment of the left anterior cerebral artery is seen. Basilar artery  branches appear patent.       Impression:       1. No evidence of acute infarction or hemorrhage.  2. Scattered areas of bright signal intensity in the bilateral cerebral  hemisphere on FLAIR images are consistent with areas of chronic infarct.  3. Diminished flow signal in the right vertebral artery of uncertain  significance. The distal right vertebral artery does appear to  demonstrate flow  signal. If further evaluation is clinically indicated  followup CT angiogram of the neck could be obtained.   4. Mild narrowing of the A1 segment of the right anterior cerebral  artery and minimal narrowing at the origin of the A1 segment of the left  anterior cerebral artery.     This report was finalized on 5/17/2018 4:15 PM by Dr. Nate Brooke M.D.       MRI Angiogram Neck Without Contrast [[id number]] Collected:  05/16/18 1737     Updated:  05/17/18 1618    Narrative:       MRI OF THE BRAIN WITHOUT CONTRAST, MR ANGIOGRAPHY OF THE NECK WITHOUT  CONTRAST AND MR ANGIOGRAPHY OF THE HEAD WITHOUT CONTRAST 05/16/2018      HISTORY: Difficulty with speech. Right-sided weakness.     FINDINGS: Multiple noncontrast sagittal and axial images were obtained  through the brain.. The diffusion-weighted images show no evidence of  acute infarction. FLAIR images show scattered areas of bright signal  intensity in the bilateral cerebral hemisphere including a moderate size  area of bright signal in the left superior frontoparietal region  measuring approximately 2.8 cm. This likely represents an area of old  infarction.     No intracranial hemorrhage is seen. There is mild diffuse atrophy.     Normal-appearing vascular flow voids are seen.     3D time-of-flight MR angiography of the neck was performed without  contrast.     NASCET criteria was used.     No significant common carotid or internal carotid artery stenosis is  seen bilaterally.     There is near complete absence of flow signal in the right vertebral  artery with some flow signal in the most distal aspect of the right  vertebral artery. There is flow signal in the basilar artery. 3D  time-of-flight MR angiography of the head was performed. There is flow  signal in the distal right vertebral artery and distal left vertebral  artery with the distal right vertebral artery appearing smaller than the  left.     There is some mild diminished size of the A1 segment of the  right  anterior cerebral artery. There is flow signal in the distal bilateral  internal carotid arteries as well as in the bilateral middle and  anterior cerebral arteries. Minimal narrowing at the origin of the A1  segment of the left anterior cerebral artery is seen. Basilar artery  branches appear patent.       Impression:       1. No evidence of acute infarction or hemorrhage.  2. Scattered areas of bright signal intensity in the bilateral cerebral  hemisphere on FLAIR images are consistent with areas of chronic infarct.  3. Diminished flow signal in the right vertebral artery of uncertain  significance. The distal right vertebral artery does appear to  demonstrate flow signal. If further evaluation is clinically indicated  followup CT angiogram of the neck could be obtained.   4. Mild narrowing of the A1 segment of the right anterior cerebral  artery and minimal narrowing at the origin of the A1 segment of the left  anterior cerebral artery.     This report was finalized on 5/17/2018 4:15 PM by Dr. Nate Brooke M.D.             Medication Review:       Current Facility-Administered Medications:   •  acetaminophen (TYLENOL) tablet 650 mg, 650 mg, Oral, Q4H PRN, Jourdan Velez MD  •  aspirin tablet 325 mg, 325 mg, Oral, Daily, 325 mg at 05/20/18 0833 **OR** [DISCONTINUED] aspirin suppository 300 mg, 300 mg, Rectal, Daily, Ricardo Iniguez MD  •  atorvastatin (LIPITOR) tablet 40 mg, 40 mg, Oral, Nightly, Dorene Petit APRKAILEY, 40 mg at 05/19/18 2100  •  carvedilol (COREG) tablet 3.125 mg, 3.125 mg, Oral, [facility], Jourdan Velez MD, 3.125 mg at 05/19/18 0919  •  clopidogrel (PLAVIX) tablet 75 mg, 75 mg, Oral, Daily, Ricardo Iniguez MD, 75 mg at 05/20/18 0833  •  dextrose (D50W) solution 25 g, 25 g, Intravenous, Q15 Min PRN, Jourdan Velez MD  •  dextrose (GLUTOSE) oral gel 15 g, 15 g, Oral, Q15 Min PRN, Jourdan Velez MD  •  fludrocortisone tablet 50 mcg, 50 mcg, Oral, Daily, Trevor  Isabell SUTTON MD  •  furosemide (LASIX) tablet 40 mg, 40 mg, Oral, Daily, Donaldo Michele MD, 40 mg at 05/19/18 0919  •  gabapentin (NEURONTIN) capsule 400 mg, 400 mg, Oral, BID, Jourdan Velez MD, 400 mg at 05/20/18 0833  •  glucagon (human recombinant) (GLUCAGEN DIAGNOSTIC) injection 1 mg, 1 mg, Subcutaneous, PRN, Jourdan Velez MD  •  insulin aspart (novoLOG) injection 2-5 Units, 2-5 Units, Subcutaneous, 4x Daily AC & at Bedtime, Trevor SUTTON MD, 2 Units at 05/20/18 0833  •  linagliptin (TRADJENTA) tablet 5 mg, 5 mg, Oral, Daily, Trevor SUTTON MD, 5 mg at 05/20/18 0833  •  ondansetron (ZOFRAN) tablet 4 mg, 4 mg, Oral, Q6H PRN **OR** ondansetron ODT (ZOFRAN-ODT) disintegrating tablet 4 mg, 4 mg, Oral, Q6H PRN **OR** ondansetron (ZOFRAN) injection 4 mg, 4 mg, Intravenous, Q6H PRN, Jourdan Velez MD  •  sodium chloride 0.9 % flush 1-10 mL, 1-10 mL, Intravenous, PRN, Ricardo Iniguez MD  •  sodium chloride 0.9 % flush 1-10 mL, 1-10 mL, Intravenous, PRN, Jourdan Velez MD  •  tamsulosin (FLOMAX) 24 hr capsule 0.4 mg, 0.4 mg, Oral, Nightly, Jourdan Velez MD, 0.4 mg at 05/19/18 2101  •  traMADol (ULTRAM) tablet 50 mg, 50 mg, Oral, Q6H PRN, Jourdan Velez MD    Assessment/Plan     Patient Active Problem List   Diagnosis   • Type 2 diabetes mellitus with diabetic neuropathy, without long-term current use of insulin   • Hypertension   • Carcinoid tumor of appendix   • Arthritis   • Neuroendocrine carcinoma of small bowel   • CKD (chronic kidney disease) stage 3, GFR 30-59 ml/min   • Anemia in stage 3 chronic kidney disease   • History of ischemic stroke without residual deficits   • Osteoarthritis of spine with radiculopathy, lumbar region   • Chronic low back pain with sciatica   • Diabetic mononeuropathy associated with diabetes mellitus due to underlying condition   • TIA (transient ischemic attack)   • Carotid artery disease   • Diabetes mellitus   • CKD  "(chronic kidney disease), stage III   • HTN (hypertension)   • Hyperkalemia   • Adrenal insufficiency     Uncontrolled type 2 diabetes mellitus with hemoglobin A1c of 8.3  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  Uncontrolled type 2 diabetes mellitus with nephropathy  Chronic kidney disease  Hypotension  Hyperkalemia  Submaximal response to cosyntropin testing-suspicious for adrenal insufficiency     Patient's potassium level came down to 5.0  He appears to have benefited from 2 doses of intravenous Solu-Cortef  Patient is TOLERATING THE TRADJENTA VERY WELL  BLOOD SUGARS HAVE BEEN STABLE    Patient's blood pressure appears to be low sometimes into the 80s systolic range but patient is asymptomatic  I will start him on fludrocortisone 50 µg daily  Which may help her retain sodium and keep the blood pressure stable but also keep the potassium down  Patient will also go home on Tradjenta 5 mg daily.  I advised him not to take glipizide or Amaryl at home    I discussed with Dr. Celeste hospitalist  Patient may be able to go home today with the current plan  He will come for follow-up in 3-4 weeks    The total time spent for old record and lab review and floor time was more than 35 min of which greater than 20 min of time  ( greater than 50% of the total time )  was spent face to face with the patient counseling and coordination of care owas spent on counseling the patient on recommended evaluation and treatment options, instructions for management/treatment and /or follow up  and importance of compliance with chosen management or treatment options    Trevor Whyte MD FACE.  05/20/18  11:40 AM      EMR Dragon / transcription disclaimer:     \"Dictated utilizing Dragon dictation\".   "

## 2018-05-20 NOTE — DISCHARGE INSTRUCTIONS
Stroke Risk Factors:   1. Previous stroke  2. Age   3. Hypertension = take prescribed blood pressure medications  4. Hyperlipidemia = take statins as ordered  5. Diabetes = take prescribed diabetic medication, monitor blood sugar regularly, follow consistent carb diet    DOAP (ASA 325mg and Plavix 75mg)     Ideal targets for stroke prevention would be :  Blood pressure < 130/80; B12 > 500 TSH in normal range and LDL < 70; HbA1c < 6.5 and smoking cessation if applicable.

## 2018-05-20 NOTE — PLAN OF CARE
Problem: Patient Care Overview  Goal: Plan of Care Review  Outcome: Ongoing (interventions implemented as appropriate)   05/20/18 1450   Coping/Psychosocial   Plan of Care Reviewed With patient;spouse   Plan of Care Review   Progress improving   OTHER   Outcome Summary A+O X 4 w/o any c/o pain or discomfort. V/S WDL. On Lipitor, ASA, and Plavix, NIH 0. Dose of Solu-Cortef administered tonight. Up ad swetha, educated on call light use for nsg assist. Sleeping w/o SOA at rest.        Problem: Fall Risk (Adult)  Goal: Absence of Fall  Outcome: Ongoing (interventions implemented as appropriate)

## 2018-05-20 NOTE — PROGRESS NOTES
" LOS: 1 day   Patient Care Team:  Fahad Murray Jr., MD as PCP - General (Family Medicine)  Alex Simpson MD as Consulting Physician (Nephrology)    Chief Complaint: CKD3/Hyperkalemia    Subjective      Pt doing well without any new complaints.    Started on steroids now.        Subjective:  Symptoms:  No shortness of breath, chest pain, chest pressure or anxiety.    Diet:  No nausea or vomiting.        History taken from: patient    Objective     Vital Sign Min/Max for last 24 hours  Temp  Min: 97.8 °F (36.6 °C)  Max: 98.9 °F (37.2 °C)   BP  Min: 85/64  Max: 130/52   Pulse  Min: 87  Max: 102   Resp  Min: 18  Max: 20   SpO2  Min: 93 %  Max: 96 %   Flow (L/min)  Min: 2  Max: 2   No Data Recorded     Flowsheet Rows      First Filed Value   Admission Height  172.7 cm (68\") Documented at 05/16/2018 1202   Admission Weight  98.4 kg (217 lb) Documented at 05/16/2018 1202          No intake/output data recorded.  I/O last 3 completed shifts:  In: 120 [P.O.:120]  Out: -     Objective:  General Appearance:  Comfortable.    Vital signs: (most recent): Blood pressure (!) 87/67, pulse 102, temperature 98.7 °F (37.1 °C), temperature source Oral, resp. rate 20, height 172.7 cm (68\"), weight 101 kg (223 lb 8 oz), SpO2 96 %.  No fever.    HEENT: Normal HEENT exam.    Lungs:  Normal effort and normal respiratory rate.  Breath sounds clear to auscultation.    Heart: Normal rate.  Regular rhythm.    Abdomen: Abdomen is soft.  Bowel sounds are normal.     Skin:  Warm and dry.              Results Review:     I reviewed the patient's new clinical results.    WBC No results found for: WBC   HGB No results found for: HGB   HCT No results found for: HCT   Platlets No results found for: LABPLAT   MCV No results found for: MCV       Sodium Sodium   Date Value Ref Range Status   05/20/2018 139 136 - 145 mmol/L Final   05/19/2018 142 136 - 145 mmol/L Final   05/18/2018 143 136 - 145 mmol/L Final   05/17/2018 143 136 - 145 mmol/L Final    "   Potassium Potassium   Date Value Ref Range Status   05/20/2018 5.0 3.5 - 5.2 mmol/L Final   05/19/2018 5.5 (H) 3.5 - 5.2 mmol/L Final   05/18/2018 5.8 (H) 3.5 - 5.2 mmol/L Final   05/17/2018 5.7 (H) 3.5 - 5.2 mmol/L Final      Chloride Chloride   Date Value Ref Range Status   05/20/2018 101 98 - 107 mmol/L Final   05/19/2018 105 98 - 107 mmol/L Final   05/18/2018 110 (H) 98 - 107 mmol/L Final   05/17/2018 108 (H) 98 - 107 mmol/L Final      CO2 CO2   Date Value Ref Range Status   05/20/2018 26.5 22.0 - 29.0 mmol/L Final   05/19/2018 29.0 22.0 - 29.0 mmol/L Final   05/18/2018 25.0 22.0 - 29.0 mmol/L Final   05/17/2018 26.7 22.0 - 29.0 mmol/L Final      BUN BUN   Date Value Ref Range Status   05/20/2018 44 (H) 8 - 23 mg/dL Final   05/19/2018 36 (H) 8 - 23 mg/dL Final   05/18/2018 37 (H) 8 - 23 mg/dL Final   05/17/2018 43 (H) 8 - 23 mg/dL Final      Creatinine Creatinine   Date Value Ref Range Status   05/20/2018 1.85 (H) 0.76 - 1.27 mg/dL Final   05/19/2018 1.57 (H) 0.76 - 1.27 mg/dL Final   05/18/2018 1.63 (H) 0.76 - 1.27 mg/dL Final   05/17/2018 2.02 (H) 0.76 - 1.27 mg/dL Final      Calcium Calcium   Date Value Ref Range Status   05/20/2018 9.5 8.6 - 10.5 mg/dL Final   05/19/2018 9.2 8.6 - 10.5 mg/dL Final   05/18/2018 8.8 8.6 - 10.5 mg/dL Final   05/17/2018 8.6 8.6 - 10.5 mg/dL Final      PO4 No results found for: CAPO4   Albumin Albumin   Date Value Ref Range Status   05/20/2018 3.50 3.50 - 5.20 g/dL Final   05/19/2018 3.50 3.50 - 5.20 g/dL Final      Magnesium Magnesium   Date Value Ref Range Status   05/19/2018 1.9 1.6 - 2.4 mg/dL Final      Uric Acid Uric Acid   Date Value Ref Range Status   05/19/2018 5.7 3.4 - 7.0 mg/dL Final        Medication Review: y    Assessment/Plan     Principal Problem:    TIA (transient ischemic attack)  Active Problems:    Carotid artery disease    Diabetes mellitus    CKD (chronic kidney disease), stage III    HTN (hypertension)    Hyperkalemia    Adrenal  insufficiency      Assessment & Plan  1.  Hyperkalemia, persistent despite discontinuation of the ACE inhibitor and improvement of renal function. Improved today.  On steroids now for possible adrenal insufficiency.  May have combination of hyporenenemic hypoaldo from acei, along with diet and adrenal insufficiency, but has normal serum bicarb wouldn't strongly suspect RTA4.  Looks ok for discharge from renal standpoint.   2.  Acute kidney injury on chronic kidney disease stage III, resolved spontaneously and since admission he is back to his baseline  3.  Chronic kidney disease stage III associated with diabetic and hypertensive glomerulosclerosis.  4.  Hypertension.  Low now.  Holding acei.  5.  Diabetes mellitus type 2  6.  Carotid disease with prior stroke  7.  TIA, symptoms has resolved  8.  Coronary artery disease  9.  Peripheral neuropathy  10.  BPH treated        Plan:  1. Restrict potassium in the diet to 2 g daily   2. Loop diuretics   3.  Surveillance labs.   Bk Saldana MD  05/20/18  10:29 AM

## 2018-05-21 ENCOUNTER — TELEPHONE (OUTPATIENT)
Dept: INTERNAL MEDICINE | Facility: CLINIC | Age: 78
End: 2018-05-21

## 2018-05-21 DIAGNOSIS — E08.41 DIABETIC MONONEUROPATHY ASSOCIATED WITH DIABETES MELLITUS DUE TO UNDERLYING CONDITION (HCC): Primary | ICD-10-CM

## 2018-05-21 DIAGNOSIS — E11.40 TYPE 2 DIABETES MELLITUS WITH DIABETIC NEUROPATHY, WITHOUT LONG-TERM CURRENT USE OF INSULIN (HCC): ICD-10-CM

## 2018-05-21 NOTE — TELEPHONE ENCOUNTER
If he hasn't already been there, he probably needs to go see the dietitian about diabetic diet for consistent carb diet.  Please refer if he is okay with the referral.

## 2018-05-21 NOTE — PROGRESS NOTES
Case Management Discharge Note    Final Note: Pt was dc'd home    Destination     No service coordination in this encounter.      Durable Medical Equipment     No service coordination in this encounter.      Dialysis/Infusion     No service coordination in this encounter.      Home Medical Care     No service coordination in this encounter.      Social Care     No service coordination in this encounter.        Other: Other    Final Discharge Disposition Code: 01 - home or self-care

## 2018-05-22 LAB
ALDOST SERPL-MCNC: 1.5 NG/DL (ref 0–30)
RENIN PLAS-CCNC: 2.06 NG/ML/HR (ref 0.17–5.38)

## 2018-05-23 NOTE — DISCHARGE SUMMARY
Date of Admission: 5/16/2018  Date of Discharge:  5/20/2018    PCP: Fahad Murray Jr., MD      DISCHARGE DIAGNOSIS  Principal Problem:    TIA (transient ischemic attack)  Active Problems:    Carotid artery disease    Diabetes mellitus    CKD (chronic kidney disease), stage III    HTN (hypertension)    Hyperkalemia    Adrenal insufficiency      SECONDARY DIAGNOSES  Past Medical History:   Diagnosis Date   • Arthritis    • Blind left eye    • Carotid artery disease     Right CEA 9/26/16.  Left CEA 7/18/17 (with dionte-op CVA)   • Chronic kidney disease    • CKD (chronic kidney disease)    • CVA (cerebral vascular accident)     on 7/18/17 dionte-operatively from left CEA.     • Diabetes mellitus 1998    DR CAROLINE DURON   • Hyperlipidemia    • Hypertension    • Low back pain    • NSTEMI (non-ST elevated myocardial infarction)     NSTEMI following left CEA and dionte-op stroke in 7/18 ([facility]).     • Renal disorder    • SOB (shortness of breath)        CONSULTS   Consults     Date and Time Order Name Status Description    5/18/2018 1716 Inpatient Endocrinology Consult Completed     5/18/2018 0839 Inpatient Nephrology Consult Completed     5/16/2018 1739 Inpatient Pulmonology Consult Completed     5/16/2018 1239 [facility] (on-call MD unless specified) Completed     5/16/2018 1216 Neurology (on-call MD unless specified) Completed           PROCEDURES PERFORMED  MRI Angiogram Head Without Contrast   Final Result   1. No evidence of acute infarction or hemorrhage.   2. Scattered areas of bright signal intensity in the bilateral cerebral   hemisphere on FLAIR images are consistent with areas of chronic infarct.   3. Diminished flow signal in the right vertebral artery of uncertain   significance. The distal right vertebral artery does appear to   demonstrate flow signal. If further evaluation is clinically indicated   followup CT angiogram of the neck could be obtained.    4. Mild narrowing of the A1 segment of the  right anterior cerebral   artery and minimal narrowing at the origin of the A1 segment of the left   anterior cerebral artery.       This report was finalized on 5/17/2018 4:15 PM by Dr. Nate Brooke M.D.          MRI Brain Without Contrast   Final Result   1. No evidence of acute infarction or hemorrhage.   2. Scattered areas of bright signal intensity in the bilateral cerebral   hemisphere on FLAIR images are consistent with areas of chronic infarct.   3. Diminished flow signal in the right vertebral artery of uncertain   significance. The distal right vertebral artery does appear to   demonstrate flow signal. If further evaluation is clinically indicated   followup CT angiogram of the neck could be obtained.    4. Mild narrowing of the A1 segment of the right anterior cerebral   artery and minimal narrowing at the origin of the A1 segment of the left   anterior cerebral artery.       This report was finalized on 5/17/2018 4:15 PM by Dr. Nate Brooke M.D.          MRI Angiogram Neck Without Contrast   Final Result   1. No evidence of acute infarction or hemorrhage.   2. Scattered areas of bright signal intensity in the bilateral cerebral   hemisphere on FLAIR images are consistent with areas of chronic infarct.   3. Diminished flow signal in the right vertebral artery of uncertain   significance. The distal right vertebral artery does appear to   demonstrate flow signal. If further evaluation is clinically indicated   followup CT angiogram of the neck could be obtained.    4. Mild narrowing of the A1 segment of the right anterior cerebral   artery and minimal narrowing at the origin of the A1 segment of the left   anterior cerebral artery.       This report was finalized on 5/17/2018 4:15 PM by Dr. Nate Brooke M.D.          CT Head Without Contrast   Final Result       Findings compatible with chronic infarcts within the left precentral   gyrus extending into the adjacent superior frontal  gyrus and centrum   semiovale as well as the right cerebellar hemisphere. No acute   intracranial pathology is evident.       Radiation dose reduction techniques were utilized, including automated   exposure control and exposure modulation based on body size.       This report was finalized on 5/17/2018 8:21 AM by Dr. Balaji Butler M.D.                HOSPITAL COURSE  Patient is a 78 y.o. male presented to [facility] complaining of Dysarthria.  Please see the admitting history and physical for further details.  His symptoms lasted about 3 minutes at home and was brought to the emergency room for further evaluation.  Symptoms resolved by the time he was brought to the emergency room.  He has a history of strokes in the past and TIAs.  He had a right carotid endarterectomy in 2016.  He is already on dual antiplatelet therapy.  He also has a history of diabetes which is under relatively controlled.  He also has a history of sleep apnea.  Admitted to the hospital neurology pulmonary nephrology and endocrinology were all consulted for assistance.  He was monitored on telemetry had no issues with any arrhythmias.  MRI of the brain showed no acute strokes he does have some vascular disease but recommendations from neurology were to continue dual antiplatelet therapy.  He continued to have some low blood pressures also noted have hyperkalemia.  Nephrology was asked to assist.  His potassium improved with treatment.  However it was slow to return to normal.  An a.m. cortisol was checked and it was low.  He underwent an cosyntropin stim test showed an inadequate response.  Endocrinology was consulted and they evaluated the patient.  He had multiple other labs done and recommendations from them at discharge for a low dose of fludrocortisone daily to improve his blood pressure.  He was not discharged on blood pressure medications secondary to hypotension.  His renal function remains stable and his potassium  "normalized with steroids and fludrocortisone.  Pulmonary evaluated the patient given his history of untreated obstructive sleep apnea and they've set him up for an outpatient sleep study to be done soon.  Otherwise the day discharge is adamant that he is leaving today he's feeling better his blood pressure stabilized and he is eager to be discharged home today.  He has follow-up arranged in the outpatient setting with all appropriate subspecialists and should see his primary care physician within the next 2 weeks to coordinate care.      CONDITION ON DISCHARGE  Stable.      VITAL SIGNS  /63 (BP Location: Left arm, Patient Position: Sitting)   Pulse 104   Temp 98.7 °F (37.1 °C) (Oral)   Resp 20   Ht 172.7 cm (68\")   Wt 101 kg (223 lb 8 oz)   SpO2 96%   BMI 33.98 kg/m²   Objective:  General Appearance:  Comfortable.    Vital signs: (most recent): Blood pressure 127/63, pulse 104, temperature 98.7 °F (37.1 °C), temperature source Oral, resp. rate 20, height 172.7 cm (68\"), weight 101 kg (223 lb 8 oz), SpO2 96 %.  Vital signs are normal.    Output: Producing urine and producing stool.    HEENT: Normal HEENT exam.    Lungs:  Normal effort.  Breath sounds clear to auscultation.    Heart: Normal rate.  S1 normal.    Abdomen: Abdomen is soft.  Bowel sounds are normal.   There is no abdominal tenderness.                 DISCHARGE DISPOSITION   Home or Self Care      DISCHARGE MEDICATIONS   Santino Retana   Home Medication Instructions JULIO:[id number]    Printed on:05/23/18 8231   Medication Information                      aspirin 81 MG tablet  Take 81 mg by mouth daily.             atorvastatin (LIPITOR) 20 MG tablet  Take 1 tablet by mouth Every Night.             carvedilol (COREG) 3.125 MG tablet  Take 1 tablet by mouth Every Morning Before Breakfast.             cholecalciferol (VITAMIN D3) 1000 UNITS tablet  Take 1,000 Units by mouth Daily.             Cinnamon 500 MG tablet  Take 1 tablet by mouth " Daily.             clopidogrel (PLAVIX) 75 MG tablet  Take 75 mg by mouth Daily.             fludrocortisone 0.1 MG tablet  Take 0.5 tablets by mouth Daily for 30 days.             gabapentin (NEURONTIN) 400 MG capsule  Take 400 mg by mouth 2 (Two) Times a Day.             glipiZIDE (GLUCOTROL) 5 MG tablet  Take 5 mg by mouth 2 (Two) Times a Day Before Meals.             ketoconazole (NIZORAL) 2 % cream  Apply  topically Daily. Twice daily as needed for right ear irritation             linagliptin (TRADJENTA) 5 MG tablet tablet  Take 1 tablet by mouth Daily.             tamsulosin (FLOMAX) 0.4 MG capsule 24 hr capsule  Take 1 capsule by mouth Every Night.             traMADol (ULTRAM) 50 MG tablet  Take  mg by mouth Every 6 (Six) Hours As Needed for Severe Pain .               Diet Instructions     Diet: Consistent Carbohydrate, Cardiac       Discharge Diet:   Consistent Carbohydrate  Cardiac          Activity Instructions     Activity as Tolerated         Future Appointments  Date Time Provider Department Center   6/7/2018 8:45 AM EJ Young NS [facility] None   6/14/2018 11:00 AM Fahad Murray Jr., MD [facility] PC [facility] None   6/19/2018 9:30 AM MD WILLIAM Casanova END [facility] None   7/11/2018 7:30 AM TREATMENT RM 1 [facility] PAIN [facility] PAIN [facility]   7/12/2018 8:30 PM BH [facility] SLEEP ROOMS BH [facility] SLEEP [facility]   8/22/2018 10:40 AM Bennie Paul MD [facility] CD [facility] None     Additional Instructions for the Follow-ups that You Need to Schedule     Discharge Follow-up with PCP    As directed      Follow Up Details:  1-2 weeks will need labs and a blood pressure check         Discharge Follow-up with Specialty: nephrology as directed    As directed      Specialty:  nephrology as directed         Discharge Follow-up with Specified Provider: Endocrinology as directed    As directed      To:  Endocrinology as directed         Discharge Follow-up with Specified Provider: pulmonary as directed    As directed      To:   pulmonary as directed         Basic Metabolic Panel     May 25, 2018 (Approximate)        Follow-up Information     Bright Bose MD. Schedule an appointment as soon as possible for a visit in 2 week(s).    Specialty:  Pulmonary Disease  Why:  sleep study  Contact information:  [address] [facility] [address]  [place] [address]  [phone]             EJ Curran Follow up in 3 month(s).    Specialty:  Neurology  Contact information:  [address] [facility] [address]  [place] [address]  [phone]             Fahad Murray Jr., MD .    Specialty:  Family Medicine  Why:  1-2 weeks will need labs and a blood pressure check  Contact information:  [address] [address]  [place] [address]  [phone]                   TEST  RESULTS PENDING AT DISCHARGE         Chase Celeste MD  [facility]  05/23/18  3:26 PM      Time: greater than 30 minutes.

## 2018-05-30 DIAGNOSIS — E08.41 DIABETIC MONONEUROPATHY ASSOCIATED WITH DIABETES MELLITUS DUE TO UNDERLYING CONDITION (HCC): ICD-10-CM

## 2018-05-30 DIAGNOSIS — E11.40 TYPE 2 DIABETES MELLITUS WITH DIABETIC NEUROPATHY, WITHOUT LONG-TERM CURRENT USE OF INSULIN (HCC): Primary | ICD-10-CM

## 2018-06-04 ENCOUNTER — TELEPHONE (OUTPATIENT)
Dept: NEUROLOGY | Facility: CLINIC | Age: 78
End: 2018-06-04

## 2018-06-04 NOTE — PROGRESS NOTES
Subjective   Patient ID: Santino Retana is a 78 y.o. male is here today for follow-up on back pain that radiates into his buttock after receiving ALINA. His last ALINA was on 05/09/2018. MR. Retana states that the ALINA didn't help. Patient is currently taking Neurontin 400 mg BID    Ms. Retana returns to the office after completing 2 lumbar epidural steroid injections.  He has gotten no relief with the bilateral buttock, lateral hip and left lateral thigh pain.  He is requesting surgery.  He has a history of carotid stenosis. His vascular surgeon is Dr. Aj Washington. He was recently hospitalized for a TIA.  He stated that he had an approximate 3 minute episode of what he could not get his words out.  He had no other symptoms than that.  He also has a history of diabetes and was having some recurrent hypoglycemic episodes at that time. He was followed by the stroke service and Dr. Sellers during that hospitalization.  MRI of the brain revealed no evidence of acute stroke although he does have chronic strokes.  Patient was subsequently discharged home and has follow-up with stroke neurology and Dr. Sellers.  He is back on his home dose Plavix.  Overall Mr. Retana is feeling better in that regard.  He is just very uncomfortable and would like something done about his back and leg pain.  The pain is most severe when he is standing and walking.  He is unable to walk any distance and in fact had to use a wheelchair to attend his granddaughter's graduation.  Denies any bowel or bladder incontinence.      Back Pain   This is a chronic problem. The current episode started more than 1 year ago. The problem occurs intermittently. The problem is unchanged. The pain is present in the lumbar spine. The quality of the pain is described as aching and burning. The pain radiates to the left knee, left thigh, left foot, right knee, right foot and right thigh. The pain is at a severity of 8/10. The pain is severe. The pain is the  same all the time (if hes walking). The symptoms are aggravated by standing and position (walking). Associated symptoms include leg pain and weakness. Pertinent negatives include no bladder incontinence, bowel incontinence, chest pain, numbness or tingling. Treatments tried: ALINA. The treatment provided no relief.       The following portions of the patient's history were reviewed and updated as appropriate: allergies, current medications, past family history, past medical history, past social history, past surgical history and problem list.    Review of Systems   Constitutional: Positive for activity change.   Respiratory: Negative for chest tightness and shortness of breath.    Cardiovascular: Negative for chest pain.   Gastrointestinal: Negative for bowel incontinence.   Genitourinary: Negative for bladder incontinence.   Musculoskeletal: Positive for arthralgias and back pain.   Neurological: Positive for weakness. Negative for tingling and numbness.   All other systems reviewed and are negative.      Objective   Physical Exam   Constitutional: He is oriented to person, place, and time. He appears well-developed and well-nourished. He is cooperative. No distress.   HENT:   Head: Normocephalic and atraumatic.   Eyes: Conjunctivae are normal.   Neck: Normal range of motion. Neck supple.   Cardiovascular: Normal rate.    Pulmonary/Chest: Effort normal. No respiratory distress.   Abdominal: Soft. He exhibits no distension. There is no tenderness.   Musculoskeletal: Normal range of motion. He exhibits no edema.   Neurological: He is alert and oriented to person, place, and time. He has normal strength. He displays normal reflexes. No sensory deficit. He exhibits normal muscle tone. Coordination (experienced difficulty withheel and toe walking on the left.) abnormal. GCS eye subscore is 4. GCS verbal subscore is 5. GCS motor subscore is 6.   No motor or sensory deficit in the lower extremities.  Normal DTRs throughout.   Negative Martini's; negative clonus.  Straight leg raise negative bilaterally.   Skin: Skin is warm and dry. No rash noted. He is not diaphoretic.   Psychiatric: He has a normal mood and affect. Thought content normal.   Vitals reviewed.      Assessment/Plan   Independent Review of Radiographic Studies:      Previous MRI images of the lumbar spine revealed degenerative changes bilateral foraminal narrowing at L4-5 and possibly a left L5 synovial cyst.    Medical Decision Making:      Mr. Retana returns to the office today for reevaluation of low back and bilateral buttock pain left greater than right.  He was last in the office April 4, 2018 and was referred for lumbar epidural steroid injections.  He reports that the epidural injections were of no benefit.    Mr. Retana is adamant that we explore the possibility of surgery.  He understands that in order to do that he will need a lumbar myelogram. I have discussed the myelogram procedure at length with the patient. The risks of the procedure were explained. There is a risk of infection, bleeding, increased pain and positional headache possibly requiring a blood patch. The best way to avoid the positional headache is by taking it easy and participating in no strenuous activity for a couple of days following the myelogram. Drinking plenty of fluids including caffeinated beverages was encouraged. Should the patient develop a positional headache, I recommended calling the office for further instructions. The patient verbalized understanding of these risks and asked to proceed.     Mr. Retana will also need medical clearance is from cardiology and vascular surgery before we could proceed with any type of surgical procedure.  He will also need clearance from nephrology for the lumbar myelogram.  I will arrange those consultations.  Mr. Retana will return to the office after the myelogram for a surgical discussion with Dr. Rob.  He will also call our office if he expresses  any complications with the myelogram procedure.    Santino was seen today for follow-up and back pain.    Diagnoses and all orders for this visit:    Intervertebral disc disorder with radiculopathy of lumbar region  -     Obtain Informed Consent; Standing  -     IR Myelogram Lumbar Spine; Future  -     No Lab Testing Needed; Standing  -     Ambulatory Referral to Vascular Surgery  -     Ambulatory Referral to Nephrology  -     Ambulatory Referral to Cardiology  -     CT Lumbar Spine Without Contrast; Future  -     XR Spine Lumbar Complete With Flex & Ext; Future    Other orders  -     dexamethasone (DECADRON) 4 MG tablet; Take 2 tablets one hour prior to myelogram      Return for follow up with Dr. Rob after lumbar myelogram.

## 2018-06-04 NOTE — TELEPHONE ENCOUNTER
I s/w pt wife and scheduled pt 3 month f/u with [facility] on 8/21/18 at 8am. I mailed packet out today as well.

## 2018-06-05 ENCOUNTER — TELEPHONE (OUTPATIENT)
Dept: ONCOLOGY | Facility: CLINIC | Age: 78
End: 2018-06-05

## 2018-06-05 NOTE — TELEPHONE ENCOUNTER
Pt. Came to the office to see about getting the fludrocortisone 0.5mg refilled.  Informed pt. and his wife that our md's did not order that medication.  He needs to see if dr.herbert silva will be willing to refill it or the prescribing physician on the bottle who is dr. Chase sherman.  V/u.

## 2018-06-07 ENCOUNTER — OFFICE VISIT (OUTPATIENT)
Dept: NEUROSURGERY | Facility: CLINIC | Age: 78
End: 2018-06-07

## 2018-06-07 VITALS
SYSTOLIC BLOOD PRESSURE: 107 MMHG | DIASTOLIC BLOOD PRESSURE: 58 MMHG | WEIGHT: 223 LBS | HEART RATE: 86 BPM | BODY MASS INDEX: 33.8 KG/M2 | HEIGHT: 68 IN

## 2018-06-07 DIAGNOSIS — M51.16 INTERVERTEBRAL DISC DISORDER WITH RADICULOPATHY OF LUMBAR REGION: Primary | ICD-10-CM

## 2018-06-07 PROCEDURE — 99214 OFFICE O/P EST MOD 30 MIN: CPT | Performed by: NURSE PRACTITIONER

## 2018-06-07 RX ORDER — DEXAMETHASONE 4 MG/1
TABLET ORAL
Qty: 2 TABLET | Refills: 0 | Status: SHIPPED | OUTPATIENT
Start: 2018-06-07 | End: 2018-12-12

## 2018-06-14 ENCOUNTER — OFFICE VISIT (OUTPATIENT)
Dept: INTERNAL MEDICINE | Facility: CLINIC | Age: 78
End: 2018-06-14

## 2018-06-14 VITALS
WEIGHT: 215 LBS | OXYGEN SATURATION: 94 % | BODY MASS INDEX: 32.69 KG/M2 | DIASTOLIC BLOOD PRESSURE: 52 MMHG | SYSTOLIC BLOOD PRESSURE: 118 MMHG | TEMPERATURE: 97.1 F | HEART RATE: 78 BPM

## 2018-06-14 DIAGNOSIS — L30.8 OTHER ECZEMA: Primary | ICD-10-CM

## 2018-06-14 DIAGNOSIS — N18.30 CKD (CHRONIC KIDNEY DISEASE), STAGE III (HCC): ICD-10-CM

## 2018-06-14 DIAGNOSIS — G45.8 OTHER SPECIFIED TRANSIENT CEREBRAL ISCHEMIAS: ICD-10-CM

## 2018-06-14 DIAGNOSIS — I10 ESSENTIAL HYPERTENSION: ICD-10-CM

## 2018-06-14 DIAGNOSIS — M47.26 OSTEOARTHRITIS OF SPINE WITH RADICULOPATHY, LUMBAR REGION: ICD-10-CM

## 2018-06-14 DIAGNOSIS — E11.40 TYPE 2 DIABETES MELLITUS WITH DIABETIC NEUROPATHY, WITHOUT LONG-TERM CURRENT USE OF INSULIN (HCC): ICD-10-CM

## 2018-06-14 DIAGNOSIS — E27.40 ADRENAL INSUFFICIENCY (HCC): ICD-10-CM

## 2018-06-14 PROCEDURE — 99214 OFFICE O/P EST MOD 30 MIN: CPT | Performed by: FAMILY MEDICINE

## 2018-06-14 RX ORDER — POLYETHYLENE GLYCOL 3350 17 G/17G
17 POWDER, FOR SOLUTION ORAL DAILY
Qty: 100 PACKET | Refills: 11 | Status: SHIPPED | OUTPATIENT
Start: 2018-06-14

## 2018-06-14 RX ORDER — ACETYLCYSTEINE 600 MG
CAPSULE ORAL
COMMUNITY
Start: 2018-06-12 | End: 2018-06-19

## 2018-06-14 NOTE — PROGRESS NOTES
Subjective   Santino Retana is a 78 y.o. male.     Chief Complaint   Patient presents with   • Rash   • Diabetes   • Hypertension   • Hyperlipidemia   • Cerebrovascular Accident   Back pain      History of Present Illness   Current outpatient and discharge medications have been reconciled for the patient.  Reviewed by: Fahad Murray Jr., MD  Mr. Retana returns with a history of TIAs on dual platelet therapy and continues on such.  We reviewed carbohydrate counting as far as his diet and he has a follow-up with endocrinology with adrenal insufficiency and he is on Florinef.    Otherwise treatment of hypertension hyperlipidemia is reviewed.  Also treatment of diabetes type 2.    He has chronic DJD lumbar spine with radiculopathy and he is scheduled for myelogram neurosurgery.  We'll see him back in October for recheck.    He's had recurrent eczematous rash on the years try eucrisa with prescription will see if he can afford it.    The following portions of the patient's history were reviewed and updated as appropriate: allergies, current medications, past social history and problem list.    Review of Systems   Constitutional: Negative.    HENT: Negative.    Eyes: Negative.    Respiratory: Negative.    Cardiovascular: Negative.    Gastrointestinal: Negative.    Endocrine: Negative.    Genitourinary: Negative.    Musculoskeletal: Negative.    Skin: Negative.    Allergic/Immunologic: Negative.    Neurological: Negative.    Hematological: Negative.    Psychiatric/Behavioral: Negative.        Objective   Vitals:    06/14/18 1109   BP: 118/52   Pulse: 78   Temp: 97.1 °F (36.2 °C)   SpO2: 94%     Physical Exam   Constitutional: He is oriented to person, place, and time. He appears well-developed.   HENT:   Head: Normocephalic.   Right Ear: External ear normal.   Left Ear: External ear normal.   Mouth/Throat: Oropharynx is clear and moist.   Eyes: Pupils are equal, round, and reactive to light.   Neck: Normal range of motion.  Neck supple.   Cardiovascular: Normal rate, regular rhythm and normal heart sounds.    Pulmonary/Chest: Effort normal and breath sounds normal.   Abdominal: Soft. Bowel sounds are normal.   Musculoskeletal: Normal range of motion.   Neurological: He is alert and oriented to person, place, and time.   Skin: Skin is warm and dry.   Psychiatric: He has a normal mood and affect.   Vitals reviewed.      Assessment/Plan   Problem List Items Addressed This Visit        Cardiovascular and Mediastinum    TIA (transient ischemic attack)    Hypertension       Endocrine    Adrenal insufficiency    Type 2 diabetes mellitus with diabetic neuropathy, without long-term current use of insulin       Nervous and Auditory    Osteoarthritis of spine with radiculopathy, lumbar region       Genitourinary    CKD (chronic kidney disease), stage III      Other Visit Diagnoses     Other eczema    -  Primary    Relevant Medications    Crisaborole (EUCRISA) 2 % ointment      Plan: Meds remain the same.  Eucrisa ointment twice a day to the ears.  Recheck in 4 months.  Discussion about carbohydrate counting.  Follow-up with endocrinology follow-up with neurosurgery.

## 2018-06-19 ENCOUNTER — OFFICE VISIT (OUTPATIENT)
Dept: ENDOCRINOLOGY | Age: 78
End: 2018-06-19

## 2018-06-19 VITALS
HEART RATE: 90 BPM | HEIGHT: 68 IN | BODY MASS INDEX: 32.43 KG/M2 | DIASTOLIC BLOOD PRESSURE: 68 MMHG | WEIGHT: 214 LBS | SYSTOLIC BLOOD PRESSURE: 110 MMHG

## 2018-06-19 DIAGNOSIS — N18.30 CKD (CHRONIC KIDNEY DISEASE) STAGE 3, GFR 30-59 ML/MIN (HCC): ICD-10-CM

## 2018-06-19 DIAGNOSIS — IMO0002 UNCONTROLLED TYPE 2 DIABETES MELLITUS WITH COMPLICATION, WITHOUT LONG-TERM CURRENT USE OF INSULIN: Primary | ICD-10-CM

## 2018-06-19 DIAGNOSIS — E11.40 TYPE 2 DIABETES MELLITUS WITH DIABETIC NEUROPATHY, WITHOUT LONG-TERM CURRENT USE OF INSULIN (HCC): ICD-10-CM

## 2018-06-19 PROCEDURE — 99214 OFFICE O/P EST MOD 30 MIN: CPT | Performed by: INTERNAL MEDICINE

## 2018-06-19 NOTE — PROGRESS NOTES
78 y.o.    Patient Care Team:  Fahad Murray Jr., MD as PCP - General (Family Medicine)  Alex Simpson MD as Consulting Physician (Nephrology)    Chief Complaint:      HOSPITAL F/U TYPE 2 DIABETES, UNCONTROLLED.  ADRENAL INSUFFICIENCY.  Subjective     HPI   Patient is a 78-year-old white male with uncontrolled type 2 diabetes mellitus with complications came for follow-up    Patient was recently  seen by me during his hospitalization  Patient reports that most of his blood sugars are less than 160  Blood pressure has been stable  Patient is currently taking Tradjenta 5 mg daily along with cinnamon tablets  He reported that his blood sugars are much better  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  Patient is symptomatic of numbness and tingling in both lower extremities  Patient denied any nausea  Uncontrolled type 2 diabetes mellitus with nephropathy  Patient is stage III chronic kidney disease  Hypoglycemia  Patient reported occasional hypoglycemia when he was taking glipizide 5 mg twice daily.      Interval History/summary of hospitalization    Patient is a 78-year-old male admitted to the hospital for a slurred speech and was suspected to have TIA which he has recovered  For diabetes he was taking glipizide 5 mg twice daily and patient has moderate stage III chronic kidney disease and I recommended that he discontinue glipizide  I started him on Tradjenta yesterday  Patient tolerated the medication well  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  Patient's serum cortisol level was noted to be low subsequently the cosyntropin stimulation testing was also submaximal  Patient reported receiving epidural injections ×2 within the past 2 weeks which could explain the baseline low cortisol     Potassium level continues to be  high at this particular improvement in renal function     Patient reports receiving epidural injections ×2 within the past 2 weeks     Patient denies any prior history of elevated potassium  Patient has received IV fluids and his creatinine has improved from 2.4-1.8 but his potassium is still remains to be elevated  Nephrology suspecting adrenal insufficiency as possible cause for hyperkalemia     Patient denies any previous history of hypotension  He is on medication for hypertension     Cosyntropin stim testing suggest the maximum response was only 6.6 cortisol level  This is still consistent with a recent epidural injections ×2 within the past 2 weeks  But apart from that hypotension, hyperkalemia cannot be explained     Patient does have peripheral autonomic neuropathy which could be manifesting with orthostatic symptoms and signs     I suspect emperic therapy with Solu-Cortef 50 mg every 8 hours ×2 doses is reasonable at this point  If patient has significantly improved potassium levels as well as blood pressure levels then we could justify continuation of a low-dose steroid for some time  On the contrary if patient has no significant response then it is highly unlikely that he has any primary adrenal insufficiency     In that instance we will need to consider type for RTA which  is hyporeninemic hypoaldosteronism as a possible etiology for hyperkalemia in addition to chronic kidney disease and suspected dietary intake of potassium rich foods     I discussed this at length with the patient and his wife and both verbalized understanding and are willing to try  I also recommended discontinuation of glipizide/Amaryl at home and recommend Tradjenta with or without Starlix to minimize incidence of hypoglycemia and compliance.     The following portions of the patient's history were reviewed and updated as appropriate: allergies, current medications, past family history, past medical history, past social history,  past surgical history and problem list.    Past Medical History:   Diagnosis Date   • Arthritis    • Blind left eye    • Carotid artery disease     Right CEA 16.  Left CEA 17 (with dionte-op CVA)   • Chronic kidney disease    • CKD (chronic kidney disease)    • CVA (cerebral vascular accident)     on 17 dionte-operatively from left CEA.     • Diabetes mellitus     DR CAROLINE DURON   • Hyperlipidemia    • Hypertension    • Low back pain    • NSTEMI (non-ST elevated myocardial infarction)     NSTEMI following left CEA and dionte-op stroke in  ([facility]).     • Renal disorder    • SOB (shortness of breath)      Family History   Problem Relation Age of Onset   • Lymphoma Brother 65         at age 71   • Coronary artery disease Neg Hx      Social History     Social History   • Marital status:      Spouse name: Gallito   • Number of children: 2   • Years of education: GED     Occupational History   • Retired       Social History Main Topics   • Smoking status: Former Smoker     Packs/day: 2.00     Years: 25.00     Quit date:    • Smokeless tobacco: Never Used   • Alcohol use No   • Drug use: No   • Sexual activity: Defer     Other Topics Concern   • Not on file     Social History Narrative    Enjoys golf.    LIVES WITH GALLITO GERONIMO     Allergies   Allergen Reactions   • Lamisil [Terbinafine Hcl] Rash       Current Outpatient Prescriptions:   •  aspirin 81 MG tablet, Take 81 mg by mouth daily., Disp: , Rfl:   •  atorvastatin (LIPITOR) 20 MG tablet, Take 1 tablet by mouth Every Night., Disp: 90 tablet, Rfl: 3  •  carvedilol (COREG) 3.125 MG tablet, Take 1 tablet by mouth Every Morning Before Breakfast., Disp: 30 tablet, Rfl: 11  •  cholecalciferol (VITAMIN D3) 1000 UNITS tablet, Take 1,000 Units by mouth Daily., Disp: , Rfl:   •  Cinnamon 500 MG tablet, Take 1 tablet by mouth Daily., Disp: , Rfl:   •  clopidogrel (PLAVIX) 75 MG tablet, Take 75 mg by mouth Daily.,  "Disp: , Rfl:   •  Crisaborole (EUCRISA) 2 % ointment, Apply 1 g topically 2 (Two) Times a Day., Disp: 60 g, Rfl: 3  •  dexamethasone (DECADRON) 4 MG tablet, Take 2 tablets one hour prior to myelogram, Disp: 2 tablet, Rfl: 0  •  fludrocortisone 0.1 MG tablet, Take 0.5 tablets by mouth Daily for 30 days., Disp: 15 tablet, Rfl: 1  •  gabapentin (NEURONTIN) 400 MG capsule, Take 400 mg by mouth 2 (Two) Times a Day., Disp: , Rfl:   •  linagliptin (TRADJENTA) 5 MG tablet tablet, Take 1 tablet by mouth Daily., Disp: 30 tablet, Rfl: 1  •   MG capsule, , Disp: , Rfl:   •  polyethylene glycol (MIRALAX) packet, Take 17 g by mouth Daily., Disp: 100 packet, Rfl: 11  •  tamsulosin (FLOMAX) 0.4 MG capsule 24 hr capsule, Take 1 capsule by mouth Every Night., Disp: 90 capsule, Rfl: 1  •  traMADol (ULTRAM) 50 MG tablet, Take  mg by mouth Every 6 (Six) Hours As Needed for Severe Pain ., Disp: , Rfl:         Review of Systems   Constitutional: Positive for fatigue. Negative for chills and fever.   Cardiovascular: Negative for chest pain and palpitations.   Gastrointestinal: Negative for abdominal pain, constipation, diarrhea, nausea and vomiting.   Endocrine: Negative for cold intolerance and heat intolerance.   All other systems reviewed and are negative.      Objective       Vitals:    06/19/18 0940   BP: 110/68   Pulse: 90   Weight: 97.1 kg (214 lb)   Height: 172.7 cm (68\")     Body mass index is 32.54 kg/m².      Physical Exam   Constitutional: He is oriented to person, place, and time.   Eyes: EOM are normal. Pupils are equal, round, and reactive to light.   Neck: Normal range of motion. Neck supple. No thyromegaly present.   Cardiovascular: Normal rate, regular rhythm, normal heart sounds and intact distal pulses.    Pulmonary/Chest: Effort normal and breath sounds normal.   Abdominal: Soft. Bowel sounds are normal. He exhibits distension.   Musculoskeletal: Normal range of motion.   Neurological: He is alert and " oriented to person, place, and time.   Skin: Skin is warm and dry.   Psychiatric: He has a normal mood and affect. His behavior is normal.   Nursing note and vitals reviewed.    Results Review:     I reviewed the patient's new clinical results.    Medical records reviewed  Summary:      Admission on 05/16/2018, Discharged on 05/20/2018   No results displayed because visit has over 200 results.        Lab Results   Component Value Date    HGBA1C 8.33 (H) 05/17/2018    HGBA1C 8.76 (H) 02/06/2018     Lab Results   Component Value Date    CREATININE 1.85 (H) 05/20/2018     Imaging Results (most recent)     None                Assessment and Plan:    Santino was seen today for diabetes.    Diagnoses and all orders for this visit:    Type 2 diabetes mellitus with diabetic neuropathy, without long-term current use of insulin  -     Comprehensive Metabolic Panel  -     Hemoglobin A1c    CKD (chronic kidney disease) stage 3, GFR 30-59 ml/min    Uncontrolled type 2 diabetes mellitus with complication, without long-term current use of insulin  -     Comprehensive Metabolic Panel  -     Hemoglobin A1c    Other orders  -     linagliptin (TRADJENTA) 5 MG tablet tablet; Take 1 tablet by mouth Daily.    Patient will get lab testing done today  Patient forgot his glucometer today but reports that most of the blood sugars are less than 160  Patient is also on a consistent carb diet since discharge from the hospital  Patient had high potassium during the hospitalization  He was also briefly on dexamethasone for adrenal support    Patient will continue Tradjenta 5 mg daily in the morning  He is also on Neurontin for nervous stomach    Patient will return to follow-up in 6 months    The total time spent  was more than 25 min of which greater than 15 min of time ( greater than 50% of the total time )  was spent face to face with the patient counseling and coordination of care on recommended evaluation and treatment options, instructions  "for management/treatment and /or follow up  and importance of compliance with chosen management or treatment options       Trevor Whyte MD. FACE    06/19/18      EMR Dragon / transcription disclaimer:     \"Dictated utilizing Dragon dictation\".         "

## 2018-06-20 ENCOUNTER — TELEPHONE (OUTPATIENT)
Dept: NEUROSURGERY | Facility: CLINIC | Age: 78
End: 2018-06-20

## 2018-06-20 LAB
ALBUMIN SERPL-MCNC: 3.7 G/DL (ref 3.5–5.2)
ALBUMIN/GLOB SERPL: 1.5 G/DL
ALP SERPL-CCNC: 91 U/L (ref 39–117)
ALT SERPL-CCNC: 11 U/L (ref 1–41)
AST SERPL-CCNC: 8 U/L (ref 1–40)
BILIRUB SERPL-MCNC: 0.4 MG/DL (ref 0.1–1.2)
BUN SERPL-MCNC: 29 MG/DL (ref 8–23)
BUN/CREAT SERPL: 17.1 (ref 7–25)
CALCIUM SERPL-MCNC: 9.4 MG/DL (ref 8.6–10.5)
CHLORIDE SERPL-SCNC: 100 MMOL/L (ref 98–107)
CO2 SERPL-SCNC: 29.5 MMOL/L (ref 22–29)
CREAT SERPL-MCNC: 1.7 MG/DL (ref 0.76–1.27)
GFR SERPLBLD CREATININE-BSD FMLA CKD-EPI: 39 ML/MIN/1.73
GFR SERPLBLD CREATININE-BSD FMLA CKD-EPI: 48 ML/MIN/1.73
GLOBULIN SER CALC-MCNC: 2.4 GM/DL
GLUCOSE SERPL-MCNC: 344 MG/DL (ref 65–99)
HBA1C MFR BLD: 8.91 % (ref 4.8–5.6)
POTASSIUM SERPL-SCNC: 4.9 MMOL/L (ref 3.5–5.2)
PROT SERPL-MCNC: 6.1 G/DL (ref 6–8.5)
SODIUM SERPL-SCNC: 140 MMOL/L (ref 136–145)

## 2018-06-20 RX ORDER — GABAPENTIN 400 MG/1
CAPSULE ORAL
Qty: 180 CAPSULE | Refills: 1 | Status: SHIPPED | OUTPATIENT
Start: 2018-06-20 | End: 2019-02-07 | Stop reason: SDUPTHER

## 2018-06-21 ENCOUNTER — TELEPHONE (OUTPATIENT)
Dept: CARDIOLOGY | Facility: CLINIC | Age: 78
End: 2018-06-21

## 2018-06-21 NOTE — TELEPHONE ENCOUNTER
06/21/18  Anjelica with [facility] is calling for surgery clearance for an upcoming surgery. Not yet scheduled pending Myelogram results.   Please advise if needs seen or cleared?   Thanks [facility]

## 2018-06-26 NOTE — TELEPHONE ENCOUNTER
I spoke with Anjelica and let her know pt need seen with EKG before being cleared.   I will call pt and get him in for an appointment for clearance.   I let her know I will let her know when he is cleared.  [facility]

## 2018-06-29 NOTE — TELEPHONE ENCOUNTER
6/29/18  FYI.......Pt's wife, Kesha called.  States pt is scheduled for his 3rd epidural on 7/11, day after his appt with Dr. JARQUIN on 7/10.      She states that they thought [facility] was going to cancel this because the patient did not feel the other 2 epidurals helped him very much.  She has called their ofc., but was told to call the pain clinic.  I suggested she call [facility] again and ask if the epidural is still scheduled because pt is on Plavix and will have to hold that med prior to the epidural. I told her I would inform Dr. JARQUIN/greg

## 2018-07-03 ENCOUNTER — TELEPHONE (OUTPATIENT)
Dept: INTERNAL MEDICINE | Facility: CLINIC | Age: 78
End: 2018-07-03

## 2018-07-03 ENCOUNTER — TELEPHONE (OUTPATIENT)
Dept: NEUROSURGERY | Facility: CLINIC | Age: 78
End: 2018-07-03

## 2018-07-03 ENCOUNTER — APPOINTMENT (OUTPATIENT)
Dept: DIABETES SERVICES | Facility: HOSPITAL | Age: 78
End: 2018-07-03

## 2018-07-03 RX ORDER — FLUDROCORTISONE ACETATE 0.1 MG/1
0.1 TABLET ORAL DAILY
Qty: 30 TABLET | Refills: 2 | Status: SHIPPED | OUTPATIENT
Start: 2018-07-03 | End: 2018-12-29 | Stop reason: SDUPTHER

## 2018-07-03 NOTE — TELEPHONE ENCOUNTER
I called patient and spoke with patients wife. Patient is suppose to see his cardiologist on 07/10/2018 and is going to see what Dr. Paul says.

## 2018-07-03 NOTE — TELEPHONE ENCOUNTER
Kesha called and said the pt is about to run out of his prescription for fludrocortisone and would like to know if you want him to continue it. Please advise.

## 2018-07-03 NOTE — TELEPHONE ENCOUNTER
Patient scheduled for third epidural injection on 7/11/18 wants to know if he should have it as the first two injections did not work please advise [phone]

## 2018-07-03 NOTE — TELEPHONE ENCOUNTER
Patient last seen [facility] on 06/07/2018. He is being followed for back pain that radiates into his buttock. His last ALINA was on 05/09/2018. He is scheduled for a ALINA on 07/11/2018. The patient states that the previous ALINA haven't helped with the pain that he is experiencing. He is in the process of being scheduled for myelogram.

## 2018-07-10 ENCOUNTER — OFFICE VISIT (OUTPATIENT)
Dept: CARDIOLOGY | Facility: CLINIC | Age: 78
End: 2018-07-10

## 2018-07-10 ENCOUNTER — APPOINTMENT (OUTPATIENT)
Dept: DIABETES SERVICES | Facility: HOSPITAL | Age: 78
End: 2018-07-10

## 2018-07-10 ENCOUNTER — TELEPHONE (OUTPATIENT)
Dept: NEUROSURGERY | Facility: CLINIC | Age: 78
End: 2018-07-10

## 2018-07-10 VITALS
HEART RATE: 88 BPM | WEIGHT: 217.4 LBS | HEIGHT: 60 IN | SYSTOLIC BLOOD PRESSURE: 118 MMHG | BODY MASS INDEX: 42.68 KG/M2 | DIASTOLIC BLOOD PRESSURE: 64 MMHG

## 2018-07-10 DIAGNOSIS — I21.4 NSTEMI (NON-ST ELEVATED MYOCARDIAL INFARCTION) (HCC): Primary | ICD-10-CM

## 2018-07-10 DIAGNOSIS — I63.132 CEREBROVASCULAR ACCIDENT (CVA) DUE TO EMBOLISM OF LEFT CAROTID ARTERY (HCC): ICD-10-CM

## 2018-07-10 DIAGNOSIS — I77.9 BILATERAL CAROTID ARTERY DISEASE (HCC): ICD-10-CM

## 2018-07-10 PROCEDURE — 99214 OFFICE O/P EST MOD 30 MIN: CPT | Performed by: INTERNAL MEDICINE

## 2018-07-10 NOTE — TELEPHONE ENCOUNTER
I had not seen this note until now, I have been waiting for this office to send over the fluid instructions for the patient myelogram that is scheduled on 7-19-18 @ 815 am.  When I called the patient to give him the myelogram appointment he stated he did have a cardiology appointment on 7-10-18 and he said depending on what they said if he was going to proceed with the myelogram.  I told him to call me after that appointment and let me know.  The patient is scheduled to have his myelogram and follow up with Dr. Rob.

## 2018-07-10 NOTE — TELEPHONE ENCOUNTER
----- Message from EJ Young sent at 7/9/2018  8:48 PM EDT -----  Regarding: re upcoming myelogram  Have you seen this note from nephrology? Wasn't sure if Dr. Ribeiro saw it and whether the myelogram had been scheduled with the recommendations stipulated in the note.     ----- Message -----  From: Essie Woody  Sent: 7/9/2018   8:34 AM  To: Fahad Murray Jr., MD, EJ Young

## 2018-07-10 NOTE — TELEPHONE ENCOUNTER
Fluid orders placed, ok per Dr. Rob. Nephrology requested patient get Normal Saline bolus 1 hour prior and 1 hour after Myelogram for renal history

## 2018-07-11 ENCOUNTER — HOSPITAL ENCOUNTER (OUTPATIENT)
Dept: PAIN MEDICINE | Facility: HOSPITAL | Age: 78
Discharge: HOME | End: 2018-07-11
Admitting: ANESTHESIOLOGY

## 2018-07-11 ENCOUNTER — HOSPITAL ENCOUNTER (OUTPATIENT)
Dept: GENERAL RADIOLOGY | Facility: HOSPITAL | Age: 78
Discharge: HOME | End: 2018-07-11

## 2018-07-11 ENCOUNTER — ANESTHESIA EVENT (OUTPATIENT)
Dept: PAIN MEDICINE | Facility: HOSPITAL | Age: 78
End: 2018-07-11

## 2018-07-11 ENCOUNTER — ANESTHESIA (OUTPATIENT)
Dept: PAIN MEDICINE | Facility: HOSPITAL | Age: 78
End: 2018-07-11

## 2018-07-11 VITALS
DIASTOLIC BLOOD PRESSURE: 73 MMHG | RESPIRATION RATE: 16 BRPM | TEMPERATURE: 97.7 F | SYSTOLIC BLOOD PRESSURE: 148 MMHG | HEART RATE: 90 BPM | OXYGEN SATURATION: 92 %

## 2018-07-11 DIAGNOSIS — M51.17 INTERVERTEBRAL DISC DISORDER WITH RADICULOPATHY OF LUMBOSACRAL REGION: ICD-10-CM

## 2018-07-11 DIAGNOSIS — R52 PAIN: ICD-10-CM

## 2018-07-11 LAB — GLUCOSE BLDC GLUCOMTR-MCNC: 310 MG/DL (ref 70–130)

## 2018-07-11 PROCEDURE — C1755 CATHETER, INTRASPINAL: HCPCS

## 2018-07-11 PROCEDURE — 25010000002 METHYLPREDNISOLONE PER 80 MG: Performed by: ANESTHESIOLOGY

## 2018-07-11 PROCEDURE — 82962 GLUCOSE BLOOD TEST: CPT

## 2018-07-11 PROCEDURE — 77003 FLUOROGUIDE FOR SPINE INJECT: CPT

## 2018-07-11 RX ORDER — LIDOCAINE HYDROCHLORIDE 10 MG/ML
1 INJECTION, SOLUTION INFILTRATION; PERINEURAL ONCE AS NEEDED
Status: DISCONTINUED | OUTPATIENT
Start: 2018-07-11 | End: 2018-07-12 | Stop reason: HOSPADM

## 2018-07-11 RX ORDER — METHYLPREDNISOLONE ACETATE 80 MG/ML
80 INJECTION, SUSPENSION INTRA-ARTICULAR; INTRALESIONAL; INTRAMUSCULAR; SOFT TISSUE ONCE
Status: COMPLETED | OUTPATIENT
Start: 2018-07-11 | End: 2018-07-11

## 2018-07-11 RX ORDER — MIDAZOLAM HYDROCHLORIDE 1 MG/ML
1 INJECTION INTRAMUSCULAR; INTRAVENOUS AS NEEDED
Status: DISCONTINUED | OUTPATIENT
Start: 2018-07-11 | End: 2018-07-12 | Stop reason: HOSPADM

## 2018-07-11 RX ORDER — SODIUM CHLORIDE 0.9 % (FLUSH) 0.9 %
1-10 SYRINGE (ML) INJECTION AS NEEDED
Status: DISCONTINUED | OUTPATIENT
Start: 2018-07-11 | End: 2018-07-12 | Stop reason: HOSPADM

## 2018-07-11 RX ORDER — FENTANYL CITRATE 50 UG/ML
50 INJECTION, SOLUTION INTRAMUSCULAR; INTRAVENOUS AS NEEDED
Status: DISCONTINUED | OUTPATIENT
Start: 2018-07-11 | End: 2018-07-12 | Stop reason: HOSPADM

## 2018-07-11 RX ADMIN — METHYLPREDNISOLONE ACETATE 80 MG: 80 INJECTION, SUSPENSION INTRA-ARTICULAR; INTRALESIONAL; INTRAMUSCULAR; SOFT TISSUE at 07:51

## 2018-07-11 NOTE — ANESTHESIA PROCEDURE NOTES
PAIN Epidural block    Patient location during procedure: pain clinic  Indication:procedure for pain  Performed By  Anesthesiologist: ANETTE COLES  Preanesthetic Checklist  Completed: patient identified and risks and benefits discussed  Additional Notes  DIAGNOSIS:   Post-Op Diagnosis Codes:     * Lumbar degenerative disc disease (M51.36)     * Lumbar neuritis (M54.16)    Sedation: none    No dye secondary to chronic kidney disease.  His previous 2 injections were at L4 but since he was getting borderline results are moved to the L5 area thinking that may help him more since his pain does go into his buttocks.    A lumbar epidural steroid injection under fluoroscopic guidance was performed.  Under fluoroscopic guidance, the epidural space was identified and accessed, confirmed by loss of resistance to saline.  The above medications were injected uneventfully.    Prep:  Pt Position:prone  Sterile Tech:cap, gloves, mask and sterile barrier  Prep:chlorhexidine gluconate and isopropyl alcohol  Monitoring:blood pressure monitoring, continuous pulse oximetry and EKG  Procedure:  Sedation: no   Approach:midline  Guidance: fluoroscopy  Location:lumbar  Interspace: L5-S1.  Needle Type:Tuohy  Needle Gauge:20  Aspiration:negative  Medications:  Depomedrol:80  Preservative Free Saline:1mL    Post Assessment:  Pt Tolerance:patient tolerated the procedure well with no apparent complications  Complications:no

## 2018-07-11 NOTE — ADDENDUM NOTE
Addendum  created 07/11/18 0809 by Alexx Helm MD    Order Reconciliation Section accessed, Order list changed

## 2018-07-11 NOTE — H&P
INTERVAL HISTORY:    The patient returns for another Lumbar epidural steroid injection today.  For 3-4 days after each injection but his pain is back to baseline now the pain scale of 6-8 out of 10.  He has done physical therapy for several weeks which did not help him.  He is on a Medrol Dosepak and Neurontin which are helping him.  He has been off his Plavix for 7 days.  His MRI showed degeneration.  He is scheduled for a myelogram but has not had it yet.  He is a type II diabetic and his blood sugar was 300 today but this is most likely due to the oral steroids he is taking.  He also has not taken his hypoglycemic medicine today.  I told him that the epidural steroid is unlikely to raise his sugar much more and be hyper-glycemia is due to the oral steroids.  I did instruct him to call his primary care doctor today to discuss this with them to see if there is anything they want him to do while he is on the oral steroids    Exam:  There were no vitals taken for this visit.   /84 (BP Location: Left arm, Patient Position: Lying)   Pulse 89   Temp 36.5 °C (97.7 °F) (Oral)   Resp 16   SpO2 91%     Airway Mallampatti 2  Alert and oriented      Diagnosis:  Post-Op Diagnosis Codes:     * Lumbar degenerative disc disease [M51.36]     * Lumbar neuritis [M54.16]    Plan:  Lumbar epidural steroid injection under fluoroscopic guidance    I have encouraged them to continue:  1.  Physical therapy exercises at home as prescribed by physical therapy or from the pain clinic handout (given to the patient).  Continuation of these exercises every day, or multiple times per week, even when the patient has good pain relief, was stressed to the patient as a preventative measure to decrease the frequency and severity of future pain episodes.  2.  Continue pain medicines as already prescribed.  If patient not currently taking any, it is recommended to begin Acetaminophen 1000 mg po q 8 hours.  If other medicines containing  Acetaminophen are currently prescribed, maintain daily dose at 3000mg.    3.  If they can tolerate NSAIDS, it is recommended to take Ibuprofen 600 mg po q 6 hours for 7 days during pain exacerbations.   Alternatively, they may substitute an NSAID of their choice (e.g. Aleve)  4.  Heat and ice to the affected area as tolerated for pain control.  It was discussed that heating pads can cause burns.  5.  Low impact exercise such as walking or water exercise was recommended to maintain overall health and aid in weight control.   6.  Follow up as needed for subsequent injections.  7.  Patient was counseled to abstain from tobacco products.

## 2018-07-13 ENCOUNTER — TELEPHONE (OUTPATIENT)
Dept: NEUROSURGERY | Facility: CLINIC | Age: 78
End: 2018-07-13

## 2018-07-16 NOTE — TELEPHONE ENCOUNTER
Patient called and spoke with Charline and cancelled his myelogram, the patient stated he decided not to have it done

## 2018-07-17 ENCOUNTER — APPOINTMENT (OUTPATIENT)
Dept: DIABETES SERVICES | Facility: HOSPITAL | Age: 78
End: 2018-07-17

## 2018-07-24 RX ORDER — CARVEDILOL 3.12 MG/1
3.12 TABLET ORAL
Qty: 30 TABLET | Refills: 11 | Status: SHIPPED | OUTPATIENT
Start: 2018-07-24 | End: 2019-07-06 | Stop reason: SDUPTHER

## 2018-07-24 RX ORDER — TAMSULOSIN HYDROCHLORIDE 0.4 MG/1
CAPSULE ORAL
Qty: 90 CAPSULE | Refills: 1 | Status: SHIPPED | OUTPATIENT
Start: 2018-07-24 | End: 2019-01-24 | Stop reason: SDUPTHER

## 2018-07-25 NOTE — PROGRESS NOTES
Date of Office Visit: 07/10/2018  Encounter Provider: Bennie Paul MD  Place of Service: [facility] CARDIOLOGY  Patient Name: Santino Retana  :1940    Chief complaint: Follow-up for NSTEMI, CVA, carotid artery disease.    History of Present Illness:    I again had the pleasure of seeing your patient in cardiology office on 2018. As  you well know, he is a very pleasant, 78 year-old white male with a medical history  significant for diabetes, hypertension, and chronic kidney disease who presents for  follow-up. The patient initially saw me on 2014, with complaints of dyspnea on  exertion, which had not changed significantly. However, given that he was diabetic, he  did undergo an exercise stress echocardiogram on 2014. This showed an   ejection fraction of 58% with grade 1 diastolic dysfunction and no significant valvular   disease. There was no evidence of ischemia on the study.     The patient was to undergo an elective right hip replacement, and as part of his   work-up, had screening ultrasound of his carotid arteries.  This showed significant   bilateral disease.  A CT angiogram in 2016 confirmed 85-90% stenosis in   the right carotid artery and 70-75% stenosis in the left carotid artery.  He subsequently   underwent a right carotid endarterectomy on 2016 at [facility].    He did eventually also have his right hip replacement on 2016 at [facility].     The patient underwent a left carotid endarterectomy electively on 2017 by Dr. Washington   at [facility].  Postoperatively, he was found to have right sided   hemiparesis and aphasia.  He was subsequently found to have a significant   perioperative stroke.  He had multiple complications afterwards, including hypoxic   respiratory failure, urinary retention, acute on chronic kidney injury, and a non-ST   elevation myocardial infarction.  His  troponin peaked at 1.04, and he did have some   EKG changes as well.  He was treated aggressively with heparin, IV Lopressor, and   aspirin.  An echocardiogram performed on 7/19/2017 showed an ejection fraction of   62%, mild LVH, and no significant valve disease.  He did not undergo a cardiac   catheterization at that time given his lack of symptoms and the recent stroke.     After being treated medically, the patient did eventually undergo a Lexiscan Myoview   stress test on 10/2/2017.  This showed a medium-sized infarct in the inferior wall with   no significant ischemia.  The ejection fraction was calculated at 44%.  He later   underwent an echocardiogram on 10/12/2017, which showed ejection fraction at 56%.    There was severe hypokinesis of the basal inferior wall and basal inferoseptum at that   time.  The decision was made to continue medical management as he was not having   any symptoms, and there was no ischemia on the stress test.     The patient presents today for follow-up.  Unfortunately, he did have a TIA in May 2018.    He has recovered from this, and remains on aspirin and Plavix.  He has a cyst at the   L5 level and is having significant sciatica.  He is scheduled to get a myelogram and   possibly surgery with Dr. Rob.  He is here mainly for preoperative clearance for the   myelogram and possible back surgery.  He has had no chest pain or shortness of   breath.  He states that he is able to exert himself without any difficulty.    Past Medical History:   Diagnosis Date   • Arthritis    • Blind left eye    • Carotid artery disease (CMS/HCC)     Right CEA 9/26/16.  Left CEA 7/18/17 (with dionte-op CVA)   • Chronic kidney disease    • CKD (chronic kidney disease)    • CVA (cerebral vascular accident) (CMS/HCC)     on 7/18/17 dionte-operatively from left CEA.     • Diabetes mellitus (CMS/HCC) 1998    DR CAROLINE DURON   • Hyperlipidemia    • Hypertension    • Low back pain    • NSTEMI (non-ST elevated  myocardial infarction) (CMS/[facility])     NSTEMI following left CEA and dionte-op stroke in 7/18 ([facility]).     • Renal disorder    • SOB (shortness of breath)    • TIA (transient ischemic attack) 05/2018       Past Surgical History:   Procedure Laterality Date   • APPENDECTOMY  02/19/2016    [facility], Dr. Zimmerman   • CAROTID ENDARTERECTOMY Right 09/26/2016    [facility]    • CAROTID ENDARTERECTOMY Left 07/18/2017    [facility]    • CATARACT EXTRACTION     • CHOLECYSTECTOMY  11/1989    [facility]   • JOINT REPLACEMENT     • LUMBAR EPIDURAL INJECTION     • TOTAL HIP ARTHROPLASTY Right 11/16/2016    [facility]       Current Outpatient Prescriptions on File Prior to Visit   Medication Sig Dispense Refill   • aspirin 81 MG tablet Take 81 mg by mouth daily.     • atorvastatin (LIPITOR) 20 MG tablet Take 1 tablet by mouth Every Night. 90 tablet 3   • cholecalciferol (VITAMIN D3) 1000 UNITS tablet Take 1,000 Units by mouth Daily.     • Cinnamon 500 MG tablet Take 1 tablet by mouth Daily.     • clopidogrel (PLAVIX) 75 MG tablet Take 75 mg by mouth Daily.     • Crisaborole (EUCRISA) 2 % ointment Apply 1 g topically 2 (Two) Times a Day. 60 g 3   • dexamethasone (DECADRON) 4 MG tablet Take 2 tablets one hour prior to myelogram 2 tablet 0   • fludrocortisone 0.1 MG tablet Take 1 tablet by mouth Daily. 30 tablet 2   • gabapentin (NEURONTIN) 400 MG capsule take 1 capsule by mouth twice a day 180 capsule 1   • linagliptin (TRADJENTA) 5 MG tablet tablet Take 1 tablet by mouth Daily. 30 tablet 5   • polyethylene glycol (MIRALAX) packet Take 17 g by mouth Daily. 100 packet 11     No current facility-administered medications on file prior to visit.      Allergies as of 07/10/2018 - Reviewed 07/10/2018   Allergen Reaction Noted   • Lamisil [terbinafine hcl] Rash 02/26/2016     Social History     Social History   • Marital status:      Spouse name:  "Gallito   • Number of children: 2   • Years of education: GED     Occupational History   • Retired       Social History Main Topics   • Smoking status: Former Smoker     Packs/day: 2.00     Years: 25.00     Quit date:    • Smokeless tobacco: Never Used   • Alcohol use No   • Drug use: No   • Sexual activity: Defer     Other Topics Concern   • Not on file     Social History Narrative    Enjoys golf.    LIVES WITH GALLITO GERONIMO     Family History   Problem Relation Age of Onset   • Lymphoma Brother 65         at age 71   • Coronary artery disease Neg Hx        Review of Systems   Musculoskeletal: Positive for back pain.   Neurological: Positive for numbness and paresthesias.   All other systems reviewed and are negative.     Objective:     Vitals:    07/10/18 0950   BP: 118/64   Pulse: 88   Weight: 98.6 kg (217 lb 6.4 oz)   Height: 127.7 cm (50.28\")     Body mass index is 60.47 kg/m².    Physical Exam   Constitutional: He is oriented to person, place, and time. He appears well-developed and well-nourished.   HENT:   Head: Normocephalic and atraumatic.   Eyes: Conjunctivae are normal.   Neck: Neck supple.   Cardiovascular: Normal rate and regular rhythm.  Exam reveals no gallop and no friction rub.    No murmur heard.  Pulmonary/Chest: Effort normal and breath sounds normal.   Abdominal: Soft. There is no tenderness.   Musculoskeletal: He exhibits no edema.   Neurological: He is alert and oriented to person, place, and time.   Skin: Skin is warm.   Psychiatric: He has a normal mood and affect. His behavior is normal.     Lab Review:   Procedures    Cardiac Procedures:  1. Echocardiogram on 2017: The ejection fraction was 62%.  There was mild LVH.    There was no significant valvular disease.  2. Lexiscan Myoview stress test on 10/2/2017: There was a medium-sized infarct in the   inferior wall with no significant ischemia.  Ejection fraction was calculated at 44%.  3. Echocardiogram on " 10/12/2017: The ejection fraction was 56%.  There was severe   hypokinesis of the basal inferior wall and basal inferoseptum.  There was mild to   moderate left ventricular hypertrophy area there was grade 1 diastolic dysfunction.    There was aortic sclerosis without stenosis.  4.  Echocardiogram on 5/17/2018: The ejection fraction was 65%.  Saline test was   normal.  There was mild mitral regurgitation.    Assessment:       Diagnosis Plan   1. NSTEMI (non-ST elevated myocardial infarction) (CMS/[facility])     2. Cerebrovascular accident (CVA) due to embolism of left carotid artery (CMS/[facility])     3. Bilateral carotid artery disease (CMS/[facility])       Plan:       Again, the patient has had no cardiac symptoms.  He specifically denied any chest pain,   shortness of breath, or other new issues.  He is able to easily exert himself.  His latest   ejection fraction was 65%.  He did have evidence of an infarct on his stress test in   October, but no ischemia.  He is diabetic, and very likely could have coronary artery   disease, although we did not perform a cardiac catheterization on him in the past   because he was asymptomatic.  With regards to the myelogram, I feel that he is at low   risk to proceed.  The lower back surgery is slightly higher risk, but I also feel this is   acceptable.  I will send a clearance letter if needed to Dr. Rob.  In the meantime, he   will continue on medical therapy with aspirin, Plavix, Coreg, and Lipitor.  I will plan on   seeing him back in the office in the next 6 months unless other issues arise.

## 2018-12-07 ENCOUNTER — LAB (OUTPATIENT)
Dept: ENDOCRINOLOGY | Age: 78
End: 2018-12-07

## 2018-12-07 DIAGNOSIS — E11.40 TYPE 2 DIABETES MELLITUS WITH DIABETIC NEUROPATHY, WITHOUT LONG-TERM CURRENT USE OF INSULIN (HCC): Primary | ICD-10-CM

## 2018-12-07 DIAGNOSIS — E11.40 TYPE 2 DIABETES MELLITUS WITH DIABETIC NEUROPATHY, WITHOUT LONG-TERM CURRENT USE OF INSULIN (HCC): ICD-10-CM

## 2018-12-08 LAB
ALBUMIN SERPL-MCNC: 4.2 G/DL (ref 3.5–5.2)
ALBUMIN/CREAT UR: 180 MG/G CREAT (ref 0–30)
ALBUMIN/GLOB SERPL: 1.8 G/DL
ALP SERPL-CCNC: 92 U/L (ref 39–117)
ALT SERPL-CCNC: 14 U/L (ref 1–41)
AST SERPL-CCNC: 11 U/L (ref 1–40)
BILIRUB SERPL-MCNC: 0.5 MG/DL (ref 0.1–1.2)
BUN SERPL-MCNC: 23 MG/DL (ref 8–23)
BUN/CREAT SERPL: 12.6 (ref 7–25)
CALCIUM SERPL-MCNC: 9.9 MG/DL (ref 8.6–10.5)
CHLORIDE SERPL-SCNC: 97 MMOL/L (ref 98–107)
CO2 SERPL-SCNC: 30.6 MMOL/L (ref 22–29)
CREAT SERPL-MCNC: 1.82 MG/DL (ref 0.76–1.27)
CREAT UR-MCNC: 98.2 MG/DL
GLOBULIN SER CALC-MCNC: 2.3 GM/DL
GLUCOSE SERPL-MCNC: 420 MG/DL (ref 65–99)
HBA1C MFR BLD: 13.2 % (ref 4.8–5.6)
MICROALBUMIN UR-MCNC: 176.8 UG/ML
POTASSIUM SERPL-SCNC: 4.8 MMOL/L (ref 3.5–5.2)
PROT SERPL-MCNC: 6.5 G/DL (ref 6–8.5)
SODIUM SERPL-SCNC: 137 MMOL/L (ref 136–145)
TSH SERPL DL<=0.005 MIU/L-ACNC: 1.52 MIU/ML (ref 0.27–4.2)

## 2018-12-12 ENCOUNTER — OFFICE VISIT (OUTPATIENT)
Dept: ENDOCRINOLOGY | Age: 78
End: 2018-12-12

## 2018-12-12 VITALS
WEIGHT: 206.2 LBS | HEIGHT: 68 IN | DIASTOLIC BLOOD PRESSURE: 68 MMHG | SYSTOLIC BLOOD PRESSURE: 122 MMHG | HEART RATE: 81 BPM | BODY MASS INDEX: 31.25 KG/M2

## 2018-12-12 DIAGNOSIS — N18.30 CKD (CHRONIC KIDNEY DISEASE) STAGE 3, GFR 30-59 ML/MIN (HCC): ICD-10-CM

## 2018-12-12 DIAGNOSIS — E11.40 TYPE 2 DIABETES MELLITUS WITH DIABETIC NEUROPATHY, WITHOUT LONG-TERM CURRENT USE OF INSULIN (HCC): ICD-10-CM

## 2018-12-12 DIAGNOSIS — E16.1 HYPERINSULINISM: ICD-10-CM

## 2018-12-12 DIAGNOSIS — IMO0002 UNCONTROLLED TYPE II DIABETES MELLITUS WITH NEPHROPATHY: ICD-10-CM

## 2018-12-12 DIAGNOSIS — IMO0002 UNCONTROLLED TYPE 2 DIABETES MELLITUS WITH COMPLICATION, WITHOUT LONG-TERM CURRENT USE OF INSULIN: Primary | ICD-10-CM

## 2018-12-12 PROCEDURE — 99214 OFFICE O/P EST MOD 30 MIN: CPT | Performed by: INTERNAL MEDICINE

## 2018-12-12 RX ORDER — GLIPIZIDE 10 MG/1
10 TABLET ORAL
Qty: 30 TABLET | Refills: 3 | Status: SHIPPED | OUTPATIENT
Start: 2018-12-12 | End: 2019-04-06 | Stop reason: SDUPTHER

## 2018-12-12 NOTE — PROGRESS NOTES
78 y.o.    Patient Care Team:  Fahad Murray Jr., MD as PCP - General (Family Medicine)  Alex Simpson MD as Consulting Physician (Nephrology)    Chief Complaint:      HOSPITAL F/U TYPE 2 DIABETES, UNCONTROLLED.  ADRENAL INSUFFICIENCY.  HERE TO DISCUSS LAB RESULTS.  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  Hyperlipidemia associated with diabetes  Patient is currently taking Lipitor 20 mg daily.  He reports compliance with the medication  Denies any side effects.      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  Patient's serum cortisol level was noted to be low subsequently the cosyntropin stimulation testing was also submaximal  Patient reported receiving epidural injections ×2 within the past 2 weeks which could explain the baseline  low cortisol     Potassium level continues to be high at this particular improvement in renal function     Patient reports receiving epidural injections ×2 within the past 2 weeks     Patient denies any prior history of elevated potassium  Patient has received IV fluids and his creatinine has improved from 2.4-1.8 but his potassium is still remains to be elevated  Nephrology suspecting adrenal insufficiency as possible cause for hyperkalemia     Patient denies any previous history of hypotension  He is on medication for hypertension     Cosyntropin stim testing suggest the maximum response was only 6.6 cortisol level  This is still consistent with a recent epidural injections ×2 within the past 2 weeks  But apart from that hypotension, hyperkalemia cannot be explained     Patient does have peripheral autonomic neuropathy which could be manifesting with orthostatic symptoms and signs     I suspect emperic therapy with Solu-Cortef 50 mg every 8 hours ×2 doses is reasonable at this point  If patient has significantly improved potassium levels as well as blood pressure levels then we could justify continuation of a low-dose steroid for some time  On the contrary if patient has no significant response then it is highly unlikely that he has any primary adrenal insufficiency     In that instance we will need to consider type for RTA which  is hyporeninemic hypoaldosteronism as a possible etiology for hyperkalemia in addition to chronic kidney disease and suspected dietary intake of potassium rich foods     I discussed this at length with the patient and his wife and both verbalized understanding and are willing to try  I also recommended discontinuation of glipizide/Amaryl at home and recommend Tradjenta with or without Starlix to minimize incidence of hypoglycemia and compliance.        The following portions of the patient's history were reviewed and updated as appropriate: allergies, current medications, past family  history, past medical history, past social history, past surgical history and problem list.    Past Medical History:   Diagnosis Date   • Arthritis    • Blind left eye    • Carotid artery disease (CMS/[facility])     Right CEA 16.  Left CEA 17 (with dionte-op CVA)   • Chronic kidney disease    • CKD (chronic kidney disease)    • CVA (cerebral vascular accident) (CMS/[facility])     on 17 dionte-operatively from left CEA.     • Diabetes mellitus (CMS/[facility])     DR CAROLINE DURON   • Hyperlipidemia    • Hypertension    • Low back pain    • NSTEMI (non-ST elevated myocardial infarction) (CMS/[facility])     NSTEMI following left CEA and dionte-op stroke in  ([facility]).     • Renal disorder    • SOB (shortness of breath)    • TIA (transient ischemic attack) 2018     Family History   Problem Relation Age of Onset   • Lymphoma Brother 65         at age 73   • Coronary artery disease Neg Hx      Social History     Socioeconomic History   • Marital status:      Spouse name: Gallito   • Number of children: 2   • Years of education: GED   • Highest education level: Not on file   Social Needs   • Financial resource strain: Not on file   • Food insecurity - worry: Not on file   • Food insecurity - inability: Not on file   • Transportation needs - medical: Not on file   • Transportation needs - non-medical: Not on file   Occupational History   • Occupation: Retired    Tobacco Use   • Smoking status: Former Smoker     Packs/day: 2.00     Years: 25.00     Pack years: 50.00     Last attempt to quit:      Years since quittin.9   • Smokeless tobacco: Never Used   Substance and Sexual Activity   • Alcohol use: No   • Drug use: No   • Sexual activity: Defer   Other Topics Concern   • Not on file   Social History Narrative    Enjoys golf.    LIVES WITH GALLITO GERONIMO     Allergies   Allergen Reactions   • Terbinafine Unknown (See Comments)     RASH,    • Lamisil [Terbinafine] Rash       Current  "Outpatient Medications:   •  aspirin 81 MG tablet, Take 81 mg by mouth daily., Disp: , Rfl:   •  atorvastatin (LIPITOR) 20 MG tablet, Take 1 tablet by mouth Every Night., Disp: 90 tablet, Rfl: 3  •  carvedilol (COREG) 3.125 MG tablet, take 1 tablet by mouth every morning BEFORE BREAKFAST, Disp: 30 tablet, Rfl: 11  •  cholecalciferol (VITAMIN D3) 1000 UNITS tablet, Take 1,000 Units by mouth Daily., Disp: , Rfl:   •  Cinnamon 500 MG tablet, Take 1 tablet by mouth Daily., Disp: , Rfl:   •  clopidogrel (PLAVIX) 75 MG tablet, Take 75 mg by mouth Daily., Disp: , Rfl:   •  Crisaborole (EUCRISA) 2 % ointment, Apply 1 g topically 2 (Two) Times a Day., Disp: 60 g, Rfl: 3  •  dexamethasone (DECADRON) 4 MG tablet, Take 2 tablets one hour prior to myelogram, Disp: 2 tablet, Rfl: 0  •  fludrocortisone 0.1 MG tablet, Take 1 tablet by mouth Daily., Disp: 30 tablet, Rfl: 2  •  gabapentin (NEURONTIN) 400 MG capsule, take 1 capsule by mouth twice a day, Disp: 180 capsule, Rfl: 1  •  linagliptin (TRADJENTA) 5 MG tablet tablet, Take 1 tablet by mouth Daily., Disp: 30 tablet, Rfl: 5  •  polyethylene glycol (MIRALAX) packet, Take 17 g by mouth Daily., Disp: 100 packet, Rfl: 11  •  sodium chloride 0.9 % bolus, Give 250 cc Bolus 1 hour prior to Myelogram and 1 hour after Myelogram, Disp: 500 mL, Rfl: 0  •  tamsulosin (FLOMAX) 0.4 MG capsule 24 hr capsule, take 1 capsule by mouth every evening, Disp: 90 capsule, Rfl: 1        Review of Systems   Constitutional: Negative for chills, fatigue and fever.   Cardiovascular: Negative for chest pain and palpitations.   Gastrointestinal: Negative for abdominal pain, constipation, diarrhea, nausea and vomiting.   Endocrine: Negative for cold intolerance and heat intolerance.   All other systems reviewed and are negative.      Objective       Vitals:    12/12/18 1353   BP: 122/68   Pulse: 81   Weight: 93.5 kg (206 lb 3.2 oz)   Height: 172.7 cm (68\")     Body mass index is 31.35 kg/m².      Physical " Exam   Constitutional: He is oriented to person, place, and time.   Eyes: EOM are normal. Pupils are equal, round, and reactive to light.   Neck: Normal range of motion. Neck supple. No thyromegaly present.   Cardiovascular: Normal rate, regular rhythm, normal heart sounds and intact distal pulses.   Pulmonary/Chest: Effort normal and breath sounds normal.   Abdominal: Soft. Bowel sounds are normal. He exhibits distension.   Musculoskeletal: Normal range of motion.   Neurological: He is alert and oriented to person, place, and time.   Skin: Skin is warm and dry.   Psychiatric: He has a normal mood and affect. His behavior is normal.   Nursing note and vitals reviewed.    Results Review:     I reviewed the patient's new clinical results.    Medical records reviewed  Summary:      Lab on 12/07/2018   Component Date Value Ref Range Status   • Creatinine, Urine 12/07/2018 98.2  Not Estab. mg/dL Final   • Microalbumin, Urine 12/07/2018 176.8  Not Estab. ug/mL Final   • Microalbumin/Creatinine Ratio 12/07/2018 180.0* 0.0 - 30.0 mg/g creat Final    Comment:                      Normal:                0.0 -  30.0                       Albuminuria:          31.0 - 300.0                       Clinical albuminuria:       >300.0     • TSH 12/07/2018 1.520  0.270 - 4.200 mIU/mL Final   • Hemoglobin A1C 12/07/2018 13.20* 4.80 - 5.60 % Final    Comment: Hemoglobin A1C Ranges:  Increased Risk for Diabetes  5.7% to 6.4%  Diabetes                     >= 6.5%  Diabetic Goal                < 7.0%     • Glucose 12/07/2018 420* 65 - 99 mg/dL Final   • BUN 12/07/2018 23  8 - 23 mg/dL Final   • Creatinine 12/07/2018 1.82* 0.76 - 1.27 mg/dL Final   • eGFR Non  Am 12/07/2018 36* >60 mL/min/1.73 Final    Comment: The MDRD GFR formula is only valid for adults with stable  renal function between ages 18 and 70.     • eGFR  Am 12/07/2018 44* >60 mL/min/1.73 Final   • BUN/Creatinine Ratio 12/07/2018 12.6  7.0 - 25.0 Final   • Sodium  12/07/2018 137  136 - 145 mmol/L Final   • Potassium 12/07/2018 4.8  3.5 - 5.2 mmol/L Final   • Chloride 12/07/2018 97* 98 - 107 mmol/L Final   • Total CO2 12/07/2018 30.6* 22.0 - 29.0 mmol/L Final   • Calcium 12/07/2018 9.9  8.6 - 10.5 mg/dL Final   • Total Protein 12/07/2018 6.5  6.0 - 8.5 g/dL Final   • Albumin 12/07/2018 4.20  3.50 - 5.20 g/dL Final   • Globulin 12/07/2018 2.3  gm/dL Final   • A/G Ratio 12/07/2018 1.8  g/dL Final   • Total Bilirubin 12/07/2018 0.5  0.1 - 1.2 mg/dL Final   • Alkaline Phosphatase 12/07/2018 92  39 - 117 U/L Final   • AST (SGOT) 12/07/2018 11  1 - 40 U/L Final   • ALT (SGPT) 12/07/2018 14  1 - 41 U/L Final     Lab Results   Component Value Date    HGBA1C 13.20 (H) 12/07/2018    HGBA1C 8.91 (H) 06/19/2018    HGBA1C 8.33 (H) 05/17/2018     Lab Results   Component Value Date    MICROALBUR 176.8 12/07/2018    CREATININE 1.82 (H) 12/07/2018     Imaging Results (most recent)     None                Assessment and Plan:    Santino was seen today for diabetes.    Diagnoses and all orders for this visit:    Uncontrolled type 2 diabetes mellitus with complication, without long-term current use of insulin (CMS/[facility])    CKD (chronic kidney disease) stage 3, GFR 30-59 ml/min (CMS/[facility])    Type 2 diabetes mellitus with diabetic neuropathy, without long-term current use of insulin (CMS/[facility])    Hyperinsulinism    Uncontrolled type II diabetes mellitus with nephropathy (CMS/[facility])    Other orders  -     glipiZIDE (GLUCOTROL) 10 MG tablet; Take 1 tablet by mouth Daily With Breakfast.    Patient forgot to bring his glucometer today but reports that most of his blood sugars are below 200  Patient is currently taking only Tradjenta 5 mg daily in the morning  His hemoglobin A1c is extremely elevated  Patient is also taking Neurontin for neuropathy    Patient's hemoglobin A1c is 13.5% extremely elevated  He also reported that he is not been compliant with medication    I advised him to take all the  "medication  He will start tresiba 15 units every morning  Patient was given training in the office and was given a sample  He will also start him on Glucotrol 10 mg daily in the morning  Prescriptions sent to pharmacy    He is also advised to bring his glucometer to every office visit  Return to follow-up in one month     The total time spent  was more than 25 min of which greater than 15 min of time (greater than 50% of the total time)  was spent face to face with the patient counseling and coordination of care on recommended evaluation and treatment options, instructions for management/treatment and /or follow up and importance of compliance with chosen management or treatment options    Trevor Whyte MD. FACE    12/12/18      EMR Dragon / transcription disclaimer:     \"Dictated utilizing Dragon dictation\".         "

## 2018-12-26 RX ORDER — GABAPENTIN 400 MG/1
CAPSULE ORAL
Qty: 180 CAPSULE | Refills: 0 | OUTPATIENT
Start: 2018-12-26

## 2018-12-29 PROBLEM — E16.1 HYPERINSULINISM: Status: ACTIVE | Noted: 2018-12-29

## 2018-12-29 PROBLEM — IMO0002 UNCONTROLLED TYPE II DIABETES MELLITUS WITH NEPHROPATHY: Status: ACTIVE | Noted: 2018-12-29

## 2018-12-31 RX ORDER — FLUDROCORTISONE ACETATE 0.1 MG/1
TABLET ORAL
Qty: 30 TABLET | Refills: 5 | Status: SHIPPED | OUTPATIENT
Start: 2018-12-31 | End: 2020-01-01 | Stop reason: SDUPTHER

## 2019-01-01 ENCOUNTER — OFFICE VISIT (OUTPATIENT)
Dept: WOUND CARE | Facility: HOSPITAL | Age: 79
End: 2019-01-01

## 2019-01-01 ENCOUNTER — HOSPITAL ENCOUNTER (OUTPATIENT)
Dept: SLEEP MEDICINE | Facility: HOSPITAL | Age: 79
Discharge: HOME | End: 2019-09-09
Admitting: INTERNAL MEDICINE

## 2019-01-01 ENCOUNTER — OFFICE VISIT (OUTPATIENT)
Dept: INTERNAL MEDICINE | Facility: CLINIC | Age: 79
End: 2019-01-01

## 2019-01-01 ENCOUNTER — APPOINTMENT (OUTPATIENT)
Dept: GENERAL RADIOLOGY | Facility: HOSPITAL | Age: 79
End: 2019-01-01

## 2019-01-01 ENCOUNTER — OFFICE VISIT (OUTPATIENT)
Dept: PAIN MEDICINE | Facility: CLINIC | Age: 79
End: 2019-01-01

## 2019-01-01 ENCOUNTER — RESULTS ENCOUNTER (OUTPATIENT)
Dept: ENDOCRINOLOGY | Age: 79
End: 2019-01-01

## 2019-01-01 ENCOUNTER — TELEPHONE (OUTPATIENT)
Dept: CARDIAC REHAB | Facility: HOSPITAL | Age: 79
End: 2019-01-01

## 2019-01-01 ENCOUNTER — DOCUMENTATION (OUTPATIENT)
Dept: PAIN MEDICINE | Facility: CLINIC | Age: 79
End: 2019-01-01

## 2019-01-01 ENCOUNTER — TELEPHONE (OUTPATIENT)
Dept: CARDIOLOGY | Facility: CLINIC | Age: 79
End: 2019-01-01

## 2019-01-01 ENCOUNTER — APPOINTMENT (OUTPATIENT)
Dept: CARDIOLOGY | Facility: HOSPITAL | Age: 79
End: 2019-01-01

## 2019-01-01 ENCOUNTER — TELEPHONE (OUTPATIENT)
Dept: SLEEP MEDICINE | Facility: HOSPITAL | Age: 79
End: 2019-01-01

## 2019-01-01 ENCOUNTER — OFFICE VISIT (OUTPATIENT)
Dept: SLEEP MEDICINE | Facility: HOSPITAL | Age: 79
End: 2019-01-01

## 2019-01-01 ENCOUNTER — ANTICOAGULATION VISIT (OUTPATIENT)
Dept: PHARMACY | Facility: HOSPITAL | Age: 79
End: 2019-01-01

## 2019-01-01 ENCOUNTER — ANCILLARY PROCEDURE (OUTPATIENT)
Dept: PERIOP | Facility: HOSPITAL | Age: 79
End: 2019-01-01

## 2019-01-01 ENCOUNTER — OFFICE VISIT (OUTPATIENT)
Dept: CARDIAC SURGERY | Facility: CLINIC | Age: 79
End: 2019-01-01

## 2019-01-01 ENCOUNTER — OFFICE VISIT (OUTPATIENT)
Dept: CARDIOLOGY | Facility: CLINIC | Age: 79
End: 2019-01-01

## 2019-01-01 ENCOUNTER — ANESTHESIA (OUTPATIENT)
Dept: PERIOP | Facility: HOSPITAL | Age: 79
End: 2019-01-01

## 2019-01-01 ENCOUNTER — ANESTHESIA EVENT (OUTPATIENT)
Dept: PERIOP | Facility: HOSPITAL | Age: 79
End: 2019-01-01

## 2019-01-01 ENCOUNTER — OFFICE VISIT (OUTPATIENT)
Dept: ENDOCRINOLOGY | Age: 79
End: 2019-01-01

## 2019-01-01 ENCOUNTER — APPOINTMENT (OUTPATIENT)
Dept: RESPIRATORY THERAPY | Facility: HOSPITAL | Age: 79
End: 2019-01-01

## 2019-01-01 ENCOUNTER — TELEPHONE (OUTPATIENT)
Dept: PHARMACY | Facility: HOSPITAL | Age: 79
End: 2019-01-01

## 2019-01-01 ENCOUNTER — OUTSIDE FACILITY SERVICE (OUTPATIENT)
Dept: PAIN MEDICINE | Facility: CLINIC | Age: 79
End: 2019-01-01

## 2019-01-01 ENCOUNTER — APPOINTMENT (OUTPATIENT)
Dept: SLEEP MEDICINE | Facility: HOSPITAL | Age: 79
End: 2019-01-01

## 2019-01-01 ENCOUNTER — HOSPITAL ENCOUNTER (INPATIENT)
Facility: HOSPITAL | Age: 79
LOS: 23 days | Discharge: NURSING, REHAB OR LONG-TERM CARE | End: 2019-10-28
Attending: EMERGENCY MEDICINE | Admitting: THORACIC SURGERY (CARDIOTHORACIC VASCULAR SURGERY)

## 2019-01-01 VITALS
SYSTOLIC BLOOD PRESSURE: 173 MMHG | WEIGHT: 223.4 LBS | BODY MASS INDEX: 33.86 KG/M2 | OXYGEN SATURATION: 94 % | RESPIRATION RATE: 18 BRPM | TEMPERATURE: 97.7 F | HEART RATE: 83 BPM | HEIGHT: 68 IN | DIASTOLIC BLOOD PRESSURE: 78 MMHG

## 2019-01-01 VITALS
SYSTOLIC BLOOD PRESSURE: 101 MMHG | DIASTOLIC BLOOD PRESSURE: 67 MMHG | BODY MASS INDEX: 31.22 KG/M2 | RESPIRATION RATE: 20 BRPM | OXYGEN SATURATION: 94 % | HEART RATE: 84 BPM | TEMPERATURE: 98.4 F | HEIGHT: 68 IN | WEIGHT: 206 LBS

## 2019-01-01 VITALS
BODY MASS INDEX: 34.25 KG/M2 | HEART RATE: 84 BPM | SYSTOLIC BLOOD PRESSURE: 136 MMHG | OXYGEN SATURATION: 90 % | DIASTOLIC BLOOD PRESSURE: 52 MMHG | HEIGHT: 68 IN | WEIGHT: 226 LBS

## 2019-01-01 VITALS — OXYGEN SATURATION: 97 % | WEIGHT: 205 LBS | HEIGHT: 70 IN | BODY MASS INDEX: 29.35 KG/M2 | HEART RATE: 69 BPM

## 2019-01-01 VITALS
DIASTOLIC BLOOD PRESSURE: 64 MMHG | OXYGEN SATURATION: 95 % | WEIGHT: 205.6 LBS | TEMPERATURE: 98.1 F | BODY MASS INDEX: 29.43 KG/M2 | HEIGHT: 70 IN | SYSTOLIC BLOOD PRESSURE: 105 MMHG | HEART RATE: 83 BPM | RESPIRATION RATE: 18 BRPM

## 2019-01-01 VITALS
WEIGHT: 226.4 LBS | DIASTOLIC BLOOD PRESSURE: 80 MMHG | SYSTOLIC BLOOD PRESSURE: 132 MMHG | HEIGHT: 68 IN | HEART RATE: 81 BPM | BODY MASS INDEX: 34.31 KG/M2

## 2019-01-01 VITALS
OXYGEN SATURATION: 92 % | SYSTOLIC BLOOD PRESSURE: 100 MMHG | DIASTOLIC BLOOD PRESSURE: 60 MMHG | BODY MASS INDEX: 32.23 KG/M2 | WEIGHT: 212 LBS | HEART RATE: 92 BPM | TEMPERATURE: 98 F

## 2019-01-01 DIAGNOSIS — N17.9 AKI (ACUTE KIDNEY INJURY) (HCC): ICD-10-CM

## 2019-01-01 DIAGNOSIS — I25.119 CORONARY ARTERY DISEASE INVOLVING NATIVE CORONARY ARTERY OF NATIVE HEART WITH ANGINA PECTORIS (HCC): ICD-10-CM

## 2019-01-01 DIAGNOSIS — M47.816 LUMBAR FACET ARTHROPATHY: ICD-10-CM

## 2019-01-01 DIAGNOSIS — E16.1 HYPERINSULINISM: ICD-10-CM

## 2019-01-01 DIAGNOSIS — Z86.73 HISTORY OF ISCHEMIC STROKE WITHOUT RESIDUAL DEFICITS: ICD-10-CM

## 2019-01-01 DIAGNOSIS — N18.30 CKD (CHRONIC KIDNEY DISEASE), STAGE III (HCC): ICD-10-CM

## 2019-01-01 DIAGNOSIS — S01.00XS OPEN WOUND OF SCALP, UNSPECIFIED OPEN WOUND TYPE, SEQUELA: ICD-10-CM

## 2019-01-01 DIAGNOSIS — IMO0002 DIABETES MELLITUS TYPE 2, UNCONTROLLED, WITH COMPLICATIONS: ICD-10-CM

## 2019-01-01 DIAGNOSIS — L89.323 STAGE III PRESSURE ULCER OF LEFT BUTTOCK (HCC): Primary | ICD-10-CM
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
## 2019-01-01 DIAGNOSIS — IMO0002 UNCONTROLLED TYPE II DIABETES MELLITUS WITH NEPHROPATHY: ICD-10-CM

## 2019-01-01 DIAGNOSIS — Z91.89 RISK FACTORS FOR OBSTRUCTIVE SLEEP APNEA: ICD-10-CM

## 2019-01-01 DIAGNOSIS — I25.119 CORONARY ARTERY DISEASE INVOLVING NATIVE HEART WITH ANGINA PECTORIS, UNSPECIFIED VESSEL OR LESION TYPE (HCC): ICD-10-CM

## 2019-01-01 DIAGNOSIS — E66.9 OBESITY (BMI 30-39.9): ICD-10-CM

## 2019-01-01 DIAGNOSIS — G47.30 OBSERVED SLEEP APNEA: ICD-10-CM

## 2019-01-01 DIAGNOSIS — G47.30 OBSERVED SLEEP APNEA: Primary | ICD-10-CM

## 2019-01-01 DIAGNOSIS — I21.4 NSTEMI (NON-ST ELEVATION MYOCARDIAL INFARCTION) (HCC): Primary | ICD-10-CM

## 2019-01-01 DIAGNOSIS — IMO0002 UNCONTROLLED TYPE II DIABETES MELLITUS WITH NEPHROPATHY: Primary | ICD-10-CM

## 2019-01-01 DIAGNOSIS — Z95.2 STATUS POST MITRAL VALVE REPLACEMENT: ICD-10-CM

## 2019-01-01 DIAGNOSIS — I20.0 UNSTABLE ANGINA (HCC): Primary | ICD-10-CM

## 2019-01-01 DIAGNOSIS — E27.40 ADRENAL INSUFFICIENCY (HCC): ICD-10-CM

## 2019-01-01 DIAGNOSIS — Z95.2 S/P MVR (MITRAL VALVE REPLACEMENT): ICD-10-CM

## 2019-01-01 DIAGNOSIS — N18.9 CHRONIC KIDNEY DISEASE, UNSPECIFIED CKD STAGE: ICD-10-CM

## 2019-01-01 DIAGNOSIS — I77.9 BILATERAL CAROTID ARTERY DISEASE, UNSPECIFIED TYPE (HCC): ICD-10-CM

## 2019-01-01 DIAGNOSIS — I10 ESSENTIAL HYPERTENSION: ICD-10-CM

## 2019-01-01 DIAGNOSIS — Z95.1 S/P CABG X 4: Primary | ICD-10-CM

## 2019-01-01 DIAGNOSIS — Z95.1 HX OF CABG: ICD-10-CM

## 2019-01-01 DIAGNOSIS — I48.21 PERMANENT ATRIAL FIBRILLATION (HCC): ICD-10-CM

## 2019-01-01 DIAGNOSIS — Z99.2 ESRD ON HEMODIALYSIS (HCC): ICD-10-CM

## 2019-01-01 DIAGNOSIS — N18.6 ESRD ON HEMODIALYSIS (HCC): ICD-10-CM

## 2019-01-01 DIAGNOSIS — G47.10 HYPERSOMNOLENCE: ICD-10-CM

## 2019-01-01 DIAGNOSIS — Z00.00 MEDICARE ANNUAL WELLNESS VISIT, SUBSEQUENT: ICD-10-CM

## 2019-01-01 DIAGNOSIS — Z79.4 ENCOUNTER FOR LONG-TERM (CURRENT) USE OF INSULIN (HCC): ICD-10-CM

## 2019-01-01 DIAGNOSIS — Z79.01 ANTICOAGULATED: ICD-10-CM

## 2019-01-01 DIAGNOSIS — R06.83 SNORING: ICD-10-CM

## 2019-01-01 DIAGNOSIS — S00.06XS: Primary | ICD-10-CM

## 2019-01-01 DIAGNOSIS — IMO0002 DIABETES MELLITUS TYPE 2, UNCONTROLLED, WITH COMPLICATIONS: Primary | ICD-10-CM

## 2019-01-01 DIAGNOSIS — G89.29 OTHER CHRONIC PAIN: Primary | ICD-10-CM

## 2019-01-01 LAB
A PHAGOCYTOPH IGG TITR SER IF: NEGATIVE {TITER}
ABO + RH BLD: NORMAL
ABO GROUP BLD: NORMAL
ABO GROUP BLD: NORMAL
ACT BLD: 103 SECONDS (ref 82–152)
ACT BLD: 131 SECONDS (ref 82–152)
ACT BLD: 268 SECONDS (ref 82–152)
ACT BLD: 323 SECONDS (ref 82–152)
ACT BLD: 395 SECONDS (ref 82–152)
ACT BLD: 411 SECONDS (ref 82–152)
ACT BLD: 461 SECONDS (ref 82–152)
ACTH PLAS-MCNC: 14.7 PG/ML (ref 7.2–63.3)
ALBUMIN SERPL-MCNC: 3 G/DL (ref 3.5–5.2)
ALBUMIN SERPL-MCNC: 3 G/DL (ref 3.5–5.2)
ALBUMIN SERPL-MCNC: 3.1 G/DL (ref 3.5–5.2)
ALBUMIN SERPL-MCNC: 3.3 G/DL (ref 3.5–5.2)
ALBUMIN SERPL-MCNC: 3.3 G/DL (ref 3.5–5.2)
ALBUMIN SERPL-MCNC: 3.4 G/DL (ref 3.5–5.2)
ALBUMIN SERPL-MCNC: 3.4 G/DL (ref 3.5–5.2)
ALBUMIN SERPL-MCNC: 3.5 G/DL (ref 3.5–5.2)
ALBUMIN SERPL-MCNC: 3.6 G/DL (ref 3.5–5.2)
ALBUMIN SERPL-MCNC: 3.7 G/DL (ref 3.5–5.2)
ALBUMIN SERPL-MCNC: 3.8 G/DL (ref 3.5–5.2)
ALBUMIN SERPL-MCNC: 3.8 G/DL (ref 3.5–5.2)
ALBUMIN SERPL-MCNC: 3.9 G/DL (ref 3.5–5.2)
ALBUMIN SERPL-MCNC: 4.1 G/DL (ref 3.5–5.2)
ALBUMIN SERPL-MCNC: 4.3 G/DL (ref 3.5–5.2)
ALBUMIN SERPL-MCNC: 4.3 G/DL (ref 3.5–5.2)
ALBUMIN/GLOB SERPL: 1 G/DL
ALBUMIN/GLOB SERPL: 1.1 G/DL
ALBUMIN/GLOB SERPL: 1.3 G/DL
ALBUMIN/GLOB SERPL: 1.3 G/DL
ALBUMIN/GLOB SERPL: 1.5 G/DL
ALBUMIN/GLOB SERPL: 1.6 G/DL
ALP SERPL-CCNC: 55 U/L (ref 39–117)
ALP SERPL-CCNC: 55 U/L (ref 39–117)
ALP SERPL-CCNC: 58 U/L (ref 39–117)
ALP SERPL-CCNC: 74 U/L (ref 39–117)
ALP SERPL-CCNC: 80 U/L (ref 39–117)
ALP SERPL-CCNC: 87 U/L (ref 39–117)
ALT SERPL W P-5'-P-CCNC: 17 U/L (ref 1–41)
ALT SERPL W P-5'-P-CCNC: 21 U/L (ref 1–41)
ALT SERPL W P-5'-P-CCNC: 31 U/L (ref 1–41)
ALT SERPL W P-5'-P-CCNC: 6 U/L (ref 1–41)
ALT SERPL W P-5'-P-CCNC: <5 U/L (ref 1–41)
ALT SERPL W P-5'-P-CCNC: <5 U/L (ref 1–41)
ANION GAP SERPL CALCULATED.3IONS-SCNC: 10.5 MMOL/L (ref 5–15)
ANION GAP SERPL CALCULATED.3IONS-SCNC: 12.5 MMOL/L (ref 5–15)
ANION GAP SERPL CALCULATED.3IONS-SCNC: 13.2 MMOL/L (ref 5–15)
ANION GAP SERPL CALCULATED.3IONS-SCNC: 13.6 MMOL/L (ref 5–15)
ANION GAP SERPL CALCULATED.3IONS-SCNC: 13.8 MMOL/L (ref 5–15)
ANION GAP SERPL CALCULATED.3IONS-SCNC: 13.9 MMOL/L (ref 5–15)
ANION GAP SERPL CALCULATED.3IONS-SCNC: 14 MMOL/L (ref 5–15)
ANION GAP SERPL CALCULATED.3IONS-SCNC: 14.3 MMOL/L (ref 5–15)
ANION GAP SERPL CALCULATED.3IONS-SCNC: 14.6 MMOL/L (ref 5–15)
ANION GAP SERPL CALCULATED.3IONS-SCNC: 15 MMOL/L (ref 5–15)
ANION GAP SERPL CALCULATED.3IONS-SCNC: 15.1 MMOL/L (ref 5–15)
ANION GAP SERPL CALCULATED.3IONS-SCNC: 16.8 MMOL/L (ref 5–15)
ANION GAP SERPL CALCULATED.3IONS-SCNC: 16.8 MMOL/L (ref 5–15)
ANION GAP SERPL CALCULATED.3IONS-SCNC: 17.2 MMOL/L (ref 5–15)
ANION GAP SERPL CALCULATED.3IONS-SCNC: 17.4 MMOL/L (ref 5–15)
ANION GAP SERPL CALCULATED.3IONS-SCNC: 18.1 MMOL/L (ref 5–15)
ANION GAP SERPL CALCULATED.3IONS-SCNC: 18.5 MMOL/L (ref 5–15)
ANION GAP SERPL CALCULATED.3IONS-SCNC: 19.4 MMOL/L (ref 5–15)
ANION GAP SERPL CALCULATED.3IONS-SCNC: 19.6 MMOL/L (ref 5–15)
ANION GAP SERPL CALCULATED.3IONS-SCNC: 21 MMOL/L (ref 5–15)
ANION GAP SERPL CALCULATED.3IONS-SCNC: 21.1 MMOL/L (ref 5–15)
ANION GAP SERPL CALCULATED.3IONS-SCNC: 21.6 MMOL/L (ref 5–15)
ANION GAP SERPL CALCULATED.3IONS-SCNC: 7.1 MMOL/L (ref 5–15)
ANION GAP SERPL CALCULATED.3IONS-SCNC: 7.6 MMOL/L (ref 5–15)
ANION GAP SERPL CALCULATED.3IONS-SCNC: 8.7 MMOL/L (ref 5–15)
ANION GAP SERPL CALCULATED.3IONS-SCNC: 9.4 MMOL/L (ref 5–15)
ANION GAP SERPL CALCULATED.3IONS-SCNC: 9.4 MMOL/L (ref 5–15)
AORTIC DIMENSIONLESS INDEX: 0.6 (DI)
APTT PPP: 27 SECONDS (ref 22.7–35.4)
APTT PPP: 27.4 SECONDS (ref 22.7–35.4)
APTT PPP: 27.6 SECONDS (ref 22.7–35.4)
APTT PPP: 29 SECONDS (ref 22.7–35.4)
APTT PPP: 30.6 SECONDS (ref 22.7–35.4)
APTT PPP: 35.2 SECONDS (ref 22.7–35.4)
APTT PPP: 36.8 SECONDS (ref 22.7–35.4)
APTT PPP: 37.5 SECONDS (ref 22.7–35.4)
APTT PPP: 38.1 SECONDS (ref 22.7–35.4)
APTT PPP: 41.1 SECONDS (ref 22.7–35.4)
APTT PPP: 43.5 SECONDS (ref 22.7–35.4)
APTT PPP: 44.6 SECONDS (ref 22.7–35.4)
APTT PPP: 50.1 SECONDS (ref 22.7–35.4)
APTT PPP: 51.3 SECONDS (ref 22.7–35.4)
APTT PPP: 53.6 SECONDS (ref 22.7–35.4)
APTT PPP: 61.7 SECONDS (ref 22.7–35.4)
APTT PPP: 63.3 SECONDS (ref 22.7–35.4)
APTT PPP: 71.5 SECONDS (ref 22.7–35.4)
APTT PPP: 90.3 SECONDS (ref 22.7–35.4)
ARTERIAL PATENCY WRIST A: ABNORMAL
ARTERIAL PATENCY WRIST A: POSITIVE
AST SERPL-CCNC: 11 U/L (ref 1–40)
AST SERPL-CCNC: 11 U/L (ref 1–40)
AST SERPL-CCNC: 13 U/L (ref 1–40)
AST SERPL-CCNC: 14 U/L (ref 1–40)
AST SERPL-CCNC: 17 U/L (ref 1–40)
AST SERPL-CCNC: 20 U/L (ref 1–40)
ATMOSPHERIC PRESS: 747.3 MMHG
ATMOSPHERIC PRESS: 754.4 MMHG
ATMOSPHERIC PRESS: 756.7 MMHG
ATMOSPHERIC PRESS: 757.5 MMHG
B BURGDOR IGG+IGM SER-ACNC: <0.91 ISR (ref 0–0.9)
BACTERIA SPEC AEROBE CULT: NORMAL
BACTERIA SPEC ANAEROBE CULT: NORMAL
BASE EXCESS BLDA CALC-SCNC: -2.8 MMOL/L (ref 0–2)
BASE EXCESS BLDA CALC-SCNC: 0.9 MMOL/L (ref 0–2)
BASE EXCESS BLDA CALC-SCNC: 1.9 MMOL/L (ref 0–2)
BASE EXCESS BLDA CALC-SCNC: 2.2 MMOL/L (ref 0–2)
BASE EXCESS BLDA CALC-SCNC: 5 MMOL/L (ref -5–5)
BASE EXCESS BLDA CALC-SCNC: 6 MMOL/L (ref -5–5)
BASE EXCESS BLDA CALC-SCNC: 7 MMOL/L (ref -5–5)
BASE EXCESS BLDA CALC-SCNC: 7 MMOL/L (ref -5–5)
BASE EXCESS BLDA CALC-SCNC: 9 MMOL/L (ref -5–5)
BASOPHILS # BLD AUTO: 0.01 10*3/MM3 (ref 0–0.2)
BASOPHILS # BLD AUTO: 0.03 10*3/MM3 (ref 0–0.2)
BASOPHILS # BLD AUTO: 0.04 10*3/MM3 (ref 0–0.2)
BASOPHILS # BLD AUTO: 0.05 10*3/MM3 (ref 0–0.2)
BASOPHILS # BLD AUTO: 0.06 10*3/MM3 (ref 0–0.2)
BASOPHILS # BLD AUTO: 0.07 10*3/MM3 (ref 0–0.2)
BASOPHILS NFR BLD AUTO: 0.1 % (ref 0–1.5)
BASOPHILS NFR BLD AUTO: 0.3 % (ref 0–1.5)
BASOPHILS NFR BLD AUTO: 0.4 % (ref 0–1.5)
BASOPHILS NFR BLD AUTO: 0.5 % (ref 0–1.5)
BASOPHILS NFR BLD AUTO: 0.6 % (ref 0–1.5)
BASOPHILS NFR BLD AUTO: 0.7 % (ref 0–1.5)
BASOPHILS NFR BLD AUTO: 0.7 % (ref 0–1.5)
BASOPHILS NFR BLD AUTO: 0.8 % (ref 0–1.5)
BASOPHILS NFR BLD AUTO: 0.8 % (ref 0–1.5)
BASOPHILS NFR BLD AUTO: 0.9 % (ref 0–1.5)
BASOPHILS NFR BLD AUTO: 1 % (ref 0–1.5)
BDY SITE: ABNORMAL
BH BB BLOOD EXPIRATION DATE: NORMAL
BH BB BLOOD TYPE BARCODE: 600
BH BB DISPENSE STATUS: NORMAL
BH BB PRODUCT CODE: NORMAL
BH BB UNIT NUMBER: NORMAL
BH CV ECHO MEAS - ACS: 1.7 CM
BH CV ECHO MEAS - ACS: 1.7 CM
BH CV ECHO MEAS - AO MAX PG (FULL): 7.2 MMHG
BH CV ECHO MEAS - AO MAX PG: 14 MMHG
BH CV ECHO MEAS - AO MAX PG: 9.6 MMHG
BH CV ECHO MEAS - AO MEAN PG (FULL): 5 MMHG
BH CV ECHO MEAS - AO MEAN PG (FULL): 5 MMHG
BH CV ECHO MEAS - AO MEAN PG: 6 MMHG
BH CV ECHO MEAS - AO MEAN PG: 8 MMHG
BH CV ECHO MEAS - AO ROOT AREA (BSA CORRECTED): 1.3
BH CV ECHO MEAS - AO ROOT AREA (BSA CORRECTED): 1.4
BH CV ECHO MEAS - AO ROOT AREA: 6.6 CM^2
BH CV ECHO MEAS - AO ROOT AREA: 8 CM^2
BH CV ECHO MEAS - AO ROOT DIAM: 2.9 CM
BH CV ECHO MEAS - AO ROOT DIAM: 3.2 CM
BH CV ECHO MEAS - AO V2 MAX: 155 CM/SEC
BH CV ECHO MEAS - AO V2 MAX: 188 CM/SEC
BH CV ECHO MEAS - AO V2 MEAN: 112 CM/SEC
BH CV ECHO MEAS - AO V2 MEAN: 135 CM/SEC
BH CV ECHO MEAS - AO V2 VTI: 36.7 CM
BH CV ECHO MEAS - AO V2 VTI: 39.3 CM
BH CV ECHO MEAS - AVA(I,A): 1.6 CM^2
BH CV ECHO MEAS - AVA(I,A): 2 CM^2
BH CV ECHO MEAS - AVA(I,D): 1.6 CM^2
BH CV ECHO MEAS - AVA(I,D): 2 CM^2
BH CV ECHO MEAS - AVA(V,A): 1.6 CM^2
BH CV ECHO MEAS - AVA(V,D): 1.6 CM^2
BH CV ECHO MEAS - BSA(HAYCOCK): 2.2 M^2
BH CV ECHO MEAS - BSA(HAYCOCK): 2.2 M^2
BH CV ECHO MEAS - BSA(HAYCOCK): 2.3 M^2
BH CV ECHO MEAS - BSA: 2.1 M^2
BH CV ECHO MEAS - BSA: 2.2 M^2
BH CV ECHO MEAS - BSA: 2.2 M^2
BH CV ECHO MEAS - BZI_BMI: 29.7 KILOGRAMS/M^2
BH CV ECHO MEAS - BZI_BMI: 31.6 KILOGRAMS/M^2
BH CV ECHO MEAS - BZI_BMI: 32.9 KILOGRAMS/M^2
BH CV ECHO MEAS - BZI_METRIC_HEIGHT: 177.8 CM
BH CV ECHO MEAS - BZI_METRIC_WEIGHT: 103.9 KG
BH CV ECHO MEAS - BZI_METRIC_WEIGHT: 93.9 KG
BH CV ECHO MEAS - BZI_METRIC_WEIGHT: 99.8 KG
BH CV ECHO MEAS - EDV(MOD-SP2): 126 ML
BH CV ECHO MEAS - EDV(MOD-SP2): 96 ML
BH CV ECHO MEAS - EDV(MOD-SP4): 131 ML
BH CV ECHO MEAS - EDV(MOD-SP4): 135 ML
BH CV ECHO MEAS - EDV(MOD-SP4): 69 ML
BH CV ECHO MEAS - EDV(TEICH): 141.3 ML
BH CV ECHO MEAS - EDV(TEICH): 201.9 ML
BH CV ECHO MEAS - EF(CUBED): 38.2 %
BH CV ECHO MEAS - EF(CUBED): 59.2 %
BH CV ECHO MEAS - EF(MOD-BP): 40 %
BH CV ECHO MEAS - EF(MOD-BP): 52 %
BH CV ECHO MEAS - EF(MOD-BP): 53 %
BH CV ECHO MEAS - EF(MOD-SP2): 50 %
BH CV ECHO MEAS - EF(MOD-SP2): 50.8 %
BH CV ECHO MEAS - EF(MOD-SP4): 40.6 %
BH CV ECHO MEAS - EF(MOD-SP4): 52.6 %
BH CV ECHO MEAS - EF(MOD-SP4): 57.3 %
BH CV ECHO MEAS - EF(TEICH): 31.1 %
BH CV ECHO MEAS - EF(TEICH): 49.8 %
BH CV ECHO MEAS - ESV(MOD-SP2): 48 ML
BH CV ECHO MEAS - ESV(MOD-SP2): 62 ML
BH CV ECHO MEAS - ESV(MOD-SP4): 41 ML
BH CV ECHO MEAS - ESV(MOD-SP4): 56 ML
BH CV ECHO MEAS - ESV(MOD-SP4): 64 ML
BH CV ECHO MEAS - ESV(TEICH): 101.3 ML
BH CV ECHO MEAS - ESV(TEICH): 97.3 ML
BH CV ECHO MEAS - FS: 14.8 %
BH CV ECHO MEAS - FS: 25.8 %
BH CV ECHO MEAS - IVS/LVPW: 1
BH CV ECHO MEAS - IVS/LVPW: 1
BH CV ECHO MEAS - IVSD: 0.98 CM
BH CV ECHO MEAS - IVSD: 1 CM
BH CV ECHO MEAS - LAT PEAK E' VEL: 11 CM/SEC
BH CV ECHO MEAS - LAT PEAK E' VEL: 9 CM/SEC
BH CV ECHO MEAS - LV DIASTOLIC VOL/BSA (35-75): 32.6 ML/M^2
BH CV ECHO MEAS - LV DIASTOLIC VOL/BSA (35-75): 60.3 ML/M^2
BH CV ECHO MEAS - LV DIASTOLIC VOL/BSA (35-75): 61.1 ML/M^2
BH CV ECHO MEAS - LV MASS(C)D: 206.7 GRAMS
BH CV ECHO MEAS - LV MASS(C)D: 260.8 GRAMS
BH CV ECHO MEAS - LV MASS(C)DI: 118 GRAMS/M^2
BH CV ECHO MEAS - LV MASS(C)DI: 95.1 GRAMS/M^2
BH CV ECHO MEAS - LV MAX PG: 2.5 MMHG
BH CV ECHO MEAS - LV MEAN PG: 1 MMHG
BH CV ECHO MEAS - LV MEAN PG: 3 MMHG
BH CV ECHO MEAS - LV SYSTOLIC VOL/BSA (12-30): 19.4 ML/M^2
BH CV ECHO MEAS - LV SYSTOLIC VOL/BSA (12-30): 25.8 ML/M^2
BH CV ECHO MEAS - LV SYSTOLIC VOL/BSA (12-30): 28.9 ML/M^2
BH CV ECHO MEAS - LV V1 MAX: 140 CM/SEC
BH CV ECHO MEAS - LV V1 MAX: 78.3 CM/SEC
BH CV ECHO MEAS - LV V1 MEAN: 57.7 CM/SEC
BH CV ECHO MEAS - LV V1 MEAN: 79.1 CM/SEC
BH CV ECHO MEAS - LV V1 VTI: 18.9 CM
BH CV ECHO MEAS - LV V1 VTI: 22.5 CM
BH CV ECHO MEAS - LVIDD: 5.4 CM
BH CV ECHO MEAS - LVIDD: 6.3 CM
BH CV ECHO MEAS - LVIDS: 4.6 CM
BH CV ECHO MEAS - LVIDS: 4.7 CM
BH CV ECHO MEAS - LVLD AP2: 7.5 CM
BH CV ECHO MEAS - LVLD AP2: 8.1 CM
BH CV ECHO MEAS - LVLD AP4: 6.6 CM
BH CV ECHO MEAS - LVLD AP4: 7.6 CM
BH CV ECHO MEAS - LVLD AP4: 7.7 CM
BH CV ECHO MEAS - LVLS AP2: 7 CM
BH CV ECHO MEAS - LVLS AP2: 7.2 CM
BH CV ECHO MEAS - LVLS AP4: 5.7 CM
BH CV ECHO MEAS - LVLS AP4: 7 CM
BH CV ECHO MEAS - LVLS AP4: 7.2 CM
BH CV ECHO MEAS - LVOT AREA (M): 3.1 CM^2
BH CV ECHO MEAS - LVOT AREA (M): 3.5 CM^2
BH CV ECHO MEAS - LVOT AREA: 3.1 CM^2
BH CV ECHO MEAS - LVOT AREA: 3.5 CM^2
BH CV ECHO MEAS - LVOT DIAM: 2 CM
BH CV ECHO MEAS - LVOT DIAM: 2.1 CM
BH CV ECHO MEAS - LVPWD: 0.98 CM
BH CV ECHO MEAS - LVPWD: 1 CM
BH CV ECHO MEAS - MED PEAK E' VEL: 11 CM/SEC
BH CV ECHO MEAS - MED PEAK E' VEL: 8 CM/SEC
BH CV ECHO MEAS - MR MAX PG: 92.5 MMHG
BH CV ECHO MEAS - MR MAX VEL: 481 CM/SEC
BH CV ECHO MEAS - MV A DUR: 0.12 SEC
BH CV ECHO MEAS - MV A MAX VEL: 121 CM/SEC
BH CV ECHO MEAS - MV DEC SLOPE: 1206 CM/SEC^2
BH CV ECHO MEAS - MV DEC SLOPE: 629 CM/SEC^2
BH CV ECHO MEAS - MV DEC TIME: 0.13 SEC
BH CV ECHO MEAS - MV DEC TIME: 0.18 SEC
BH CV ECHO MEAS - MV E MAX VEL: 166 CM/SEC
BH CV ECHO MEAS - MV E MAX VEL: 89.6 CM/SEC
BH CV ECHO MEAS - MV E/A: 0.74
BH CV ECHO MEAS - MV MAX PG: 5.7 MMHG
BH CV ECHO MEAS - MV MEAN PG: 3 MMHG
BH CV ECHO MEAS - MV MEAN PG: 8 MMHG
BH CV ECHO MEAS - MV P1/2T MAX VEL: 112.1 CM/SEC
BH CV ECHO MEAS - MV P1/2T MAX VEL: 217 CM/SEC
BH CV ECHO MEAS - MV P1/2T: 52.2 MSEC
BH CV ECHO MEAS - MV P1/2T: 52.7 MSEC
BH CV ECHO MEAS - MV V2 MAX: 119 CM/SEC
BH CV ECHO MEAS - MV V2 MEAN: 123 CM/SEC
BH CV ECHO MEAS - MV V2 MEAN: 81.5 CM/SEC
BH CV ECHO MEAS - MV V2 VTI: 31.2 CM
BH CV ECHO MEAS - MV V2 VTI: 32 CM
BH CV ECHO MEAS - MVA P1/2T LCG: 1 CM^2
BH CV ECHO MEAS - MVA P1/2T LCG: 2 CM^2
BH CV ECHO MEAS - MVA(P1/2T): 4.2 CM^2
BH CV ECHO MEAS - MVA(P1/2T): 4.2 CM^2
BH CV ECHO MEAS - MVA(VTI): 1.9 CM^2
BH CV ECHO MEAS - MVA(VTI): 2.4 CM^2
BH CV ECHO MEAS - PA ACC TIME: 0.1 SEC
BH CV ECHO MEAS - PA MAX PG (FULL): 1.7 MMHG
BH CV ECHO MEAS - PA MAX PG (FULL): 2.2 MMHG
BH CV ECHO MEAS - PA MAX PG: 3.1 MMHG
BH CV ECHO MEAS - PA MAX PG: 3.3 MMHG
BH CV ECHO MEAS - PA PR(ACCEL): 36.3 MMHG
BH CV ECHO MEAS - PA V2 MAX: 88.2 CM/SEC
BH CV ECHO MEAS - PA V2 MAX: 90.2 CM/SEC
BH CV ECHO MEAS - PULM A REVS DUR: 0.1 SEC
BH CV ECHO MEAS - PULM A REVS VEL: 34.8 CM/SEC
BH CV ECHO MEAS - PULM DIAS VEL: 31.9 CM/SEC
BH CV ECHO MEAS - PULM S/D: 1.2
BH CV ECHO MEAS - PULM SYS VEL: 39 CM/SEC
BH CV ECHO MEAS - PVA(V,A): 2.2 CM^2
BH CV ECHO MEAS - PVA(V,A): 2.2 CM^2
BH CV ECHO MEAS - PVA(V,D): 2.2 CM^2
BH CV ECHO MEAS - PVA(V,D): 2.2 CM^2
BH CV ECHO MEAS - QP/QS: 0.52
BH CV ECHO MEAS - QP/QS: 0.69
BH CV ECHO MEAS - RAP SYSTOLE: 3 MMHG
BH CV ECHO MEAS - RAP SYSTOLE: 8 MMHG
BH CV ECHO MEAS - RV MAX PG: 0.87 MMHG
BH CV ECHO MEAS - RV MAX PG: 1.6 MMHG
BH CV ECHO MEAS - RV MEAN PG: 1 MMHG
BH CV ECHO MEAS - RV MEAN PG: 1 MMHG
BH CV ECHO MEAS - RV V1 MAX: 46.6 CM/SEC
BH CV ECHO MEAS - RV V1 MAX: 62.8 CM/SEC
BH CV ECHO MEAS - RV V1 MEAN: 33.7 CM/SEC
BH CV ECHO MEAS - RV V1 MEAN: 43.4 CM/SEC
BH CV ECHO MEAS - RV V1 VTI: 13.1 CM
BH CV ECHO MEAS - RV V1 VTI: 9.7 CM
BH CV ECHO MEAS - RVOT AREA: 3.1 CM^2
BH CV ECHO MEAS - RVOT AREA: 4.2 CM^2
BH CV ECHO MEAS - RVOT DIAM: 2 CM
BH CV ECHO MEAS - RVOT DIAM: 2.3 CM
BH CV ECHO MEAS - RVSP: 24 MMHG
BH CV ECHO MEAS - RVSP: 28 MMHG
BH CV ECHO MEAS - SI(AO): 119.4 ML/M^2
BH CV ECHO MEAS - SI(AO): 133.5 ML/M^2
BH CV ECHO MEAS - SI(CUBED): 27.7 ML/M^2
BH CV ECHO MEAS - SI(CUBED): 67.3 ML/M^2
BH CV ECHO MEAS - SI(LVOT): 26.9 ML/M^2
BH CV ECHO MEAS - SI(LVOT): 35.9 ML/M^2
BH CV ECHO MEAS - SI(MOD-SP2): 21.7 ML/M^2
BH CV ECHO MEAS - SI(MOD-SP2): 29.4 ML/M^2
BH CV ECHO MEAS - SI(MOD-SP4): 13.2 ML/M^2
BH CV ECHO MEAS - SI(MOD-SP4): 32.1 ML/M^2
BH CV ECHO MEAS - SI(MOD-SP4): 34.5 ML/M^2
BH CV ECHO MEAS - SI(TEICH): 20.2 ML/M^2
BH CV ECHO MEAS - SI(TEICH): 45.5 ML/M^2
BH CV ECHO MEAS - SV(AO): 259.6 ML
BH CV ECHO MEAS - SV(AO): 295.2 ML
BH CV ECHO MEAS - SV(CUBED): 148.7 ML
BH CV ECHO MEAS - SV(CUBED): 60.1 ML
BH CV ECHO MEAS - SV(LVOT): 59.4 ML
BH CV ECHO MEAS - SV(LVOT): 77.9 ML
BH CV ECHO MEAS - SV(MOD-SP2): 48 ML
BH CV ECHO MEAS - SV(MOD-SP2): 64 ML
BH CV ECHO MEAS - SV(MOD-SP4): 28 ML
BH CV ECHO MEAS - SV(MOD-SP4): 71 ML
BH CV ECHO MEAS - SV(MOD-SP4): 75 ML
BH CV ECHO MEAS - SV(RVOT): 40.3 ML
BH CV ECHO MEAS - SV(RVOT): 41.2 ML
BH CV ECHO MEAS - SV(TEICH): 100.6 ML
BH CV ECHO MEAS - SV(TEICH): 44 ML
BH CV ECHO MEAS - TAPSE (>1.6): 2 CM2
BH CV ECHO MEAS - TR MAX VEL: 224 CM/SEC
BH CV ECHO MEAS - TR MAX VEL: 227 CM/SEC
BH CV ECHO MEASUREMENTS AVERAGE E/E' RATIO: 10.54
BH CV ECHO MEASUREMENTS AVERAGE E/E' RATIO: 15.09
BH CV UPPER ARTERIAL LEFT 2ND DIGIT SYS MAX: 106 MMHG
BH CV UPPER ARTERIAL LEFT FBI 2ND DIGIT RATIO: 0.93
BH CV UPPER ARTERIAL LEFT WBI RATIO: 1.18
BH CV UPPER ARTERIAL RIGHT 2ND DIGIT SYS MAX: 80 MMHG
BH CV UPPER ARTERIAL RIGHT FBI 2ND DIGIT RATIO: 0.7
BH CV VAS BP RIGHT ARM: NORMAL MMHG
BH CV VAS MEAS BASILIC ANTECUBITAL FOSSA LEFT: 0.4 CM
BH CV VAS MEAS BASILIC FOREARM LEFT - DIST: 0.08 CM
BH CV VAS MEAS BASILIC FOREARM LEFT - MID: 0.05 CM
BH CV VAS MEAS BASILIC FOREARM LEFT - PROX: 0.08 CM
BH CV VAS MEAS BASILIC UPPER ARM LEFT - DIST: 0.36 CM
BH CV VAS MEAS BASILIC UPPER ARM LEFT - MID: 0.44 CM
BH CV VAS MEAS CEPHALIC ANTECUBITAL FOSSA LEFT: 0.56 CM
BH CV VAS MEAS CEPHALIC ANTECUBITAL FOSSA RIGHT: 0.48 CM
BH CV VAS MEAS CEPHALIC FOREARM LEFT - PROX: 0.33 CM
BH CV VAS MEAS CEPHALIC FOREARM RIGHT - DIST: 0.3 CM
BH CV VAS MEAS CEPHALIC FOREARM RIGHT - MID: 0.35 CM
BH CV VAS MEAS CEPHALIC FOREARM RIGHT - PROX: 0.31 CM
BH CV VAS MEAS CEPHALIC UPPER ARM LEFT - DIST: 0.3 CM
BH CV VAS MEAS CEPHALIC UPPER ARM LEFT - MID: 0.36 CM
BH CV VAS MEAS CEPHALIC UPPER ARM LEFT - PROX: 0.36 CM
BH CV VAS MEAS CEPHALIC UPPER ARM RIGHT - DIST: 0.34 CM
BH CV VAS MEAS CEPHALIC UPPER ARM RIGHT - MID: 0.35 CM
BH CV VAS MEAS CEPHALIC UPPER ARM RIGHT - PROX: 0.4 CM
BH CV VAS MEAS RADIAL UPPER ARM LEFT - DIST: 0.18 CM
BH CV VAS MEAS RADIAL UPPER ARM LEFT - MID: 0.17 CM
BH CV VAS MEAS RADIAL UPPER ARM LEFT - PROX: 0.2 CM
BH CV VAS MEAS RADIAL UPPER ARM RIGHT - DIST: 0.19 CM
BH CV VAS MEAS RADIAL UPPER ARM RIGHT - MID: 0.2 CM
BH CV VAS MEAS RADIAL UPPER ARM RIGHT - PROX: 0.21 CM
BH CV XLRA - RV BASE: 3 CM
BH CV XLRA - RV BASE: 3.2 CM
BH CV XLRA - TDI S': 15 CM/SEC
BH CV XLRA - TDI S': 5 CM/SEC
BH CV XLRA MEAS - DIST GSV CALF DIST RIGHT: 0.24 CM
BH CV XLRA MEAS - DIST GSV THIGH DIST LEFT: 0.36 CM
BH CV XLRA MEAS - DIST GSV THIGH DIST RIGHT: 0.38 CM
BH CV XLRA MEAS - GSV KNEE DIST LEFT: 0.33 CM
BH CV XLRA MEAS - GSV KNEE DIST RIGHT: 0.26 CM
BH CV XLRA MEAS - GSV ORIGIN DIST RIGHT: 0.36 CM
BH CV XLRA MEAS - MID GSV CALF RIGHT: 0.21 CM
BH CV XLRA MEAS - MID GSV THIGH  LEFT: 0.37 CM
BH CV XLRA MEAS - MID GSV THIGH  RIGHT: 0.37 CM
BH CV XLRA MEAS - PROX GSV CALF DIST LEFT: 0.23 CM
BH CV XLRA MEAS - PROX GSV CALF DIST RIGHT: 0.32 CM
BH CV XLRA MEAS - PROX GSV THIGH  LEFT: 0.31 CM
BH CV XLRA MEAS - PROX GSV THIGH  RIGHT: 0.29 CM
BILIRUB SERPL-MCNC: 0.4 MG/DL (ref 0.2–1.2)
BILIRUB SERPL-MCNC: 0.5 MG/DL (ref 0.2–1.2)
BILIRUB SERPL-MCNC: 0.5 MG/DL (ref 0.2–1.2)
BILIRUB SERPL-MCNC: 0.6 MG/DL (ref 0.2–1.2)
BLD GP AB SCN SERPL QL: NEGATIVE
BLD GP AB SCN SERPL QL: NEGATIVE
BUN BLD-MCNC: 22 MG/DL (ref 8–23)
BUN BLD-MCNC: 24 MG/DL (ref 8–23)
BUN BLD-MCNC: 25 MG/DL (ref 8–23)
BUN BLD-MCNC: 25 MG/DL (ref 8–23)
BUN BLD-MCNC: 26 MG/DL (ref 8–23)
BUN BLD-MCNC: 27 MG/DL (ref 8–23)
BUN BLD-MCNC: 28 MG/DL (ref 8–23)
BUN BLD-MCNC: 31 MG/DL (ref 8–23)
BUN BLD-MCNC: 33 MG/DL (ref 8–23)
BUN BLD-MCNC: 42 MG/DL (ref 8–23)
BUN BLD-MCNC: 43 MG/DL (ref 8–23)
BUN BLD-MCNC: 46 MG/DL (ref 8–23)
BUN BLD-MCNC: 47 MG/DL (ref 8–23)
BUN BLD-MCNC: 47 MG/DL (ref 8–23)
BUN BLD-MCNC: 48 MG/DL (ref 8–23)
BUN BLD-MCNC: 49 MG/DL (ref 8–23)
BUN BLD-MCNC: 50 MG/DL (ref 8–23)
BUN BLD-MCNC: 58 MG/DL (ref 8–23)
BUN BLD-MCNC: 60 MG/DL (ref 8–23)
BUN BLD-MCNC: 69 MG/DL (ref 8–23)
BUN BLD-MCNC: 77 MG/DL (ref 8–23)
BUN BLD-MCNC: 88 MG/DL (ref 8–23)
BUN SERPL-MCNC: 44 MG/DL (ref 8–23)
BUN/CREAT SERPL: 10.2 (ref 7–25)
BUN/CREAT SERPL: 10.2 (ref 7–25)
BUN/CREAT SERPL: 10.3 (ref 7–25)
BUN/CREAT SERPL: 10.3 (ref 7–25)
BUN/CREAT SERPL: 10.5 (ref 7–25)
BUN/CREAT SERPL: 10.6 (ref 7–25)
BUN/CREAT SERPL: 10.9 (ref 7–25)
BUN/CREAT SERPL: 11 (ref 7–25)
BUN/CREAT SERPL: 11.1 (ref 7–25)
BUN/CREAT SERPL: 12.4 (ref 7–25)
BUN/CREAT SERPL: 12.9 (ref 7–25)
BUN/CREAT SERPL: 13.1 (ref 7–25)
BUN/CREAT SERPL: 13.6 (ref 7–25)
BUN/CREAT SERPL: 14 (ref 7–25)
BUN/CREAT SERPL: 14.1 (ref 7–25)
BUN/CREAT SERPL: 14.2 (ref 7–25)
BUN/CREAT SERPL: 14.5 (ref 7–25)
BUN/CREAT SERPL: 14.8 (ref 7–25)
BUN/CREAT SERPL: 5.1 (ref 7–25)
BUN/CREAT SERPL: 6.2 (ref 7–25)
BUN/CREAT SERPL: 6.8 (ref 7–25)
BUN/CREAT SERPL: 6.9 (ref 7–25)
BUN/CREAT SERPL: 7.3 (ref 7–25)
BUN/CREAT SERPL: 7.6 (ref 7–25)
BUN/CREAT SERPL: 7.6 (ref 7–25)
BUN/CREAT SERPL: 7.8 (ref 7–25)
BUN/CREAT SERPL: 8 (ref 7–25)
BUN/CREAT SERPL: 9.8 (ref 7–25)
CA-I BLD-MCNC: 4.9 MG/DL (ref 4.6–5.4)
CA-I SERPL ISE-MCNC: 1.22 MMOL/L (ref 1.15–1.35)
CALCIUM SERPL-MCNC: 8.4 MG/DL (ref 8.6–10.5)
CALCIUM SPEC-SCNC: 7.7 MG/DL (ref 8.6–10.5)
CALCIUM SPEC-SCNC: 7.8 MG/DL (ref 8.6–10.5)
CALCIUM SPEC-SCNC: 7.9 MG/DL (ref 8.6–10.5)
CALCIUM SPEC-SCNC: 7.9 MG/DL (ref 8.6–10.5)
CALCIUM SPEC-SCNC: 8 MG/DL (ref 8.6–10.5)
CALCIUM SPEC-SCNC: 8.1 MG/DL (ref 8.6–10.5)
CALCIUM SPEC-SCNC: 8.2 MG/DL (ref 8.6–10.5)
CALCIUM SPEC-SCNC: 8.2 MG/DL (ref 8.6–10.5)
CALCIUM SPEC-SCNC: 8.3 MG/DL (ref 8.6–10.5)
CALCIUM SPEC-SCNC: 8.4 MG/DL (ref 8.6–10.5)
CALCIUM SPEC-SCNC: 8.6 MG/DL (ref 8.6–10.5)
CALCIUM SPEC-SCNC: 8.8 MG/DL (ref 8.6–10.5)
CHLORIDE SERPL-SCNC: 101 MMOL/L (ref 98–107)
CHLORIDE SERPL-SCNC: 102 MMOL/L (ref 98–107)
CHLORIDE SERPL-SCNC: 103 MMOL/L (ref 98–107)
CHLORIDE SERPL-SCNC: 104 MMOL/L (ref 98–107)
CHLORIDE SERPL-SCNC: 105 MMOL/L (ref 98–107)
CHLORIDE SERPL-SCNC: 106 MMOL/L (ref 98–107)
CHLORIDE SERPL-SCNC: 107 MMOL/L (ref 98–107)
CHLORIDE SERPL-SCNC: 88 MMOL/L (ref 98–107)
CHLORIDE SERPL-SCNC: 92 MMOL/L (ref 98–107)
CHLORIDE SERPL-SCNC: 92 MMOL/L (ref 98–107)
CHLORIDE SERPL-SCNC: 93 MMOL/L (ref 98–107)
CHLORIDE SERPL-SCNC: 94 MMOL/L (ref 98–107)
CHLORIDE SERPL-SCNC: 94 MMOL/L (ref 98–107)
CHLORIDE SERPL-SCNC: 95 MMOL/L (ref 98–107)
CHLORIDE SERPL-SCNC: 95 MMOL/L (ref 98–107)
CHLORIDE SERPL-SCNC: 96 MMOL/L (ref 98–107)
CHLORIDE SERPL-SCNC: 98 MMOL/L (ref 98–107)
CHLORIDE SERPL-SCNC: 98 MMOL/L (ref 98–107)
CHLORIDE SERPL-SCNC: 99 MMOL/L (ref 98–107)
CHLORIDE SERPL-SCNC: 99 MMOL/L (ref 98–107)
CHOLEST SERPL-MCNC: 109 MG/DL (ref 0–200)
CLOSE TME COLL+ADP + EPINEP PNL BLD: 61 %
CLOSE TME COLL+ADP + EPINEP PNL BLD: 73 %
CLOSE TME COLL+ADP + EPINEP PNL BLD: 76 %
CO2 BLDA-SCNC: 32 MMOL/L (ref 24–29)
CO2 BLDA-SCNC: 32 MMOL/L (ref 24–29)
CO2 BLDA-SCNC: 34 MMOL/L (ref 24–29)
CO2 BLDA-SCNC: 35 MMOL/L (ref 24–29)
CO2 BLDA-SCNC: 36 MMOL/L (ref 24–29)
CO2 SERPL-SCNC: 19.4 MMOL/L (ref 22–29)
CO2 SERPL-SCNC: 19.9 MMOL/L (ref 22–29)
CO2 SERPL-SCNC: 20 MMOL/L (ref 22–29)
CO2 SERPL-SCNC: 20.4 MMOL/L (ref 22–29)
CO2 SERPL-SCNC: 20.4 MMOL/L (ref 22–29)
CO2 SERPL-SCNC: 20.6 MMOL/L (ref 22–29)
CO2 SERPL-SCNC: 21.5 MMOL/L (ref 22–29)
CO2 SERPL-SCNC: 21.9 MMOL/L (ref 22–29)
CO2 SERPL-SCNC: 22.2 MMOL/L (ref 22–29)
CO2 SERPL-SCNC: 22.6 MMOL/L (ref 22–29)
CO2 SERPL-SCNC: 22.8 MMOL/L (ref 22–29)
CO2 SERPL-SCNC: 22.9 MMOL/L (ref 22–29)
CO2 SERPL-SCNC: 23 MMOL/L (ref 22–29)
CO2 SERPL-SCNC: 23.2 MMOL/L (ref 22–29)
CO2 SERPL-SCNC: 23.5 MMOL/L (ref 22–29)
CO2 SERPL-SCNC: 24 MMOL/L (ref 22–29)
CO2 SERPL-SCNC: 24.2 MMOL/L (ref 22–29)
CO2 SERPL-SCNC: 24.7 MMOL/L (ref 22–29)
CO2 SERPL-SCNC: 24.8 MMOL/L (ref 22–29)
CO2 SERPL-SCNC: 26.1 MMOL/L (ref 22–29)
CO2 SERPL-SCNC: 26.4 MMOL/L (ref 22–29)
CO2 SERPL-SCNC: 26.6 MMOL/L (ref 22–29)
CO2 SERPL-SCNC: 27.3 MMOL/L (ref 22–29)
CO2 SERPL-SCNC: 27.5 MMOL/L (ref 22–29)
CO2 SERPL-SCNC: 28.5 MMOL/L (ref 22–29)
CO2 SERPL-SCNC: 28.9 MMOL/L (ref 22–29)
CO2 SERPL-SCNC: 29.4 MMOL/L (ref 22–29)
CO2 SERPL-SCNC: 31.6 MMOL/L (ref 22–29)
CORTIS SERPL-MCNC: 11.75 MCG/DL
CORTIS SERPL-MCNC: 13.32 MCG/DL
CORTIS SERPL-MCNC: 22.3 MCG/DL
CREAT BLD-MCNC: 1.76 MG/DL (ref 0.76–1.27)
CREAT BLD-MCNC: 1.77 MG/DL (ref 0.76–1.27)
CREAT BLD-MCNC: 1.78 MG/DL (ref 0.76–1.27)
CREAT BLD-MCNC: 1.79 MG/DL (ref 0.76–1.27)
CREAT BLD-MCNC: 1.83 MG/DL (ref 0.76–1.27)
CREAT BLD-MCNC: 1.83 MG/DL (ref 0.76–1.27)
CREAT BLD-MCNC: 1.99 MG/DL (ref 0.76–1.27)
CREAT BLD-MCNC: 2.01 MG/DL (ref 0.76–1.27)
CREAT BLD-MCNC: 2.51 MG/DL (ref 0.76–1.27)
CREAT BLD-MCNC: 3.88 MG/DL (ref 0.76–1.27)
CREAT BLD-MCNC: 4.05 MG/DL (ref 0.76–1.27)
CREAT BLD-MCNC: 4.1 MG/DL (ref 0.76–1.27)
CREAT BLD-MCNC: 4.27 MG/DL (ref 0.76–1.27)
CREAT BLD-MCNC: 4.32 MG/DL (ref 0.76–1.27)
CREAT BLD-MCNC: 4.4 MG/DL (ref 0.76–1.27)
CREAT BLD-MCNC: 4.53 MG/DL (ref 0.76–1.27)
CREAT BLD-MCNC: 4.84 MG/DL (ref 0.76–1.27)
CREAT BLD-MCNC: 5.32 MG/DL (ref 0.76–1.27)
CREAT BLD-MCNC: 5.7 MG/DL (ref 0.76–1.27)
CREAT BLD-MCNC: 5.83 MG/DL (ref 0.76–1.27)
CREAT BLD-MCNC: 6.01 MG/DL (ref 0.76–1.27)
CREAT BLD-MCNC: 6.19 MG/DL (ref 0.76–1.27)
CREAT BLD-MCNC: 6.44 MG/DL (ref 0.76–1.27)
CREAT BLD-MCNC: 6.55 MG/DL (ref 0.76–1.27)
CREAT BLD-MCNC: 6.8 MG/DL (ref 0.76–1.27)
CREAT BLD-MCNC: 7.03 MG/DL (ref 0.76–1.27)
CREAT BLD-MCNC: 7.91 MG/DL (ref 0.76–1.27)
CREAT SERPL-MCNC: 4.02 MG/DL (ref 0.76–1.27)
CREAT UR-MCNC: 69.9 MG/DL
CYTO UR: NORMAL
DEPRECATED RDW RBC AUTO: 43.1 FL (ref 37–54)
DEPRECATED RDW RBC AUTO: 43.2 FL (ref 37–54)
DEPRECATED RDW RBC AUTO: 43.2 FL (ref 37–54)
DEPRECATED RDW RBC AUTO: 43.3 FL (ref 37–54)
DEPRECATED RDW RBC AUTO: 43.3 FL (ref 37–54)
DEPRECATED RDW RBC AUTO: 43.5 FL (ref 37–54)
DEPRECATED RDW RBC AUTO: 43.5 FL (ref 37–54)
DEPRECATED RDW RBC AUTO: 43.7 FL (ref 37–54)
DEPRECATED RDW RBC AUTO: 43.7 FL (ref 37–54)
DEPRECATED RDW RBC AUTO: 43.8 FL (ref 37–54)
DEPRECATED RDW RBC AUTO: 43.9 FL (ref 37–54)
DEPRECATED RDW RBC AUTO: 44.1 FL (ref 37–54)
DEPRECATED RDW RBC AUTO: 44.2 FL (ref 37–54)
DEPRECATED RDW RBC AUTO: 44.3 FL (ref 37–54)
DEPRECATED RDW RBC AUTO: 44.4 FL (ref 37–54)
DEPRECATED RDW RBC AUTO: 44.4 FL (ref 37–54)
DEPRECATED RDW RBC AUTO: 44.6 FL (ref 37–54)
DEPRECATED RDW RBC AUTO: 45 FL (ref 37–54)
DEPRECATED RDW RBC AUTO: 45.2 FL (ref 37–54)
DEPRECATED RDW RBC AUTO: 45.2 FL (ref 37–54)
DEPRECATED RDW RBC AUTO: 45.4 FL (ref 37–54)
DEPRECATED RDW RBC AUTO: 45.6 FL (ref 37–54)
DEPRECATED RDW RBC AUTO: 45.6 FL (ref 37–54)
DEPRECATED RDW RBC AUTO: 45.8 FL (ref 37–54)
DEPRECATED RDW RBC AUTO: 46.4 FL (ref 37–54)
EOSINOPHIL # BLD AUTO: 0 10*3/MM3 (ref 0–0.4)
EOSINOPHIL # BLD AUTO: 0.01 10*3/MM3 (ref 0–0.4)
EOSINOPHIL # BLD AUTO: 0.04 10*3/MM3 (ref 0–0.4)
EOSINOPHIL # BLD AUTO: 0.09 10*3/MM3 (ref 0–0.4)
EOSINOPHIL # BLD AUTO: 0.1 10*3/MM3 (ref 0–0.4)
EOSINOPHIL # BLD AUTO: 0.11 10*3/MM3 (ref 0–0.4)
EOSINOPHIL # BLD AUTO: 0.12 10*3/MM3 (ref 0–0.4)
EOSINOPHIL # BLD AUTO: 0.16 10*3/MM3 (ref 0–0.4)
EOSINOPHIL # BLD AUTO: 0.17 10*3/MM3 (ref 0–0.4)
EOSINOPHIL # BLD AUTO: 0.17 10*3/MM3 (ref 0–0.4)
EOSINOPHIL # BLD AUTO: 0.18 10*3/MM3 (ref 0–0.4)
EOSINOPHIL # BLD AUTO: 0.18 10*3/MM3 (ref 0–0.4)
EOSINOPHIL # BLD AUTO: 0.2 10*3/MM3 (ref 0–0.4)
EOSINOPHIL # BLD AUTO: 0.23 10*3/MM3 (ref 0–0.4)
EOSINOPHIL # BLD AUTO: 0.24 10*3/MM3 (ref 0–0.4)
EOSINOPHIL # BLD AUTO: 0.24 10*3/MM3 (ref 0–0.4)
EOSINOPHIL # BLD AUTO: 0.25 10*3/MM3 (ref 0–0.4)
EOSINOPHIL # BLD AUTO: 0.26 10*3/MM3 (ref 0–0.4)
EOSINOPHIL NFR BLD AUTO: 0 % (ref 0.3–6.2)
EOSINOPHIL NFR BLD AUTO: 0.1 % (ref 0.3–6.2)
EOSINOPHIL NFR BLD AUTO: 0.5 % (ref 0.3–6.2)
EOSINOPHIL NFR BLD AUTO: 1 % (ref 0.3–6.2)
EOSINOPHIL NFR BLD AUTO: 1.1 % (ref 0.3–6.2)
EOSINOPHIL NFR BLD AUTO: 1.3 % (ref 0.3–6.2)
EOSINOPHIL NFR BLD AUTO: 1.5 % (ref 0.3–6.2)
EOSINOPHIL NFR BLD AUTO: 1.7 % (ref 0.3–6.2)
EOSINOPHIL NFR BLD AUTO: 1.7 % (ref 0.3–6.2)
EOSINOPHIL NFR BLD AUTO: 1.8 % (ref 0.3–6.2)
EOSINOPHIL NFR BLD AUTO: 2 % (ref 0.3–6.2)
EOSINOPHIL NFR BLD AUTO: 2.1 % (ref 0.3–6.2)
EOSINOPHIL NFR BLD AUTO: 2.6 % (ref 0.3–6.2)
EOSINOPHIL NFR BLD AUTO: 2.9 % (ref 0.3–6.2)
EOSINOPHIL NFR BLD AUTO: 3.1 % (ref 0.3–6.2)
EOSINOPHIL NFR BLD AUTO: 3.2 % (ref 0.3–6.2)
EOSINOPHIL NFR BLD AUTO: 3.4 % (ref 0.3–6.2)
EOSINOPHIL NFR BLD AUTO: 3.8 % (ref 0.3–6.2)
ERYTHROCYTE [DISTWIDTH] IN BLOOD BY AUTOMATED COUNT: 12.9 % (ref 12.3–15.4)
ERYTHROCYTE [DISTWIDTH] IN BLOOD BY AUTOMATED COUNT: 13.1 % (ref 12.3–15.4)
ERYTHROCYTE [DISTWIDTH] IN BLOOD BY AUTOMATED COUNT: 13.2 % (ref 12.3–15.4)
ERYTHROCYTE [DISTWIDTH] IN BLOOD BY AUTOMATED COUNT: 13.3 % (ref 12.3–15.4)
ERYTHROCYTE [DISTWIDTH] IN BLOOD BY AUTOMATED COUNT: 13.4 % (ref 12.3–15.4)
ERYTHROCYTE [DISTWIDTH] IN BLOOD BY AUTOMATED COUNT: 13.5 % (ref 12.3–15.4)
ERYTHROCYTE [DISTWIDTH] IN BLOOD BY AUTOMATED COUNT: 13.6 % (ref 12.3–15.4)
ERYTHROCYTE [DISTWIDTH] IN BLOOD BY AUTOMATED COUNT: 13.6 % (ref 12.3–15.4)
ERYTHROCYTE [DISTWIDTH] IN BLOOD BY AUTOMATED COUNT: 13.7 % (ref 12.3–15.4)
ERYTHROCYTE [DISTWIDTH] IN BLOOD BY AUTOMATED COUNT: 16.2 % (ref 12.3–15.4)
FERRITIN SERPL-MCNC: 369 NG/ML (ref 30–400)
FIBRINOGEN PPP-MCNC: 237 MG/DL (ref 219–464)
GAS FLOW AIRWAY: 3 LPM
GFR SERPL CREATININE-BSD FRML MDRD: 10 ML/MIN/1.73
GFR SERPL CREATININE-BSD FRML MDRD: 11 ML/MIN/1.73
GFR SERPL CREATININE-BSD FRML MDRD: 12 ML/MIN/1.73
GFR SERPL CREATININE-BSD FRML MDRD: 13 ML/MIN/1.73
GFR SERPL CREATININE-BSD FRML MDRD: 14 ML/MIN/1.73
GFR SERPL CREATININE-BSD FRML MDRD: 15 ML/MIN/1.73
GFR SERPL CREATININE-BSD FRML MDRD: 25 ML/MIN/1.73
GFR SERPL CREATININE-BSD FRML MDRD: 32 ML/MIN/1.73
GFR SERPL CREATININE-BSD FRML MDRD: 33 ML/MIN/1.73
GFR SERPL CREATININE-BSD FRML MDRD: 36 ML/MIN/1.73
GFR SERPL CREATININE-BSD FRML MDRD: 36 ML/MIN/1.73
GFR SERPL CREATININE-BSD FRML MDRD: 37 ML/MIN/1.73
GFR SERPL CREATININE-BSD FRML MDRD: 38 ML/MIN/1.73
GFR SERPL CREATININE-BSD FRML MDRD: 7 ML/MIN/1.73
GFR SERPL CREATININE-BSD FRML MDRD: 8 ML/MIN/1.73
GFR SERPL CREATININE-BSD FRML MDRD: 9 ML/MIN/1.73
GFR SERPL CREATININE-BSD FRML MDRD: ABNORMAL ML/MIN/{1.73_M2}
GLOBULIN UR ELPH-MCNC: 2.5 GM/DL
GLOBULIN UR ELPH-MCNC: 2.5 GM/DL
GLOBULIN UR ELPH-MCNC: 2.6 GM/DL
GLOBULIN UR ELPH-MCNC: 2.7 GM/DL
GLOBULIN UR ELPH-MCNC: 2.7 GM/DL
GLOBULIN UR ELPH-MCNC: 3.1 GM/DL
GLUCOSE BLD-MCNC: 107 MG/DL (ref 65–99)
GLUCOSE BLD-MCNC: 108 MG/DL (ref 65–99)
GLUCOSE BLD-MCNC: 115 MG/DL (ref 65–99)
GLUCOSE BLD-MCNC: 117 MG/DL (ref 65–99)
GLUCOSE BLD-MCNC: 119 MG/DL (ref 65–99)
GLUCOSE BLD-MCNC: 119 MG/DL (ref 65–99)
GLUCOSE BLD-MCNC: 120 MG/DL (ref 65–99)
GLUCOSE BLD-MCNC: 127 MG/DL (ref 65–99)
GLUCOSE BLD-MCNC: 136 MG/DL (ref 65–99)
GLUCOSE BLD-MCNC: 136 MG/DL (ref 65–99)
GLUCOSE BLD-MCNC: 142 MG/DL (ref 65–99)
GLUCOSE BLD-MCNC: 142 MG/DL (ref 65–99)
GLUCOSE BLD-MCNC: 143 MG/DL (ref 65–99)
GLUCOSE BLD-MCNC: 156 MG/DL (ref 65–99)
GLUCOSE BLD-MCNC: 159 MG/DL (ref 65–99)
GLUCOSE BLD-MCNC: 166 MG/DL (ref 65–99)
GLUCOSE BLD-MCNC: 168 MG/DL (ref 65–99)
GLUCOSE BLD-MCNC: 179 MG/DL (ref 65–99)
GLUCOSE BLD-MCNC: 196 MG/DL (ref 65–99)
GLUCOSE BLD-MCNC: 207 MG/DL (ref 65–99)
GLUCOSE BLD-MCNC: 243 MG/DL (ref 65–99)
GLUCOSE BLD-MCNC: 255 MG/DL (ref 65–99)
GLUCOSE BLD-MCNC: 290 MG/DL (ref 65–99)
GLUCOSE BLD-MCNC: 96 MG/DL (ref 65–99)
GLUCOSE BLD-MCNC: 96 MG/DL (ref 65–99)
GLUCOSE BLDC GLUCOMTR-MCNC: 100 MG/DL (ref 70–130)
GLUCOSE BLDC GLUCOMTR-MCNC: 105 MG/DL (ref 70–130)
GLUCOSE BLDC GLUCOMTR-MCNC: 106 MG/DL (ref 70–130)
GLUCOSE BLDC GLUCOMTR-MCNC: 106 MG/DL (ref 70–130)
GLUCOSE BLDC GLUCOMTR-MCNC: 111 MG/DL (ref 70–130)
GLUCOSE BLDC GLUCOMTR-MCNC: 111 MG/DL (ref 70–130)
GLUCOSE BLDC GLUCOMTR-MCNC: 112 MG/DL (ref 70–130)
GLUCOSE BLDC GLUCOMTR-MCNC: 114 MG/DL (ref 70–130)
GLUCOSE BLDC GLUCOMTR-MCNC: 116 MG/DL (ref 70–130)
GLUCOSE BLDC GLUCOMTR-MCNC: 117 MG/DL (ref 70–130)
GLUCOSE BLDC GLUCOMTR-MCNC: 118 MG/DL (ref 70–130)
GLUCOSE BLDC GLUCOMTR-MCNC: 119 MG/DL (ref 70–130)
GLUCOSE BLDC GLUCOMTR-MCNC: 120 MG/DL (ref 70–130)
GLUCOSE BLDC GLUCOMTR-MCNC: 120 MG/DL (ref 70–130)
GLUCOSE BLDC GLUCOMTR-MCNC: 121 MG/DL (ref 70–130)
GLUCOSE BLDC GLUCOMTR-MCNC: 122 MG/DL (ref 70–130)
GLUCOSE BLDC GLUCOMTR-MCNC: 124 MG/DL (ref 70–130)
GLUCOSE BLDC GLUCOMTR-MCNC: 124 MG/DL (ref 70–130)
GLUCOSE BLDC GLUCOMTR-MCNC: 125 MG/DL (ref 70–130)
GLUCOSE BLDC GLUCOMTR-MCNC: 125 MG/DL (ref 70–130)
GLUCOSE BLDC GLUCOMTR-MCNC: 126 MG/DL (ref 70–130)
GLUCOSE BLDC GLUCOMTR-MCNC: 127 MG/DL (ref 70–130)
GLUCOSE BLDC GLUCOMTR-MCNC: 129 MG/DL (ref 70–130)
GLUCOSE BLDC GLUCOMTR-MCNC: 131 MG/DL (ref 70–130)
GLUCOSE BLDC GLUCOMTR-MCNC: 133 MG/DL (ref 70–130)
GLUCOSE BLDC GLUCOMTR-MCNC: 134 MG/DL (ref 70–130)
GLUCOSE BLDC GLUCOMTR-MCNC: 135 MG/DL (ref 70–130)
GLUCOSE BLDC GLUCOMTR-MCNC: 137 MG/DL (ref 70–130)
GLUCOSE BLDC GLUCOMTR-MCNC: 137 MG/DL (ref 70–130)
GLUCOSE BLDC GLUCOMTR-MCNC: 139 MG/DL (ref 70–130)
GLUCOSE BLDC GLUCOMTR-MCNC: 140 MG/DL (ref 70–130)
GLUCOSE BLDC GLUCOMTR-MCNC: 141 MG/DL (ref 70–130)
GLUCOSE BLDC GLUCOMTR-MCNC: 142 MG/DL (ref 70–130)
GLUCOSE BLDC GLUCOMTR-MCNC: 146 MG/DL (ref 70–130)
GLUCOSE BLDC GLUCOMTR-MCNC: 146 MG/DL (ref 70–130)
GLUCOSE BLDC GLUCOMTR-MCNC: 150 MG/DL (ref 70–130)
GLUCOSE BLDC GLUCOMTR-MCNC: 151 MG/DL (ref 70–130)
GLUCOSE BLDC GLUCOMTR-MCNC: 154 MG/DL (ref 70–130)
GLUCOSE BLDC GLUCOMTR-MCNC: 158 MG/DL (ref 70–130)
GLUCOSE BLDC GLUCOMTR-MCNC: 158 MG/DL (ref 70–130)
GLUCOSE BLDC GLUCOMTR-MCNC: 159 MG/DL (ref 70–130)
GLUCOSE BLDC GLUCOMTR-MCNC: 159 MG/DL (ref 70–130)
GLUCOSE BLDC GLUCOMTR-MCNC: 160 MG/DL (ref 70–130)
GLUCOSE BLDC GLUCOMTR-MCNC: 161 MG/DL (ref 70–130)
GLUCOSE BLDC GLUCOMTR-MCNC: 161 MG/DL (ref 70–130)
GLUCOSE BLDC GLUCOMTR-MCNC: 163 MG/DL (ref 70–130)
GLUCOSE BLDC GLUCOMTR-MCNC: 163 MG/DL (ref 70–130)
GLUCOSE BLDC GLUCOMTR-MCNC: 164 MG/DL (ref 70–130)
GLUCOSE BLDC GLUCOMTR-MCNC: 164 MG/DL (ref 70–130)
GLUCOSE BLDC GLUCOMTR-MCNC: 165 MG/DL (ref 70–130)
GLUCOSE BLDC GLUCOMTR-MCNC: 166 MG/DL (ref 70–130)
GLUCOSE BLDC GLUCOMTR-MCNC: 166 MG/DL (ref 70–130)
GLUCOSE BLDC GLUCOMTR-MCNC: 170 MG/DL (ref 70–130)
GLUCOSE BLDC GLUCOMTR-MCNC: 170 MG/DL (ref 70–130)
GLUCOSE BLDC GLUCOMTR-MCNC: 172 MG/DL (ref 70–130)
GLUCOSE BLDC GLUCOMTR-MCNC: 174 MG/DL (ref 70–130)
GLUCOSE BLDC GLUCOMTR-MCNC: 176 MG/DL (ref 70–130)
GLUCOSE BLDC GLUCOMTR-MCNC: 177 MG/DL (ref 70–130)
GLUCOSE BLDC GLUCOMTR-MCNC: 178 MG/DL (ref 70–130)
GLUCOSE BLDC GLUCOMTR-MCNC: 179 MG/DL (ref 70–130)
GLUCOSE BLDC GLUCOMTR-MCNC: 181 MG/DL (ref 70–130)
GLUCOSE BLDC GLUCOMTR-MCNC: 181 MG/DL (ref 70–130)
GLUCOSE BLDC GLUCOMTR-MCNC: 184 MG/DL (ref 70–130)
GLUCOSE BLDC GLUCOMTR-MCNC: 184 MG/DL (ref 70–130)
GLUCOSE BLDC GLUCOMTR-MCNC: 185 MG/DL (ref 70–130)
GLUCOSE BLDC GLUCOMTR-MCNC: 185 MG/DL (ref 70–130)
GLUCOSE BLDC GLUCOMTR-MCNC: 186 MG/DL (ref 70–130)
GLUCOSE BLDC GLUCOMTR-MCNC: 188 MG/DL (ref 70–130)
GLUCOSE BLDC GLUCOMTR-MCNC: 188 MG/DL (ref 70–130)
GLUCOSE BLDC GLUCOMTR-MCNC: 189 MG/DL (ref 70–130)
GLUCOSE BLDC GLUCOMTR-MCNC: 190 MG/DL (ref 70–130)
GLUCOSE BLDC GLUCOMTR-MCNC: 191 MG/DL (ref 70–130)
GLUCOSE BLDC GLUCOMTR-MCNC: 193 MG/DL (ref 70–130)
GLUCOSE BLDC GLUCOMTR-MCNC: 195 MG/DL (ref 70–130)
GLUCOSE BLDC GLUCOMTR-MCNC: 196 MG/DL (ref 70–130)
GLUCOSE BLDC GLUCOMTR-MCNC: 199 MG/DL (ref 70–130)
GLUCOSE BLDC GLUCOMTR-MCNC: 201 MG/DL (ref 70–130)
GLUCOSE BLDC GLUCOMTR-MCNC: 201 MG/DL (ref 70–130)
GLUCOSE BLDC GLUCOMTR-MCNC: 210 MG/DL (ref 70–130)
GLUCOSE BLDC GLUCOMTR-MCNC: 210 MG/DL (ref 70–130)
GLUCOSE BLDC GLUCOMTR-MCNC: 215 MG/DL (ref 70–130)
GLUCOSE BLDC GLUCOMTR-MCNC: 216 MG/DL (ref 70–130)
GLUCOSE BLDC GLUCOMTR-MCNC: 217 MG/DL (ref 70–130)
GLUCOSE BLDC GLUCOMTR-MCNC: 218 MG/DL (ref 70–130)
GLUCOSE BLDC GLUCOMTR-MCNC: 219 MG/DL (ref 70–130)
GLUCOSE BLDC GLUCOMTR-MCNC: 226 MG/DL (ref 70–130)
GLUCOSE BLDC GLUCOMTR-MCNC: 235 MG/DL (ref 70–130)
GLUCOSE BLDC GLUCOMTR-MCNC: 238 MG/DL (ref 70–130)
GLUCOSE BLDC GLUCOMTR-MCNC: 240 MG/DL (ref 70–130)
GLUCOSE BLDC GLUCOMTR-MCNC: 240 MG/DL (ref 70–130)
GLUCOSE BLDC GLUCOMTR-MCNC: 249 MG/DL (ref 70–130)
GLUCOSE BLDC GLUCOMTR-MCNC: 263 MG/DL (ref 70–130)
GLUCOSE BLDC GLUCOMTR-MCNC: 265 MG/DL (ref 70–130)
GLUCOSE BLDC GLUCOMTR-MCNC: 268 MG/DL (ref 70–130)
GLUCOSE BLDC GLUCOMTR-MCNC: 279 MG/DL (ref 70–130)
GLUCOSE BLDC GLUCOMTR-MCNC: 97 MG/DL (ref 70–130)
GLUCOSE SERPL-MCNC: 114 MG/DL (ref 65–99)
GRAM STN SPEC: NORMAL
HBA1C MFR BLD: 7.6 % (ref 4.8–5.6)
HBV CORE AB SER DONR QL IA: NEGATIVE
HBV E AB SERPL QL IA: NEGATIVE
HBV SURFACE AG SERPL QL IA: NORMAL
HBV SURFACE AG SERPL QL IA: NORMAL
HCO3 BLDA-SCNC: 23.3 MMOL/L (ref 22–28)
HCO3 BLDA-SCNC: 25.2 MMOL/L (ref 22–28)
HCO3 BLDA-SCNC: 26.7 MMOL/L (ref 22–28)
HCO3 BLDA-SCNC: 29 MMOL/L (ref 22–28)
HCO3 BLDA-SCNC: 30.2 MMOL/L (ref 22–26)
HCO3 BLDA-SCNC: 31.1 MMOL/L (ref 22–26)
HCO3 BLDA-SCNC: 32.7 MMOL/L (ref 22–26)
HCO3 BLDA-SCNC: 33.4 MMOL/L (ref 22–26)
HCO3 BLDA-SCNC: 33.7 MMOL/L (ref 22–26)
HCT VFR BLD AUTO: 22.6 % (ref 37.5–51)
HCT VFR BLD AUTO: 24.7 % (ref 37.5–51)
HCT VFR BLD AUTO: 25.1 % (ref 37.5–51)
HCT VFR BLD AUTO: 25.3 % (ref 37.5–51)
HCT VFR BLD AUTO: 25.8 % (ref 37.5–51)
HCT VFR BLD AUTO: 25.9 % (ref 37.5–51)
HCT VFR BLD AUTO: 26 % (ref 37.5–51)
HCT VFR BLD AUTO: 26 % (ref 37.5–51)
HCT VFR BLD AUTO: 26.1 % (ref 37.5–51)
HCT VFR BLD AUTO: 26.2 % (ref 37.5–51)
HCT VFR BLD AUTO: 26.3 % (ref 37.5–51)
HCT VFR BLD AUTO: 26.4 % (ref 37.5–51)
HCT VFR BLD AUTO: 26.8 % (ref 37.5–51)
HCT VFR BLD AUTO: 26.8 % (ref 37.5–51)
HCT VFR BLD AUTO: 27.1 % (ref 37.5–51)
HCT VFR BLD AUTO: 27.4 % (ref 37.5–51)
HCT VFR BLD AUTO: 27.6 % (ref 37.5–51)
HCT VFR BLD AUTO: 27.9 % (ref 37.5–51)
HCT VFR BLD AUTO: 29.4 % (ref 37.5–51)
HCT VFR BLD AUTO: 31.3 % (ref 37.5–51)
HCT VFR BLD AUTO: 31.3 % (ref 37.5–51)
HCT VFR BLD AUTO: 35.4 % (ref 37.5–51)
HCT VFR BLD AUTO: 36.1 % (ref 37.5–51)
HCT VFR BLD AUTO: 36.1 % (ref 37.5–51)
HCT VFR BLD AUTO: 36.2 % (ref 37.5–51)
HCT VFR BLD AUTO: 37.2 % (ref 37.5–51)
HCT VFR BLD AUTO: 37.6 % (ref 37.5–51)
HCT VFR BLD AUTO: 37.9 % (ref 37.5–51)
HCT VFR BLDA CALC: 23 % (ref 38–51)
HCT VFR BLDA CALC: 23 % (ref 38–51)
HCT VFR BLDA CALC: 24 % (ref 38–51)
HCT VFR BLDA CALC: 25 % (ref 38–51)
HCT VFR BLDA CALC: 34 % (ref 38–51)
HDLC SERPL-MCNC: 32 MG/DL (ref 40–60)
HGB BLD-MCNC: 10.3 G/DL (ref 13–17.7)
HGB BLD-MCNC: 10.3 G/DL (ref 13–17.7)
HGB BLD-MCNC: 11.4 G/DL (ref 13–17.7)
HGB BLD-MCNC: 11.6 G/DL (ref 13–17.7)
HGB BLD-MCNC: 12 G/DL (ref 13–17.7)
HGB BLD-MCNC: 12.2 G/DL (ref 13–17.7)
HGB BLD-MCNC: 12.3 G/DL (ref 13–17.7)
HGB BLD-MCNC: 7.4 G/DL (ref 13–17.7)
HGB BLD-MCNC: 8 G/DL (ref 13–17.7)
HGB BLD-MCNC: 8.2 G/DL (ref 13–17.7)
HGB BLD-MCNC: 8.3 G/DL (ref 13–17.7)
HGB BLD-MCNC: 8.4 G/DL (ref 13–17.7)
HGB BLD-MCNC: 8.4 G/DL (ref 13–17.7)
HGB BLD-MCNC: 8.6 G/DL (ref 13–17.7)
HGB BLD-MCNC: 8.7 G/DL (ref 13–17.7)
HGB BLD-MCNC: 8.7 G/DL (ref 13–17.7)
HGB BLD-MCNC: 8.8 G/DL (ref 13–17.7)
HGB BLD-MCNC: 8.9 G/DL (ref 13–17.7)
HGB BLD-MCNC: 8.9 G/DL (ref 13–17.7)
HGB BLD-MCNC: 9 G/DL (ref 13–17.7)
HGB BLD-MCNC: 9.2 G/DL (ref 13–17.7)
HGB BLD-MCNC: 9.5 G/DL (ref 13–17.7)
HGB BLDA-MCNC: 11.6 G/DL (ref 12–17)
HGB BLDA-MCNC: 7.8 G/DL (ref 12–17)
HGB BLDA-MCNC: 7.8 G/DL (ref 12–17)
HGB BLDA-MCNC: 8.2 G/DL (ref 12–17)
HGB BLDA-MCNC: 8.5 G/DL (ref 12–17)
HOROWITZ INDEX BLD+IHG-RTO: 100 %
HOROWITZ INDEX BLD+IHG-RTO: 40 %
HOROWITZ INDEX BLD+IHG-RTO: 40 %
IMM GRANULOCYTES # BLD AUTO: 0.01 10*3/MM3 (ref 0–0.05)
IMM GRANULOCYTES # BLD AUTO: 0.02 10*3/MM3 (ref 0–0.05)
IMM GRANULOCYTES # BLD AUTO: 0.03 10*3/MM3 (ref 0–0.05)
IMM GRANULOCYTES # BLD AUTO: 0.03 10*3/MM3 (ref 0–0.05)
IMM GRANULOCYTES # BLD AUTO: 0.04 10*3/MM3 (ref 0–0.05)
IMM GRANULOCYTES # BLD AUTO: 0.05 10*3/MM3 (ref 0–0.05)
IMM GRANULOCYTES # BLD AUTO: 0.06 10*3/MM3 (ref 0–0.05)
IMM GRANULOCYTES # BLD AUTO: 0.07 10*3/MM3 (ref 0–0.05)
IMM GRANULOCYTES # BLD AUTO: 0.07 10*3/MM3 (ref 0–0.05)
IMM GRANULOCYTES # BLD AUTO: 0.08 10*3/MM3 (ref 0–0.05)
IMM GRANULOCYTES # BLD AUTO: 0.08 10*3/MM3 (ref 0–0.05)
IMM GRANULOCYTES # BLD AUTO: 0.1 10*3/MM3 (ref 0–0.05)
IMM GRANULOCYTES # BLD AUTO: 0.11 10*3/MM3 (ref 0–0.05)
IMM GRANULOCYTES NFR BLD AUTO: 0.2 % (ref 0–0.5)
IMM GRANULOCYTES NFR BLD AUTO: 0.3 % (ref 0–0.5)
IMM GRANULOCYTES NFR BLD AUTO: 0.4 % (ref 0–0.5)
IMM GRANULOCYTES NFR BLD AUTO: 0.4 % (ref 0–0.5)
IMM GRANULOCYTES NFR BLD AUTO: 0.5 % (ref 0–0.5)
IMM GRANULOCYTES NFR BLD AUTO: 0.6 % (ref 0–0.5)
IMM GRANULOCYTES NFR BLD AUTO: 0.7 % (ref 0–0.5)
IMM GRANULOCYTES NFR BLD AUTO: 0.8 % (ref 0–0.5)
IMM GRANULOCYTES NFR BLD AUTO: 0.8 % (ref 0–0.5)
IMM GRANULOCYTES NFR BLD AUTO: 0.9 % (ref 0–0.5)
IMM GRANULOCYTES NFR BLD AUTO: 0.9 % (ref 0–0.5)
IMM GRANULOCYTES NFR BLD AUTO: 1 % (ref 0–0.5)
IMM GRANULOCYTES NFR BLD AUTO: 1.1 % (ref 0–0.5)
INR PPP: 1.09 (ref 0.9–1.1)
INR PPP: 1.1 (ref 0.9–1.1)
INR PPP: 1.3
INR PPP: 1.41 (ref 0.9–1.1)
INR PPP: 1.42 (ref 0.9–1.1)
INR PPP: 1.45 (ref 0.9–1.1)
INR PPP: 1.58 (ref 0.9–1.1)
INR PPP: 1.7 (ref 0.9–1.1)
INR PPP: 1.8 (ref 0.9–1.1)
INR PPP: 1.9
INR PPP: 2
INR PPP: 2.2
INR PPP: 2.3
INR PPP: 2.62 (ref 0.9–1.1)
INR PPP: 2.91 (ref 0.9–1.1)
INR PPP: 3.24 (ref 0.9–1.1)
IRON 24H UR-MRATE: 23 MCG/DL (ref 59–158)
IRON SATN MFR SERPL: 13 % (ref 20–50)
LAB AP CASE REPORT: NORMAL
LDLC SERPL CALC-MCNC: 58 MG/DL (ref 0–100)
LDLC/HDLC SERPL: 1.82 {RATIO}
LEFT ATRIUM VOLUME INDEX: 20 ML/M2
LEFT ATRIUM VOLUME INDEX: 26 ML/M2
LIPASE SERPL-CCNC: 38 U/L (ref 13–60)
LV EF 2D ECHO EST: 52 %
LYMPHOCYTES # BLD AUTO: 0.53 10*3/MM3 (ref 0.7–3.1)
LYMPHOCYTES # BLD AUTO: 0.78 10*3/MM3 (ref 0.7–3.1)
LYMPHOCYTES # BLD AUTO: 0.86 10*3/MM3 (ref 0.7–3.1)
LYMPHOCYTES # BLD AUTO: 0.88 10*3/MM3 (ref 0.7–3.1)
LYMPHOCYTES # BLD AUTO: 0.88 10*3/MM3 (ref 0.7–3.1)
LYMPHOCYTES # BLD AUTO: 0.89 10*3/MM3 (ref 0.7–3.1)
LYMPHOCYTES # BLD AUTO: 0.92 10*3/MM3 (ref 0.7–3.1)
LYMPHOCYTES # BLD AUTO: 0.99 10*3/MM3 (ref 0.7–3.1)
LYMPHOCYTES # BLD AUTO: 1.01 10*3/MM3 (ref 0.7–3.1)
LYMPHOCYTES # BLD AUTO: 1.04 10*3/MM3 (ref 0.7–3.1)
LYMPHOCYTES # BLD AUTO: 1.12 10*3/MM3 (ref 0.7–3.1)
LYMPHOCYTES # BLD AUTO: 1.19 10*3/MM3 (ref 0.7–3.1)
LYMPHOCYTES # BLD AUTO: 1.22 10*3/MM3 (ref 0.7–3.1)
LYMPHOCYTES # BLD AUTO: 1.22 10*3/MM3 (ref 0.7–3.1)
LYMPHOCYTES # BLD AUTO: 1.26 10*3/MM3 (ref 0.7–3.1)
LYMPHOCYTES # BLD AUTO: 1.35 10*3/MM3 (ref 0.7–3.1)
LYMPHOCYTES # BLD AUTO: 1.41 10*3/MM3 (ref 0.7–3.1)
LYMPHOCYTES # BLD AUTO: 1.41 10*3/MM3 (ref 0.7–3.1)
LYMPHOCYTES # BLD AUTO: 1.55 10*3/MM3 (ref 0.7–3.1)
LYMPHOCYTES # BLD AUTO: 1.69 10*3/MM3 (ref 0.7–3.1)
LYMPHOCYTES NFR BLD AUTO: 10.8 % (ref 19.6–45.3)
LYMPHOCYTES NFR BLD AUTO: 11 % (ref 19.6–45.3)
LYMPHOCYTES NFR BLD AUTO: 11.8 % (ref 19.6–45.3)
LYMPHOCYTES NFR BLD AUTO: 12.4 % (ref 19.6–45.3)
LYMPHOCYTES NFR BLD AUTO: 12.5 % (ref 19.6–45.3)
LYMPHOCYTES NFR BLD AUTO: 13.2 % (ref 19.6–45.3)
LYMPHOCYTES NFR BLD AUTO: 16 % (ref 19.6–45.3)
LYMPHOCYTES NFR BLD AUTO: 17.2 % (ref 19.6–45.3)
LYMPHOCYTES NFR BLD AUTO: 17.8 % (ref 19.6–45.3)
LYMPHOCYTES NFR BLD AUTO: 19.3 % (ref 19.6–45.3)
LYMPHOCYTES NFR BLD AUTO: 20.1 % (ref 19.6–45.3)
LYMPHOCYTES NFR BLD AUTO: 22.7 % (ref 19.6–45.3)
LYMPHOCYTES NFR BLD AUTO: 29 % (ref 19.6–45.3)
LYMPHOCYTES NFR BLD AUTO: 6.9 % (ref 19.6–45.3)
LYMPHOCYTES NFR BLD AUTO: 8.5 % (ref 19.6–45.3)
LYMPHOCYTES NFR BLD AUTO: 8.5 % (ref 19.6–45.3)
LYMPHOCYTES NFR BLD AUTO: 9.4 % (ref 19.6–45.3)
LYMPHOCYTES NFR BLD AUTO: 9.4 % (ref 19.6–45.3)
LYMPHOCYTES NFR BLD AUTO: 9.7 % (ref 19.6–45.3)
LYMPHOCYTES NFR BLD AUTO: 9.9 % (ref 19.6–45.3)
MAGNESIUM SERPL-MCNC: 2.1 MG/DL (ref 1.6–2.4)
MAGNESIUM SERPL-MCNC: 2.2 MG/DL (ref 1.6–2.4)
MAGNESIUM SERPL-MCNC: 2.3 MG/DL (ref 1.6–2.4)
MAGNESIUM SERPL-MCNC: 2.4 MG/DL (ref 1.6–2.4)
MAGNESIUM SERPL-MCNC: 2.4 MG/DL (ref 1.6–2.4)
MAGNESIUM SERPL-MCNC: 2.5 MG/DL (ref 1.6–2.4)
MAGNESIUM SERPL-MCNC: 2.5 MG/DL (ref 1.6–2.4)
MAGNESIUM SERPL-MCNC: 2.6 MG/DL (ref 1.6–2.4)
MAGNESIUM SERPL-MCNC: 2.7 MG/DL (ref 1.6–2.4)
MAGNESIUM SERPL-MCNC: 2.8 MG/DL (ref 1.6–2.4)
MAXIMAL PREDICTED HEART RATE: 143 BPM
MAXIMAL PREDICTED HEART RATE: 143 BPM
MCH RBC QN AUTO: 28.9 PG (ref 26.6–33)
MCH RBC QN AUTO: 29 PG (ref 26.6–33)
MCH RBC QN AUTO: 29.1 PG (ref 26.6–33)
MCH RBC QN AUTO: 29.1 PG (ref 26.6–33)
MCH RBC QN AUTO: 29.2 PG (ref 26.6–33)
MCH RBC QN AUTO: 29.3 PG (ref 26.6–33)
MCH RBC QN AUTO: 29.4 PG (ref 26.6–33)
MCH RBC QN AUTO: 29.5 PG (ref 26.6–33)
MCH RBC QN AUTO: 29.5 PG (ref 26.6–33)
MCH RBC QN AUTO: 29.6 PG (ref 26.6–33)
MCH RBC QN AUTO: 29.7 PG (ref 26.6–33)
MCH RBC QN AUTO: 29.7 PG (ref 26.6–33)
MCH RBC QN AUTO: 29.8 PG (ref 26.6–33)
MCH RBC QN AUTO: 30.1 PG (ref 26.6–33)
MCH RBC QN AUTO: 30.2 PG (ref 26.6–33)
MCH RBC QN AUTO: 30.3 PG (ref 26.6–33)
MCH RBC QN AUTO: 30.5 PG (ref 26.6–33)
MCHC RBC AUTO-ENTMCNC: 31.8 G/DL (ref 31.5–35.7)
MCHC RBC AUTO-ENTMCNC: 31.9 G/DL (ref 31.5–35.7)
MCHC RBC AUTO-ENTMCNC: 31.9 G/DL (ref 31.5–35.7)
MCHC RBC AUTO-ENTMCNC: 32 G/DL (ref 31.5–35.7)
MCHC RBC AUTO-ENTMCNC: 32.1 G/DL (ref 31.5–35.7)
MCHC RBC AUTO-ENTMCNC: 32.2 G/DL (ref 31.5–35.7)
MCHC RBC AUTO-ENTMCNC: 32.3 G/DL (ref 31.5–35.7)
MCHC RBC AUTO-ENTMCNC: 32.3 G/DL (ref 31.5–35.7)
MCHC RBC AUTO-ENTMCNC: 32.4 G/DL (ref 31.5–35.7)
MCHC RBC AUTO-ENTMCNC: 32.4 G/DL (ref 31.5–35.7)
MCHC RBC AUTO-ENTMCNC: 32.5 G/DL (ref 31.5–35.7)
MCHC RBC AUTO-ENTMCNC: 32.7 G/DL (ref 31.5–35.7)
MCHC RBC AUTO-ENTMCNC: 32.8 G/DL (ref 31.5–35.7)
MCHC RBC AUTO-ENTMCNC: 32.9 G/DL (ref 31.5–35.7)
MCHC RBC AUTO-ENTMCNC: 32.9 G/DL (ref 31.5–35.7)
MCHC RBC AUTO-ENTMCNC: 33 G/DL (ref 31.5–35.7)
MCHC RBC AUTO-ENTMCNC: 33 G/DL (ref 31.5–35.7)
MCHC RBC AUTO-ENTMCNC: 33.1 G/DL (ref 31.5–35.7)
MCHC RBC AUTO-ENTMCNC: 33.6 G/DL (ref 31.5–35.7)
MCHC RBC AUTO-ENTMCNC: 33.6 G/DL (ref 31.5–35.7)
MCV RBC AUTO: 88.6 FL (ref 79–97)
MCV RBC AUTO: 89.4 FL (ref 79–97)
MCV RBC AUTO: 89.4 FL (ref 79–97)
MCV RBC AUTO: 89.5 FL (ref 79–97)
MCV RBC AUTO: 89.8 FL (ref 79–97)
MCV RBC AUTO: 89.9 FL (ref 79–97)
MCV RBC AUTO: 90 FL (ref 79–97)
MCV RBC AUTO: 90.4 FL (ref 79–97)
MCV RBC AUTO: 90.4 FL (ref 79–97)
MCV RBC AUTO: 90.6 FL (ref 79–97)
MCV RBC AUTO: 90.7 FL (ref 79–97)
MCV RBC AUTO: 90.7 FL (ref 79–97)
MCV RBC AUTO: 90.8 FL (ref 79–97)
MCV RBC AUTO: 90.9 FL (ref 79–97)
MCV RBC AUTO: 90.9 FL (ref 79–97)
MCV RBC AUTO: 91.1 FL (ref 79–97)
MCV RBC AUTO: 91.2 FL (ref 79–97)
MCV RBC AUTO: 91.3 FL (ref 79–97)
MCV RBC AUTO: 91.5 FL (ref 79–97)
MCV RBC AUTO: 91.6 FL (ref 79–97)
MCV RBC AUTO: 91.6 FL (ref 79–97)
MCV RBC AUTO: 91.7 FL (ref 79–97)
MCV RBC AUTO: 91.8 FL (ref 79–97)
MCV RBC AUTO: 92.2 FL (ref 79–97)
MCV RBC AUTO: 92.5 FL (ref 79–97)
MODALITY: ABNORMAL
MONOCYTES # BLD AUTO: 0.31 10*3/MM3 (ref 0.1–0.9)
MONOCYTES # BLD AUTO: 0.74 10*3/MM3 (ref 0.1–0.9)
MONOCYTES # BLD AUTO: 0.76 10*3/MM3 (ref 0.1–0.9)
MONOCYTES # BLD AUTO: 0.81 10*3/MM3 (ref 0.1–0.9)
MONOCYTES # BLD AUTO: 0.81 10*3/MM3 (ref 0.1–0.9)
MONOCYTES # BLD AUTO: 0.84 10*3/MM3 (ref 0.1–0.9)
MONOCYTES # BLD AUTO: 0.96 10*3/MM3 (ref 0.1–0.9)
MONOCYTES # BLD AUTO: 1.06 10*3/MM3 (ref 0.1–0.9)
MONOCYTES # BLD AUTO: 1.11 10*3/MM3 (ref 0.1–0.9)
MONOCYTES # BLD AUTO: 1.13 10*3/MM3 (ref 0.1–0.9)
MONOCYTES # BLD AUTO: 1.14 10*3/MM3 (ref 0.1–0.9)
MONOCYTES # BLD AUTO: 1.15 10*3/MM3 (ref 0.1–0.9)
MONOCYTES # BLD AUTO: 1.18 10*3/MM3 (ref 0.1–0.9)
MONOCYTES # BLD AUTO: 1.19 10*3/MM3 (ref 0.1–0.9)
MONOCYTES # BLD AUTO: 1.22 10*3/MM3 (ref 0.1–0.9)
MONOCYTES # BLD AUTO: 1.22 10*3/MM3 (ref 0.1–0.9)
MONOCYTES # BLD AUTO: 1.24 10*3/MM3 (ref 0.1–0.9)
MONOCYTES # BLD AUTO: 1.28 10*3/MM3 (ref 0.1–0.9)
MONOCYTES # BLD AUTO: 1.37 10*3/MM3 (ref 0.1–0.9)
MONOCYTES # BLD AUTO: 1.38 10*3/MM3 (ref 0.1–0.9)
MONOCYTES NFR BLD AUTO: 10.3 % (ref 5–12)
MONOCYTES NFR BLD AUTO: 10.5 % (ref 5–12)
MONOCYTES NFR BLD AUTO: 11.1 % (ref 5–12)
MONOCYTES NFR BLD AUTO: 11.8 % (ref 5–12)
MONOCYTES NFR BLD AUTO: 12.3 % (ref 5–12)
MONOCYTES NFR BLD AUTO: 13.1 % (ref 5–12)
MONOCYTES NFR BLD AUTO: 13.1 % (ref 5–12)
MONOCYTES NFR BLD AUTO: 13.6 % (ref 5–12)
MONOCYTES NFR BLD AUTO: 14.1 % (ref 5–12)
MONOCYTES NFR BLD AUTO: 14.3 % (ref 5–12)
MONOCYTES NFR BLD AUTO: 14.7 % (ref 5–12)
MONOCYTES NFR BLD AUTO: 14.8 % (ref 5–12)
MONOCYTES NFR BLD AUTO: 15 % (ref 5–12)
MONOCYTES NFR BLD AUTO: 15.5 % (ref 5–12)
MONOCYTES NFR BLD AUTO: 16.1 % (ref 5–12)
MONOCYTES NFR BLD AUTO: 4 % (ref 5–12)
MONOCYTES NFR BLD AUTO: 9.1 % (ref 5–12)
MONOCYTES NFR BLD AUTO: 9.8 % (ref 5–12)
NEUTROPHILS # BLD AUTO: 3.12 10*3/MM3 (ref 1.7–7)
NEUTROPHILS # BLD AUTO: 4.1 10*3/MM3 (ref 1.7–7)
NEUTROPHILS # BLD AUTO: 4.27 10*3/MM3 (ref 1.7–7)
NEUTROPHILS # BLD AUTO: 4.56 10*3/MM3 (ref 1.7–7)
NEUTROPHILS # BLD AUTO: 4.76 10*3/MM3 (ref 1.7–7)
NEUTROPHILS # BLD AUTO: 4.78 10*3/MM3 (ref 1.7–7)
NEUTROPHILS # BLD AUTO: 5.03 10*3/MM3 (ref 1.7–7)
NEUTROPHILS # BLD AUTO: 5.35 10*3/MM3 (ref 1.7–7)
NEUTROPHILS # BLD AUTO: 5.83 10*3/MM3 (ref 1.7–7)
NEUTROPHILS # BLD AUTO: 6.46 10*3/MM3 (ref 1.7–7)
NEUTROPHILS # BLD AUTO: 6.54 10*3/MM3 (ref 1.7–7)
NEUTROPHILS # BLD AUTO: 6.63 10*3/MM3 (ref 1.7–7)
NEUTROPHILS # BLD AUTO: 6.63 10*3/MM3 (ref 1.7–7)
NEUTROPHILS # BLD AUTO: 6.75 10*3/MM3 (ref 1.7–7)
NEUTROPHILS # BLD AUTO: 6.95 10*3/MM3 (ref 1.7–7)
NEUTROPHILS # BLD AUTO: 6.99 10*3/MM3 (ref 1.7–7)
NEUTROPHILS # BLD AUTO: 7.06 10*3/MM3 (ref 1.7–7)
NEUTROPHILS # BLD AUTO: 7.58 10*3/MM3 (ref 1.7–7)
NEUTROPHILS # BLD AUTO: 8.56 10*3/MM3 (ref 1.7–7)
NEUTROPHILS # BLD AUTO: 9.22 10*3/MM3 (ref 1.7–7)
NEUTROPHILS NFR BLD AUTO: 53.6 % (ref 42.7–76)
NEUTROPHILS NFR BLD AUTO: 59.9 % (ref 42.7–76)
NEUTROPHILS NFR BLD AUTO: 62.5 % (ref 42.7–76)
NEUTROPHILS NFR BLD AUTO: 64.2 % (ref 42.7–76)
NEUTROPHILS NFR BLD AUTO: 65 % (ref 42.7–76)
NEUTROPHILS NFR BLD AUTO: 65.5 % (ref 42.7–76)
NEUTROPHILS NFR BLD AUTO: 68 % (ref 42.7–76)
NEUTROPHILS NFR BLD AUTO: 68.2 % (ref 42.7–76)
NEUTROPHILS NFR BLD AUTO: 69.4 % (ref 42.7–76)
NEUTROPHILS NFR BLD AUTO: 69.6 % (ref 42.7–76)
NEUTROPHILS NFR BLD AUTO: 72 % (ref 42.7–76)
NEUTROPHILS NFR BLD AUTO: 73 % (ref 42.7–76)
NEUTROPHILS NFR BLD AUTO: 73.6 % (ref 42.7–76)
NEUTROPHILS NFR BLD AUTO: 73.9 % (ref 42.7–76)
NEUTROPHILS NFR BLD AUTO: 74.5 % (ref 42.7–76)
NEUTROPHILS NFR BLD AUTO: 75.4 % (ref 42.7–76)
NEUTROPHILS NFR BLD AUTO: 76.2 % (ref 42.7–76)
NEUTROPHILS NFR BLD AUTO: 79.6 % (ref 42.7–76)
NEUTROPHILS NFR BLD AUTO: 81.5 % (ref 42.7–76)
NEUTROPHILS NFR BLD AUTO: 85.2 % (ref 42.7–76)
NRBC BLD AUTO-RTO: 0 /100 WBC (ref 0–0.2)
NRBC BLD AUTO-RTO: 0.1 /100 WBC (ref 0–0.2)
NRBC BLD AUTO-RTO: 0.1 /100 WBC (ref 0–0.2)
NT-PROBNP SERPL-MCNC: 1579 PG/ML (ref 5–1800)
NT-PROBNP SERPL-MCNC: 2083 PG/ML (ref 5–1800)
O2 A-A PPRESDIFF RESPIRATORY: 0.3 MMHG
O2 A-A PPRESDIFF RESPIRATORY: 0.4 MMHG
O2 A-A PPRESDIFF RESPIRATORY: 0.5 MMHG
PATH REPORT.FINAL DX SPEC: NORMAL
PATH REPORT.GROSS SPEC: NORMAL
PCO2 BLDA: 37.8 MM HG (ref 35–45)
PCO2 BLDA: 40.1 MM HG (ref 35–45)
PCO2 BLDA: 46.3 MM HG (ref 35–45)
PCO2 BLDA: 46.7 MM HG (ref 35–45)
PCO2 BLDA: 54.5 MM HG (ref 35–45)
PCO2 BLDA: 55 MM HG (ref 35–45)
PCO2 BLDA: 55.6 MM HG (ref 35–45)
PCO2 BLDA: 58.9 MM HG (ref 35–45)
PCO2 BLDA: 79.4 MM HG (ref 35–45)
PEEP RESPIRATORY: 5 CM[H2O]
PEEP RESPIRATORY: 7.5 CM[H2O]
PEEP RESPIRATORY: 7.5 CM[H2O]
PH BLDA: 7.23 PH UNITS (ref 7.35–7.6)
PH BLDA: 7.31 PH UNITS (ref 7.35–7.45)
PH BLDA: 7.32 PH UNITS (ref 7.35–7.45)
PH BLDA: 7.35 PH UNITS (ref 7.35–7.6)
PH BLDA: 7.35 PH UNITS (ref 7.35–7.6)
PH BLDA: 7.4 PH UNITS (ref 7.35–7.6)
PH BLDA: 7.43 PH UNITS (ref 7.35–7.45)
PH BLDA: 7.43 PH UNITS (ref 7.35–7.45)
PH BLDA: 7.43 PH UNITS (ref 7.35–7.6)
PHOSPHATE SERPL-MCNC: 1.6 MG/DL (ref 2.5–4.5)
PHOSPHATE SERPL-MCNC: 1.8 MG/DL (ref 2.5–4.5)
PHOSPHATE SERPL-MCNC: 2.9 MG/DL (ref 2.5–4.5)
PHOSPHATE SERPL-MCNC: 3.8 MG/DL (ref 2.5–4.5)
PHOSPHATE SERPL-MCNC: 3.8 MG/DL (ref 2.5–4.5)
PHOSPHATE SERPL-MCNC: 3.9 MG/DL (ref 2.5–4.5)
PHOSPHATE SERPL-MCNC: 4.1 MG/DL (ref 2.5–4.5)
PHOSPHATE SERPL-MCNC: 4.4 MG/DL (ref 2.5–4.5)
PHOSPHATE SERPL-MCNC: 4.9 MG/DL (ref 2.5–4.5)
PHOSPHATE SERPL-MCNC: 5.3 MG/DL (ref 2.5–4.5)
PHOSPHATE SERPL-MCNC: 5.4 MG/DL (ref 2.5–4.5)
PHOSPHATE SERPL-MCNC: 6.4 MG/DL (ref 2.5–4.5)
PHOSPHATE SERPL-MCNC: 6.5 MG/DL (ref 2.5–4.5)
PHOSPHATE SERPL-MCNC: 6.5 MG/DL (ref 2.5–4.5)
PHOSPHATE SERPL-MCNC: 6.6 MG/DL (ref 2.5–4.5)
PHOSPHATE SERPL-MCNC: 7.1 MG/DL (ref 2.5–4.5)
PHOSPHATE SERPL-MCNC: 8.4 MG/DL (ref 2.5–4.5)
PHOSPHATE SERPL-MCNC: 8.6 MG/DL (ref 2.5–4.5)
PLATELET # BLD AUTO: 106 10*3/MM3 (ref 140–450)
PLATELET # BLD AUTO: 113 10*3/MM3 (ref 140–450)
PLATELET # BLD AUTO: 113 10*3/MM3 (ref 140–450)
PLATELET # BLD AUTO: 116 10*3/MM3 (ref 140–450)
PLATELET # BLD AUTO: 123 10*3/MM3 (ref 140–450)
PLATELET # BLD AUTO: 124 10*3/MM3 (ref 140–450)
PLATELET # BLD AUTO: 146 10*3/MM3 (ref 140–450)
PLATELET # BLD AUTO: 167 10*3/MM3 (ref 140–450)
PLATELET # BLD AUTO: 179 10*3/MM3 (ref 140–450)
PLATELET # BLD AUTO: 179 10*3/MM3 (ref 140–450)
PLATELET # BLD AUTO: 183 10*3/MM3 (ref 140–450)
PLATELET # BLD AUTO: 186 10*3/MM3 (ref 140–450)
PLATELET # BLD AUTO: 188 10*3/MM3 (ref 140–450)
PLATELET # BLD AUTO: 189 10*3/MM3 (ref 140–450)
PLATELET # BLD AUTO: 189 10*3/MM3 (ref 140–450)
PLATELET # BLD AUTO: 204 10*3/MM3 (ref 140–450)
PLATELET # BLD AUTO: 234 10*3/MM3 (ref 140–450)
PLATELET # BLD AUTO: 242 10*3/MM3 (ref 140–450)
PLATELET # BLD AUTO: 242 10*3/MM3 (ref 140–450)
PLATELET # BLD AUTO: 249 10*3/MM3 (ref 140–450)
PLATELET # BLD AUTO: 254 10*3/MM3 (ref 140–450)
PLATELET # BLD AUTO: 256 10*3/MM3 (ref 140–450)
PLATELET # BLD AUTO: 259 10*3/MM3 (ref 140–450)
PLATELET # BLD AUTO: 259 10*3/MM3 (ref 140–450)
PLATELET # BLD AUTO: 260 10*3/MM3 (ref 140–450)
PLATELET # BLD AUTO: 266 10*3/MM3 (ref 140–450)
PLATELET # BLD AUTO: 271 10*3/MM3 (ref 140–450)
PLATELET # BLD AUTO: 297 10*3/MM3 (ref 140–450)
PMV BLD AUTO: 10 FL (ref 6–12)
PMV BLD AUTO: 10.1 FL (ref 6–12)
PMV BLD AUTO: 10.1 FL (ref 6–12)
PMV BLD AUTO: 10.2 FL (ref 6–12)
PMV BLD AUTO: 10.2 FL (ref 6–12)
PMV BLD AUTO: 10.3 FL (ref 6–12)
PMV BLD AUTO: 10.4 FL (ref 6–12)
PMV BLD AUTO: 10.4 FL (ref 6–12)
PMV BLD AUTO: 10.5 FL (ref 6–12)
PMV BLD AUTO: 10.6 FL (ref 6–12)
PMV BLD AUTO: 10.7 FL (ref 6–12)
PMV BLD AUTO: 10.9 FL (ref 6–12)
PMV BLD AUTO: 11 FL (ref 6–12)
PMV BLD AUTO: 11 FL (ref 6–12)
PMV BLD AUTO: 11.1 FL (ref 6–12)
PMV BLD AUTO: 11.4 FL (ref 6–12)
PMV BLD AUTO: 11.6 FL (ref 6–12)
PMV BLD AUTO: 12.2 FL (ref 6–12)
PMV BLD AUTO: 12.3 FL (ref 6–12)
PMV BLD AUTO: 89.9 FL (ref 6–12)
PO2 BLDA: 321.3 MM HG (ref 80–100)
PO2 BLDA: 364 MMHG (ref 80–105)
PO2 BLDA: 458 MMHG (ref 80–105)
PO2 BLDA: 462 MMHG (ref 80–105)
PO2 BLDA: 49 MMHG (ref 80–105)
PO2 BLDA: 60 MMHG (ref 80–105)
PO2 BLDA: 72.6 MM HG (ref 80–100)
PO2 BLDA: 85.1 MM HG (ref 80–100)
PO2 BLDA: 96.7 MM HG (ref 80–100)
POTASSIUM BLD-SCNC: 3.6 MMOL/L (ref 3.5–5.2)
POTASSIUM BLD-SCNC: 3.6 MMOL/L (ref 3.5–5.2)
POTASSIUM BLD-SCNC: 3.8 MMOL/L (ref 3.5–5.2)
POTASSIUM BLD-SCNC: 4 MMOL/L (ref 3.5–5.2)
POTASSIUM BLD-SCNC: 4 MMOL/L (ref 3.5–5.2)
POTASSIUM BLD-SCNC: 4.1 MMOL/L (ref 3.5–5.2)
POTASSIUM BLD-SCNC: 4.2 MMOL/L (ref 3.5–5.2)
POTASSIUM BLD-SCNC: 4.3 MMOL/L (ref 3.5–5.2)
POTASSIUM BLD-SCNC: 4.4 MMOL/L (ref 3.5–5.2)
POTASSIUM BLD-SCNC: 4.5 MMOL/L (ref 3.5–5.2)
POTASSIUM BLD-SCNC: 4.6 MMOL/L (ref 3.5–5.2)
POTASSIUM BLD-SCNC: 4.7 MMOL/L (ref 3.5–5.2)
POTASSIUM BLD-SCNC: 4.7 MMOL/L (ref 3.5–5.2)
POTASSIUM BLD-SCNC: 4.8 MMOL/L (ref 3.5–5.2)
POTASSIUM BLD-SCNC: 4.8 MMOL/L (ref 3.5–5.2)
POTASSIUM BLD-SCNC: 4.9 MMOL/L (ref 3.5–5.2)
POTASSIUM BLD-SCNC: 5 MMOL/L (ref 3.5–5.2)
POTASSIUM BLD-SCNC: 5.1 MMOL/L (ref 3.5–5.2)
POTASSIUM BLD-SCNC: 5.5 MMOL/L (ref 3.5–5.2)
POTASSIUM BLDA-SCNC: 4.3 MMOL/L (ref 3.5–4.9)
POTASSIUM BLDA-SCNC: 4.7 MMOL/L (ref 3.5–4.9)
POTASSIUM BLDA-SCNC: 4.8 MMOL/L (ref 3.5–4.9)
POTASSIUM BLDA-SCNC: 4.9 MMOL/L (ref 3.5–4.9)
POTASSIUM BLDA-SCNC: 5.4 MMOL/L (ref 3.5–4.9)
POTASSIUM SERPL-SCNC: 3.9 MMOL/L (ref 3.5–5.2)
PROT SERPL-MCNC: 5.8 G/DL (ref 6–8.5)
PROT SERPL-MCNC: 5.8 G/DL (ref 6–8.5)
PROT SERPL-MCNC: 6.2 G/DL (ref 6–8.5)
PROT SERPL-MCNC: 6.2 G/DL (ref 6–8.5)
PROT SERPL-MCNC: 6.4 G/DL (ref 6–8.5)
PROT SERPL-MCNC: 6.4 G/DL (ref 6–8.5)
PROT UR-MCNC: 53 MG/DL
PROTHROMBIN TIME: 13.9 SECONDS (ref 11.7–14.2)
PROTHROMBIN TIME: 13.9 SECONDS (ref 11.7–14.2)
PROTHROMBIN TIME: 17 SECONDS (ref 11.7–14.2)
PROTHROMBIN TIME: 17.1 SECONDS (ref 11.7–14.2)
PROTHROMBIN TIME: 17.4 SECONDS (ref 11.7–14.2)
PROTHROMBIN TIME: 18.6 SECONDS (ref 11.7–14.2)
PROTHROMBIN TIME: 19.6 SECONDS (ref 11.7–14.2)
PROTHROMBIN TIME: 20.6 SECONDS (ref 11.7–14.2)
PROTHROMBIN TIME: 27.7 SECONDS (ref 11.7–14.2)
PROTHROMBIN TIME: 30.1 SECONDS (ref 11.7–14.2)
PROTHROMBIN TIME: 32.8 SECONDS (ref 11.7–14.2)
PSV: 8 CMH2O
R RICKETTSI IGG SER QL IA: POSITIVE
R RICKETTSI IGG TITR SER IF: NORMAL {TITER}
R RICKETTSI IGM SER-ACNC: 1.32 INDEX (ref 0–0.89)
RBC # BLD AUTO: 2.5 10*6/MM3 (ref 4.14–5.8)
RBC # BLD AUTO: 2.72 10*6/MM3 (ref 4.14–5.8)
RBC # BLD AUTO: 2.75 10*6/MM3 (ref 4.14–5.8)
RBC # BLD AUTO: 2.76 10*6/MM3 (ref 4.14–5.8)
RBC # BLD AUTO: 2.81 10*6/MM3 (ref 4.14–5.8)
RBC # BLD AUTO: 2.81 10*6/MM3 (ref 4.14–5.8)
RBC # BLD AUTO: 2.84 10*6/MM3 (ref 4.14–5.8)
RBC # BLD AUTO: 2.85 10*6/MM3 (ref 4.14–5.8)
RBC # BLD AUTO: 2.88 10*6/MM3 (ref 4.14–5.8)
RBC # BLD AUTO: 2.91 10*6/MM3 (ref 4.14–5.8)
RBC # BLD AUTO: 2.93 10*6/MM3 (ref 4.14–5.8)
RBC # BLD AUTO: 2.96 10*6/MM3 (ref 4.14–5.8)
RBC # BLD AUTO: 2.98 10*6/MM3 (ref 4.14–5.8)
RBC # BLD AUTO: 3.03 10*6/MM3 (ref 4.14–5.8)
RBC # BLD AUTO: 3.06 10*6/MM3 (ref 4.14–5.8)
RBC # BLD AUTO: 3.12 10*6/MM3 (ref 4.14–5.8)
RBC # BLD AUTO: 3.29 10*6/MM3 (ref 4.14–5.8)
RBC # BLD AUTO: 3.48 10*6/MM3 (ref 4.14–5.8)
RBC # BLD AUTO: 3.48 10*6/MM3 (ref 4.14–5.8)
RBC # BLD AUTO: 3.84 10*6/MM3 (ref 4.14–5.8)
RBC # BLD AUTO: 3.94 10*6/MM3 (ref 4.14–5.8)
RBC # BLD AUTO: 3.96 10*6/MM3 (ref 4.14–5.8)
RBC # BLD AUTO: 3.99 10*6/MM3 (ref 4.14–5.8)
RBC # BLD AUTO: 4.1 10*6/MM3 (ref 4.14–5.8)
RBC # BLD AUTO: 4.16 10*6/MM3 (ref 4.14–5.8)
RBC # BLD AUTO: 4.17 10*6/MM3 (ref 4.14–5.8)
RH BLD: NEGATIVE
RH BLD: NEGATIVE
SAO2 % BLDA: 100 % (ref 95–98)
SAO2 % BLDA: 82 % (ref 95–98)
SAO2 % BLDA: 84 % (ref 95–98)
SAO2 % BLDCOA: 94.9 % (ref 92–99)
SAO2 % BLDCOA: 95.3 % (ref 92–99)
SAO2 % BLDCOA: 96.7 % (ref 92–99)
SAO2 % BLDCOA: 99.9 % (ref 92–99)
SET MECH RESP RATE: 14
SET MECH RESP RATE: 14
SET MECH RESP RATE: 25
SODIUM BLD-SCNC: 129 MMOL/L (ref 136–145)
SODIUM BLD-SCNC: 130 MMOL/L (ref 136–145)
SODIUM BLD-SCNC: 132 MMOL/L (ref 136–145)
SODIUM BLD-SCNC: 133 MMOL/L (ref 136–145)
SODIUM BLD-SCNC: 135 MMOL/L (ref 136–145)
SODIUM BLD-SCNC: 136 MMOL/L (ref 136–145)
SODIUM BLD-SCNC: 136 MMOL/L (ref 136–145)
SODIUM BLD-SCNC: 137 MMOL/L (ref 136–145)
SODIUM BLD-SCNC: 138 MMOL/L (ref 136–145)
SODIUM BLD-SCNC: 138 MMOL/L (ref 136–145)
SODIUM BLD-SCNC: 139 MMOL/L (ref 136–145)
SODIUM BLD-SCNC: 140 MMOL/L (ref 136–145)
SODIUM BLD-SCNC: 141 MMOL/L (ref 136–145)
SODIUM BLD-SCNC: 142 MMOL/L (ref 136–145)
SODIUM BLD-SCNC: 143 MMOL/L (ref 136–145)
SODIUM BLD-SCNC: 143 MMOL/L (ref 136–145)
SODIUM SERPL-SCNC: 145 MMOL/L (ref 136–145)
STRESS TARGET HR: 122 BPM
STRESS TARGET HR: 122 BPM
T&S EXPIRATION DATE: NORMAL
T&S EXPIRATION DATE: NORMAL
T4 FREE SERPL-MCNC: 1.47 NG/DL (ref 0.93–1.7)
TIBC SERPL-MCNC: 183 MCG/DL (ref 298–536)
TOTAL RATE: 14 BREATHS/MINUTE
TOTAL RATE: 14 BREATHS/MINUTE
TOTAL RATE: 20 BREATHS/MINUTE
TOTAL RATE: 25 BREATHS/MINUTE
TRANSFERRIN SERPL-MCNC: 123 MG/DL (ref 200–360)
TRIGL SERPL-MCNC: 94 MG/DL (ref 0–150)
TROPONIN T SERPL-MCNC: 0.02 NG/ML (ref 0–0.03)
TROPONIN T SERPL-MCNC: 0.08 NG/ML (ref 0–0.03)
TROPONIN T SERPL-MCNC: 0.09 NG/ML (ref 0–0.03)
TSH SERPL DL<=0.05 MIU/L-ACNC: 3.62 UIU/ML (ref 0.27–4.2)
UNIT  ABO: NORMAL
UNIT  RH: NORMAL
UPPER ARTERIAL LEFT ARM BRACHIAL LENGTH: 0.43 CM
UPPER ARTERIAL LEFT ARM BRACHIAL SYS MAX: 114 MMHG
UPPER ARTERIAL LEFT ARM RADIAL SYS MAX: 135 MMHG
UPPER ARTERIAL LEFT ARM ULNAR SYS MAX: 132 MMHG
UPPER ARTERIAL RIGHT ARM BRACHIAL LENGTH: 0.31 CM
URATE SERPL-MCNC: 4.9 MG/DL (ref 3.4–7)
URATE SERPL-MCNC: 6.6 MG/DL (ref 3.4–7)
URATE SERPL-MCNC: 7.7 MG/DL (ref 3.4–7)
URATE SERPL-MCNC: 9.1 MG/DL (ref 3.4–7)
VENTILATOR MODE: ABNORMAL
VLDLC SERPL-MCNC: 18.8 MG/DL (ref 5–40)
VT ON VENT VENT: 390 ML
VT ON VENT VENT: 650 ML
VT ON VENT VENT: 650 ML
WBC # BLD AUTO: 5.82 10*3/MM3 (ref 3.4–10.8)
WBC NRBC COR # BLD: 10.37 10*3/MM3 (ref 3.4–10.8)
WBC NRBC COR # BLD: 10.5 10*3/MM3 (ref 3.4–10.8)
WBC NRBC COR # BLD: 11.58 10*3/MM3 (ref 3.4–10.8)
WBC NRBC COR # BLD: 14.62 10*3/MM3 (ref 3.4–10.8)
WBC NRBC COR # BLD: 6.84 10*3/MM3 (ref 3.4–10.8)
WBC NRBC COR # BLD: 6.84 10*3/MM3 (ref 3.4–10.8)
WBC NRBC COR # BLD: 7.02 10*3/MM3 (ref 3.4–10.8)
WBC NRBC COR # BLD: 7.21 10*3/MM3 (ref 3.4–10.8)
WBC NRBC COR # BLD: 7.3 10*3/MM3 (ref 3.4–10.8)
WBC NRBC COR # BLD: 7.38 10*3/MM3 (ref 3.4–10.8)
WBC NRBC COR # BLD: 7.42 10*3/MM3 (ref 3.4–10.8)
WBC NRBC COR # BLD: 7.47 10*3/MM3 (ref 3.4–10.8)
WBC NRBC COR # BLD: 7.68 10*3/MM3 (ref 3.4–10.8)
WBC NRBC COR # BLD: 7.77 10*3/MM3 (ref 3.4–10.8)
WBC NRBC COR # BLD: 7.8 10*3/MM3 (ref 3.4–10.8)
WBC NRBC COR # BLD: 7.86 10*3/MM3 (ref 3.4–10.8)
WBC NRBC COR # BLD: 8.18 10*3/MM3 (ref 3.4–10.8)
WBC NRBC COR # BLD: 8.38 10*3/MM3 (ref 3.4–10.8)
WBC NRBC COR # BLD: 8.67 10*3/MM3 (ref 3.4–10.8)
WBC NRBC COR # BLD: 9.01 10*3/MM3 (ref 3.4–10.8)
WBC NRBC COR # BLD: 9.04 10*3/MM3 (ref 3.4–10.8)
WBC NRBC COR # BLD: 9.18 10*3/MM3 (ref 3.4–10.8)
WBC NRBC COR # BLD: 9.19 10*3/MM3 (ref 3.4–10.8)
WBC NRBC COR # BLD: 9.36 10*3/MM3 (ref 3.4–10.8)
WBC NRBC COR # BLD: 9.37 10*3/MM3 (ref 3.4–10.8)
WBC NRBC COR # BLD: 9.41 10*3/MM3 (ref 3.4–10.8)
WBC NRBC COR # BLD: 9.56 10*3/MM3 (ref 3.4–10.8)

## 2019-01-01 PROCEDURE — 85730 THROMBOPLASTIN TIME PARTIAL: CPT | Performed by: THORACIC SURGERY (CARDIOTHORACIC VASCULAR SURGERY)

## 2019-01-01 PROCEDURE — 80053 COMPREHEN METABOLIC PANEL: CPT | Performed by: EMERGENCY MEDICINE

## 2019-01-01 PROCEDURE — 94799 UNLISTED PULMONARY SVC/PX: CPT

## 2019-01-01 PROCEDURE — 83036 HEMOGLOBIN GLYCOSYLATED A1C: CPT | Performed by: NURSE PRACTITIONER

## 2019-01-01 PROCEDURE — 02HV33Z INSERTION OF INFUSION DEVICE INTO SUPERIOR VENA CAVA, PERCUTANEOUS APPROACH: ICD-10-PCS | Performed by: SURGERY

## 2019-01-01 PROCEDURE — 85384 FIBRINOGEN ACTIVITY: CPT | Performed by: THORACIC SURGERY (CARDIOTHORACIC VASCULAR SURGERY)

## 2019-01-01 PROCEDURE — 64494 INJ PARAVERT F JNT L/S 2 LEV: CPT | Performed by: ANESTHESIOLOGY

## 2019-01-01 PROCEDURE — 99233 SBSQ HOSP IP/OBS HIGH 50: CPT | Performed by: INTERNAL MEDICINE

## 2019-01-01 PROCEDURE — 85025 COMPLETE CBC W/AUTO DIFF WBC: CPT | Performed by: INTERNAL MEDICINE

## 2019-01-01 PROCEDURE — 82728 ASSAY OF FERRITIN: CPT | Performed by: INTERNAL MEDICINE

## 2019-01-01 PROCEDURE — 63710000001 INSULIN LISPRO (HUMAN) PER 5 UNITS: Performed by: INTERNAL MEDICINE

## 2019-01-01 PROCEDURE — 25010000002 METOCLOPRAMIDE PER 10 MG: Performed by: INTERNAL MEDICINE

## 2019-01-01 PROCEDURE — 85610 PROTHROMBIN TIME: CPT | Performed by: NURSE PRACTITIONER

## 2019-01-01 PROCEDURE — B240ZZ3 ULTRASONOGRAPHY OF SINGLE CORONARY ARTERY, INTRAVASCULAR: ICD-10-PCS | Performed by: INTERNAL MEDICINE

## 2019-01-01 PROCEDURE — 63710000001 INSULIN LISPRO (HUMAN) PER 5 UNITS: Performed by: THORACIC SURGERY (CARDIOTHORACIC VASCULAR SURGERY)

## 2019-01-01 PROCEDURE — 82330 ASSAY OF CALCIUM: CPT | Performed by: THORACIC SURGERY (CARDIOTHORACIC VASCULAR SURGERY)

## 2019-01-01 PROCEDURE — 25010000002 ENOXAPARIN PER 10 MG: Performed by: THORACIC SURGERY (CARDIOTHORACIC VASCULAR SURGERY)

## 2019-01-01 PROCEDURE — 99024 POSTOP FOLLOW-UP VISIT: CPT | Performed by: THORACIC SURGERY (CARDIOTHORACIC VASCULAR SURGERY)

## 2019-01-01 PROCEDURE — 25010000002 PROPOFOL 10 MG/ML EMULSION: Performed by: ANESTHESIOLOGY

## 2019-01-01 PROCEDURE — 25010000002 ONDANSETRON PER 1 MG: Performed by: INTERNAL MEDICINE

## 2019-01-01 PROCEDURE — 85025 COMPLETE CBC W/AUTO DIFF WBC: CPT | Performed by: THORACIC SURGERY (CARDIOTHORACIC VASCULAR SURGERY)

## 2019-01-01 PROCEDURE — P9047 ALBUMIN (HUMAN), 25%, 50ML: HCPCS

## 2019-01-01 PROCEDURE — 02PYX3Z REMOVAL OF INFUSION DEVICE FROM GREAT VESSEL, EXTERNAL APPROACH: ICD-10-PCS | Performed by: SURGERY

## 2019-01-01 PROCEDURE — 25010000002 ENOXAPARIN PER 10 MG: Performed by: NURSE PRACTITIONER

## 2019-01-01 PROCEDURE — 99024 POSTOP FOLLOW-UP VISIT: CPT | Performed by: PHYSICIAN ASSISTANT

## 2019-01-01 PROCEDURE — C1753 CATH, INTRAVAS ULTRASOUND: HCPCS | Performed by: INTERNAL MEDICINE

## 2019-01-01 PROCEDURE — G0365 VESSEL MAPPING HEMO ACCESS: HCPCS

## 2019-01-01 PROCEDURE — 25010000003 LIDOCAINE 1 % SOLUTION 20 ML VIAL: Performed by: SURGERY

## 2019-01-01 PROCEDURE — 25010000002 HEPARIN (PORCINE) PER 1000 UNITS: Performed by: INTERNAL MEDICINE

## 2019-01-01 PROCEDURE — 33533 CABG ARTERIAL SINGLE: CPT | Performed by: PHYSICIAN ASSISTANT

## 2019-01-01 PROCEDURE — 84550 ASSAY OF BLOOD/URIC ACID: CPT | Performed by: INTERNAL MEDICINE

## 2019-01-01 PROCEDURE — 82962 GLUCOSE BLOOD TEST: CPT

## 2019-01-01 PROCEDURE — 25010000002 HEPARIN (PORCINE) PER 1000 UNITS: Performed by: SURGERY

## 2019-01-01 PROCEDURE — 99024 POSTOP FOLLOW-UP VISIT: CPT | Performed by: NURSE PRACTITIONER

## 2019-01-01 PROCEDURE — 99214 OFFICE O/P EST MOD 30 MIN: CPT | Performed by: INTERNAL MEDICINE

## 2019-01-01 PROCEDURE — 02RG08Z REPLACEMENT OF MITRAL VALVE WITH ZOOPLASTIC TISSUE, OPEN APPROACH: ICD-10-PCS | Performed by: THORACIC SURGERY (CARDIOTHORACIC VASCULAR SURGERY)

## 2019-01-01 PROCEDURE — 25010000002 PROTAMINE SULFATE PER 10 MG: Performed by: ANESTHESIOLOGY

## 2019-01-01 PROCEDURE — 82570 ASSAY OF URINE CREATININE: CPT | Performed by: INTERNAL MEDICINE

## 2019-01-01 PROCEDURE — 25010000002 MAGNESIUM SULFATE PER 500 MG OF MAGNESIUM: Performed by: ANESTHESIOLOGY

## 2019-01-01 PROCEDURE — 25010000002 ALBUMIN HUMAN 25% PER 50 ML: Performed by: INTERNAL MEDICINE

## 2019-01-01 PROCEDURE — 06BQ4ZZ EXCISION OF LEFT SAPHENOUS VEIN, PERCUTANEOUS ENDOSCOPIC APPROACH: ICD-10-PCS | Performed by: THORACIC SURGERY (CARDIOTHORACIC VASCULAR SURGERY)

## 2019-01-01 PROCEDURE — 33519 CABG ARTERY-VEIN THREE: CPT | Performed by: PHYSICIAN ASSISTANT

## 2019-01-01 PROCEDURE — 93005 ELECTROCARDIOGRAM TRACING: CPT | Performed by: NURSE PRACTITIONER

## 2019-01-01 PROCEDURE — 92978 ENDOLUMINL IVUS OCT C 1ST: CPT | Performed by: INTERNAL MEDICINE

## 2019-01-01 PROCEDURE — 84484 ASSAY OF TROPONIN QUANT: CPT | Performed by: INTERNAL MEDICINE

## 2019-01-01 PROCEDURE — 83735 ASSAY OF MAGNESIUM: CPT | Performed by: PHYSICIAN ASSISTANT

## 2019-01-01 PROCEDURE — 99214 OFFICE O/P EST MOD 30 MIN: CPT | Performed by: NURSE PRACTITIONER

## 2019-01-01 PROCEDURE — 25010000002 AMIODARONE IN DEXTROSE 5% 150-4.21 MG/100ML-% SOLUTION: Performed by: THORACIC SURGERY (CARDIOTHORACIC VASCULAR SURGERY)

## 2019-01-01 PROCEDURE — 02100AW BYPASS CORONARY ARTERY, ONE ARTERY FROM AORTA WITH AUTOLOGOUS ARTERIAL TISSUE, OPEN APPROACH: ICD-10-PCS | Performed by: THORACIC SURGERY (CARDIOTHORACIC VASCULAR SURGERY)

## 2019-01-01 PROCEDURE — 80069 RENAL FUNCTION PANEL: CPT | Performed by: INTERNAL MEDICINE

## 2019-01-01 PROCEDURE — P9047 ALBUMIN (HUMAN), 25%, 50ML: HCPCS | Performed by: INTERNAL MEDICINE

## 2019-01-01 PROCEDURE — 82024 ASSAY OF ACTH: CPT | Performed by: INTERNAL MEDICINE

## 2019-01-01 PROCEDURE — 25010000002 NALOXONE PER 1 MG: Performed by: INTERNAL MEDICINE

## 2019-01-01 PROCEDURE — 99231 SBSQ HOSP IP/OBS SF/LOW 25: CPT | Performed by: SURGERY

## 2019-01-01 PROCEDURE — 5A1221Z PERFORMANCE OF CARDIAC OUTPUT, CONTINUOUS: ICD-10-PCS | Performed by: THORACIC SURGERY (CARDIOTHORACIC VASCULAR SURGERY)

## 2019-01-01 PROCEDURE — 33508 ENDOSCOPIC VEIN HARVEST: CPT | Performed by: PHYSICIAN ASSISTANT

## 2019-01-01 PROCEDURE — 84443 ASSAY THYROID STIM HORMONE: CPT | Performed by: INTERNAL MEDICINE

## 2019-01-01 PROCEDURE — 71045 X-RAY EXAM CHEST 1 VIEW: CPT

## 2019-01-01 PROCEDURE — 36415 COLL VENOUS BLD VENIPUNCTURE: CPT | Performed by: EMERGENCY MEDICINE

## 2019-01-01 PROCEDURE — 85018 HEMOGLOBIN: CPT

## 2019-01-01 PROCEDURE — 25010000002 MORPHINE PER 10 MG: Performed by: THORACIC SURGERY (CARDIOTHORACIC VASCULAR SURGERY)

## 2019-01-01 PROCEDURE — 86900 BLOOD TYPING SEROLOGIC ABO: CPT | Performed by: NURSE PRACTITIONER

## 2019-01-01 PROCEDURE — 85730 THROMBOPLASTIN TIME PARTIAL: CPT | Performed by: EMERGENCY MEDICINE

## 2019-01-01 PROCEDURE — 25010000002 PERFLUTREN (DEFINITY) 8.476 MG IN SODIUM CHLORIDE 0.9 % 10 ML INJECTION: Performed by: NURSE PRACTITIONER

## 2019-01-01 PROCEDURE — 99232 SBSQ HOSP IP/OBS MODERATE 35: CPT | Performed by: INTERNAL MEDICINE

## 2019-01-01 PROCEDURE — 71046 X-RAY EXAM CHEST 2 VIEWS: CPT

## 2019-01-01 PROCEDURE — 93010 ELECTROCARDIOGRAM REPORT: CPT | Performed by: INTERNAL MEDICINE

## 2019-01-01 PROCEDURE — 83735 ASSAY OF MAGNESIUM: CPT | Performed by: INTERNAL MEDICINE

## 2019-01-01 PROCEDURE — 94640 AIRWAY INHALATION TREATMENT: CPT

## 2019-01-01 PROCEDURE — 85347 COAGULATION TIME ACTIVATED: CPT

## 2019-01-01 PROCEDURE — 94060 EVALUATION OF WHEEZING: CPT

## 2019-01-01 PROCEDURE — 36600 WITHDRAWAL OF ARTERIAL BLOOD: CPT

## 2019-01-01 PROCEDURE — 25010000002 AMIODARONE IN DEXTROSE 5% 150-4.21 MG/100ML-% SOLUTION: Performed by: INTERNAL MEDICINE

## 2019-01-01 PROCEDURE — 86900 BLOOD TYPING SEROLOGIC ABO: CPT

## 2019-01-01 PROCEDURE — G0463 HOSPITAL OUTPT CLINIC VISIT: HCPCS

## 2019-01-01 PROCEDURE — 97110 THERAPEUTIC EXERCISES: CPT

## 2019-01-01 PROCEDURE — 97535 SELF CARE MNGMENT TRAINING: CPT

## 2019-01-01 PROCEDURE — 25010000002 HEPARIN (PORCINE) PER 1000 UNITS

## 2019-01-01 PROCEDURE — 93306 TTE W/DOPPLER COMPLETE: CPT

## 2019-01-01 PROCEDURE — 87070 CULTURE OTHR SPECIMN AEROBIC: CPT | Performed by: NURSE PRACTITIONER

## 2019-01-01 PROCEDURE — 80053 COMPREHEN METABOLIC PANEL: CPT | Performed by: INTERNAL MEDICINE

## 2019-01-01 PROCEDURE — C1729 CATH, DRAINAGE: HCPCS | Performed by: THORACIC SURGERY (CARDIOTHORACIC VASCULAR SURGERY)

## 2019-01-01 PROCEDURE — 82947 ASSAY GLUCOSE BLOOD QUANT: CPT

## 2019-01-01 PROCEDURE — 85025 COMPLETE CBC W/AUTO DIFF WBC: CPT | Performed by: EMERGENCY MEDICINE

## 2019-01-01 PROCEDURE — 63710000001 INSULIN GLARGINE PER 5 UNITS: Performed by: INTERNAL MEDICINE

## 2019-01-01 PROCEDURE — 93321 DOPPLER ECHO F-UP/LMTD STD: CPT | Performed by: INTERNAL MEDICINE

## 2019-01-01 PROCEDURE — C1887 CATHETER, GUIDING: HCPCS | Performed by: INTERNAL MEDICINE

## 2019-01-01 PROCEDURE — 25010000002 FENTANYL CITRATE (PF) 100 MCG/2ML SOLUTION: Performed by: INTERNAL MEDICINE

## 2019-01-01 PROCEDURE — 96160 PT-FOCUSED HLTH RISK ASSMT: CPT | Performed by: FAMILY MEDICINE

## 2019-01-01 PROCEDURE — 25010000002 METOCLOPRAMIDE PER 10 MG: Performed by: THORACIC SURGERY (CARDIOTHORACIC VASCULAR SURGERY)

## 2019-01-01 PROCEDURE — 84100 ASSAY OF PHOSPHORUS: CPT | Performed by: INTERNAL MEDICINE

## 2019-01-01 PROCEDURE — 97162 PT EVAL MOD COMPLEX 30 MIN: CPT

## 2019-01-01 PROCEDURE — 86850 RBC ANTIBODY SCREEN: CPT | Performed by: NURSE PRACTITIONER

## 2019-01-01 PROCEDURE — 85730 THROMBOPLASTIN TIME PARTIAL: CPT | Performed by: INTERNAL MEDICINE

## 2019-01-01 PROCEDURE — 97166 OT EVAL MOD COMPLEX 45 MIN: CPT | Performed by: OCCUPATIONAL THERAPIST

## 2019-01-01 PROCEDURE — 021209W BYPASS CORONARY ARTERY, THREE ARTERIES FROM AORTA WITH AUTOLOGOUS VENOUS TISSUE, OPEN APPROACH: ICD-10-PCS | Performed by: THORACIC SURGERY (CARDIOTHORACIC VASCULAR SURGERY)

## 2019-01-01 PROCEDURE — 33430 REPLACEMENT OF MITRAL VALVE: CPT | Performed by: THORACIC SURGERY (CARDIOTHORACIC VASCULAR SURGERY)

## 2019-01-01 PROCEDURE — 87075 CULTR BACTERIA EXCEPT BLOOD: CPT | Performed by: NURSE PRACTITIONER

## 2019-01-01 PROCEDURE — 84439 ASSAY OF FREE THYROXINE: CPT | Performed by: INTERNAL MEDICINE

## 2019-01-01 PROCEDURE — 25010000002 CALCIUM GLUCONATE PER 10 ML: Performed by: NURSE PRACTITIONER

## 2019-01-01 PROCEDURE — 33430 REPLACEMENT OF MITRAL VALVE: CPT | Performed by: PHYSICIAN ASSISTANT

## 2019-01-01 PROCEDURE — 87205 SMEAR GRAM STAIN: CPT | Performed by: NURSE PRACTITIONER

## 2019-01-01 PROCEDURE — 95811 POLYSOM 6/>YRS CPAP 4/> PARM: CPT

## 2019-01-01 PROCEDURE — 25010000002 HEPARIN (PORCINE) PER 1000 UNITS: Performed by: EMERGENCY MEDICINE

## 2019-01-01 PROCEDURE — 99231 SBSQ HOSP IP/OBS SF/LOW 25: CPT | Performed by: NURSE PRACTITIONER

## 2019-01-01 PROCEDURE — 93923 UPR/LXTR ART STDY 3+ LVLS: CPT

## 2019-01-01 PROCEDURE — 93458 L HRT ARTERY/VENTRICLE ANGIO: CPT | Performed by: INTERNAL MEDICINE

## 2019-01-01 PROCEDURE — 93321 DOPPLER ECHO F-UP/LMTD STD: CPT

## 2019-01-01 PROCEDURE — 25010000002 AMIODARONE IN DEXTROSE 5% 360-4.14 MG/200ML-% SOLUTION: Performed by: INTERNAL MEDICINE

## 2019-01-01 PROCEDURE — 93005 ELECTROCARDIOGRAM TRACING: CPT | Performed by: EMERGENCY MEDICINE

## 2019-01-01 PROCEDURE — 36430 TRANSFUSION BLD/BLD COMPNT: CPT

## 2019-01-01 PROCEDURE — 83880 ASSAY OF NATRIURETIC PEPTIDE: CPT | Performed by: EMERGENCY MEDICINE

## 2019-01-01 PROCEDURE — 80069 RENAL FUNCTION PANEL: CPT | Performed by: THORACIC SURGERY (CARDIOTHORACIC VASCULAR SURGERY)

## 2019-01-01 PROCEDURE — 85576 BLOOD PLATELET AGGREGATION: CPT | Performed by: NURSE PRACTITIONER

## 2019-01-01 PROCEDURE — 25010000002 MIDAZOLAM PER 1 MG: Performed by: ANESTHESIOLOGY

## 2019-01-01 PROCEDURE — 25010000002 FENTANYL CITRATE (PF) 100 MCG/2ML SOLUTION: Performed by: ANESTHESIOLOGY

## 2019-01-01 PROCEDURE — P9041 ALBUMIN (HUMAN),5%, 50ML: HCPCS | Performed by: THORACIC SURGERY (CARDIOTHORACIC VASCULAR SURGERY)

## 2019-01-01 PROCEDURE — 83735 ASSAY OF MAGNESIUM: CPT | Performed by: THORACIC SURGERY (CARDIOTHORACIC VASCULAR SURGERY)

## 2019-01-01 PROCEDURE — 85027 COMPLETE CBC AUTOMATED: CPT | Performed by: THORACIC SURGERY (CARDIOTHORACIC VASCULAR SURGERY)

## 2019-01-01 PROCEDURE — 25010000002 HEPARIN (PORCINE) PER 1000 UNITS: Performed by: THORACIC SURGERY (CARDIOTHORACIC VASCULAR SURGERY)

## 2019-01-01 PROCEDURE — 25010000002 FENTANYL CITRATE (PF) 100 MCG/2ML SOLUTION: Performed by: NURSE ANESTHETIST, CERTIFIED REGISTERED

## 2019-01-01 PROCEDURE — 99223 1ST HOSP IP/OBS HIGH 75: CPT | Performed by: INTERNAL MEDICINE

## 2019-01-01 PROCEDURE — 85027 COMPLETE CBC AUTOMATED: CPT | Performed by: INTERNAL MEDICINE

## 2019-01-01 PROCEDURE — 86901 BLOOD TYPING SEROLOGIC RH(D): CPT | Performed by: NURSE PRACTITIONER

## 2019-01-01 PROCEDURE — 25010000002 COSYNTROPIN PER 0.25 MG: Performed by: INTERNAL MEDICINE

## 2019-01-01 PROCEDURE — 84466 ASSAY OF TRANSFERRIN: CPT | Performed by: INTERNAL MEDICINE

## 2019-01-01 PROCEDURE — 82803 BLOOD GASES ANY COMBINATION: CPT

## 2019-01-01 PROCEDURE — 85610 PROTHROMBIN TIME: CPT | Performed by: THORACIC SURGERY (CARDIOTHORACIC VASCULAR SURGERY)

## 2019-01-01 PROCEDURE — G0439 PPPS, SUBSEQ VISIT: HCPCS | Performed by: FAMILY MEDICINE

## 2019-01-01 PROCEDURE — 99232 SBSQ HOSP IP/OBS MODERATE 35: CPT | Performed by: PHYSICIAN ASSISTANT

## 2019-01-01 PROCEDURE — 94660 CPAP INITIATION&MGMT: CPT

## 2019-01-01 PROCEDURE — 83735 ASSAY OF MAGNESIUM: CPT | Performed by: NURSE PRACTITIONER

## 2019-01-01 PROCEDURE — 84484 ASSAY OF TROPONIN QUANT: CPT | Performed by: EMERGENCY MEDICINE

## 2019-01-01 PROCEDURE — 99238 HOSP IP/OBS DSCHRG MGMT 30/<: CPT | Performed by: INTERNAL MEDICINE

## 2019-01-01 PROCEDURE — 93005 ELECTROCARDIOGRAM TRACING: CPT | Performed by: INTERNAL MEDICINE

## 2019-01-01 PROCEDURE — C1713 ANCHOR/SCREW BN/BN,TIS/BN: HCPCS | Performed by: THORACIC SURGERY (CARDIOTHORACIC VASCULAR SURGERY)

## 2019-01-01 PROCEDURE — 97535 SELF CARE MNGMENT TRAINING: CPT | Performed by: OCCUPATIONAL THERAPIST

## 2019-01-01 PROCEDURE — 25010000002 PAPAVERINE PER 60 MG: Performed by: THORACIC SURGERY (CARDIOTHORACIC VASCULAR SURGERY)

## 2019-01-01 PROCEDURE — 99231 SBSQ HOSP IP/OBS SF/LOW 25: CPT | Performed by: INTERNAL MEDICINE

## 2019-01-01 PROCEDURE — 86901 BLOOD TYPING SEROLOGIC RH(D): CPT

## 2019-01-01 PROCEDURE — 25010000002 ALBUMIN HUMAN 5% PER 50 ML: Performed by: THORACIC SURGERY (CARDIOTHORACIC VASCULAR SURGERY)

## 2019-01-01 PROCEDURE — 97116 GAIT TRAINING THERAPY: CPT

## 2019-01-01 PROCEDURE — 82533 TOTAL CORTISOL: CPT | Performed by: INTERNAL MEDICINE

## 2019-01-01 PROCEDURE — 93306 TTE W/DOPPLER COMPLETE: CPT | Performed by: INTERNAL MEDICINE

## 2019-01-01 PROCEDURE — 86923 COMPATIBILITY TEST ELECTRIC: CPT

## 2019-01-01 PROCEDURE — 0JH63XZ INSERTION OF TUNNELED VASCULAR ACCESS DEVICE INTO CHEST SUBCUTANEOUS TISSUE AND FASCIA, PERCUTANEOUS APPROACH: ICD-10-PCS | Performed by: SURGERY

## 2019-01-01 PROCEDURE — 25010000002 ALBUMIN HUMAN 25% PER 50 ML

## 2019-01-01 PROCEDURE — 93325 DOPPLER ECHO COLOR FLOW MAPG: CPT | Performed by: INTERNAL MEDICINE

## 2019-01-01 PROCEDURE — 25010000002 PROPOFOL 10 MG/ML EMULSION: Performed by: NURSE ANESTHETIST, CERTIFIED REGISTERED

## 2019-01-01 PROCEDURE — 87340 HEPATITIS B SURFACE AG IA: CPT | Performed by: INTERNAL MEDICINE

## 2019-01-01 PROCEDURE — 97110 THERAPEUTIC EXERCISES: CPT | Performed by: OCCUPATIONAL THERAPIST

## 2019-01-01 PROCEDURE — 74018 RADEX ABDOMEN 1 VIEW: CPT

## 2019-01-01 PROCEDURE — 93005 ELECTROCARDIOGRAM TRACING: CPT | Performed by: THORACIC SURGERY (CARDIOTHORACIC VASCULAR SURGERY)

## 2019-01-01 PROCEDURE — 25010000002 FUROSEMIDE PER 20 MG: Performed by: INTERNAL MEDICINE

## 2019-01-01 PROCEDURE — 93318 ECHO TRANSESOPHAGEAL INTRAOP: CPT

## 2019-01-01 PROCEDURE — 25010000003 CEFAZOLIN IN DEXTROSE 2-4 GM/100ML-% SOLUTION: Performed by: THORACIC SURGERY (CARDIOTHORACIC VASCULAR SURGERY)

## 2019-01-01 PROCEDURE — 63710000001 INSULIN REGULAR HUMAN PER 5 UNITS: Performed by: INTERNAL MEDICINE

## 2019-01-01 PROCEDURE — 93308 TTE F-UP OR LMTD: CPT

## 2019-01-01 PROCEDURE — C1769 GUIDE WIRE: HCPCS | Performed by: INTERNAL MEDICINE

## 2019-01-01 PROCEDURE — 99152 MOD SED SAME PHYS/QHP 5/>YRS: CPT | Performed by: INTERNAL MEDICINE

## 2019-01-01 PROCEDURE — 83540 ASSAY OF IRON: CPT | Performed by: INTERNAL MEDICINE

## 2019-01-01 PROCEDURE — 80048 BASIC METABOLIC PNL TOTAL CA: CPT | Performed by: INTERNAL MEDICINE

## 2019-01-01 PROCEDURE — 25010000002 ONDANSETRON PER 1 MG: Performed by: THORACIC SURGERY (CARDIOTHORACIC VASCULAR SURGERY)

## 2019-01-01 PROCEDURE — 93325 DOPPLER ECHO COLOR FLOW MAPG: CPT

## 2019-01-01 PROCEDURE — C1750 CATH, HEMODIALYSIS,LONG-TERM: HCPCS | Performed by: SURGERY

## 2019-01-01 PROCEDURE — 94002 VENT MGMT INPAT INIT DAY: CPT

## 2019-01-01 PROCEDURE — 0 IOPAMIDOL PER 1 ML: Performed by: INTERNAL MEDICINE

## 2019-01-01 PROCEDURE — 63710000001 INSULIN REGULAR HUMAN PER 5 UNITS: Performed by: ANESTHESIOLOGY

## 2019-01-01 PROCEDURE — 25010000002 DIGOXIN PER 500 MCG: Performed by: NURSE PRACTITIONER

## 2019-01-01 PROCEDURE — 25010000002 PHENYLEPHRINE PER 1 ML: Performed by: ANESTHESIOLOGY

## 2019-01-01 PROCEDURE — 5A1D70Z PERFORMANCE OF URINARY FILTRATION, INTERMITTENT, LESS THAN 6 HOURS PER DAY: ICD-10-PCS | Performed by: INTERNAL MEDICINE

## 2019-01-01 PROCEDURE — C1894 INTRO/SHEATH, NON-LASER: HCPCS | Performed by: INTERNAL MEDICINE

## 2019-01-01 PROCEDURE — 80048 BASIC METABOLIC PNL TOTAL CA: CPT | Performed by: THORACIC SURGERY (CARDIOTHORACIC VASCULAR SURGERY)

## 2019-01-01 PROCEDURE — 25010000002 VANCOMYCIN 1 G RECONSTITUTED SOLUTION

## 2019-01-01 PROCEDURE — 99153 MOD SED SAME PHYS/QHP EA: CPT | Performed by: INTERNAL MEDICINE

## 2019-01-01 PROCEDURE — 25010000002 NEOSTIGMINE 0.5 MG/ML SOLUTION: Performed by: ANESTHESIOLOGY

## 2019-01-01 PROCEDURE — 25010000002 MIDAZOLAM PER 1 MG: Performed by: INTERNAL MEDICINE

## 2019-01-01 PROCEDURE — 83880 ASSAY OF NATRIURETIC PEPTIDE: CPT | Performed by: NURSE PRACTITIONER

## 2019-01-01 PROCEDURE — 99232 SBSQ HOSP IP/OBS MODERATE 35: CPT | Performed by: NURSE PRACTITIONER

## 2019-01-01 PROCEDURE — B246ZZ4 ULTRASONOGRAPHY OF RIGHT AND LEFT HEART, TRANSESOPHAGEAL: ICD-10-PCS | Performed by: THORACIC SURGERY (CARDIOTHORACIC VASCULAR SURGERY)

## 2019-01-01 PROCEDURE — 88305 TISSUE EXAM BY PATHOLOGIST: CPT | Performed by: THORACIC SURGERY (CARDIOTHORACIC VASCULAR SURGERY)

## 2019-01-01 PROCEDURE — 93308 TTE F-UP OR LMTD: CPT | Performed by: INTERNAL MEDICINE

## 2019-01-01 PROCEDURE — 99214 OFFICE O/P EST MOD 30 MIN: CPT | Performed by: FAMILY MEDICINE

## 2019-01-01 PROCEDURE — 94003 VENT MGMT INPAT SUBQ DAY: CPT

## 2019-01-01 PROCEDURE — 93970 EXTREMITY STUDY: CPT

## 2019-01-01 PROCEDURE — 86900 BLOOD TYPING SEROLOGIC ABO: CPT | Performed by: INTERNAL MEDICINE

## 2019-01-01 PROCEDURE — 86707 HEPATITIS BE ANTIBODY: CPT | Performed by: INTERNAL MEDICINE

## 2019-01-01 PROCEDURE — 80048 BASIC METABOLIC PNL TOTAL CA: CPT | Performed by: NURSE PRACTITIONER

## 2019-01-01 PROCEDURE — 85610 PROTHROMBIN TIME: CPT | Performed by: EMERGENCY MEDICINE

## 2019-01-01 PROCEDURE — 33508 ENDOSCOPIC VEIN HARVEST: CPT | Performed by: THORACIC SURGERY (CARDIOTHORACIC VASCULAR SURGERY)

## 2019-01-01 PROCEDURE — 64493 INJ PARAVERT F JNT L/S 1 LEV: CPT | Performed by: ANESTHESIOLOGY

## 2019-01-01 PROCEDURE — 25010000002 PERFLUTREN (DEFINITY) 8.476 MG IN SODIUM CHLORIDE 0.9 % 10 ML INJECTION: Performed by: INTERNAL MEDICINE

## 2019-01-01 PROCEDURE — 25010000003 PROTAMINE SULFATE PER 10 MG: Performed by: THORACIC SURGERY (CARDIOTHORACIC VASCULAR SURGERY)

## 2019-01-01 PROCEDURE — 76000 FLUOROSCOPY <1 HR PHYS/QHP: CPT

## 2019-01-01 PROCEDURE — 86704 HEP B CORE ANTIBODY TOTAL: CPT | Performed by: INTERNAL MEDICINE

## 2019-01-01 PROCEDURE — 4A023N7 MEASUREMENT OF CARDIAC SAMPLING AND PRESSURE, LEFT HEART, PERCUTANEOUS APPROACH: ICD-10-PCS | Performed by: INTERNAL MEDICINE

## 2019-01-01 PROCEDURE — 84132 ASSAY OF SERUM POTASSIUM: CPT | Performed by: THORACIC SURGERY (CARDIOTHORACIC VASCULAR SURGERY)

## 2019-01-01 PROCEDURE — 85014 HEMATOCRIT: CPT

## 2019-01-01 PROCEDURE — B548ZZA ULTRASONOGRAPHY OF SUPERIOR VENA CAVA, GUIDANCE: ICD-10-PCS | Performed by: SURGERY

## 2019-01-01 PROCEDURE — 87015 SPECIMEN INFECT AGNT CONCNTJ: CPT | Performed by: NURSE PRACTITIONER

## 2019-01-01 PROCEDURE — B2111ZZ FLUOROSCOPY OF MULTIPLE CORONARY ARTERIES USING LOW OSMOLAR CONTRAST: ICD-10-PCS | Performed by: INTERNAL MEDICINE

## 2019-01-01 PROCEDURE — A4648 IMPLANTABLE TISSUE MARKER: HCPCS | Performed by: THORACIC SURGERY (CARDIOTHORACIC VASCULAR SURGERY)

## 2019-01-01 PROCEDURE — P9016 RBC LEUKOCYTES REDUCED: HCPCS

## 2019-01-01 PROCEDURE — 85027 COMPLETE CBC AUTOMATED: CPT | Performed by: PHYSICIAN ASSISTANT

## 2019-01-01 PROCEDURE — 99284 EMERGENCY DEPT VISIT MOD MDM: CPT

## 2019-01-01 PROCEDURE — 85027 COMPLETE CBC AUTOMATED: CPT | Performed by: NURSE PRACTITIONER

## 2019-01-01 PROCEDURE — 83690 ASSAY OF LIPASE: CPT | Performed by: EMERGENCY MEDICINE

## 2019-01-01 PROCEDURE — 25010000002 HEPARIN (PORCINE) PER 1000 UNITS: Performed by: ANESTHESIOLOGY

## 2019-01-01 PROCEDURE — 86850 RBC ANTIBODY SCREEN: CPT | Performed by: INTERNAL MEDICINE

## 2019-01-01 PROCEDURE — 33533 CABG ARTERIAL SINGLE: CPT | Performed by: THORACIC SURGERY (CARDIOTHORACIC VASCULAR SURGERY)

## 2019-01-01 PROCEDURE — 80053 COMPREHEN METABOLIC PANEL: CPT | Performed by: NURSE PRACTITIONER

## 2019-01-01 PROCEDURE — 25010000002 PROPOFOL 10 MG/ML EMULSION: Performed by: THORACIC SURGERY (CARDIOTHORACIC VASCULAR SURGERY)

## 2019-01-01 PROCEDURE — 84156 ASSAY OF PROTEIN URINE: CPT | Performed by: INTERNAL MEDICINE

## 2019-01-01 PROCEDURE — 86901 BLOOD TYPING SEROLOGIC RH(D): CPT | Performed by: INTERNAL MEDICINE

## 2019-01-01 PROCEDURE — 33519 CABG ARTERY-VEIN THREE: CPT | Performed by: THORACIC SURGERY (CARDIOTHORACIC VASCULAR SURGERY)

## 2019-01-01 PROCEDURE — 99221 1ST HOSP IP/OBS SF/LOW 40: CPT | Performed by: SURGERY

## 2019-01-01 PROCEDURE — C1751 CATH, INF, PER/CENT/MIDLINE: HCPCS | Performed by: ANESTHESIOLOGY

## 2019-01-01 PROCEDURE — 25010000003 CEFAZOLIN IN DEXTROSE 2-4 GM/100ML-% SOLUTION: Performed by: NURSE PRACTITIONER

## 2019-01-01 PROCEDURE — 80061 LIPID PANEL: CPT | Performed by: INTERNAL MEDICINE

## 2019-01-01 PROCEDURE — 25010000002 AMIODARONE IN DEXTROSE 5% 150-4.21 MG/100ML-% SOLUTION: Performed by: NURSE PRACTITIONER

## 2019-01-01 DEVICE — SS SUTURE, 3 PER SLEEVE
Type: IMPLANTABLE DEVICE | Site: STERNUM | Status: FUNCTIONAL
Brand: MYO/WIRE II

## 2019-01-01 DEVICE — SS SUTURE, 4 PER SLEEVE
Type: IMPLANTABLE DEVICE | Site: STERNUM | Status: FUNCTIONAL
Brand: MYO/WIRE II

## 2019-01-01 DEVICE — COR-KNOT MINI® COMBO KITBASE PACKAGE TYPE - KITEACH STERILE PACKAGE KIT CONTAINS (2) SINGLE PATIENT USE COR-KNOT MINI® DEVICES AND (12) COR-KNOT® QUICK LOADS®.
Type: IMPLANTABLE DEVICE | Site: HEART | Status: FUNCTIONAL
Brand: COR-KNOT MINI®

## 2019-01-01 DEVICE — VLV MITRAL EPIC PORCN 31MM: Type: IMPLANTABLE DEVICE | Site: HEART | Status: FUNCTIONAL

## 2019-01-01 RX ORDER — ACETAMINOPHEN 650 MG/1
650 SUPPOSITORY RECTAL EVERY 4 HOURS
Status: DISCONTINUED | OUTPATIENT
Start: 2019-01-01 | End: 2019-01-01

## 2019-01-01 RX ORDER — ACETAMINOPHEN 325 MG/1
650 TABLET ORAL EVERY 4 HOURS PRN
Status: DISCONTINUED | OUTPATIENT
Start: 2019-01-01 | End: 2019-01-01 | Stop reason: HOSPADM

## 2019-01-01 RX ORDER — ZOLPIDEM TARTRATE 5 MG/1
TABLET ORAL
Qty: 2 TABLET | Refills: 0 | Status: ON HOLD | OUTPATIENT
Start: 2019-01-01 | End: 2019-01-01

## 2019-01-01 RX ORDER — SUFENTANIL CITRATE 50 UG/ML
INJECTION EPIDURAL; INTRAVENOUS AS NEEDED
Status: DISCONTINUED | OUTPATIENT
Start: 2019-01-01 | End: 2019-01-01 | Stop reason: SURG

## 2019-01-01 RX ORDER — ASPIRIN 81 MG/1
324 TABLET, CHEWABLE ORAL ONCE
Status: DISCONTINUED | OUTPATIENT
Start: 2019-01-01 | End: 2019-01-01

## 2019-01-01 RX ORDER — ALPRAZOLAM 0.25 MG/1
0.25 TABLET ORAL EVERY 8 HOURS PRN
Status: DISPENSED | OUTPATIENT
Start: 2019-01-01 | End: 2019-01-01

## 2019-01-01 RX ORDER — VECURONIUM BROMIDE 20 MG/20ML
INJECTION, POWDER, LYOPHILIZED, FOR SOLUTION INTRAVENOUS AS NEEDED
Status: DISCONTINUED | OUTPATIENT
Start: 2019-01-01 | End: 2019-01-01

## 2019-01-01 RX ORDER — ACETAMINOPHEN 650 MG/1
650 SUPPOSITORY RECTAL EVERY 4 HOURS PRN
Status: DISCONTINUED | OUTPATIENT
Start: 2019-01-01 | End: 2019-01-01 | Stop reason: HOSPADM

## 2019-01-01 RX ORDER — PROTAMINE SULFATE 10 MG/ML
INJECTION, SOLUTION INTRAVENOUS AS NEEDED
Status: DISCONTINUED | OUTPATIENT
Start: 2019-01-01 | End: 2019-01-01 | Stop reason: HOSPADM

## 2019-01-01 RX ORDER — MORPHINE SULFATE 2 MG/ML
1 INJECTION, SOLUTION INTRAMUSCULAR; INTRAVENOUS EVERY 4 HOURS PRN
Status: DISCONTINUED | OUTPATIENT
Start: 2019-01-01 | End: 2019-01-01

## 2019-01-01 RX ORDER — CEFAZOLIN SODIUM 2 G/100ML
2 INJECTION, SOLUTION INTRAVENOUS EVERY 8 HOURS
Status: COMPLETED | OUTPATIENT
Start: 2019-01-01 | End: 2019-01-01

## 2019-01-01 RX ORDER — HEPARIN SODIUM 1000 [USP'U]/ML
3000 INJECTION, SOLUTION INTRAVENOUS; SUBCUTANEOUS ONCE
Status: COMPLETED | OUTPATIENT
Start: 2019-01-01 | End: 2019-01-01

## 2019-01-01 RX ORDER — FLUDROCORTISONE ACETATE 0.1 MG/1
100 TABLET ORAL DAILY
Status: DISCONTINUED | OUTPATIENT
Start: 2019-01-01 | End: 2019-01-01

## 2019-01-01 RX ORDER — METOCLOPRAMIDE HYDROCHLORIDE 5 MG/ML
10 INJECTION INTRAMUSCULAR; INTRAVENOUS EVERY 6 HOURS
Status: DISCONTINUED | OUTPATIENT
Start: 2019-01-01 | End: 2019-01-01

## 2019-01-01 RX ORDER — LIDOCAINE HYDROCHLORIDE 20 MG/ML
INJECTION, SOLUTION INFILTRATION; PERINEURAL AS NEEDED
Status: DISCONTINUED | OUTPATIENT
Start: 2019-01-01 | End: 2019-01-01 | Stop reason: SURG

## 2019-01-01 RX ORDER — MORPHINE SULFATE 2 MG/ML
4 INJECTION, SOLUTION INTRAMUSCULAR; INTRAVENOUS
Status: DISPENSED | OUTPATIENT
Start: 2019-01-01 | End: 2019-01-01

## 2019-01-01 RX ORDER — MIDODRINE HYDROCHLORIDE 5 MG/1
5 TABLET ORAL 2 TIMES DAILY
Refills: 0 | COMMUNITY
Start: 2019-01-01

## 2019-01-01 RX ORDER — PROMETHAZINE HYDROCHLORIDE 25 MG/ML
6.25 INJECTION, SOLUTION INTRAMUSCULAR; INTRAVENOUS
Status: DISCONTINUED | OUTPATIENT
Start: 2019-01-01 | End: 2019-01-01

## 2019-01-01 RX ORDER — BISACODYL 10 MG
10 SUPPOSITORY, RECTAL RECTAL DAILY
Status: DISCONTINUED | OUTPATIENT
Start: 2019-01-01 | End: 2019-01-01

## 2019-01-01 RX ORDER — CARVEDILOL 6.25 MG/1
6.25 TABLET ORAL EVERY 12 HOURS SCHEDULED
Status: DISCONTINUED | OUTPATIENT
Start: 2019-01-01 | End: 2019-01-01

## 2019-01-01 RX ORDER — PANTOPRAZOLE SODIUM 40 MG/1
40 TABLET, DELAYED RELEASE ORAL
Status: DISCONTINUED | OUTPATIENT
Start: 2019-01-01 | End: 2019-01-01 | Stop reason: HOSPADM

## 2019-01-01 RX ORDER — CYCLOBENZAPRINE HCL 10 MG
10 TABLET ORAL EVERY 8 HOURS PRN
Status: DISCONTINUED | OUTPATIENT
Start: 2019-01-01 | End: 2019-01-01 | Stop reason: HOSPADM

## 2019-01-01 RX ORDER — ALBUTEROL SULFATE 2.5 MG/3ML
2.5 SOLUTION RESPIRATORY (INHALATION) ONCE AS NEEDED
Status: COMPLETED | OUTPATIENT
Start: 2019-01-01 | End: 2019-01-01

## 2019-01-01 RX ORDER — MELATONIN
1000 DAILY
Status: DISCONTINUED | OUTPATIENT
Start: 2019-01-01 | End: 2019-01-01

## 2019-01-01 RX ORDER — MORPHINE SULFATE 2 MG/ML
1 INJECTION, SOLUTION INTRAMUSCULAR; INTRAVENOUS EVERY 4 HOURS PRN
Status: ACTIVE | OUTPATIENT
Start: 2019-01-01 | End: 2019-01-01

## 2019-01-01 RX ORDER — PROPOFOL 10 MG/ML
VIAL (ML) INTRAVENOUS AS NEEDED
Status: DISCONTINUED | OUTPATIENT
Start: 2019-01-01 | End: 2019-01-01 | Stop reason: SURG

## 2019-01-01 RX ORDER — POTASSIUM CHLORIDE 7.45 MG/ML
10 INJECTION INTRAVENOUS
Status: DISCONTINUED | OUTPATIENT
Start: 2019-01-01 | End: 2019-01-01

## 2019-01-01 RX ORDER — ERYTHROMYCIN 250 MG/1
250 TABLET, COATED ORAL
Status: DISCONTINUED | OUTPATIENT
Start: 2019-01-01 | End: 2019-01-01

## 2019-01-01 RX ORDER — OXYCODONE AND ACETAMINOPHEN 7.5; 325 MG/1; MG/1
1 TABLET ORAL ONCE AS NEEDED
Status: DISCONTINUED | OUTPATIENT
Start: 2019-01-01 | End: 2019-01-01

## 2019-01-01 RX ORDER — NALOXONE HCL 0.4 MG/ML
0.4 VIAL (ML) INJECTION
Status: DISCONTINUED | OUTPATIENT
Start: 2019-01-01 | End: 2019-01-01 | Stop reason: HOSPADM

## 2019-01-01 RX ORDER — NITROGLYCERIN 5 MG/ML
INJECTION, SOLUTION INTRAVENOUS AS NEEDED
Status: DISCONTINUED | OUTPATIENT
Start: 2019-01-01 | End: 2019-01-01 | Stop reason: SURG

## 2019-01-01 RX ORDER — FUROSEMIDE 10 MG/ML
80 INJECTION INTRAMUSCULAR; INTRAVENOUS EVERY 8 HOURS
Status: DISCONTINUED | OUTPATIENT
Start: 2019-01-01 | End: 2019-01-01

## 2019-01-01 RX ORDER — DIGOXIN 0.25 MG/ML
500 INJECTION INTRAMUSCULAR; INTRAVENOUS ONCE
Status: COMPLETED | OUTPATIENT
Start: 2019-01-01 | End: 2019-01-01

## 2019-01-01 RX ORDER — TAMSULOSIN HYDROCHLORIDE 0.4 MG/1
0.4 CAPSULE ORAL EVERY EVENING
Status: DISCONTINUED | OUTPATIENT
Start: 2019-01-01 | End: 2019-01-01 | Stop reason: HOSPADM

## 2019-01-01 RX ORDER — ATORVASTATIN CALCIUM 20 MG/1
20 TABLET, FILM COATED ORAL NIGHTLY
Status: DISCONTINUED | OUTPATIENT
Start: 2019-01-01 | End: 2019-01-01 | Stop reason: HOSPADM

## 2019-01-01 RX ORDER — FENTANYL CITRATE 50 UG/ML
INJECTION, SOLUTION INTRAMUSCULAR; INTRAVENOUS AS NEEDED
Status: DISCONTINUED | OUTPATIENT
Start: 2019-01-01 | End: 2019-01-01 | Stop reason: SURG

## 2019-01-01 RX ORDER — SODIUM CHLORIDE 9 MG/ML
75 INJECTION, SOLUTION INTRAVENOUS CONTINUOUS
Status: ACTIVE | OUTPATIENT
Start: 2019-01-01 | End: 2019-01-01

## 2019-01-01 RX ORDER — GLYCOPYRROLATE 0.2 MG/ML
INJECTION INTRAMUSCULAR; INTRAVENOUS AS NEEDED
Status: DISCONTINUED | OUTPATIENT
Start: 2019-01-01 | End: 2019-01-01 | Stop reason: SURG

## 2019-01-01 RX ORDER — WARFARIN SODIUM 2 MG/1
2 TABLET ORAL
Status: DISCONTINUED | OUTPATIENT
Start: 2019-01-01 | End: 2019-01-01

## 2019-01-01 RX ORDER — PROMETHAZINE HYDROCHLORIDE 25 MG/1
12.5 TABLET ORAL EVERY 6 HOURS PRN
Status: DISCONTINUED | OUTPATIENT
Start: 2019-01-01 | End: 2019-01-01 | Stop reason: HOSPADM

## 2019-01-01 RX ORDER — SODIUM CHLORIDE 0.9 % (FLUSH) 0.9 %
10 SYRINGE (ML) INJECTION AS NEEDED
Status: DISCONTINUED | OUTPATIENT
Start: 2019-01-01 | End: 2019-01-01

## 2019-01-01 RX ORDER — ALBUMIN (HUMAN) 12.5 G/50ML
12.5 SOLUTION INTRAVENOUS ONCE
Status: DISCONTINUED | OUTPATIENT
Start: 2019-01-01 | End: 2019-01-01 | Stop reason: HOSPADM

## 2019-01-01 RX ORDER — BISACODYL 10 MG
10 SUPPOSITORY, RECTAL RECTAL DAILY PRN
Status: DISCONTINUED | OUTPATIENT
Start: 2019-01-01 | End: 2019-01-01 | Stop reason: HOSPADM

## 2019-01-01 RX ORDER — DEXTROSE MONOHYDRATE 25 G/50ML
25 INJECTION, SOLUTION INTRAVENOUS
Status: DISCONTINUED | OUTPATIENT
Start: 2019-01-01 | End: 2019-01-01

## 2019-01-01 RX ORDER — POTASSIUM CHLORIDE 750 MG/1
40 CAPSULE, EXTENDED RELEASE ORAL AS NEEDED
Status: DISCONTINUED | OUTPATIENT
Start: 2019-01-01 | End: 2019-01-01

## 2019-01-01 RX ORDER — IPRATROPIUM BROMIDE AND ALBUTEROL SULFATE 2.5; .5 MG/3ML; MG/3ML
3 SOLUTION RESPIRATORY (INHALATION) EVERY 4 HOURS PRN
Status: DISCONTINUED | OUTPATIENT
Start: 2019-01-01 | End: 2019-01-01 | Stop reason: HOSPADM

## 2019-01-01 RX ORDER — SODIUM CHLORIDE FOR INHALATION 7 %
4 VIAL, NEBULIZER (ML) INHALATION
Status: DISCONTINUED | OUTPATIENT
Start: 2019-01-01 | End: 2019-01-01

## 2019-01-01 RX ORDER — ASPIRIN 81 MG/1
81 TABLET ORAL DAILY
Status: DISCONTINUED | OUTPATIENT
Start: 2019-01-01 | End: 2019-01-01

## 2019-01-01 RX ORDER — AMIODARONE HYDROCHLORIDE 200 MG/1
200 TABLET ORAL EVERY 12 HOURS SCHEDULED
Status: DISCONTINUED | OUTPATIENT
Start: 2019-01-01 | End: 2019-01-01 | Stop reason: HOSPADM

## 2019-01-01 RX ORDER — ATORVASTATIN CALCIUM 20 MG/1
20 TABLET, FILM COATED ORAL EVERY EVENING
Status: DISCONTINUED | OUTPATIENT
Start: 2019-01-01 | End: 2019-01-01

## 2019-01-01 RX ORDER — EPHEDRINE SULFATE 50 MG/ML
INJECTION, SOLUTION INTRAVENOUS AS NEEDED
Status: DISCONTINUED | OUTPATIENT
Start: 2019-01-01 | End: 2019-01-01 | Stop reason: SURG

## 2019-01-01 RX ORDER — ALBUMIN (HUMAN) 12.5 G/50ML
12.5 SOLUTION INTRAVENOUS AS NEEDED
Status: DISPENSED | OUTPATIENT
Start: 2019-01-01 | End: 2019-01-01

## 2019-01-01 RX ORDER — CHLORHEXIDINE GLUCONATE 500 MG/1
1 CLOTH TOPICAL EVERY 12 HOURS
Status: DISCONTINUED | OUTPATIENT
Start: 2019-01-01 | End: 2019-01-01

## 2019-01-01 RX ORDER — CLOPIDOGREL BISULFATE 75 MG/1
75 TABLET ORAL DAILY
Status: DISCONTINUED | OUTPATIENT
Start: 2019-01-01 | End: 2019-01-01

## 2019-01-01 RX ORDER — ALBUMIN, HUMAN INJ 5% 5 %
1500 SOLUTION INTRAVENOUS AS NEEDED
Status: DISPENSED | OUTPATIENT
Start: 2019-01-01 | End: 2019-01-01

## 2019-01-01 RX ORDER — TAMSULOSIN HYDROCHLORIDE 0.4 MG/1
0.4 CAPSULE ORAL EVERY EVENING
Status: DISCONTINUED | OUTPATIENT
Start: 2019-01-01 | End: 2019-01-01

## 2019-01-01 RX ORDER — ACETAMINOPHEN 160 MG/5ML
650 SOLUTION ORAL EVERY 4 HOURS
Status: DISCONTINUED | OUTPATIENT
Start: 2019-01-01 | End: 2019-01-01

## 2019-01-01 RX ORDER — EPHEDRINE SULFATE 50 MG/ML
5 INJECTION, SOLUTION INTRAVENOUS ONCE AS NEEDED
Status: DISCONTINUED | OUTPATIENT
Start: 2019-01-01 | End: 2019-01-01

## 2019-01-01 RX ORDER — ACETAMINOPHEN 325 MG/1
650 TABLET ORAL EVERY 4 HOURS PRN
Status: DISCONTINUED | OUTPATIENT
Start: 2019-01-01 | End: 2019-01-01

## 2019-01-01 RX ORDER — CARVEDILOL 3.12 MG/1
3.12 TABLET ORAL 2 TIMES DAILY WITH MEALS
Refills: 0 | COMMUNITY
Start: 2019-01-01

## 2019-01-01 RX ORDER — ACETAMINOPHEN 325 MG/1
650 TABLET ORAL EVERY 6 HOURS PRN
Status: DISCONTINUED | OUTPATIENT
Start: 2019-01-01 | End: 2019-01-01

## 2019-01-01 RX ORDER — HEPARIN SODIUM 5000 [USP'U]/ML
30-40.1 INJECTION, SOLUTION INTRAVENOUS; SUBCUTANEOUS EVERY 6 HOURS PRN
Status: DISCONTINUED | OUTPATIENT
Start: 2019-01-01 | End: 2019-01-01

## 2019-01-01 RX ORDER — SODIUM CHLORIDE 9 MG/ML
30 INJECTION, SOLUTION INTRAVENOUS CONTINUOUS PRN
Status: DISCONTINUED | OUTPATIENT
Start: 2019-01-01 | End: 2019-01-01 | Stop reason: HOSPADM

## 2019-01-01 RX ORDER — HEPARIN SODIUM 5000 [USP'U]/ML
40.4 INJECTION, SOLUTION INTRAVENOUS; SUBCUTANEOUS ONCE
Status: COMPLETED | OUTPATIENT
Start: 2019-01-01 | End: 2019-01-01

## 2019-01-01 RX ORDER — WARFARIN SODIUM 2 MG/1
TABLET ORAL
Qty: 90 TABLET | Refills: 0 | Status: SHIPPED | OUTPATIENT
Start: 2019-01-01 | End: 2019-01-01 | Stop reason: SDUPTHER

## 2019-01-01 RX ORDER — NALOXONE HCL 0.4 MG/ML
VIAL (ML) INJECTION
Status: COMPLETED | OUTPATIENT
Start: 2019-01-01 | End: 2019-01-01

## 2019-01-01 RX ORDER — CARVEDILOL 3.12 MG/1
3.12 TABLET ORAL EVERY 12 HOURS SCHEDULED
Status: DISCONTINUED | OUTPATIENT
Start: 2019-01-01 | End: 2019-01-01

## 2019-01-01 RX ORDER — AMIODARONE HYDROCHLORIDE 200 MG/1
200 TABLET ORAL DAILY
Qty: 30 TABLET | Refills: 0 | Status: SHIPPED | OUTPATIENT
Start: 2019-01-01 | End: 2019-01-01

## 2019-01-01 RX ORDER — MAGNESIUM SULFATE 1 G/100ML
1 INJECTION INTRAVENOUS EVERY 8 HOURS
Status: DISCONTINUED | OUTPATIENT
Start: 2019-01-01 | End: 2019-01-01

## 2019-01-01 RX ORDER — GLIPIZIDE 5 MG/1
5 TABLET ORAL EVERY MORNING
Status: ON HOLD | COMMUNITY
End: 2020-01-01

## 2019-01-01 RX ORDER — FUROSEMIDE 10 MG/ML
40 INJECTION INTRAMUSCULAR; INTRAVENOUS EVERY 6 HOURS PRN
Status: DISCONTINUED | OUTPATIENT
Start: 2019-01-01 | End: 2019-01-01

## 2019-01-01 RX ORDER — BUMETANIDE 0.25 MG/ML
4 INJECTION INTRAMUSCULAR; INTRAVENOUS EVERY 8 HOURS
Status: COMPLETED | OUTPATIENT
Start: 2019-01-01 | End: 2019-01-01

## 2019-01-01 RX ORDER — DOXYCYCLINE HYCLATE 100 MG/1
100 CAPSULE ORAL 2 TIMES DAILY
Qty: 14 CAPSULE | Refills: 0 | Status: SHIPPED | OUTPATIENT
Start: 2019-01-01 | End: 2019-01-01 | Stop reason: SDUPTHER

## 2019-01-01 RX ORDER — GUAIFENESIN 600 MG/1
1200 TABLET, EXTENDED RELEASE ORAL EVERY 12 HOURS SCHEDULED
Status: DISCONTINUED | OUTPATIENT
Start: 2019-01-01 | End: 2019-01-01 | Stop reason: HOSPADM

## 2019-01-01 RX ORDER — LIDOCAINE HYDROCHLORIDE 10 MG/ML
0.5 INJECTION, SOLUTION EPIDURAL; INFILTRATION; INTRACAUDAL; PERINEURAL ONCE AS NEEDED
Status: DISCONTINUED | OUTPATIENT
Start: 2019-01-01 | End: 2019-01-01 | Stop reason: HOSPADM

## 2019-01-01 RX ORDER — HYDROCODONE BITARTRATE AND ACETAMINOPHEN 7.5; 325 MG/1; MG/1
1 TABLET ORAL ONCE AS NEEDED
Status: DISCONTINUED | OUTPATIENT
Start: 2019-01-01 | End: 2019-01-01

## 2019-01-01 RX ORDER — ONDANSETRON 4 MG/1
4 TABLET, FILM COATED ORAL EVERY 6 HOURS PRN
Status: DISCONTINUED | OUTPATIENT
Start: 2019-01-01 | End: 2019-01-01

## 2019-01-01 RX ORDER — NICOTINE POLACRILEX 4 MG
15 LOZENGE BUCCAL
Status: DISCONTINUED | OUTPATIENT
Start: 2019-01-01 | End: 2019-01-01

## 2019-01-01 RX ORDER — DEXTROSE MONOHYDRATE 25 G/50ML
25 INJECTION, SOLUTION INTRAVENOUS
Status: DISCONTINUED | OUTPATIENT
Start: 2019-01-01 | End: 2019-01-01 | Stop reason: HOSPADM

## 2019-01-01 RX ORDER — FAMOTIDINE 10 MG/ML
20 INJECTION, SOLUTION INTRAVENOUS ONCE
Status: COMPLETED | OUTPATIENT
Start: 2019-01-01 | End: 2019-01-01

## 2019-01-01 RX ORDER — PROPOFOL 10 MG/ML
VIAL (ML) INTRAVENOUS CONTINUOUS PRN
Status: DISCONTINUED | OUTPATIENT
Start: 2019-01-01 | End: 2019-01-01 | Stop reason: SURG

## 2019-01-01 RX ORDER — ONDANSETRON 2 MG/ML
4 INJECTION INTRAMUSCULAR; INTRAVENOUS EVERY 6 HOURS PRN
Status: DISCONTINUED | OUTPATIENT
Start: 2019-01-01 | End: 2019-01-01

## 2019-01-01 RX ORDER — HEPARIN SODIUM 1000 [USP'U]/ML
3800 INJECTION, SOLUTION INTRAVENOUS; SUBCUTANEOUS ONCE
Status: COMPLETED | OUTPATIENT
Start: 2019-01-01 | End: 2019-01-01

## 2019-01-01 RX ORDER — POTASSIUM CHLORIDE 29.8 MG/ML
20 INJECTION INTRAVENOUS
Status: DISCONTINUED | OUTPATIENT
Start: 2019-01-01 | End: 2019-01-01

## 2019-01-01 RX ORDER — WARFARIN SODIUM 1 MG/1
1 TABLET ORAL
Status: DISCONTINUED | OUTPATIENT
Start: 2019-01-01 | End: 2019-01-01 | Stop reason: HOSPADM

## 2019-01-01 RX ORDER — OXYCODONE HYDROCHLORIDE 5 MG/1
10 TABLET ORAL EVERY 4 HOURS PRN
Status: ACTIVE | OUTPATIENT
Start: 2019-01-01 | End: 2019-01-01

## 2019-01-01 RX ORDER — ATORVASTATIN CALCIUM 20 MG/1
40 TABLET, FILM COATED ORAL NIGHTLY
Status: DISCONTINUED | OUTPATIENT
Start: 2019-01-01 | End: 2019-01-01

## 2019-01-01 RX ORDER — DIGOXIN 0.25 MG/ML
250 INJECTION INTRAMUSCULAR; INTRAVENOUS ONCE
Status: DISCONTINUED | OUTPATIENT
Start: 2019-01-01 | End: 2019-01-01

## 2019-01-01 RX ORDER — HYDROCHLOROTHIAZIDE 12.5 MG/1
12.5 TABLET ORAL DAILY PRN
Status: DISCONTINUED | OUTPATIENT
Start: 2019-01-01 | End: 2019-01-01

## 2019-01-01 RX ORDER — MIDAZOLAM HYDROCHLORIDE 1 MG/ML
INJECTION INTRAMUSCULAR; INTRAVENOUS
Status: COMPLETED | OUTPATIENT
Start: 2019-01-01 | End: 2019-01-01

## 2019-01-01 RX ORDER — NICARDIPINE HYDROCHLORIDE 2.5 MG/ML
INJECTION INTRAVENOUS AS NEEDED
Status: DISCONTINUED | OUTPATIENT
Start: 2019-01-01 | End: 2019-01-01 | Stop reason: SURG

## 2019-01-01 RX ORDER — SODIUM CHLORIDE 9 MG/ML
INJECTION, SOLUTION INTRAVENOUS
Status: COMPLETED | OUTPATIENT
Start: 2019-01-01 | End: 2019-01-01

## 2019-01-01 RX ORDER — PROMETHAZINE HYDROCHLORIDE 25 MG/ML
12.5 INJECTION, SOLUTION INTRAMUSCULAR; INTRAVENOUS EVERY 6 HOURS PRN
Status: DISCONTINUED | OUTPATIENT
Start: 2019-01-01 | End: 2019-01-01 | Stop reason: HOSPADM

## 2019-01-01 RX ORDER — ACETAMINOPHEN 160 MG/5ML
650 SOLUTION ORAL EVERY 4 HOURS PRN
Status: DISCONTINUED | OUTPATIENT
Start: 2019-01-01 | End: 2019-01-01 | Stop reason: HOSPADM

## 2019-01-01 RX ORDER — METOCLOPRAMIDE HYDROCHLORIDE 5 MG/ML
5 INJECTION INTRAMUSCULAR; INTRAVENOUS EVERY 8 HOURS
Status: DISPENSED | OUTPATIENT
Start: 2019-01-01 | End: 2019-01-01

## 2019-01-01 RX ORDER — POTASSIUM CHLORIDE 1.5 G/1.77G
40 POWDER, FOR SOLUTION ORAL AS NEEDED
Status: DISCONTINUED | OUTPATIENT
Start: 2019-01-01 | End: 2019-01-01

## 2019-01-01 RX ORDER — FENTANYL CITRATE 50 UG/ML
INJECTION, SOLUTION INTRAMUSCULAR; INTRAVENOUS AS NEEDED
Status: DISCONTINUED | OUTPATIENT
Start: 2019-01-01 | End: 2019-01-01 | Stop reason: HOSPADM

## 2019-01-01 RX ORDER — HEPARIN SODIUM 1000 [USP'U]/ML
4000 INJECTION, SOLUTION INTRAVENOUS; SUBCUTANEOUS ONCE
Status: DISCONTINUED | OUTPATIENT
Start: 2019-01-01 | End: 2019-01-01 | Stop reason: HOSPADM

## 2019-01-01 RX ORDER — WARFARIN SODIUM 3 MG/1
3 TABLET ORAL
Status: DISCONTINUED | OUTPATIENT
Start: 2019-01-01 | End: 2019-01-01

## 2019-01-01 RX ORDER — HEPARIN SODIUM 1000 [USP'U]/ML
4000 INJECTION, SOLUTION INTRAVENOUS; SUBCUTANEOUS ONCE
Status: COMPLETED | OUTPATIENT
Start: 2019-01-01 | End: 2019-01-01

## 2019-01-01 RX ORDER — MIDAZOLAM HYDROCHLORIDE 1 MG/ML
INJECTION INTRAMUSCULAR; INTRAVENOUS AS NEEDED
Status: DISCONTINUED | OUTPATIENT
Start: 2019-01-01 | End: 2019-01-01 | Stop reason: SURG

## 2019-01-01 RX ORDER — DOPAMINE HYDROCHLORIDE 160 MG/100ML
2-20 INJECTION, SOLUTION INTRAVENOUS CONTINUOUS PRN
Status: DISCONTINUED | OUTPATIENT
Start: 2019-01-01 | End: 2019-01-01

## 2019-01-01 RX ORDER — SODIUM CHLORIDE 9 MG/ML
9 INJECTION, SOLUTION INTRAVENOUS CONTINUOUS PRN
Status: DISCONTINUED | OUTPATIENT
Start: 2019-01-01 | End: 2019-01-01

## 2019-01-01 RX ORDER — SODIUM CHLORIDE 0.9 % (FLUSH) 0.9 %
3-10 SYRINGE (ML) INJECTION AS NEEDED
Status: DISCONTINUED | OUTPATIENT
Start: 2019-01-01 | End: 2019-01-01 | Stop reason: HOSPADM

## 2019-01-01 RX ORDER — MIDAZOLAM HYDROCHLORIDE 1 MG/ML
INJECTION INTRAMUSCULAR; INTRAVENOUS AS NEEDED
Status: DISCONTINUED | OUTPATIENT
Start: 2019-01-01 | End: 2019-01-01 | Stop reason: HOSPADM

## 2019-01-01 RX ORDER — NITROGLYCERIN 20 MG/100ML
5-200 INJECTION INTRAVENOUS
Status: DISCONTINUED | OUTPATIENT
Start: 2019-01-01 | End: 2019-01-01

## 2019-01-01 RX ORDER — INSULIN GLARGINE 100 [IU]/ML
14 INJECTION, SOLUTION SUBCUTANEOUS DAILY
Status: DISCONTINUED | OUTPATIENT
Start: 2019-01-01 | End: 2019-01-01

## 2019-01-01 RX ORDER — HEPARIN SODIUM 10000 [USP'U]/100ML
10.1 INJECTION, SOLUTION INTRAVENOUS
Status: DISCONTINUED | OUTPATIENT
Start: 2019-01-01 | End: 2019-01-01

## 2019-01-01 RX ORDER — FENTANYL CITRATE 50 UG/ML
INJECTION, SOLUTION INTRAMUSCULAR; INTRAVENOUS
Status: COMPLETED | OUTPATIENT
Start: 2019-01-01 | End: 2019-01-01

## 2019-01-01 RX ORDER — NITROGLYCERIN 20 MG/100ML
INJECTION INTRAVENOUS CONTINUOUS PRN
Status: DISCONTINUED | OUTPATIENT
Start: 2019-01-01 | End: 2019-01-01 | Stop reason: SURG

## 2019-01-01 RX ORDER — HEPARIN SODIUM 1000 [USP'U]/ML
3000 INJECTION, SOLUTION INTRAVENOUS; SUBCUTANEOUS ONCE
Status: DISCONTINUED | OUTPATIENT
Start: 2019-01-01 | End: 2019-01-01 | Stop reason: HOSPADM

## 2019-01-01 RX ORDER — HYDROCODONE BITARTRATE AND ACETAMINOPHEN 5; 325 MG/1; MG/1
2 TABLET ORAL EVERY 4 HOURS PRN
Status: DISPENSED | OUTPATIENT
Start: 2019-01-01 | End: 2019-01-01

## 2019-01-01 RX ORDER — INSULIN GLARGINE 100 [IU]/ML
10 INJECTION, SOLUTION SUBCUTANEOUS EVERY 12 HOURS SCHEDULED
Status: DISCONTINUED | OUTPATIENT
Start: 2019-01-01 | End: 2019-01-01 | Stop reason: HOSPADM

## 2019-01-01 RX ORDER — POLYETHYLENE GLYCOL 3350 17 G/17G
17 POWDER, FOR SOLUTION ORAL DAILY
Status: DISCONTINUED | OUTPATIENT
Start: 2019-01-01 | End: 2019-01-01

## 2019-01-01 RX ORDER — TORSEMIDE 20 MG/1
20 TABLET ORAL DAILY
Status: DISCONTINUED | OUTPATIENT
Start: 2019-01-01 | End: 2019-01-01

## 2019-01-01 RX ORDER — ALBUMIN (HUMAN) 12.5 G/50ML
12.5 SOLUTION INTRAVENOUS AS NEEDED
Status: ACTIVE | OUTPATIENT
Start: 2019-01-01 | End: 2019-01-01

## 2019-01-01 RX ORDER — HEPARIN SODIUM 1000 [USP'U]/ML
INJECTION, SOLUTION INTRAVENOUS; SUBCUTANEOUS AS NEEDED
Status: DISCONTINUED | OUTPATIENT
Start: 2019-01-01 | End: 2019-01-01 | Stop reason: SURG

## 2019-01-01 RX ORDER — HEPARIN SODIUM 5000 [USP'U]/ML
INJECTION, SOLUTION INTRAVENOUS; SUBCUTANEOUS AS NEEDED
Status: DISCONTINUED | OUTPATIENT
Start: 2019-01-01 | End: 2019-01-01 | Stop reason: HOSPADM

## 2019-01-01 RX ORDER — HYDROMORPHONE HYDROCHLORIDE 1 MG/ML
0.5 INJECTION, SOLUTION INTRAMUSCULAR; INTRAVENOUS; SUBCUTANEOUS
Status: DISCONTINUED | OUTPATIENT
Start: 2019-01-01 | End: 2019-01-01

## 2019-01-01 RX ORDER — PROMETHAZINE HYDROCHLORIDE 25 MG/1
25 SUPPOSITORY RECTAL ONCE AS NEEDED
Status: DISCONTINUED | OUTPATIENT
Start: 2019-01-01 | End: 2019-01-01

## 2019-01-01 RX ORDER — MEPERIDINE HYDROCHLORIDE 25 MG/ML
25 INJECTION INTRAMUSCULAR; INTRAVENOUS; SUBCUTANEOUS EVERY 4 HOURS PRN
Status: DISCONTINUED | OUTPATIENT
Start: 2019-01-01 | End: 2019-01-01

## 2019-01-01 RX ORDER — ACETAMINOPHEN 325 MG/1
650 TABLET ORAL ONCE AS NEEDED
Status: DISCONTINUED | OUTPATIENT
Start: 2019-01-01 | End: 2019-01-01

## 2019-01-01 RX ORDER — BISACODYL 5 MG/1
10 TABLET, DELAYED RELEASE ORAL DAILY PRN
Status: DISCONTINUED | OUTPATIENT
Start: 2019-01-01 | End: 2019-01-01 | Stop reason: HOSPADM

## 2019-01-01 RX ORDER — FENTANYL CITRATE 50 UG/ML
50 INJECTION, SOLUTION INTRAMUSCULAR; INTRAVENOUS
Status: DISCONTINUED | OUTPATIENT
Start: 2019-01-01 | End: 2019-01-01

## 2019-01-01 RX ORDER — ALBUTEROL SULFATE 2.5 MG/3ML
SOLUTION RESPIRATORY (INHALATION)
Status: COMPLETED
Start: 2019-01-01 | End: 2019-01-01

## 2019-01-01 RX ORDER — PROMETHAZINE HYDROCHLORIDE 25 MG/ML
12.5 INJECTION, SOLUTION INTRAMUSCULAR; INTRAVENOUS ONCE AS NEEDED
Status: DISCONTINUED | OUTPATIENT
Start: 2019-01-01 | End: 2019-01-01

## 2019-01-01 RX ORDER — SODIUM CHLORIDE 0.9 % (FLUSH) 0.9 %
10 SYRINGE (ML) INJECTION EVERY 12 HOURS SCHEDULED
Status: DISCONTINUED | OUTPATIENT
Start: 2019-01-01 | End: 2019-01-01

## 2019-01-01 RX ORDER — HEPARIN SODIUM 5000 [USP'U]/ML
40.1 INJECTION, SOLUTION INTRAVENOUS; SUBCUTANEOUS ONCE
Status: DISCONTINUED | OUTPATIENT
Start: 2019-01-01 | End: 2019-01-01

## 2019-01-01 RX ORDER — CHLORHEXIDINE GLUCONATE 0.12 MG/ML
15 RINSE ORAL EVERY 12 HOURS SCHEDULED
Status: DISCONTINUED | OUTPATIENT
Start: 2019-01-01 | End: 2019-01-01

## 2019-01-01 RX ORDER — FLUMAZENIL 0.1 MG/ML
0.2 INJECTION INTRAVENOUS AS NEEDED
Status: DISCONTINUED | OUTPATIENT
Start: 2019-01-01 | End: 2019-01-01

## 2019-01-01 RX ORDER — SODIUM CHLORIDE 0.9 % (FLUSH) 0.9 %
3 SYRINGE (ML) INJECTION EVERY 12 HOURS SCHEDULED
Status: DISCONTINUED | OUTPATIENT
Start: 2019-01-01 | End: 2019-01-01 | Stop reason: HOSPADM

## 2019-01-01 RX ORDER — HEPARIN SODIUM 5000 [USP'U]/ML
30-40.4 INJECTION, SOLUTION INTRAVENOUS; SUBCUTANEOUS EVERY 6 HOURS PRN
Status: DISCONTINUED | OUTPATIENT
Start: 2019-01-01 | End: 2019-01-01

## 2019-01-01 RX ORDER — CEFAZOLIN SODIUM 2 G/100ML
2 INJECTION, SOLUTION INTRAVENOUS
Status: COMPLETED | OUTPATIENT
Start: 2019-01-01 | End: 2019-01-01

## 2019-01-01 RX ORDER — WARFARIN SODIUM 1 MG/1
TABLET ORAL
Qty: 60 TABLET | Refills: 0 | Status: SHIPPED | OUTPATIENT
Start: 2019-01-01 | End: 2019-01-01 | Stop reason: SDUPTHER

## 2019-01-01 RX ORDER — ALPRAZOLAM 0.25 MG/1
0.25 TABLET ORAL EVERY 8 HOURS PRN
Status: DISCONTINUED | OUTPATIENT
Start: 2019-01-01 | End: 2019-01-01 | Stop reason: HOSPADM

## 2019-01-01 RX ORDER — BISACODYL 10 MG
10 SUPPOSITORY, RECTAL RECTAL DAILY PRN
Status: DISCONTINUED | OUTPATIENT
Start: 2019-01-01 | End: 2019-01-01

## 2019-01-01 RX ORDER — ACETAMINOPHEN 500 MG
1000 TABLET ORAL ONCE
Status: DISCONTINUED | OUTPATIENT
Start: 2019-01-01 | End: 2019-01-01

## 2019-01-01 RX ORDER — NALOXONE HCL 0.4 MG/ML
0.4 VIAL (ML) INJECTION
Status: DISCONTINUED | OUTPATIENT
Start: 2019-01-01 | End: 2019-01-01

## 2019-01-01 RX ORDER — HYDROCODONE BITARTRATE AND ACETAMINOPHEN 5; 325 MG/1; MG/1
2 TABLET ORAL EVERY 4 HOURS PRN
Status: DISCONTINUED | OUTPATIENT
Start: 2019-01-01 | End: 2019-01-01 | Stop reason: HOSPADM

## 2019-01-01 RX ORDER — HEPARIN SODIUM 1000 [USP'U]/ML
INJECTION, SOLUTION INTRAVENOUS; SUBCUTANEOUS AS NEEDED
Status: DISCONTINUED | OUTPATIENT
Start: 2019-01-01 | End: 2019-01-01 | Stop reason: HOSPADM

## 2019-01-01 RX ORDER — VECURONIUM BROMIDE 20 MG/20ML
INJECTION, POWDER, LYOPHILIZED, FOR SOLUTION INTRAVENOUS AS NEEDED
Status: DISCONTINUED | OUTPATIENT
Start: 2019-01-01 | End: 2019-01-01 | Stop reason: SURG

## 2019-01-01 RX ORDER — HEPARIN SODIUM 1000 [USP'U]/ML
1000 INJECTION, SOLUTION INTRAVENOUS; SUBCUTANEOUS ONCE
Status: DISCONTINUED | OUTPATIENT
Start: 2019-01-01 | End: 2019-01-01

## 2019-01-01 RX ORDER — INSULIN GLARGINE 100 [IU]/ML
10 INJECTION, SOLUTION SUBCUTANEOUS EVERY 12 HOURS SCHEDULED
Status: DISCONTINUED | OUTPATIENT
Start: 2019-01-01 | End: 2019-01-01

## 2019-01-01 RX ORDER — AMINOCAPROIC ACID 250 MG/ML
INJECTION, SOLUTION INTRAVENOUS AS NEEDED
Status: DISCONTINUED | OUTPATIENT
Start: 2019-01-01 | End: 2019-01-01 | Stop reason: SURG

## 2019-01-01 RX ORDER — AMIODARONE HYDROCHLORIDE 200 MG/1
200 TABLET ORAL EVERY 12 HOURS SCHEDULED
Status: DISCONTINUED | OUTPATIENT
Start: 2019-01-01 | End: 2019-01-01

## 2019-01-01 RX ORDER — CEFAZOLIN SODIUM 2 G/100ML
2 INJECTION, SOLUTION INTRAVENOUS
Status: DISCONTINUED | OUTPATIENT
Start: 2019-01-01 | End: 2019-01-01 | Stop reason: HOSPADM

## 2019-01-01 RX ORDER — AMIODARONE HYDROCHLORIDE 200 MG/1
200 TABLET ORAL EVERY 12 HOURS SCHEDULED
Status: DISPENSED | OUTPATIENT
Start: 2019-01-01 | End: 2019-01-01

## 2019-01-01 RX ORDER — POLYETHYLENE GLYCOL 3350 17 G/17G
17 POWDER, FOR SOLUTION ORAL DAILY PRN
Status: DISCONTINUED | OUTPATIENT
Start: 2019-01-01 | End: 2019-01-01 | Stop reason: HOSPADM

## 2019-01-01 RX ORDER — BUMETANIDE 0.25 MG/ML
4 INJECTION INTRAMUSCULAR; INTRAVENOUS ONCE
Status: COMPLETED | OUTPATIENT
Start: 2019-01-01 | End: 2019-01-01

## 2019-01-01 RX ORDER — PROTAMINE SULFATE 10 MG/ML
INJECTION, SOLUTION INTRAVENOUS AS NEEDED
Status: DISCONTINUED | OUTPATIENT
Start: 2019-01-01 | End: 2019-01-01 | Stop reason: SURG

## 2019-01-01 RX ORDER — LABETALOL HYDROCHLORIDE 5 MG/ML
5 INJECTION, SOLUTION INTRAVENOUS
Status: DISCONTINUED | OUTPATIENT
Start: 2019-01-01 | End: 2019-01-01

## 2019-01-01 RX ORDER — WARFARIN SODIUM 2 MG/1
TABLET ORAL
Qty: 90 TABLET | Refills: 0 | Status: SHIPPED | OUTPATIENT
Start: 2019-01-01 | End: 2020-01-01 | Stop reason: SDUPTHER

## 2019-01-01 RX ORDER — ACETAMINOPHEN 325 MG/1
650 TABLET ORAL EVERY 4 HOURS
Status: DISCONTINUED | OUTPATIENT
Start: 2019-01-01 | End: 2019-01-01

## 2019-01-01 RX ORDER — SODIUM CHLORIDE 9 MG/ML
75 INJECTION, SOLUTION INTRAVENOUS CONTINUOUS
Status: DISCONTINUED | OUTPATIENT
Start: 2019-01-01 | End: 2019-01-01

## 2019-01-01 RX ORDER — ONDANSETRON 2 MG/ML
4 INJECTION INTRAMUSCULAR; INTRAVENOUS EVERY 6 HOURS PRN
Status: DISCONTINUED | OUTPATIENT
Start: 2019-01-01 | End: 2019-01-01 | Stop reason: HOSPADM

## 2019-01-01 RX ORDER — AMIODARONE HYDROCHLORIDE 200 MG/1
200 TABLET ORAL 2 TIMES DAILY WITH MEALS
Status: DISCONTINUED | OUTPATIENT
Start: 2019-01-01 | End: 2019-01-01

## 2019-01-01 RX ORDER — CHLORHEXIDINE GLUCONATE 0.12 MG/ML
15 RINSE ORAL EVERY 12 HOURS SCHEDULED
Status: DISCONTINUED | OUTPATIENT
Start: 2019-01-01 | End: 2019-01-01 | Stop reason: SDUPTHER

## 2019-01-01 RX ORDER — NOREPINEPHRINE BIT/0.9 % NACL 8 MG/250ML
.02-.3 INFUSION BOTTLE (ML) INTRAVENOUS CONTINUOUS PRN
Status: DISCONTINUED | OUTPATIENT
Start: 2019-01-01 | End: 2019-01-01

## 2019-01-01 RX ORDER — HYDROMORPHONE HYDROCHLORIDE 1 MG/ML
0.25 INJECTION, SOLUTION INTRAMUSCULAR; INTRAVENOUS; SUBCUTANEOUS EVERY 4 HOURS PRN
Status: DISCONTINUED | OUTPATIENT
Start: 2019-01-01 | End: 2019-01-01

## 2019-01-01 RX ORDER — SODIUM CHLORIDE 0.9 % (FLUSH) 0.9 %
30 SYRINGE (ML) INJECTION ONCE AS NEEDED
Status: DISCONTINUED | OUTPATIENT
Start: 2019-01-01 | End: 2019-01-01 | Stop reason: HOSPADM

## 2019-01-01 RX ORDER — HYDROCODONE BITARTRATE AND ACETAMINOPHEN 5; 325 MG/1; MG/1
1 TABLET ORAL EVERY 4 HOURS PRN
Status: DISCONTINUED | OUTPATIENT
Start: 2019-01-01 | End: 2019-01-01

## 2019-01-01 RX ORDER — FENTANYL CITRATE 50 UG/ML
50 INJECTION, SOLUTION INTRAMUSCULAR; INTRAVENOUS
Status: DISCONTINUED | OUTPATIENT
Start: 2019-01-01 | End: 2019-01-01 | Stop reason: HOSPADM

## 2019-01-01 RX ORDER — WARFARIN SODIUM 1 MG/1
1 TABLET ORAL NIGHTLY
Qty: 60 TABLET | Refills: 5 | Status: SHIPPED | OUTPATIENT
Start: 2019-01-01 | End: 2019-01-01 | Stop reason: SDUPTHER

## 2019-01-01 RX ORDER — HEPARIN SODIUM 10000 [USP'U]/100ML
10 INJECTION, SOLUTION INTRAVENOUS
Status: DISCONTINUED | OUTPATIENT
Start: 2019-01-01 | End: 2019-01-01

## 2019-01-01 RX ORDER — NICOTINE POLACRILEX 4 MG
15 LOZENGE BUCCAL
Status: DISCONTINUED | OUTPATIENT
Start: 2019-01-01 | End: 2019-01-01 | Stop reason: HOSPADM

## 2019-01-01 RX ORDER — LIDOCAINE HYDROCHLORIDE 20 MG/ML
SOLUTION OROPHARYNGEAL
Status: COMPLETED | OUTPATIENT
Start: 2019-01-01 | End: 2019-01-01

## 2019-01-01 RX ORDER — INSULIN GLARGINE 100 [IU]/ML
15 INJECTION, SOLUTION SUBCUTANEOUS EVERY 12 HOURS SCHEDULED
Status: DISCONTINUED | OUTPATIENT
Start: 2019-01-01 | End: 2019-01-01

## 2019-01-01 RX ORDER — ALBUMIN (HUMAN) 12.5 G/50ML
25 SOLUTION INTRAVENOUS AS NEEDED
Status: DISCONTINUED | OUTPATIENT
Start: 2019-01-01 | End: 2019-01-01 | Stop reason: HOSPADM

## 2019-01-01 RX ORDER — MIDAZOLAM HYDROCHLORIDE 1 MG/ML
2 INJECTION INTRAMUSCULAR; INTRAVENOUS
Status: DISCONTINUED | OUTPATIENT
Start: 2019-01-01 | End: 2019-01-01

## 2019-01-01 RX ORDER — HYDRALAZINE HYDROCHLORIDE 20 MG/ML
5 INJECTION INTRAMUSCULAR; INTRAVENOUS
Status: DISCONTINUED | OUTPATIENT
Start: 2019-01-01 | End: 2019-01-01

## 2019-01-01 RX ORDER — MAGNESIUM SULFATE HEPTAHYDRATE 500 MG/ML
INJECTION, SOLUTION INTRAMUSCULAR; INTRAVENOUS AS NEEDED
Status: DISCONTINUED | OUTPATIENT
Start: 2019-01-01 | End: 2019-01-01 | Stop reason: SURG

## 2019-01-01 RX ORDER — SODIUM CHLORIDE 9 MG/ML
INJECTION, SOLUTION INTRAVENOUS CONTINUOUS PRN
Status: COMPLETED | OUTPATIENT
Start: 2019-01-01 | End: 2019-01-01

## 2019-01-01 RX ORDER — CHLORHEXIDINE GLUCONATE 0.12 MG/ML
15 RINSE ORAL EVERY 12 HOURS
Status: DISCONTINUED | OUTPATIENT
Start: 2019-01-01 | End: 2019-01-01

## 2019-01-01 RX ORDER — MILRINONE LACTATE 0.2 MG/ML
.25-.75 INJECTION, SOLUTION INTRAVENOUS CONTINUOUS PRN
Status: DISCONTINUED | OUTPATIENT
Start: 2019-01-01 | End: 2019-01-01

## 2019-01-01 RX ORDER — ASPIRIN 81 MG/1
81 TABLET ORAL DAILY
Qty: 30 TABLET | Refills: 5 | Status: SHIPPED | OUTPATIENT
Start: 2019-01-01

## 2019-01-01 RX ORDER — PROMETHAZINE HYDROCHLORIDE 25 MG/1
25 TABLET ORAL ONCE AS NEEDED
Status: DISCONTINUED | OUTPATIENT
Start: 2019-01-01 | End: 2019-01-01

## 2019-01-01 RX ORDER — ASPIRIN 81 MG/1
81 TABLET ORAL DAILY
Status: DISCONTINUED | OUTPATIENT
Start: 2019-01-01 | End: 2019-01-01 | Stop reason: HOSPADM

## 2019-01-01 RX ORDER — GLIPIZIDE 5 MG/1
5 TABLET ORAL EVERY MORNING
Status: DISCONTINUED | OUTPATIENT
Start: 2019-01-01 | End: 2019-01-01

## 2019-01-01 RX ORDER — GABAPENTIN 400 MG/1
400 CAPSULE ORAL 2 TIMES DAILY
Status: DISCONTINUED | OUTPATIENT
Start: 2019-01-01 | End: 2019-01-01

## 2019-01-01 RX ORDER — DOXYCYCLINE HYCLATE 100 MG/1
100 CAPSULE ORAL 2 TIMES DAILY
Qty: 14 CAPSULE | Refills: 0 | Status: SHIPPED | OUTPATIENT
Start: 2019-01-01 | End: 2020-01-01

## 2019-01-01 RX ORDER — COSYNTROPIN 0.25 MG/ML
0.25 INJECTION, POWDER, FOR SOLUTION INTRAMUSCULAR; INTRAVENOUS ONCE
Status: COMPLETED | OUTPATIENT
Start: 2019-01-01 | End: 2019-01-01

## 2019-01-01 RX ORDER — SENNA AND DOCUSATE SODIUM 50; 8.6 MG/1; MG/1
2 TABLET, FILM COATED ORAL NIGHTLY
Status: DISCONTINUED | OUTPATIENT
Start: 2019-01-01 | End: 2019-01-01 | Stop reason: HOSPADM

## 2019-01-01 RX ORDER — ERYTHROMYCIN ETHYLSUCCINATE 400 MG/5ML
250 SUSPENSION ORAL
Status: COMPLETED | OUTPATIENT
Start: 2019-01-01 | End: 2019-01-01

## 2019-01-01 RX ORDER — LIDOCAINE HYDROCHLORIDE 20 MG/ML
INJECTION, SOLUTION INFILTRATION; PERINEURAL AS NEEDED
Status: DISCONTINUED | OUTPATIENT
Start: 2019-01-01 | End: 2019-01-01 | Stop reason: HOSPADM

## 2019-01-01 RX ORDER — GABAPENTIN 400 MG/1
CAPSULE ORAL
Qty: 180 CAPSULE | Refills: 0 | Status: SHIPPED | OUTPATIENT
Start: 2019-01-01 | End: 2020-01-01

## 2019-01-01 RX ORDER — CARVEDILOL 3.12 MG/1
3.12 TABLET ORAL
Status: DISCONTINUED | OUTPATIENT
Start: 2019-01-01 | End: 2019-01-01

## 2019-01-01 RX ORDER — INSULIN DEGLUDEC 200 U/ML
INJECTION, SOLUTION SUBCUTANEOUS
Qty: 9 ML | Refills: 3 | Status: SHIPPED | OUTPATIENT
Start: 2019-01-01

## 2019-01-01 RX ORDER — PAPAVERINE HYDROCHLORIDE 30 MG/ML
INJECTION INTRAMUSCULAR; INTRAVENOUS AS NEEDED
Status: DISCONTINUED | OUTPATIENT
Start: 2019-01-01 | End: 2019-01-01 | Stop reason: HOSPADM

## 2019-01-01 RX ORDER — SODIUM CHLORIDE 9 MG/ML
INJECTION, SOLUTION INTRAVENOUS CONTINUOUS PRN
Status: DISCONTINUED | OUTPATIENT
Start: 2019-01-01 | End: 2019-01-01 | Stop reason: SURG

## 2019-01-01 RX ORDER — HEPARIN SODIUM 1000 [USP'U]/ML
10000 INJECTION, SOLUTION INTRAVENOUS; SUBCUTANEOUS
Status: COMPLETED | OUTPATIENT
Start: 2019-01-01 | End: 2019-01-01

## 2019-01-01 RX ORDER — ONDANSETRON 2 MG/ML
4 INJECTION INTRAMUSCULAR; INTRAVENOUS ONCE AS NEEDED
Status: DISCONTINUED | OUTPATIENT
Start: 2019-01-01 | End: 2019-01-01

## 2019-01-01 RX ORDER — SODIUM CHLORIDE 9 MG/ML
30 INJECTION, SOLUTION INTRAVENOUS CONTINUOUS
Status: DISCONTINUED | OUTPATIENT
Start: 2019-01-01 | End: 2019-01-01

## 2019-01-01 RX ADMIN — METOCLOPRAMIDE 5 MG: 5 INJECTION, SOLUTION INTRAMUSCULAR; INTRAVENOUS at 05:49

## 2019-01-01 RX ADMIN — ACETAMINOPHEN 650 MG: 325 TABLET, FILM COATED ORAL at 09:20

## 2019-01-01 RX ADMIN — INSULIN HUMAN 2 UNITS: 100 INJECTION, SOLUTION PARENTERAL at 21:21

## 2019-01-01 RX ADMIN — ASPIRIN 81 MG: 81 TABLET, COATED ORAL at 09:47

## 2019-01-01 RX ADMIN — INSULIN LISPRO 4 UNITS: 100 INJECTION, SOLUTION INTRAVENOUS; SUBCUTANEOUS at 20:28

## 2019-01-01 RX ADMIN — GUAIFENESIN 1200 MG: 600 TABLET, EXTENDED RELEASE ORAL at 21:23

## 2019-01-01 RX ADMIN — METOPROLOL TARTRATE 12.5 MG: 25 TABLET ORAL at 15:42

## 2019-01-01 RX ADMIN — LINAGLIPTIN 5 MG: 5 TABLET, FILM COATED ORAL at 14:34

## 2019-01-01 RX ADMIN — SODIUM CHLORIDE 30 ML/HR: 9 INJECTION, SOLUTION INTRAVENOUS at 12:15

## 2019-01-01 RX ADMIN — TAMSULOSIN HYDROCHLORIDE 0.4 MG: 0.4 CAPSULE ORAL at 09:27

## 2019-01-01 RX ADMIN — INSULIN GLARGINE 15 UNITS: 100 INJECTION, SOLUTION SUBCUTANEOUS at 21:21

## 2019-01-01 RX ADMIN — LIDOCAINE HYDROCHLORIDE 10 ML: 20 SOLUTION ORAL; TOPICAL at 11:56

## 2019-01-01 RX ADMIN — EPHEDRINE SULFATE 10 MG: 50 INJECTION INTRAMUSCULAR; INTRAVENOUS; SUBCUTANEOUS at 07:30

## 2019-01-01 RX ADMIN — GUAIFENESIN 1200 MG: 600 TABLET, EXTENDED RELEASE ORAL at 20:54

## 2019-01-01 RX ADMIN — TAMSULOSIN HYDROCHLORIDE 0.4 MG: 0.4 CAPSULE ORAL at 08:35

## 2019-01-01 RX ADMIN — PANTOPRAZOLE SODIUM 40 MG: 40 TABLET, DELAYED RELEASE ORAL at 05:07

## 2019-01-01 RX ADMIN — INSULIN LISPRO 2 UNITS: 100 INJECTION, SOLUTION INTRAVENOUS; SUBCUTANEOUS at 21:16

## 2019-01-01 RX ADMIN — PERFLUTREN 1 ML: 6.52 INJECTION, SUSPENSION INTRAVENOUS at 15:39

## 2019-01-01 RX ADMIN — INSULIN LISPRO 2 UNITS: 100 INJECTION, SOLUTION INTRAVENOUS; SUBCUTANEOUS at 21:26

## 2019-01-01 RX ADMIN — SUFENTANIL CITRATE 50 MCG: 50 INJECTION, SOLUTION EPIDURAL; INTRAVENOUS at 08:28

## 2019-01-01 RX ADMIN — IPRATROPIUM BROMIDE AND ALBUTEROL SULFATE 3 ML: 2.5; .5 SOLUTION RESPIRATORY (INHALATION) at 19:27

## 2019-01-01 RX ADMIN — TAMSULOSIN HYDROCHLORIDE 0.4 MG: 0.4 CAPSULE ORAL at 10:12

## 2019-01-01 RX ADMIN — AMIODARONE HYDROCHLORIDE 200 MG: 200 TABLET ORAL at 20:30

## 2019-01-01 RX ADMIN — Medication 4 ML: at 21:28

## 2019-01-01 RX ADMIN — ASPIRIN 81 MG: 81 TABLET, COATED ORAL at 17:19

## 2019-01-01 RX ADMIN — WARFARIN SODIUM 1 MG: 1 TABLET ORAL at 17:54

## 2019-01-01 RX ADMIN — TAMSULOSIN HYDROCHLORIDE 0.4 MG: 0.4 CAPSULE ORAL at 10:03

## 2019-01-01 RX ADMIN — INSULIN LISPRO 5 UNITS: 100 INJECTION, SOLUTION INTRAVENOUS; SUBCUTANEOUS at 11:30

## 2019-01-01 RX ADMIN — INSULIN LISPRO 2 UNITS: 100 INJECTION, SOLUTION INTRAVENOUS; SUBCUTANEOUS at 20:55

## 2019-01-01 RX ADMIN — ACETAMINOPHEN 650 MG: 325 TABLET, FILM COATED ORAL at 02:34

## 2019-01-01 RX ADMIN — INSULIN LISPRO 2 UNITS: 100 INJECTION, SOLUTION INTRAVENOUS; SUBCUTANEOUS at 20:19

## 2019-01-01 RX ADMIN — INSULIN LISPRO 2 UNITS: 100 INJECTION, SOLUTION INTRAVENOUS; SUBCUTANEOUS at 10:16

## 2019-01-01 RX ADMIN — CEFAZOLIN SODIUM 2 G: 2 INJECTION, SOLUTION INTRAVENOUS at 17:25

## 2019-01-01 RX ADMIN — ALBUMIN HUMAN 500 ML: 0.05 INJECTION, SOLUTION INTRAVENOUS at 14:00

## 2019-01-01 RX ADMIN — ENOXAPARIN SODIUM 30 MG: 30 INJECTION SUBCUTANEOUS at 18:53

## 2019-01-01 RX ADMIN — TAMSULOSIN HYDROCHLORIDE 0.4 MG: 0.4 CAPSULE ORAL at 08:38

## 2019-01-01 RX ADMIN — TAMSULOSIN HYDROCHLORIDE 0.4 MG: 0.4 CAPSULE ORAL at 10:23

## 2019-01-01 RX ADMIN — ASPIRIN 81 MG: 81 TABLET, COATED ORAL at 08:03

## 2019-01-01 RX ADMIN — FLUDROCORTISONE ACETATE 100 MCG: 0.1 TABLET ORAL at 08:07

## 2019-01-01 RX ADMIN — FLUDROCORTISONE ACETATE 100 MCG: 0.1 TABLET ORAL at 09:23

## 2019-01-01 RX ADMIN — AMIODARONE HYDROCHLORIDE 200 MG: 200 TABLET ORAL at 09:08

## 2019-01-01 RX ADMIN — PROPOFOL 25 MCG/KG/MIN: 10 INJECTION, EMULSION INTRAVENOUS at 14:49

## 2019-01-01 RX ADMIN — LINAGLIPTIN 5 MG: 5 TABLET, FILM COATED ORAL at 09:08

## 2019-01-01 RX ADMIN — SODIUM CHLORIDE, PRESERVATIVE FREE 10 ML: 5 INJECTION INTRAVENOUS at 09:00

## 2019-01-01 RX ADMIN — ATORVASTATIN CALCIUM 20 MG: 20 TABLET, FILM COATED ORAL at 20:55

## 2019-01-01 RX ADMIN — ACETAMINOPHEN 650 MG: 325 TABLET, FILM COATED ORAL at 09:19

## 2019-01-01 RX ADMIN — VITAMIN D, TAB 1000IU (100/BT) 1000 UNITS: 25 TAB at 14:19

## 2019-01-01 RX ADMIN — AMIODARONE HYDROCHLORIDE 0.5 MG/MIN: 1.8 INJECTION, SOLUTION INTRAVENOUS at 09:55

## 2019-01-01 RX ADMIN — AMIODARONE HYDROCHLORIDE 0.5 MG/MIN: 1.8 INJECTION, SOLUTION INTRAVENOUS at 12:16

## 2019-01-01 RX ADMIN — ATORVASTATIN CALCIUM 20 MG: 20 TABLET, FILM COATED ORAL at 21:17

## 2019-01-01 RX ADMIN — PROPOFOL 50 MCG/KG/MIN: 10 INJECTION, EMULSION INTRAVENOUS at 08:22

## 2019-01-01 RX ADMIN — ASPIRIN 81 MG: 81 TABLET, COATED ORAL at 09:29

## 2019-01-01 RX ADMIN — ONDANSETRON 4 MG: 2 INJECTION INTRAMUSCULAR; INTRAVENOUS at 09:32

## 2019-01-01 RX ADMIN — ATORVASTATIN CALCIUM 20 MG: 20 TABLET, FILM COATED ORAL at 20:18

## 2019-01-01 RX ADMIN — AMIODARONE HYDROCHLORIDE 200 MG: 200 TABLET ORAL at 10:19

## 2019-01-01 RX ADMIN — WARFARIN SODIUM 2 MG: 2 TABLET ORAL at 18:54

## 2019-01-01 RX ADMIN — INSULIN LISPRO 5 UNITS: 100 INJECTION, SOLUTION INTRAVENOUS; SUBCUTANEOUS at 21:04

## 2019-01-01 RX ADMIN — MUPIROCIN 1 APPLICATION: 20 OINTMENT TOPICAL at 22:13

## 2019-01-01 RX ADMIN — INSULIN LISPRO 3 UNITS: 100 INJECTION, SOLUTION INTRAVENOUS; SUBCUTANEOUS at 18:05

## 2019-01-01 RX ADMIN — MUPIROCIN 1 APPLICATION: 20 OINTMENT TOPICAL at 21:04

## 2019-01-01 RX ADMIN — GUAIFENESIN 1200 MG: 600 TABLET, EXTENDED RELEASE ORAL at 09:23

## 2019-01-01 RX ADMIN — PHENYLEPHRINE HYDROCHLORIDE 100 MCG: 10 INJECTION INTRAVENOUS at 07:30

## 2019-01-01 RX ADMIN — GUAIFENESIN 1200 MG: 600 TABLET, EXTENDED RELEASE ORAL at 21:45

## 2019-01-01 RX ADMIN — CARVEDILOL 6.25 MG: 6.25 TABLET, FILM COATED ORAL at 20:18

## 2019-01-01 RX ADMIN — FLUDROCORTISONE ACETATE 100 MCG: 0.1 TABLET ORAL at 08:49

## 2019-01-01 RX ADMIN — HEPARIN SODIUM 11.1 UNITS/KG/HR: 10000 INJECTION, SOLUTION INTRAVENOUS at 22:25

## 2019-01-01 RX ADMIN — CHLORHEXIDINE GLUCONATE 15 ML: 1.2 RINSE ORAL at 18:53

## 2019-01-01 RX ADMIN — AMIODARONE HYDROCHLORIDE 150 MG: 1.5 INJECTION, SOLUTION INTRAVENOUS at 05:21

## 2019-01-01 RX ADMIN — TAMSULOSIN HYDROCHLORIDE 0.4 MG: 0.4 CAPSULE ORAL at 09:22

## 2019-01-01 RX ADMIN — INSULIN LISPRO 3 UNITS: 100 INJECTION, SOLUTION INTRAVENOUS; SUBCUTANEOUS at 11:54

## 2019-01-01 RX ADMIN — GUAIFENESIN 1200 MG: 600 TABLET, EXTENDED RELEASE ORAL at 08:44

## 2019-01-01 RX ADMIN — MIDAZOLAM 2 MG: 1 INJECTION INTRAMUSCULAR; INTRAVENOUS at 12:21

## 2019-01-01 RX ADMIN — GUAIFENESIN 1200 MG: 600 TABLET, EXTENDED RELEASE ORAL at 21:04

## 2019-01-01 RX ADMIN — ERYTHROMYCIN 250 MG: 250 TABLET, FILM COATED ORAL at 21:04

## 2019-01-01 RX ADMIN — METOPROLOL TARTRATE 12.5 MG: 25 TABLET ORAL at 10:27

## 2019-01-01 RX ADMIN — AMIODARONE HYDROCHLORIDE 200 MG: 200 TABLET ORAL at 08:43

## 2019-01-01 RX ADMIN — FLUDROCORTISONE ACETATE 100 MCG: 0.1 TABLET ORAL at 09:29

## 2019-01-01 RX ADMIN — HEPARIN SODIUM 30000 UNITS: 1000 INJECTION, SOLUTION INTRAVENOUS; SUBCUTANEOUS at 08:21

## 2019-01-01 RX ADMIN — VECURONIUM BROMIDE 6 MG: 1 INJECTION, POWDER, LYOPHILIZED, FOR SOLUTION INTRAVENOUS at 10:04

## 2019-01-01 RX ADMIN — TAMSULOSIN HYDROCHLORIDE 0.4 MG: 0.4 CAPSULE ORAL at 14:20

## 2019-01-01 RX ADMIN — NITROGLYCERIN 100 MCG: 5 INJECTION, SOLUTION INTRAVENOUS at 08:14

## 2019-01-01 RX ADMIN — AMIODARONE HYDROCHLORIDE 200 MG: 200 TABLET ORAL at 09:18

## 2019-01-01 RX ADMIN — ALBUTEROL SULFATE 2.5 MG: 2.5 SOLUTION RESPIRATORY (INHALATION) at 16:29

## 2019-01-01 RX ADMIN — MUPIROCIN 1 APPLICATION: 20 OINTMENT TOPICAL at 20:54

## 2019-01-01 RX ADMIN — TAMSULOSIN HYDROCHLORIDE 0.4 MG: 0.4 CAPSULE ORAL at 10:20

## 2019-01-01 RX ADMIN — SENNOSIDES AND DOCUSATE SODIUM 2 TABLET: 8.6; 5 TABLET ORAL at 20:30

## 2019-01-01 RX ADMIN — ASPIRIN 81 MG: 81 TABLET, COATED ORAL at 10:04

## 2019-01-01 RX ADMIN — PHENYLEPHRINE HYDROCHLORIDE 0.5 MCG/KG/MIN: 10 INJECTION, SOLUTION INTRAMUSCULAR; INTRAVENOUS; SUBCUTANEOUS at 10:06

## 2019-01-01 RX ADMIN — INSULIN LISPRO 2 UNITS: 100 INJECTION, SOLUTION INTRAVENOUS; SUBCUTANEOUS at 17:55

## 2019-01-01 RX ADMIN — TAMSULOSIN HYDROCHLORIDE 0.4 MG: 0.4 CAPSULE ORAL at 09:23

## 2019-01-01 RX ADMIN — MUPIROCIN 1 APPLICATION: 20 OINTMENT TOPICAL at 10:23

## 2019-01-01 RX ADMIN — GUAIFENESIN 1200 MG: 600 TABLET, EXTENDED RELEASE ORAL at 21:17

## 2019-01-01 RX ADMIN — VECURONIUM BROMIDE 2 MG: 1 INJECTION, POWDER, LYOPHILIZED, FOR SOLUTION INTRAVENOUS at 08:18

## 2019-01-01 RX ADMIN — PANTOPRAZOLE SODIUM 40 MG: 40 TABLET, DELAYED RELEASE ORAL at 06:51

## 2019-01-01 RX ADMIN — ATORVASTATIN CALCIUM 40 MG: 20 TABLET, FILM COATED ORAL at 20:54

## 2019-01-01 RX ADMIN — IPRATROPIUM BROMIDE AND ALBUTEROL SULFATE 3 ML: 2.5; .5 SOLUTION RESPIRATORY (INHALATION) at 08:11

## 2019-01-01 RX ADMIN — MUPIROCIN: 20 OINTMENT TOPICAL at 20:53

## 2019-01-01 RX ADMIN — ACETAMINOPHEN 650 MG: 325 TABLET, FILM COATED ORAL at 08:09

## 2019-01-01 RX ADMIN — MUPIROCIN 1 APPLICATION: 20 OINTMENT TOPICAL at 20:19

## 2019-01-01 RX ADMIN — ERYTHROMYCIN 250 MG: 250 TABLET, FILM COATED ORAL at 11:31

## 2019-01-01 RX ADMIN — ASPIRIN 81 MG: 81 TABLET, COATED ORAL at 15:45

## 2019-01-01 RX ADMIN — GUAIFENESIN 1200 MG: 600 TABLET, EXTENDED RELEASE ORAL at 09:22

## 2019-01-01 RX ADMIN — CARVEDILOL 3.12 MG: 3.12 TABLET, FILM COATED ORAL at 09:51

## 2019-01-01 RX ADMIN — MUPIROCIN 1 APPLICATION: 20 OINTMENT TOPICAL at 09:16

## 2019-01-01 RX ADMIN — CARVEDILOL 3.12 MG: 3.12 TABLET, FILM COATED ORAL at 08:35

## 2019-01-01 RX ADMIN — AMIODARONE HYDROCHLORIDE 200 MG: 200 TABLET ORAL at 21:17

## 2019-01-01 RX ADMIN — INSULIN LISPRO 5 UNITS: 100 INJECTION, SOLUTION INTRAVENOUS; SUBCUTANEOUS at 17:48

## 2019-01-01 RX ADMIN — ASPIRIN 81 MG: 81 TABLET, COATED ORAL at 08:35

## 2019-01-01 RX ADMIN — INSULIN LISPRO 3 UNITS: 100 INJECTION, SOLUTION INTRAVENOUS; SUBCUTANEOUS at 11:50

## 2019-01-01 RX ADMIN — ACETAMINOPHEN 650 MG: 325 TABLET, FILM COATED ORAL at 12:48

## 2019-01-01 RX ADMIN — AMIODARONE HYDROCHLORIDE 200 MG: 200 TABLET ORAL at 20:55

## 2019-01-01 RX ADMIN — CARVEDILOL 3.12 MG: 3.12 TABLET, FILM COATED ORAL at 06:34

## 2019-01-01 RX ADMIN — FLUDROCORTISONE ACETATE 100 MCG: 0.1 TABLET ORAL at 08:03

## 2019-01-01 RX ADMIN — FLUDROCORTISONE ACETATE 100 MCG: 0.1 TABLET ORAL at 09:43

## 2019-01-01 RX ADMIN — MUPIROCIN 1 APPLICATION: 20 OINTMENT TOPICAL at 10:17

## 2019-01-01 RX ADMIN — TAMSULOSIN HYDROCHLORIDE 0.4 MG: 0.4 CAPSULE ORAL at 09:18

## 2019-01-01 RX ADMIN — Medication 1 APPLICATION: at 21:26

## 2019-01-01 RX ADMIN — ENOXAPARIN SODIUM 30 MG: 30 INJECTION SUBCUTANEOUS at 20:19

## 2019-01-01 RX ADMIN — MUPIROCIN 1 APPLICATION: 20 OINTMENT TOPICAL at 14:07

## 2019-01-01 RX ADMIN — WARFARIN SODIUM 2 MG: 2 TABLET ORAL at 17:33

## 2019-01-01 RX ADMIN — Medication 4 ML: at 19:48

## 2019-01-01 RX ADMIN — GUAIFENESIN 1200 MG: 600 TABLET, EXTENDED RELEASE ORAL at 08:07

## 2019-01-01 RX ADMIN — FLUDROCORTISONE ACETATE 100 MCG: 0.1 TABLET ORAL at 10:23

## 2019-01-01 RX ADMIN — SODIUM CHLORIDE, PRESERVATIVE FREE 10 ML: 5 INJECTION INTRAVENOUS at 10:06

## 2019-01-01 RX ADMIN — GLIPIZIDE 5 MG: 5 TABLET ORAL at 06:22

## 2019-01-01 RX ADMIN — INSULIN LISPRO 2 UNITS: 100 INJECTION, SOLUTION INTRAVENOUS; SUBCUTANEOUS at 07:45

## 2019-01-01 RX ADMIN — ENOXAPARIN SODIUM 30 MG: 30 INJECTION SUBCUTANEOUS at 21:11

## 2019-01-01 RX ADMIN — ASPIRIN 81 MG: 81 TABLET, COATED ORAL at 14:19

## 2019-01-01 RX ADMIN — MUPIROCIN 1 APPLICATION: 20 OINTMENT TOPICAL at 08:50

## 2019-01-01 RX ADMIN — INSULIN GLARGINE 14 UNITS: 100 INJECTION, SOLUTION SUBCUTANEOUS at 10:04

## 2019-01-01 RX ADMIN — AMIODARONE HYDROCHLORIDE 200 MG: 200 TABLET ORAL at 21:25

## 2019-01-01 RX ADMIN — INSULIN LISPRO 8 UNITS: 100 INJECTION, SOLUTION INTRAVENOUS; SUBCUTANEOUS at 21:02

## 2019-01-01 RX ADMIN — VECURONIUM BROMIDE 2 MG: 1 INJECTION, POWDER, LYOPHILIZED, FOR SOLUTION INTRAVENOUS at 08:25

## 2019-01-01 RX ADMIN — ATORVASTATIN CALCIUM 20 MG: 20 TABLET, FILM COATED ORAL at 21:48

## 2019-01-01 RX ADMIN — CEFAZOLIN SODIUM 2 G: 2 INJECTION, SOLUTION INTRAVENOUS at 02:34

## 2019-01-01 RX ADMIN — ENOXAPARIN SODIUM 30 MG: 30 INJECTION SUBCUTANEOUS at 18:16

## 2019-01-01 RX ADMIN — FENTANYL CITRATE 50 MCG: 50 INJECTION, SOLUTION INTRAMUSCULAR; INTRAVENOUS at 15:34

## 2019-01-01 RX ADMIN — GLYCOPYRROLATE 1 MG: 0.2 INJECTION INTRAMUSCULAR; INTRAVENOUS at 11:45

## 2019-01-01 RX ADMIN — GUAIFENESIN 1200 MG: 600 TABLET, EXTENDED RELEASE ORAL at 09:08

## 2019-01-01 RX ADMIN — GUAIFENESIN 1200 MG: 600 TABLET, EXTENDED RELEASE ORAL at 22:44

## 2019-01-01 RX ADMIN — ACETAMINOPHEN 650 MG: 325 TABLET, FILM COATED ORAL at 09:54

## 2019-01-01 RX ADMIN — CLOPIDOGREL 75 MG: 75 TABLET, FILM COATED ORAL at 09:09

## 2019-01-01 RX ADMIN — CALCIUM GLUCONATE 1 G: 98 INJECTION, SOLUTION INTRAVENOUS at 09:23

## 2019-01-01 RX ADMIN — POLYETHYLENE GLYCOL 3350 17 G: 17 POWDER, FOR SOLUTION ORAL at 09:39

## 2019-01-01 RX ADMIN — INSULIN LISPRO 2 UNITS: 100 INJECTION, SOLUTION INTRAVENOUS; SUBCUTANEOUS at 11:15

## 2019-01-01 RX ADMIN — GUAIFENESIN 1200 MG: 600 TABLET, EXTENDED RELEASE ORAL at 20:25

## 2019-01-01 RX ADMIN — BUMETANIDE 4 MG: 0.25 INJECTION INTRAMUSCULAR; INTRAVENOUS at 12:59

## 2019-01-01 RX ADMIN — INSULIN GLARGINE 10 UNITS: 100 INJECTION, SOLUTION SUBCUTANEOUS at 09:08

## 2019-01-01 RX ADMIN — AMIODARONE HYDROCHLORIDE 200 MG: 200 TABLET ORAL at 10:23

## 2019-01-01 RX ADMIN — ATORVASTATIN CALCIUM 20 MG: 20 TABLET, FILM COATED ORAL at 21:04

## 2019-01-01 RX ADMIN — PROPOFOL 50 MCG/KG/MIN: 10 INJECTION, EMULSION INTRAVENOUS at 14:23

## 2019-01-01 RX ADMIN — NITROGLYCERIN 0.25 MCG/KG/MIN: 20 INJECTION INTRAVENOUS at 07:49

## 2019-01-01 RX ADMIN — PANTOPRAZOLE SODIUM 40 MG: 40 TABLET, DELAYED RELEASE ORAL at 10:27

## 2019-01-01 RX ADMIN — HEPARIN SODIUM 10.1 UNITS/KG/HR: 10000 INJECTION, SOLUTION INTRAVENOUS at 23:01

## 2019-01-01 RX ADMIN — ASPIRIN 81 MG: 81 TABLET, COATED ORAL at 08:49

## 2019-01-01 RX ADMIN — CEFAZOLIN SODIUM 2 G: 2 INJECTION, SOLUTION INTRAVENOUS at 10:17

## 2019-01-01 RX ADMIN — AMINOCAPROIC ACID 10 G: 250 INJECTION, SOLUTION INTRAVENOUS at 07:41

## 2019-01-01 RX ADMIN — PANTOPRAZOLE SODIUM 40 MG: 40 TABLET, DELAYED RELEASE ORAL at 06:23

## 2019-01-01 RX ADMIN — EPHEDRINE SULFATE 10 MG: 50 INJECTION INTRAMUSCULAR; INTRAVENOUS; SUBCUTANEOUS at 07:41

## 2019-01-01 RX ADMIN — PHENYLEPHRINE HYDROCHLORIDE 200 MCG: 10 INJECTION INTRAVENOUS at 07:05

## 2019-01-01 RX ADMIN — AMIODARONE HYDROCHLORIDE 150 MG: 1.5 INJECTION, SOLUTION INTRAVENOUS at 11:17

## 2019-01-01 RX ADMIN — METOCLOPRAMIDE 10 MG: 5 INJECTION, SOLUTION INTRAMUSCULAR; INTRAVENOUS at 13:09

## 2019-01-01 RX ADMIN — TAMSULOSIN HYDROCHLORIDE 0.4 MG: 0.4 CAPSULE ORAL at 08:03

## 2019-01-01 RX ADMIN — MUPIROCIN 1 APPLICATION: 20 OINTMENT TOPICAL at 20:26

## 2019-01-01 RX ADMIN — MUPIROCIN 1 APPLICATION: 20 OINTMENT TOPICAL at 20:30

## 2019-01-01 RX ADMIN — IPRATROPIUM BROMIDE AND ALBUTEROL SULFATE 3 ML: 2.5; .5 SOLUTION RESPIRATORY (INHALATION) at 19:47

## 2019-01-01 RX ADMIN — CARVEDILOL 3.12 MG: 3.12 TABLET, FILM COATED ORAL at 22:43

## 2019-01-01 RX ADMIN — AMIODARONE HYDROCHLORIDE 200 MG: 200 TABLET ORAL at 09:49

## 2019-01-01 RX ADMIN — MUPIROCIN 1 APPLICATION: 20 OINTMENT TOPICAL at 20:45

## 2019-01-01 RX ADMIN — ENOXAPARIN SODIUM 30 MG: 30 INJECTION SUBCUTANEOUS at 20:49

## 2019-01-01 RX ADMIN — MUPIROCIN 1 APPLICATION: 20 OINTMENT TOPICAL at 09:57

## 2019-01-01 RX ADMIN — MUPIROCIN 1 APPLICATION: 20 OINTMENT TOPICAL at 08:35

## 2019-01-01 RX ADMIN — SENNOSIDES AND DOCUSATE SODIUM 2 TABLET: 8.6; 5 TABLET ORAL at 20:18

## 2019-01-01 RX ADMIN — HEPARIN SODIUM 4000 UNITS: 5000 INJECTION INTRAVENOUS; SUBCUTANEOUS at 22:59

## 2019-01-01 RX ADMIN — LIDOCAINE HYDROCHLORIDE 5 ML: 20 SOLUTION ORAL; TOPICAL at 21:48

## 2019-01-01 RX ADMIN — ERYTHROMYCIN 250 MG: 250 TABLET, FILM COATED ORAL at 20:31

## 2019-01-01 RX ADMIN — GUAIFENESIN 1200 MG: 600 TABLET, EXTENDED RELEASE ORAL at 08:35

## 2019-01-01 RX ADMIN — MUPIROCIN 1 APPLICATION: 20 OINTMENT TOPICAL at 09:24

## 2019-01-01 RX ADMIN — LIDOCAINE HYDROCHLORIDE 5 ML: 20 SOLUTION ORAL; TOPICAL at 10:51

## 2019-01-01 RX ADMIN — INSULIN GLARGINE 10 UNITS: 100 INJECTION, SOLUTION SUBCUTANEOUS at 21:28

## 2019-01-01 RX ADMIN — INSULIN GLARGINE 10 UNITS: 100 INJECTION, SOLUTION SUBCUTANEOUS at 09:19

## 2019-01-01 RX ADMIN — HYDROCODONE BITARTRATE AND ACETAMINOPHEN 1 TABLET: 5; 325 TABLET ORAL at 23:01

## 2019-01-01 RX ADMIN — NICARDIPINE HYDROCHLORIDE 0.2 MG: 25 INJECTION INTRAVENOUS at 08:36

## 2019-01-01 RX ADMIN — BUMETANIDE 4 MG: 0.25 INJECTION INTRAMUSCULAR; INTRAVENOUS at 18:44

## 2019-01-01 RX ADMIN — AMIODARONE HYDROCHLORIDE 200 MG: 200 TABLET ORAL at 21:23

## 2019-01-01 RX ADMIN — SODIUM CHLORIDE 75 ML/HR: 9 INJECTION, SOLUTION INTRAVENOUS at 14:55

## 2019-01-01 RX ADMIN — MUPIROCIN 1 APPLICATION: 20 OINTMENT TOPICAL at 20:57

## 2019-01-01 RX ADMIN — FUROSEMIDE 80 MG: 10 INJECTION, SOLUTION INTRAMUSCULAR; INTRAVENOUS at 17:49

## 2019-01-01 RX ADMIN — MAGNESIUM SULFATE HEPTAHYDRATE 2 G: 500 INJECTION, SOLUTION INTRAMUSCULAR; INTRAVENOUS at 10:04

## 2019-01-01 RX ADMIN — METOPROLOL TARTRATE 12.5 MG: 25 TABLET ORAL at 06:43

## 2019-01-01 RX ADMIN — CARVEDILOL 3.12 MG: 3.12 TABLET, FILM COATED ORAL at 10:02

## 2019-01-01 RX ADMIN — CEFAZOLIN SODIUM 2 G: 2 INJECTION, SOLUTION INTRAVENOUS at 11:50

## 2019-01-01 RX ADMIN — ERYTHROMYCIN 250 MG: 250 TABLET, FILM COATED ORAL at 15:45

## 2019-01-01 RX ADMIN — FAMOTIDINE 20 MG: 10 INJECTION, SOLUTION INTRAVENOUS at 14:04

## 2019-01-01 RX ADMIN — Medication 4 ML: at 20:15

## 2019-01-01 RX ADMIN — ENOXAPARIN SODIUM 30 MG: 30 INJECTION SUBCUTANEOUS at 20:30

## 2019-01-01 RX ADMIN — AMIODARONE HYDROCHLORIDE 200 MG: 200 TABLET ORAL at 14:06

## 2019-01-01 RX ADMIN — Medication 1 APPLICATION: at 20:55

## 2019-01-01 RX ADMIN — NICARDIPINE HYDROCHLORIDE 0.2 MG: 25 INJECTION INTRAVENOUS at 08:31

## 2019-01-01 RX ADMIN — Medication 4 ML: at 11:02

## 2019-01-01 RX ADMIN — FLUDROCORTISONE ACETATE 100 MCG: 0.1 TABLET ORAL at 09:09

## 2019-01-01 RX ADMIN — GABAPENTIN 400 MG: 400 CAPSULE ORAL at 10:02

## 2019-01-01 RX ADMIN — SUFENTANIL CITRATE 50 MCG: 50 INJECTION, SOLUTION EPIDURAL; INTRAVENOUS at 07:53

## 2019-01-01 RX ADMIN — ATORVASTATIN CALCIUM 20 MG: 20 TABLET, FILM COATED ORAL at 20:30

## 2019-01-01 RX ADMIN — CHLORHEXIDINE GLUCONATE 15 ML: 1.2 RINSE ORAL at 05:57

## 2019-01-01 RX ADMIN — GUAIFENESIN 1200 MG: 600 TABLET, EXTENDED RELEASE ORAL at 08:02

## 2019-01-01 RX ADMIN — METOPROLOL TARTRATE 12.5 MG: 25 TABLET ORAL at 22:13

## 2019-01-01 RX ADMIN — MUPIROCIN 1 APPLICATION: 20 OINTMENT TOPICAL at 21:33

## 2019-01-01 RX ADMIN — HYDROCODONE BITARTRATE AND ACETAMINOPHEN 1 TABLET: 5; 325 TABLET ORAL at 21:16

## 2019-01-01 RX ADMIN — ENOXAPARIN SODIUM 40 MG: 40 INJECTION SUBCUTANEOUS at 17:45

## 2019-01-01 RX ADMIN — LINAGLIPTIN 5 MG: 5 TABLET, FILM COATED ORAL at 10:23

## 2019-01-01 RX ADMIN — NICARDIPINE HYDROCHLORIDE 0.2 MG: 25 INJECTION INTRAVENOUS at 10:32

## 2019-01-01 RX ADMIN — CYCLOBENZAPRINE HYDROCHLORIDE 10 MG: 10 TABLET, FILM COATED ORAL at 02:42

## 2019-01-01 RX ADMIN — ALBUMIN HUMAN 250 ML: 0.05 INJECTION, SOLUTION INTRAVENOUS at 13:34

## 2019-01-01 RX ADMIN — GUAIFENESIN 1200 MG: 600 TABLET, EXTENDED RELEASE ORAL at 10:19

## 2019-01-01 RX ADMIN — FLUDROCORTISONE ACETATE 100 MCG: 0.1 TABLET ORAL at 08:35

## 2019-01-01 RX ADMIN — IPRATROPIUM BROMIDE AND ALBUTEROL SULFATE 3 ML: 2.5; .5 SOLUTION RESPIRATORY (INHALATION) at 07:31

## 2019-01-01 RX ADMIN — FLUDROCORTISONE ACETATE 100 MCG: 0.1 TABLET ORAL at 14:19

## 2019-01-01 RX ADMIN — INSULIN GLARGINE 10 UNITS: 100 INJECTION, SOLUTION SUBCUTANEOUS at 22:44

## 2019-01-01 RX ADMIN — ALBUMIN HUMAN 250 ML: 0.05 INJECTION, SOLUTION INTRAVENOUS at 14:43

## 2019-01-01 RX ADMIN — METOPROLOL TARTRATE 12.5 MG: 25 TABLET ORAL at 22:08

## 2019-01-01 RX ADMIN — PROPOFOL 120 MG: 10 INJECTION, EMULSION INTRAVENOUS at 07:02

## 2019-01-01 RX ADMIN — GABAPENTIN 400 MG: 400 CAPSULE ORAL at 20:09

## 2019-01-01 RX ADMIN — COSYNTROPIN 0.25 MG: 0.25 INJECTION, POWDER, LYOPHILIZED, FOR SOLUTION INTRAMUSCULAR; INTRAVENOUS at 08:08

## 2019-01-01 RX ADMIN — ATORVASTATIN CALCIUM 20 MG: 20 TABLET, FILM COATED ORAL at 18:09

## 2019-01-01 RX ADMIN — METOPROLOL TARTRATE 12.5 MG: 25 TABLET ORAL at 23:03

## 2019-01-01 RX ADMIN — ACETAMINOPHEN 650 MG: 325 TABLET, FILM COATED ORAL at 18:24

## 2019-01-01 RX ADMIN — VITAMIN D, TAB 1000IU (100/BT) 1000 UNITS: 25 TAB at 10:03

## 2019-01-01 RX ADMIN — Medication 4 ML: at 19:27

## 2019-01-01 RX ADMIN — Medication 4 ML: at 07:18

## 2019-01-01 RX ADMIN — IPRATROPIUM BROMIDE AND ALBUTEROL SULFATE 3 ML: 2.5; .5 SOLUTION RESPIRATORY (INHALATION) at 20:56

## 2019-01-01 RX ADMIN — INSULIN LISPRO 4 UNITS: 100 INJECTION, SOLUTION INTRAVENOUS; SUBCUTANEOUS at 21:33

## 2019-01-01 RX ADMIN — AMIODARONE HYDROCHLORIDE 200 MG: 200 TABLET ORAL at 09:29

## 2019-01-01 RX ADMIN — ENOXAPARIN SODIUM 30 MG: 30 INJECTION SUBCUTANEOUS at 21:26

## 2019-01-01 RX ADMIN — BUMETANIDE 4 MG: 0.25 INJECTION INTRAMUSCULAR; INTRAVENOUS at 18:31

## 2019-01-01 RX ADMIN — LIDOCAINE HYDROCHLORIDE 5 ML: 20 SOLUTION ORAL; TOPICAL at 04:44

## 2019-01-01 RX ADMIN — INSULIN GLARGINE 15 UNITS: 100 INJECTION, SOLUTION SUBCUTANEOUS at 08:35

## 2019-01-01 RX ADMIN — GUAIFENESIN 1200 MG: 600 TABLET, EXTENDED RELEASE ORAL at 20:18

## 2019-01-01 RX ADMIN — GUAIFENESIN 1200 MG: 600 TABLET, EXTENDED RELEASE ORAL at 09:18

## 2019-01-01 RX ADMIN — NITROGLYCERIN 100 MCG: 5 INJECTION, SOLUTION INTRAVENOUS at 08:34

## 2019-01-01 RX ADMIN — INSULIN GLARGINE 15 UNITS: 100 INJECTION, SOLUTION SUBCUTANEOUS at 20:28

## 2019-01-01 RX ADMIN — SENNOSIDES AND DOCUSATE SODIUM 2 TABLET: 8.6; 5 TABLET ORAL at 21:25

## 2019-01-01 RX ADMIN — SODIUM CHLORIDE 9 ML/HR: 9 INJECTION, SOLUTION INTRAVENOUS at 13:25

## 2019-01-01 RX ADMIN — INSULIN LISPRO 3 UNITS: 100 INJECTION, SOLUTION INTRAVENOUS; SUBCUTANEOUS at 12:26

## 2019-01-01 RX ADMIN — CEFAZOLIN SODIUM 2 G: 2 INJECTION, SOLUTION INTRAVENOUS at 07:21

## 2019-01-01 RX ADMIN — PANTOPRAZOLE SODIUM 40 MG: 40 TABLET, DELAYED RELEASE ORAL at 06:10

## 2019-01-01 RX ADMIN — ASPIRIN 81 MG: 81 TABLET, COATED ORAL at 14:07

## 2019-01-01 RX ADMIN — PANTOPRAZOLE SODIUM 40 MG: 40 TABLET, DELAYED RELEASE ORAL at 06:24

## 2019-01-01 RX ADMIN — INSULIN LISPRO 3 UNITS: 100 INJECTION, SOLUTION INTRAVENOUS; SUBCUTANEOUS at 06:59

## 2019-01-01 RX ADMIN — NITROGLYCERIN 100 MCG: 5 INJECTION, SOLUTION INTRAVENOUS at 08:29

## 2019-01-01 RX ADMIN — MUPIROCIN 1 APPLICATION: 20 OINTMENT TOPICAL at 10:13

## 2019-01-01 RX ADMIN — INSULIN LISPRO 3 UNITS: 100 INJECTION, SOLUTION INTRAVENOUS; SUBCUTANEOUS at 06:49

## 2019-01-01 RX ADMIN — PHENYLEPHRINE HYDROCHLORIDE 100 MCG: 10 INJECTION INTRAVENOUS at 07:04

## 2019-01-01 RX ADMIN — METOPROLOL TARTRATE 12.5 MG: 25 TABLET ORAL at 09:08

## 2019-01-01 RX ADMIN — LINAGLIPTIN 5 MG: 5 TABLET, FILM COATED ORAL at 10:02

## 2019-01-01 RX ADMIN — HEPARIN SODIUM 3000 UNITS: 5000 INJECTION INTRAVENOUS; SUBCUTANEOUS at 05:46

## 2019-01-01 RX ADMIN — NITROGLYCERIN 100 MCG: 5 INJECTION, SOLUTION INTRAVENOUS at 08:08

## 2019-01-01 RX ADMIN — ATORVASTATIN CALCIUM 20 MG: 20 TABLET, FILM COATED ORAL at 14:19

## 2019-01-01 RX ADMIN — SODIUM CHLORIDE: 9 INJECTION, SOLUTION INTRAVENOUS at 06:39

## 2019-01-01 RX ADMIN — LIDOCAINE HYDROCHLORIDE 5 ML: 20 SOLUTION ORAL; TOPICAL at 15:44

## 2019-01-01 RX ADMIN — Medication 2 MG: at 06:46

## 2019-01-01 RX ADMIN — ASPIRIN 81 MG: 81 TABLET, COATED ORAL at 10:20

## 2019-01-01 RX ADMIN — EPHEDRINE SULFATE 10 MG: 50 INJECTION INTRAMUSCULAR; INTRAVENOUS; SUBCUTANEOUS at 07:05

## 2019-01-01 RX ADMIN — GUAIFENESIN 1200 MG: 600 TABLET, EXTENDED RELEASE ORAL at 20:49

## 2019-01-01 RX ADMIN — ATORVASTATIN CALCIUM 20 MG: 20 TABLET, FILM COATED ORAL at 21:10

## 2019-01-01 RX ADMIN — Medication 15 ML: at 15:43

## 2019-01-01 RX ADMIN — METOCLOPRAMIDE 5 MG: 5 INJECTION, SOLUTION INTRAMUSCULAR; INTRAVENOUS at 20:46

## 2019-01-01 RX ADMIN — ACETAMINOPHEN 650 MG: 650 SUPPOSITORY RECTAL at 23:49

## 2019-01-01 RX ADMIN — TAMSULOSIN HYDROCHLORIDE 0.4 MG: 0.4 CAPSULE ORAL at 18:09

## 2019-01-01 RX ADMIN — INSULIN HUMAN 2 UNITS: 100 INJECTION, SOLUTION PARENTERAL at 18:09

## 2019-01-01 RX ADMIN — SUFENTANIL CITRATE 50 MCG: 50 INJECTION, SOLUTION EPIDURAL; INTRAVENOUS at 09:41

## 2019-01-01 RX ADMIN — ASPIRIN 81 MG: 81 TABLET, COATED ORAL at 09:08

## 2019-01-01 RX ADMIN — HYDROCODONE BITARTRATE AND ACETAMINOPHEN 1 TABLET: 5; 325 TABLET ORAL at 18:33

## 2019-01-01 RX ADMIN — SODIUM CHLORIDE 75 ML/HR: 9 INJECTION, SOLUTION INTRAVENOUS at 02:01

## 2019-01-01 RX ADMIN — CLOPIDOGREL 75 MG: 75 TABLET, FILM COATED ORAL at 08:48

## 2019-01-01 RX ADMIN — IPRATROPIUM BROMIDE AND ALBUTEROL SULFATE 3 ML: 2.5; .5 SOLUTION RESPIRATORY (INHALATION) at 08:15

## 2019-01-01 RX ADMIN — METOPROLOL TARTRATE 12.5 MG: 25 TABLET ORAL at 21:16

## 2019-01-01 RX ADMIN — GABAPENTIN 400 MG: 400 CAPSULE ORAL at 20:02

## 2019-01-01 RX ADMIN — ERYTHROMYCIN 250 MG: 250 TABLET, FILM COATED ORAL at 12:59

## 2019-01-01 RX ADMIN — NICARDIPINE HYDROCHLORIDE 0.2 MG: 25 INJECTION INTRAVENOUS at 09:42

## 2019-01-01 RX ADMIN — LINAGLIPTIN 5 MG: 5 TABLET, FILM COATED ORAL at 09:18

## 2019-01-01 RX ADMIN — FENTANYL CITRATE 50 MCG: 50 INJECTION INTRAMUSCULAR; INTRAVENOUS at 07:02

## 2019-01-01 RX ADMIN — Medication 4 ML: at 08:19

## 2019-01-01 RX ADMIN — FLUDROCORTISONE ACETATE 100 MCG: 0.1 TABLET ORAL at 08:38

## 2019-01-01 RX ADMIN — METOPROLOL TARTRATE 12.5 MG: 25 TABLET ORAL at 21:26

## 2019-01-01 RX ADMIN — INSULIN LISPRO 2 UNITS: 100 INJECTION, SOLUTION INTRAVENOUS; SUBCUTANEOUS at 18:53

## 2019-01-01 RX ADMIN — GABAPENTIN 400 MG: 400 CAPSULE ORAL at 21:33

## 2019-01-01 RX ADMIN — METOPROLOL TARTRATE 12.5 MG: 25 TABLET ORAL at 06:15

## 2019-01-01 RX ADMIN — AMIODARONE HYDROCHLORIDE 200 MG: 200 TABLET ORAL at 21:48

## 2019-01-01 RX ADMIN — MUPIROCIN 1 APPLICATION: 20 OINTMENT TOPICAL at 20:40

## 2019-01-01 RX ADMIN — ACETAMINOPHEN 650 MG: 325 TABLET, FILM COATED ORAL at 18:44

## 2019-01-01 RX ADMIN — METOPROLOL TARTRATE 5 MG: 5 INJECTION INTRAVENOUS at 16:17

## 2019-01-01 RX ADMIN — METOPROLOL TARTRATE 12.5 MG: 25 TABLET ORAL at 13:59

## 2019-01-01 RX ADMIN — HEPARIN SODIUM 10000 UNITS: 1000 INJECTION, SOLUTION INTRAVENOUS; SUBCUTANEOUS at 14:29

## 2019-01-01 RX ADMIN — PANTOPRAZOLE SODIUM 40 MG: 40 TABLET, DELAYED RELEASE ORAL at 06:16

## 2019-01-01 RX ADMIN — HEPARIN SODIUM 3000 UNITS: 1000 INJECTION, SOLUTION INTRAVENOUS; SUBCUTANEOUS at 19:52

## 2019-01-01 RX ADMIN — HEPARIN SODIUM 3000 UNITS: 1000 INJECTION, SOLUTION INTRAVENOUS; SUBCUTANEOUS at 18:50

## 2019-01-01 RX ADMIN — GABAPENTIN 400 MG: 400 CAPSULE ORAL at 08:48

## 2019-01-01 RX ADMIN — ALPRAZOLAM 0.25 MG: 0.25 TABLET ORAL at 16:21

## 2019-01-01 RX ADMIN — METOPROLOL TARTRATE 12.5 MG: 25 TABLET ORAL at 11:21

## 2019-01-01 RX ADMIN — FUROSEMIDE 80 MG: 10 INJECTION, SOLUTION INTRAMUSCULAR; INTRAVENOUS at 02:27

## 2019-01-01 RX ADMIN — NITROGLYCERIN 100 MCG: 5 INJECTION, SOLUTION INTRAVENOUS at 08:31

## 2019-01-01 RX ADMIN — TORSEMIDE 20 MG: 20 TABLET ORAL at 14:20

## 2019-01-01 RX ADMIN — AMIODARONE HYDROCHLORIDE 75 MG: 1.5 INJECTION, SOLUTION INTRAVENOUS at 15:58

## 2019-01-01 RX ADMIN — GABAPENTIN 400 MG: 400 CAPSULE ORAL at 20:18

## 2019-01-01 RX ADMIN — MUPIROCIN 1 APPLICATION: 20 OINTMENT TOPICAL at 09:47

## 2019-01-01 RX ADMIN — PANTOPRAZOLE SODIUM 40 MG: 40 TABLET, DELAYED RELEASE ORAL at 06:17

## 2019-01-01 RX ADMIN — TAMSULOSIN HYDROCHLORIDE 0.4 MG: 0.4 CAPSULE ORAL at 19:04

## 2019-01-01 RX ADMIN — AMIODARONE HYDROCHLORIDE 200 MG: 200 TABLET ORAL at 08:07

## 2019-01-01 RX ADMIN — GUAIFENESIN 1200 MG: 600 TABLET, EXTENDED RELEASE ORAL at 09:43

## 2019-01-01 RX ADMIN — FLUDROCORTISONE ACETATE 100 MCG: 0.1 TABLET ORAL at 09:18

## 2019-01-01 RX ADMIN — INSULIN LISPRO 3 UNITS: 100 INJECTION, SOLUTION INTRAVENOUS; SUBCUTANEOUS at 11:55

## 2019-01-01 RX ADMIN — GUAIFENESIN 1200 MG: 600 TABLET, EXTENDED RELEASE ORAL at 20:40

## 2019-01-01 RX ADMIN — WARFARIN SODIUM 3 MG: 3 TABLET ORAL at 20:54

## 2019-01-01 RX ADMIN — ASPIRIN 81 MG: 81 TABLET, COATED ORAL at 09:09

## 2019-01-01 RX ADMIN — INSULIN LISPRO 8 UNITS: 100 INJECTION, SOLUTION INTRAVENOUS; SUBCUTANEOUS at 17:43

## 2019-01-01 RX ADMIN — ATORVASTATIN CALCIUM 20 MG: 20 TABLET, FILM COATED ORAL at 21:23

## 2019-01-01 RX ADMIN — GUAIFENESIN 1200 MG: 600 TABLET, EXTENDED RELEASE ORAL at 20:55

## 2019-01-01 RX ADMIN — CEFAZOLIN SODIUM 2 G: 2 INJECTION, SOLUTION INTRAVENOUS at 17:46

## 2019-01-01 RX ADMIN — ATORVASTATIN CALCIUM 20 MG: 20 TABLET, FILM COATED ORAL at 20:41

## 2019-01-01 RX ADMIN — AMIODARONE HYDROCHLORIDE 200 MG: 200 TABLET ORAL at 21:04

## 2019-01-01 RX ADMIN — IPRATROPIUM BROMIDE AND ALBUTEROL SULFATE 3 ML: 2.5; .5 SOLUTION RESPIRATORY (INHALATION) at 20:05

## 2019-01-01 RX ADMIN — INSULIN LISPRO 3 UNITS: 100 INJECTION, SOLUTION INTRAVENOUS; SUBCUTANEOUS at 11:22

## 2019-01-01 RX ADMIN — NALOXONE HYDROCHLORIDE 2 MG: 0.4 INJECTION, SOLUTION INTRAMUSCULAR; INTRAVENOUS; SUBCUTANEOUS at 12:25

## 2019-01-01 RX ADMIN — INSULIN LISPRO 5 UNITS: 100 INJECTION, SOLUTION INTRAVENOUS; SUBCUTANEOUS at 21:45

## 2019-01-01 RX ADMIN — LIDOCAINE HYDROCHLORIDE 5 ML: 20 SOLUTION ORAL; TOPICAL at 10:18

## 2019-01-01 RX ADMIN — AMIODARONE HYDROCHLORIDE 200 MG: 200 TABLET ORAL at 20:18

## 2019-01-01 RX ADMIN — IPRATROPIUM BROMIDE AND ALBUTEROL SULFATE 3 ML: 2.5; .5 SOLUTION RESPIRATORY (INHALATION) at 20:38

## 2019-01-01 RX ADMIN — GUAIFENESIN 1200 MG: 600 TABLET, EXTENDED RELEASE ORAL at 09:47

## 2019-01-01 RX ADMIN — HEPARIN SODIUM 4000 UNITS: 1000 INJECTION, SOLUTION INTRAVENOUS; SUBCUTANEOUS at 20:50

## 2019-01-01 RX ADMIN — IPRATROPIUM BROMIDE AND ALBUTEROL SULFATE 3 ML: 2.5; .5 SOLUTION RESPIRATORY (INHALATION) at 21:28

## 2019-01-01 RX ADMIN — CEFAZOLIN SODIUM 2 G: 2 INJECTION, SOLUTION INTRAVENOUS at 01:43

## 2019-01-01 RX ADMIN — AMIODARONE HYDROCHLORIDE 200 MG: 200 TABLET ORAL at 10:12

## 2019-01-01 RX ADMIN — LINAGLIPTIN 5 MG: 5 TABLET, FILM COATED ORAL at 09:17

## 2019-01-01 RX ADMIN — IPRATROPIUM BROMIDE AND ALBUTEROL SULFATE 3 ML: 2.5; .5 SOLUTION RESPIRATORY (INHALATION) at 20:14

## 2019-01-01 RX ADMIN — INSULIN LISPRO 2 UNITS: 100 INJECTION, SOLUTION INTRAVENOUS; SUBCUTANEOUS at 11:34

## 2019-01-01 RX ADMIN — ERYTHROMYCIN 250 MG: 250 TABLET, FILM COATED ORAL at 18:54

## 2019-01-01 RX ADMIN — POLYETHYLENE GLYCOL 3350 17 G: 17 POWDER, FOR SOLUTION ORAL at 14:29

## 2019-01-01 RX ADMIN — INSULIN LISPRO 2 UNITS: 100 INJECTION, SOLUTION INTRAVENOUS; SUBCUTANEOUS at 21:11

## 2019-01-01 RX ADMIN — DEXMEDETOMIDINE HYDROCHLORIDE 0.6 MCG/KG/HR: 100 INJECTION, SOLUTION, CONCENTRATE INTRAVENOUS at 16:41

## 2019-01-01 RX ADMIN — LINAGLIPTIN 5 MG: 5 TABLET, FILM COATED ORAL at 10:18

## 2019-01-01 RX ADMIN — LIDOCAINE HYDROCHLORIDE 60 MG: 20 INJECTION, SOLUTION INFILTRATION; PERINEURAL at 07:02

## 2019-01-01 RX ADMIN — BUMETANIDE 4 MG: 0.25 INJECTION INTRAMUSCULAR; INTRAVENOUS at 21:46

## 2019-01-01 RX ADMIN — POLYETHYLENE GLYCOL 3350 17 G: 17 POWDER, FOR SOLUTION ORAL at 09:20

## 2019-01-01 RX ADMIN — ERYTHROMYCIN 250 MG: 250 TABLET, FILM COATED ORAL at 08:43

## 2019-01-01 RX ADMIN — LINAGLIPTIN 5 MG: 5 TABLET, FILM COATED ORAL at 18:05

## 2019-01-01 RX ADMIN — PANTOPRAZOLE SODIUM 40 MG: 40 TABLET, DELAYED RELEASE ORAL at 06:38

## 2019-01-01 RX ADMIN — BUMETANIDE 4 MG: 0.25 INJECTION INTRAMUSCULAR; INTRAVENOUS at 13:14

## 2019-01-01 RX ADMIN — ENOXAPARIN SODIUM 30 MG: 30 INJECTION SUBCUTANEOUS at 22:13

## 2019-01-01 RX ADMIN — IPRATROPIUM BROMIDE AND ALBUTEROL SULFATE 3 ML: 2.5; .5 SOLUTION RESPIRATORY (INHALATION) at 07:18

## 2019-01-01 RX ADMIN — Medication 15 ML: at 10:27

## 2019-01-01 RX ADMIN — SODIUM CHLORIDE, PRESERVATIVE FREE 10 ML: 5 INJECTION INTRAVENOUS at 20:18

## 2019-01-01 RX ADMIN — MUPIROCIN 1 APPLICATION: 20 OINTMENT TOPICAL at 09:43

## 2019-01-01 RX ADMIN — AMIODARONE HYDROCHLORIDE 0.5 MG/MIN: 1.8 INJECTION, SOLUTION INTRAVENOUS at 22:14

## 2019-01-01 RX ADMIN — BISACODYL 10 MG: 5 TABLET ORAL at 09:12

## 2019-01-01 RX ADMIN — INSULIN GLARGINE 10 UNITS: 100 INJECTION, SOLUTION SUBCUTANEOUS at 10:17

## 2019-01-01 RX ADMIN — INSULIN LISPRO 2 UNITS: 100 INJECTION, SOLUTION INTRAVENOUS; SUBCUTANEOUS at 17:30

## 2019-01-01 RX ADMIN — SENNOSIDES AND DOCUSATE SODIUM 2 TABLET: 8.6; 5 TABLET ORAL at 20:54

## 2019-01-01 RX ADMIN — BUMETANIDE 4 MG: 0.25 INJECTION INTRAMUSCULAR; INTRAVENOUS at 09:57

## 2019-01-01 RX ADMIN — GUAIFENESIN 1200 MG: 600 TABLET, EXTENDED RELEASE ORAL at 08:38

## 2019-01-01 RX ADMIN — AMINOCAPROIC ACID 10 G: 250 INJECTION, SOLUTION INTRAVENOUS at 10:27

## 2019-01-01 RX ADMIN — AMIODARONE HYDROCHLORIDE 0.5 MG/MIN: 1.8 INJECTION, SOLUTION INTRAVENOUS at 10:09

## 2019-01-01 RX ADMIN — FLUDROCORTISONE ACETATE 100 MCG: 0.1 TABLET ORAL at 10:03

## 2019-01-01 RX ADMIN — INSULIN HUMAN 2 UNITS: 100 INJECTION, SOLUTION PARENTERAL at 12:30

## 2019-01-01 RX ADMIN — PANTOPRAZOLE SODIUM 40 MG: 40 TABLET, DELAYED RELEASE ORAL at 05:48

## 2019-01-01 RX ADMIN — AMIODARONE HYDROCHLORIDE 200 MG: 200 TABLET ORAL at 13:40

## 2019-01-01 RX ADMIN — NICARDIPINE HYDROCHLORIDE 0.2 MG: 25 INJECTION INTRAVENOUS at 10:28

## 2019-01-01 RX ADMIN — NITROGLYCERIN 50 MCG: 5 INJECTION, SOLUTION INTRAVENOUS at 07:53

## 2019-01-01 RX ADMIN — AMIODARONE HYDROCHLORIDE 0.5 MG/MIN: 1.8 INJECTION, SOLUTION INTRAVENOUS at 23:18

## 2019-01-01 RX ADMIN — MUPIROCIN 1 APPLICATION: 20 OINTMENT TOPICAL at 21:12

## 2019-01-01 RX ADMIN — ENOXAPARIN SODIUM 30 MG: 30 INJECTION SUBCUTANEOUS at 21:48

## 2019-01-01 RX ADMIN — Medication 4 ML: at 10:39

## 2019-01-01 RX ADMIN — FUROSEMIDE 80 MG: 10 INJECTION, SOLUTION INTRAMUSCULAR; INTRAVENOUS at 11:31

## 2019-01-01 RX ADMIN — METOPROLOL TARTRATE: 25 TABLET ORAL at 12:04

## 2019-01-01 RX ADMIN — CHLORHEXIDINE GLUCONATE 15 ML: 1.2 RINSE ORAL at 17:44

## 2019-01-01 RX ADMIN — Medication 4 ML: at 20:06

## 2019-01-01 RX ADMIN — ASPIRIN 81 MG: 81 TABLET, COATED ORAL at 08:50

## 2019-01-01 RX ADMIN — MILRINONE LACTATE IN DEXTROSE 0.12 MCG/KG/MIN: 200 INJECTION, SOLUTION INTRAVENOUS at 00:45

## 2019-01-01 RX ADMIN — SODIUM CHLORIDE 5 MG/HR: 9 INJECTION, SOLUTION INTRAVENOUS at 09:41

## 2019-01-01 RX ADMIN — ERYTHROMYCIN ETHYLSUCCINATE 250 MG: 400 SUSPENSION ORAL at 01:27

## 2019-01-01 RX ADMIN — POLYETHYLENE GLYCOL 3350 17 G: 17 POWDER, FOR SOLUTION ORAL at 09:09

## 2019-01-01 RX ADMIN — INSULIN LISPRO 2 UNITS: 100 INJECTION, SOLUTION INTRAVENOUS; SUBCUTANEOUS at 11:21

## 2019-01-01 RX ADMIN — METOPROLOL TARTRATE 12.5 MG: 25 TABLET ORAL at 06:49

## 2019-01-01 RX ADMIN — ERYTHROMYCIN 250 MG: 250 TABLET, FILM COATED ORAL at 17:48

## 2019-01-01 RX ADMIN — BUMETANIDE 4 MG: 0.25 INJECTION INTRAMUSCULAR; INTRAVENOUS at 06:17

## 2019-01-01 RX ADMIN — SUFENTANIL CITRATE 50 MCG: 50 INJECTION, SOLUTION EPIDURAL; INTRAVENOUS at 07:45

## 2019-01-01 RX ADMIN — TAMSULOSIN HYDROCHLORIDE 0.4 MG: 0.4 CAPSULE ORAL at 09:47

## 2019-01-01 RX ADMIN — ASPIRIN 81 MG: 81 TABLET, COATED ORAL at 08:44

## 2019-01-01 RX ADMIN — CARVEDILOL 3.12 MG: 3.12 TABLET, FILM COATED ORAL at 06:22

## 2019-01-01 RX ADMIN — MUPIROCIN 1 APPLICATION: 20 OINTMENT TOPICAL at 09:19

## 2019-01-01 RX ADMIN — MUPIROCIN 1 APPLICATION: 20 OINTMENT TOPICAL at 21:27

## 2019-01-01 RX ADMIN — TAMSULOSIN HYDROCHLORIDE 0.4 MG: 0.4 CAPSULE ORAL at 21:23

## 2019-01-01 RX ADMIN — MUPIROCIN 1 APPLICATION: 20 OINTMENT TOPICAL at 21:17

## 2019-01-01 RX ADMIN — ALBUMIN HUMAN 250 ML: 0.05 INJECTION, SOLUTION INTRAVENOUS at 12:46

## 2019-01-01 RX ADMIN — POTASSIUM PHOSPHATE, MONOBASIC AND POTASSIUM PHOSPHATE, DIBASIC 10 MMOL: 224; 236 INJECTION, SOLUTION, CONCENTRATE INTRAVENOUS at 17:48

## 2019-01-01 RX ADMIN — NICARDIPINE HYDROCHLORIDE 0.2 MG: 25 INJECTION INTRAVENOUS at 08:29

## 2019-01-01 RX ADMIN — IPRATROPIUM BROMIDE AND ALBUTEROL SULFATE 3 ML: 2.5; .5 SOLUTION RESPIRATORY (INHALATION) at 10:39

## 2019-01-01 RX ADMIN — VECURONIUM BROMIDE 10 MG: 1 INJECTION, POWDER, LYOPHILIZED, FOR SOLUTION INTRAVENOUS at 07:02

## 2019-01-01 RX ADMIN — AMIODARONE HYDROCHLORIDE 200 MG: 200 TABLET ORAL at 08:38

## 2019-01-01 RX ADMIN — LINAGLIPTIN 5 MG: 5 TABLET, FILM COATED ORAL at 09:46

## 2019-01-01 RX ADMIN — CARVEDILOL 6.25 MG: 6.25 TABLET, FILM COATED ORAL at 21:33

## 2019-01-01 RX ADMIN — ATORVASTATIN CALCIUM 20 MG: 20 TABLET, FILM COATED ORAL at 20:26

## 2019-01-01 RX ADMIN — INSULIN GLARGINE 15 UNITS: 100 INJECTION, SOLUTION SUBCUTANEOUS at 09:18

## 2019-01-01 RX ADMIN — ASPIRIN 81 MG: 81 TABLET, COATED ORAL at 09:18

## 2019-01-01 RX ADMIN — Medication 4 ML: at 07:32

## 2019-01-01 RX ADMIN — INSULIN GLARGINE 10 UNITS: 100 INJECTION, SOLUTION SUBCUTANEOUS at 21:48

## 2019-01-01 RX ADMIN — GABAPENTIN 400 MG: 400 CAPSULE ORAL at 09:09

## 2019-01-01 RX ADMIN — CHLORHEXIDINE GLUCONATE 15 ML: 1.2 RINSE ORAL at 06:59

## 2019-01-01 RX ADMIN — INSULIN GLARGINE 10 UNITS: 100 INJECTION, SOLUTION SUBCUTANEOUS at 21:15

## 2019-01-01 RX ADMIN — INSULIN LISPRO 2 UNITS: 100 INJECTION, SOLUTION INTRAVENOUS; SUBCUTANEOUS at 12:59

## 2019-01-01 RX ADMIN — ATORVASTATIN CALCIUM 20 MG: 20 TABLET, FILM COATED ORAL at 21:25

## 2019-01-01 RX ADMIN — CARVEDILOL 6.25 MG: 6.25 TABLET, FILM COATED ORAL at 09:33

## 2019-01-01 RX ADMIN — LINAGLIPTIN 5 MG: 5 TABLET, FILM COATED ORAL at 09:30

## 2019-01-01 RX ADMIN — GABAPENTIN 400 MG: 400 CAPSULE ORAL at 09:34

## 2019-01-01 RX ADMIN — AMIODARONE HYDROCHLORIDE 1 MG/MIN: 1.8 INJECTION, SOLUTION INTRAVENOUS at 05:35

## 2019-01-01 RX ADMIN — METOPROLOL TARTRATE 12.5 MG: 25 TABLET ORAL at 06:10

## 2019-01-01 RX ADMIN — INSULIN GLARGINE 14 UNITS: 100 INJECTION, SOLUTION SUBCUTANEOUS at 09:09

## 2019-01-01 RX ADMIN — IPRATROPIUM BROMIDE AND ALBUTEROL SULFATE 3 ML: 2.5; .5 SOLUTION RESPIRATORY (INHALATION) at 11:02

## 2019-01-01 RX ADMIN — PANTOPRAZOLE SODIUM 40 MG: 40 TABLET, DELAYED RELEASE ORAL at 06:15

## 2019-01-01 RX ADMIN — NITROGLYCERIN 50 MCG: 5 INJECTION, SOLUTION INTRAVENOUS at 07:49

## 2019-01-01 RX ADMIN — NICARDIPINE HYDROCHLORIDE 0.2 MG: 25 INJECTION INTRAVENOUS at 08:34

## 2019-01-01 RX ADMIN — INSULIN GLARGINE 10 UNITS: 100 INJECTION, SOLUTION SUBCUTANEOUS at 20:50

## 2019-01-01 RX ADMIN — SENNOSIDES AND DOCUSATE SODIUM 2 TABLET: 8.6; 5 TABLET ORAL at 20:40

## 2019-01-01 RX ADMIN — METOPROLOL TARTRATE 12.5 MG: 25 TABLET ORAL at 18:10

## 2019-01-01 RX ADMIN — GUAIFENESIN 1200 MG: 600 TABLET, EXTENDED RELEASE ORAL at 20:30

## 2019-01-01 RX ADMIN — AMIODARONE HYDROCHLORIDE 0.5 MG/MIN: 1.8 INJECTION, SOLUTION INTRAVENOUS at 21:44

## 2019-01-01 RX ADMIN — BUMETANIDE 4 MG: 0.25 INJECTION INTRAMUSCULAR; INTRAVENOUS at 03:33

## 2019-01-01 RX ADMIN — ENOXAPARIN SODIUM 30 MG: 30 INJECTION SUBCUTANEOUS at 17:51

## 2019-01-01 RX ADMIN — GUAIFENESIN 1200 MG: 600 TABLET, EXTENDED RELEASE ORAL at 21:10

## 2019-01-01 RX ADMIN — SODIUM CHLORIDE, PRESERVATIVE FREE 10 ML: 5 INJECTION INTRAVENOUS at 21:50

## 2019-01-01 RX ADMIN — DIGOXIN 500 MCG: 0.25 INJECTION INTRAMUSCULAR; INTRAVENOUS at 09:19

## 2019-01-01 RX ADMIN — NITROGLYCERIN 100 MCG: 5 INJECTION, SOLUTION INTRAVENOUS at 08:00

## 2019-01-01 RX ADMIN — TAMSULOSIN HYDROCHLORIDE 0.4 MG: 0.4 CAPSULE ORAL at 09:42

## 2019-01-01 RX ADMIN — INSULIN GLARGINE 10 UNITS: 100 INJECTION, SOLUTION SUBCUTANEOUS at 20:59

## 2019-01-01 RX ADMIN — BISACODYL 10 MG: 10 SUPPOSITORY RECTAL at 16:23

## 2019-01-01 RX ADMIN — HEPARIN SODIUM 3000 UNITS: 5000 INJECTION INTRAVENOUS; SUBCUTANEOUS at 05:03

## 2019-01-01 RX ADMIN — INSULIN LISPRO 8 UNITS: 100 INJECTION, SOLUTION INTRAVENOUS; SUBCUTANEOUS at 12:16

## 2019-01-01 RX ADMIN — FLUDROCORTISONE ACETATE 100 MCG: 0.1 TABLET ORAL at 10:13

## 2019-01-01 RX ADMIN — ASPIRIN 81 MG: 81 TABLET, COATED ORAL at 10:23

## 2019-01-01 RX ADMIN — Medication 15 ML: at 10:13

## 2019-01-01 RX ADMIN — ACETAMINOPHEN 650 MG: 325 TABLET, FILM COATED ORAL at 12:50

## 2019-01-01 RX ADMIN — HEPARIN SODIUM 3800 UNITS: 1000 INJECTION INTRAVENOUS; SUBCUTANEOUS at 11:56

## 2019-01-01 RX ADMIN — ALPRAZOLAM 0.25 MG: 0.25 TABLET ORAL at 14:36

## 2019-01-01 RX ADMIN — ACETAMINOPHEN 650 MG: 325 TABLET, FILM COATED ORAL at 08:25

## 2019-01-01 RX ADMIN — METOPROLOL TARTRATE 12.5 MG: 25 TABLET ORAL at 06:16

## 2019-01-01 RX ADMIN — PANTOPRAZOLE SODIUM 40 MG: 40 TABLET, DELAYED RELEASE ORAL at 06:49

## 2019-01-01 RX ADMIN — Medication 4 ML: at 20:37

## 2019-01-01 RX ADMIN — FAMOTIDINE 20 MG: 10 INJECTION INTRAVENOUS at 06:14

## 2019-01-01 RX ADMIN — ERYTHROMYCIN 250 MG: 250 TABLET, FILM COATED ORAL at 08:38

## 2019-01-01 RX ADMIN — INSULIN LISPRO 3 UNITS: 100 INJECTION, SOLUTION INTRAVENOUS; SUBCUTANEOUS at 08:43

## 2019-01-01 RX ADMIN — TAMSULOSIN HYDROCHLORIDE 0.4 MG: 0.4 CAPSULE ORAL at 08:44

## 2019-01-01 RX ADMIN — PANTOPRAZOLE SODIUM 40 MG: 40 TABLET, DELAYED RELEASE ORAL at 06:29

## 2019-01-01 RX ADMIN — INSULIN LISPRO 3 UNITS: 100 INJECTION, SOLUTION INTRAVENOUS; SUBCUTANEOUS at 08:11

## 2019-01-01 RX ADMIN — MUPIROCIN 1 APPLICATION: 20 OINTMENT TOPICAL at 08:43

## 2019-01-01 RX ADMIN — ONDANSETRON HYDROCHLORIDE 4 MG: 2 SOLUTION INTRAMUSCULAR; INTRAVENOUS at 10:17

## 2019-01-01 RX ADMIN — TAMSULOSIN HYDROCHLORIDE 0.4 MG: 0.4 CAPSULE ORAL at 08:06

## 2019-01-01 RX ADMIN — ATORVASTATIN CALCIUM 20 MG: 20 TABLET, FILM COATED ORAL at 22:12

## 2019-01-01 RX ADMIN — SENNOSIDES AND DOCUSATE SODIUM 2 TABLET: 8.6; 5 TABLET ORAL at 21:45

## 2019-01-01 RX ADMIN — GUAIFENESIN 1200 MG: 600 TABLET, EXTENDED RELEASE ORAL at 21:48

## 2019-01-01 RX ADMIN — VITAMIN D, TAB 1000IU (100/BT) 1000 UNITS: 25 TAB at 08:49

## 2019-01-01 RX ADMIN — ASPIRIN 81 MG: 81 TABLET, COATED ORAL at 09:43

## 2019-01-01 RX ADMIN — METOPROLOL TARTRATE 12.5 MG: 25 TABLET ORAL at 21:01

## 2019-01-01 RX ADMIN — FENTANYL CITRATE 50 MCG: 50 INJECTION INTRAMUSCULAR; INTRAVENOUS at 06:46

## 2019-01-01 RX ADMIN — SODIUM CHLORIDE 30 ML/HR: 9 INJECTION, SOLUTION INTRAVENOUS at 12:16

## 2019-01-01 RX ADMIN — INSULIN LISPRO 2 UNITS: 100 INJECTION, SOLUTION INTRAVENOUS; SUBCUTANEOUS at 17:25

## 2019-01-01 RX ADMIN — WARFARIN SODIUM 1 MG: 1 TABLET ORAL at 16:32

## 2019-01-01 RX ADMIN — ENOXAPARIN SODIUM 30 MG: 30 INJECTION SUBCUTANEOUS at 18:24

## 2019-01-01 RX ADMIN — Medication 4 ML: at 20:56

## 2019-01-01 RX ADMIN — NITROGLYCERIN 100 MCG: 5 INJECTION, SOLUTION INTRAVENOUS at 08:36

## 2019-01-01 RX ADMIN — LIDOCAINE HYDROCHLORIDE 80 MG: 20 INJECTION, SOLUTION INFILTRATION; PERINEURAL at 14:49

## 2019-01-01 RX ADMIN — METOPROLOL TARTRATE 12.5 MG: 25 TABLET ORAL at 21:27

## 2019-01-01 RX ADMIN — ASPIRIN 81 MG: 81 TABLET, COATED ORAL at 08:07

## 2019-01-01 RX ADMIN — GUAIFENESIN 1200 MG: 600 TABLET, EXTENDED RELEASE ORAL at 14:07

## 2019-01-01 RX ADMIN — GUAIFENESIN 1200 MG: 600 TABLET, EXTENDED RELEASE ORAL at 22:12

## 2019-01-01 RX ADMIN — PANTOPRAZOLE SODIUM 40 MG: 40 TABLET, DELAYED RELEASE ORAL at 07:00

## 2019-01-01 RX ADMIN — SODIUM CHLORIDE 1.4 UNITS/HR: 900 INJECTION, SOLUTION INTRAVENOUS at 07:30

## 2019-01-01 RX ADMIN — INSULIN LISPRO 3 UNITS: 100 INJECTION, SOLUTION INTRAVENOUS; SUBCUTANEOUS at 06:57

## 2019-01-01 RX ADMIN — GUAIFENESIN 1200 MG: 600 TABLET, EXTENDED RELEASE ORAL at 10:23

## 2019-01-01 RX ADMIN — Medication 4 ML: at 07:24

## 2019-01-01 RX ADMIN — MUPIROCIN 1 APPLICATION: 20 OINTMENT TOPICAL at 21:24

## 2019-01-01 RX ADMIN — INSULIN LISPRO 3 UNITS: 100 INJECTION, SOLUTION INTRAVENOUS; SUBCUTANEOUS at 11:24

## 2019-01-01 RX ADMIN — INSULIN GLARGINE 10 UNITS: 100 INJECTION, SOLUTION SUBCUTANEOUS at 08:11

## 2019-01-01 RX ADMIN — Medication 4 ML: at 08:11

## 2019-01-01 RX ADMIN — INSULIN LISPRO 2 UNITS: 100 INJECTION, SOLUTION INTRAVENOUS; SUBCUTANEOUS at 21:17

## 2019-01-01 RX ADMIN — SENNOSIDES AND DOCUSATE SODIUM 2 TABLET: 8.6; 5 TABLET ORAL at 21:04

## 2019-01-01 RX ADMIN — ASPIRIN 81 MG: 81 TABLET, COATED ORAL at 09:21

## 2019-01-01 RX ADMIN — NEOSTIGMINE METHYLSULFATE 5 MG: 5 INJECTION, SOLUTION INTRAMUSCULAR; INTRAVENOUS; SUBCUTANEOUS at 11:45

## 2019-01-01 RX ADMIN — MUPIROCIN 1 APPLICATION: 20 OINTMENT TOPICAL at 09:09

## 2019-01-01 RX ADMIN — ATORVASTATIN CALCIUM 20 MG: 20 TABLET, FILM COATED ORAL at 19:04

## 2019-01-01 RX ADMIN — SODIUM CHLORIDE 50 ML/HR: 9 INJECTION, SOLUTION INTRAVENOUS at 11:55

## 2019-01-01 RX ADMIN — ALBUMIN HUMAN 250 ML: 0.05 INJECTION, SOLUTION INTRAVENOUS at 14:29

## 2019-01-01 RX ADMIN — ATORVASTATIN CALCIUM 20 MG: 20 TABLET, FILM COATED ORAL at 20:49

## 2019-01-01 RX ADMIN — ATORVASTATIN CALCIUM 20 MG: 20 TABLET, FILM COATED ORAL at 22:44

## 2019-01-01 RX ADMIN — ALBUMIN HUMAN 25 G: 0.25 SOLUTION INTRAVENOUS at 19:25

## 2019-01-01 RX ADMIN — METOPROLOL TARTRATE 12.5 MG: 25 TABLET ORAL at 21:10

## 2019-01-01 RX ADMIN — SODIUM CHLORIDE 0.25 MCG/KG/MIN: 0.9 INJECTION, SOLUTION INTRAVENOUS at 09:41

## 2019-01-01 RX ADMIN — INSULIN LISPRO 3 UNITS: 100 INJECTION, SOLUTION INTRAVENOUS; SUBCUTANEOUS at 11:56

## 2019-01-01 RX ADMIN — AMIODARONE HYDROCHLORIDE 200 MG: 200 TABLET ORAL at 21:45

## 2019-01-01 RX ADMIN — ENOXAPARIN SODIUM 30 MG: 30 INJECTION SUBCUTANEOUS at 21:16

## 2019-01-01 RX ADMIN — PERFLUTREN 1 ML: 6.52 INJECTION, SUSPENSION INTRAVENOUS at 07:45

## 2019-01-01 RX ADMIN — MUPIROCIN 1 APPLICATION: 20 OINTMENT TOPICAL at 21:16

## 2019-01-01 RX ADMIN — ATORVASTATIN CALCIUM 20 MG: 20 TABLET, FILM COATED ORAL at 00:42

## 2019-01-01 RX ADMIN — INSULIN GLARGINE 10 UNITS: 100 INJECTION, SOLUTION SUBCUTANEOUS at 21:17

## 2019-01-01 RX ADMIN — VITAMIN D, TAB 1000IU (100/BT) 1000 UNITS: 25 TAB at 09:09

## 2019-01-01 RX ADMIN — PANTOPRAZOLE SODIUM 40 MG: 40 TABLET, DELAYED RELEASE ORAL at 06:34

## 2019-01-01 RX ADMIN — MUPIROCIN 1 APPLICATION: 20 OINTMENT TOPICAL at 09:31

## 2019-01-01 RX ADMIN — SODIUM CHLORIDE 0.4 MCG/KG/HR: 900 INJECTION, SOLUTION INTRAVENOUS at 07:30

## 2019-01-01 RX ADMIN — MORPHINE SULFATE 4 MG: 2 INJECTION, SOLUTION INTRAMUSCULAR; INTRAVENOUS at 15:50

## 2019-01-01 RX ADMIN — AMIODARONE HYDROCHLORIDE 200 MG: 200 TABLET ORAL at 21:10

## 2019-01-01 RX ADMIN — AMIODARONE HYDROCHLORIDE 200 MG: 200 TABLET ORAL at 20:49

## 2019-01-01 RX ADMIN — FENTANYL CITRATE 25 MCG: 50 INJECTION INTRAMUSCULAR; INTRAVENOUS at 12:21

## 2019-01-01 RX ADMIN — INSULIN LISPRO 3 UNITS: 100 INJECTION, SOLUTION INTRAVENOUS; SUBCUTANEOUS at 16:30

## 2019-01-01 RX ADMIN — METOPROLOL TARTRATE 12.5 MG: 25 TABLET ORAL at 06:34

## 2019-01-01 RX ADMIN — ALBUMIN HUMAN 12.5 G: 0.25 SOLUTION INTRAVENOUS at 11:50

## 2019-01-01 RX ADMIN — TAMSULOSIN HYDROCHLORIDE 0.4 MG: 0.4 CAPSULE ORAL at 09:08

## 2019-01-01 RX ADMIN — NICARDIPINE HYDROCHLORIDE 0.2 MG: 25 INJECTION INTRAVENOUS at 10:43

## 2019-01-01 RX ADMIN — ENOXAPARIN SODIUM 30 MG: 30 INJECTION SUBCUTANEOUS at 20:55

## 2019-01-01 RX ADMIN — INSULIN GLARGINE 15 UNITS: 100 INJECTION, SOLUTION SUBCUTANEOUS at 08:02

## 2019-01-01 RX ADMIN — ACETAMINOPHEN 650 MG: 325 TABLET, FILM COATED ORAL at 22:34

## 2019-01-01 RX ADMIN — NICARDIPINE HYDROCHLORIDE 0.2 MG: 25 INJECTION INTRAVENOUS at 10:44

## 2019-01-01 RX ADMIN — FLUDROCORTISONE ACETATE 100 MCG: 0.1 TABLET ORAL at 08:43

## 2019-01-01 RX ADMIN — FLUDROCORTISONE ACETATE 100 MCG: 0.1 TABLET ORAL at 09:07

## 2019-01-01 RX ADMIN — MUPIROCIN 1 APPLICATION: 20 OINTMENT TOPICAL at 06:14

## 2019-01-01 RX ADMIN — Medication 1 MG: at 07:02

## 2019-01-01 RX ADMIN — EPHEDRINE SULFATE 10 MG: 50 INJECTION INTRAMUSCULAR; INTRAVENOUS; SUBCUTANEOUS at 07:04

## 2019-01-01 RX ADMIN — MUPIROCIN 1 APPLICATION: 20 OINTMENT TOPICAL at 08:02

## 2019-01-01 RX ADMIN — GUAIFENESIN 1200 MG: 600 TABLET, EXTENDED RELEASE ORAL at 09:29

## 2019-01-01 RX ADMIN — PROTAMINE SULFATE 300 MG: 10 INJECTION, SOLUTION INTRAVENOUS at 10:19

## 2019-01-01 RX ADMIN — AMIODARONE HYDROCHLORIDE 200 MG: 200 TABLET ORAL at 21:16

## 2019-01-01 RX ADMIN — INSULIN LISPRO 2 UNITS: 100 INJECTION, SOLUTION INTRAVENOUS; SUBCUTANEOUS at 17:17

## 2019-01-01 RX ADMIN — MUPIROCIN 1 APPLICATION: 20 OINTMENT TOPICAL at 08:38

## 2019-01-03 ENCOUNTER — OFFICE VISIT (OUTPATIENT)
Dept: ENDOCRINOLOGY | Age: 79
End: 2019-01-03

## 2019-01-03 VITALS
HEART RATE: 86 BPM | HEIGHT: 68 IN | SYSTOLIC BLOOD PRESSURE: 124 MMHG | WEIGHT: 209.6 LBS | BODY MASS INDEX: 31.77 KG/M2 | DIASTOLIC BLOOD PRESSURE: 70 MMHG

## 2019-01-03 DIAGNOSIS — Z79.4 ENCOUNTER FOR LONG-TERM (CURRENT) USE OF INSULIN (HCC): ICD-10-CM

## 2019-01-03 DIAGNOSIS — IMO0002 UNCONTROLLED TYPE 2 DIABETES MELLITUS WITH COMPLICATION, WITHOUT LONG-TERM CURRENT USE OF INSULIN: Primary | ICD-10-CM

## 2019-01-03 DIAGNOSIS — IMO0002 UNCONTROLLED TYPE II DIABETES MELLITUS WITH NEPHROPATHY: ICD-10-CM

## 2019-01-03 DIAGNOSIS — Z86.73 HISTORY OF ISCHEMIC STROKE WITHOUT RESIDUAL DEFICITS: ICD-10-CM

## 2019-01-03 DIAGNOSIS — N18.30 CKD (CHRONIC KIDNEY DISEASE) STAGE 3, GFR 30-59 ML/MIN (HCC): ICD-10-CM

## 2019-01-03 DIAGNOSIS — E16.1 HYPERINSULINISM: ICD-10-CM

## 2019-01-03 PROCEDURE — 99214 OFFICE O/P EST MOD 30 MIN: CPT | Performed by: INTERNAL MEDICINE

## 2019-01-03 RX ORDER — BLOOD-GLUCOSE METER
1 KIT MISCELLANEOUS 4 TIMES DAILY
Qty: 1 EACH | Refills: 1 | Status: SHIPPED | OUTPATIENT
Start: 2019-01-03 | End: 2019-02-07

## 2019-01-03 NOTE — PROGRESS NOTES
78 y.o.    Patient Care Team:  Fahad Murray Jr., MD as PCP - General (Family Medicine)  Alex Simpson MD as Consulting Physician (Nephrology)    Chief Complaint:      HOSPITAL F/U TYPE 2 DIABETES, UNCONTROLLED.  ADRENAL INSUFFICIENCY.  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  He reports compliance with the medication  Denies any side effects.      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  Patient's serum  cortisol level was noted to be low subsequently the cosyntropin stimulation testing was also submaximal  Patient reported receiving epidural injections ×2 within the past 2 weeks which could explain the baseline low cortisol     Potassium level continues to be high at this particular improvement in renal function     Patient reports receiving epidural injections ×2 within the past 2 weeks     Patient denies any prior history of elevated potassium  Patient has received IV fluids and his creatinine has improved from 2.4-1.8 but his potassium is still remains to be elevated  Nephrology suspecting adrenal insufficiency as possible cause for hyperkalemia     Patient denies any previous history of hypotension  He is on medication for hypertension     Cosyntropin stim testing suggest the maximum response was only 6.6 cortisol level  This is still consistent with a recent epidural injections ×2 within the past 2 weeks  But apart from that hypotension, hyperkalemia cannot be explained     Patient does have peripheral autonomic neuropathy which could be manifesting with orthostatic symptoms and signs     I suspect emperic therapy with Solu-Cortef 50 mg every 8 hours ×2 doses is reasonable at this point  If patient has significantly improved potassium levels as well as blood pressure levels then we could justify continuation of a low-dose steroid for some time  On the contrary if patient has no significant response then it is highly unlikely that he has any primary adrenal insufficiency     In that instance we will need to consider type for RTA which  is hyporeninemic hypoaldosteronism as a possible etiology for hyperkalemia in addition to chronic kidney disease and suspected dietary intake of potassium rich foods     I discussed this at length with the patient and his wife and both verbalized understanding and are willing to try  I also recommended discontinuation of glipizide/Amaryl at home and recommend Tradjenta with or  without Starlix to minimize incidence of hypoglycemia and compliance.           The following portions of the patient's history were reviewed and updated as appropriate: allergies, current medications, past family history, past medical history, past social history, past surgical history and problem list.    Past Medical History:   Diagnosis Date   • Arthritis    • Blind left eye    • Carotid artery disease (CMS/HCC)     Right CEA 16.  Left CEA 17 (with dionte-op CVA)   • Chronic kidney disease    • CKD (chronic kidney disease)    • CVA (cerebral vascular accident) (CMS/[facility])     on 17 dionte-operatively from left CEA.     • Diabetes mellitus (CMS/[facility])     DR CAROLINE DURON   • Hyperlipidemia    • Hypertension    • Low back pain    • NSTEMI (non-ST elevated myocardial infarction) (CMS/[facility])     NSTEMI following left CEA and dionte-op stroke in  ([facility]).     • Renal disorder    • SOB (shortness of breath)    • TIA (transient ischemic attack) 2018     Family History   Problem Relation Age of Onset   • Lymphoma Brother 65         at age 73   • Coronary artery disease Neg Hx      Social History     Socioeconomic History   • Marital status:      Spouse name: Kesha   • Number of children: 2   • Years of education: GED   • Highest education level: Not on file   Social Needs   • Financial resource strain: Not on file   • Food insecurity - worry: Not on file   • Food insecurity - inability: Not on file   • Transportation needs - medical: Not on file   • Transportation needs - non-medical: Not on file   Occupational History   • Occupation: Retired    Tobacco Use   • Smoking status: Former Smoker     Packs/day: 2.00     Years: 25.00     Pack years: 50.00     Last attempt to quit:      Years since quittin.0   • Smokeless tobacco: Never Used   Substance and Sexual Activity   • Alcohol use: No   • Drug use: No   • Sexual activity: Defer   Other Topics Concern   •  Not on file   Social History Narrative    Enjoys golf.    LIVES WITH GALLITO GERONIMO     Allergies   Allergen Reactions   • Terbinafine Unknown (See Comments)     RASH,    • Lamisil [Terbinafine] Rash       Current Outpatient Medications:   •  aspirin 81 MG tablet, Take 81 mg by mouth daily., Disp: , Rfl:   •  atorvastatin (LIPITOR) 20 MG tablet, Take 1 tablet by mouth Every Night., Disp: 90 tablet, Rfl: 3  •  carvedilol (COREG) 3.125 MG tablet, take 1 tablet by mouth every morning BEFORE BREAKFAST, Disp: 30 tablet, Rfl: 11  •  cholecalciferol (VITAMIN D3) 1000 UNITS tablet, Take 1,000 Units by mouth Daily., Disp: , Rfl:   •  Cinnamon 500 MG tablet, Take 1 tablet by mouth Daily., Disp: , Rfl:   •  Crisaborole (EUCRISA) 2 % ointment, Apply 1 g topically 2 (Two) Times a Day., Disp: 60 g, Rfl: 3  •  fludrocortisone 0.1 MG tablet, take 1 tablet by mouth once daily, Disp: 30 tablet, Rfl: 5  •  gabapentin (NEURONTIN) 400 MG capsule, take 1 capsule by mouth twice a day, Disp: 180 capsule, Rfl: 1  •  glipiZIDE (GLUCOTROL) 10 MG tablet, Take 1 tablet by mouth Daily With Breakfast., Disp: 30 tablet, Rfl: 3  •  influenza vac split high-dose (FLUZONE HIGH DOSE) 0.5 ML suspension prefilled syringe injection, Fluzone High-Dose 2011-12 (PF) 180 mcg/0.5 mL intramuscular syringe, Disp: , Rfl:   •  Insulin Degludec (TRESIBA FLEXTOUCH) 200 UNIT/ML solution pen-injector, Inject 14 Units under the skin into the appropriate area as directed Daily., Disp: 9 mL, Rfl: 1  •  Insulin Pen Needle (BD PEN NEEDLE CONSUELO U/F) 32G X 4 MM misc, Use to inject insulin one time daily, Disp: 100 each, Rfl: 0  •  linagliptin (TRADJENTA) 5 MG tablet tablet, Take 1 tablet by mouth Daily., Disp: 30 tablet, Rfl: 5  •  polyethylene glycol (MIRALAX) packet, Take 17 g by mouth Daily., Disp: 100 packet, Rfl: 11  •  tamsulosin (FLOMAX) 0.4 MG capsule 24 hr capsule, take 1 capsule by mouth every evening, Disp: 90 capsule, Rfl: 1        Review of Systems  "  Constitutional: Negative for chills, fatigue and fever.   Cardiovascular: Negative for chest pain and palpitations.   Gastrointestinal: Negative for abdominal pain, constipation, diarrhea, nausea and vomiting.   Endocrine: Negative for cold intolerance and heat intolerance.   All other systems reviewed and are negative.      Objective       Vitals:    01/03/19 1126   BP: 124/70   Pulse: 86   Weight: 95.1 kg (209 lb 9.6 oz)   Height: 172.7 cm (68\")     Body mass index is 31.87 kg/m².      Physical Exam   Constitutional: He is oriented to person, place, and time.   Eyes: EOM are normal. Pupils are equal, round, and reactive to light.   Neck: Normal range of motion. Neck supple. No thyromegaly present.   Cardiovascular: Normal rate, regular rhythm, normal heart sounds and intact distal pulses.   Pulmonary/Chest: Effort normal and breath sounds normal.   Abdominal: Soft. Bowel sounds are normal. He exhibits distension.   Musculoskeletal: Normal range of motion.   Neurological: He is alert and oriented to person, place, and time.   Skin: Skin is warm and dry.   Psychiatric: He has a normal mood and affect. His behavior is normal.   Nursing note and vitals reviewed.    Results Review:     I reviewed the patient's new clinical results.    Medical records reviewed  Summary:      Lab on 12/07/2018   Component Date Value Ref Range Status   • Creatinine, Urine 12/07/2018 98.2  Not Estab. mg/dL Final   • Microalbumin, Urine 12/07/2018 176.8  Not Estab. ug/mL Final   • Microalbumin/Creatinine Ratio 12/07/2018 180.0* 0.0 - 30.0 mg/g creat Final    Comment:                      Normal:                0.0 -  30.0                       Albuminuria:          31.0 - 300.0                       Clinical albuminuria:       >300.0     • TSH 12/07/2018 1.520  0.270 - 4.200 mIU/mL Final   • Hemoglobin A1C 12/07/2018 13.20* 4.80 - 5.60 % Final    Comment: Hemoglobin A1C Ranges:  Increased Risk for Diabetes  5.7% to 6.4%  Diabetes          "            >= 6.5%  Diabetic Goal                < 7.0%     • Glucose 12/07/2018 420* 65 - 99 mg/dL Final   • BUN 12/07/2018 23  8 - 23 mg/dL Final   • Creatinine 12/07/2018 1.82* 0.76 - 1.27 mg/dL Final   • eGFR Non  Am 12/07/2018 36* >60 mL/min/1.73 Final    Comment: The MDRD GFR formula is only valid for adults with stable  renal function between ages 18 and 70.     • eGFR  Am 12/07/2018 44* >60 mL/min/1.73 Final   • BUN/Creatinine Ratio 12/07/2018 12.6  7.0 - 25.0 Final   • Sodium 12/07/2018 137  136 - 145 mmol/L Final   • Potassium 12/07/2018 4.8  3.5 - 5.2 mmol/L Final   • Chloride 12/07/2018 97* 98 - 107 mmol/L Final   • Total CO2 12/07/2018 30.6* 22.0 - 29.0 mmol/L Final   • Calcium 12/07/2018 9.9  8.6 - 10.5 mg/dL Final   • Total Protein 12/07/2018 6.5  6.0 - 8.5 g/dL Final   • Albumin 12/07/2018 4.20  3.50 - 5.20 g/dL Final   • Globulin 12/07/2018 2.3  gm/dL Final   • A/G Ratio 12/07/2018 1.8  g/dL Final   • Total Bilirubin 12/07/2018 0.5  0.1 - 1.2 mg/dL Final   • Alkaline Phosphatase 12/07/2018 92  39 - 117 U/L Final   • AST (SGOT) 12/07/2018 11  1 - 40 U/L Final   • ALT (SGPT) 12/07/2018 14  1 - 41 U/L Final     Lab Results   Component Value Date    HGBA1C 13.20 (H) 12/07/2018    HGBA1C 8.91 (H) 06/19/2018    HGBA1C 8.33 (H) 05/17/2018     Lab Results   Component Value Date    MICROALBUR 176.8 12/07/2018    CREATININE 1.82 (H) 12/07/2018     Imaging Results (most recent)     None          Assessment and Plan:    Santino was seen today for diabetes and adrenal problem.    Diagnoses and all orders for this visit:    Uncontrolled type 2 diabetes mellitus with complication, without long-term current use of insulin (CMS/[facility])  -     Hemoglobin A1c    Uncontrolled type II diabetes mellitus with nephropathy (CMS/[facility])    Hyperinsulinism    CKD (chronic kidney disease) stage 3, GFR 30-59 ml/min (CMS/[facility])    History of ischemic stroke without residual deficits    Encounter for long-term (current) use  "of insulin (CMS/[facility])      Patient once again forgot to bring his glucometer today  He reports that his blood sugars are may be slightly better since starting insulin  He's currently taking tresiba 14 units daily along with Tradjenta 5 mg and glipizide 10 mg daily  Patient's recent hemoglobin A 1C 13.2%    I advised him to continue controlling his diet and activity and take insulin  In patient will get lab testing done today  He will return to follow-up in one month    The total time spent  was more than 25 min of which greater than 15 min of time (greater than 50% of the total time)  was spent face to face with the patient counseling and coordination of care on recommended evaluation and treatment options, instructions for management/treatment and /or follow up and importance of compliance with chosen management or treatment options  Trevor Whyte MD. FACE    01/03/19      EMR Dragon / transcription disclaimer:     \"Dictated utilizing Dragon dictation\".         "

## 2019-01-04 LAB — HBA1C MFR BLD: 11.54 % (ref 4.8–5.6)

## 2019-01-24 RX ORDER — TAMSULOSIN HYDROCHLORIDE 0.4 MG/1
CAPSULE ORAL
Qty: 90 CAPSULE | Refills: 1 | Status: SHIPPED | OUTPATIENT
Start: 2019-01-24 | End: 2019-07-20 | Stop reason: SDUPTHER

## 2019-01-24 RX ORDER — LINAGLIPTIN 5 MG/1
TABLET, FILM COATED ORAL
Qty: 30 TABLET | Refills: 5 | Status: SHIPPED | OUTPATIENT
Start: 2019-01-24 | End: 2019-07-17 | Stop reason: SDUPTHER

## 2019-02-07 ENCOUNTER — OFFICE VISIT (OUTPATIENT)
Dept: INTERNAL MEDICINE | Facility: CLINIC | Age: 79
End: 2019-02-07

## 2019-02-07 VITALS
BODY MASS INDEX: 33.3 KG/M2 | SYSTOLIC BLOOD PRESSURE: 142 MMHG | TEMPERATURE: 97.4 F | OXYGEN SATURATION: 92 % | WEIGHT: 219 LBS | HEART RATE: 91 BPM | DIASTOLIC BLOOD PRESSURE: 56 MMHG

## 2019-02-07 DIAGNOSIS — M54.10 RADICULOPATHY, UNSPECIFIED SPINAL REGION: ICD-10-CM

## 2019-02-07 DIAGNOSIS — I10 ESSENTIAL HYPERTENSION: ICD-10-CM

## 2019-02-07 DIAGNOSIS — IMO0002 UNCONTROLLED TYPE II DIABETES MELLITUS WITH NEPHROPATHY: ICD-10-CM

## 2019-02-07 DIAGNOSIS — M51.36 DDD (DEGENERATIVE DISC DISEASE), LUMBAR: Primary | ICD-10-CM

## 2019-02-07 PROCEDURE — 99214 OFFICE O/P EST MOD 30 MIN: CPT | Performed by: FAMILY MEDICINE

## 2019-02-07 RX ORDER — GABAPENTIN 400 MG/1
400 CAPSULE ORAL 2 TIMES DAILY
Qty: 180 CAPSULE | Refills: 1 | Status: SHIPPED | OUTPATIENT
Start: 2019-02-07 | End: 2019-01-01 | Stop reason: SDUPTHER

## 2019-02-07 NOTE — PROGRESS NOTES
Subjective   Santino Retana is a 78 y.o. male.     Chief Complaint   Patient presents with   • Back Pain   • Diabetes   • Hypertension         History of Present Illness   Patient has had a very difficult time with recurrent back pain worth it worse with standing and radiculopathy on the left leg with evidence of severe DJD DDD at approximate L5-S1 L4-L5.  There is a disc protrusion worse on the left side which is where he has most of his pain.  Otherwise his diabetes is not been fully in control hypertension hyperlipidemia.  Lengthy discussion with he and his wife and he would like to go back to see neurosurgery to discuss options.      The following portions of the patient's history were reviewed and updated as appropriate: allergies, current medications, past social history and problem list.    Review of Systems   Constitutional: Negative.    HENT: Negative.    Eyes: Negative.    Respiratory: Negative.    Cardiovascular: Negative.    Gastrointestinal: Negative.    Endocrine: Negative.    Genitourinary: Negative.    Musculoskeletal: Positive for arthralgias, back pain and myalgias.   Skin: Negative.    Allergic/Immunologic: Negative.    Neurological: Negative.    Hematological: Negative.    Psychiatric/Behavioral: Negative.        Objective   Vitals:    02/07/19 1711   BP: 142/56   Pulse: 91   Temp: 97.4 °F (36.3 °C)   SpO2: 92%     Physical Exam   Constitutional: He is oriented to person, place, and time. He appears well-developed and well-nourished.   HENT:   Head: Normocephalic and atraumatic.   Right Ear: Tympanic membrane and external ear normal.   Left Ear: Tympanic membrane and external ear normal.   Nose: Nose normal.   Mouth/Throat: Oropharynx is clear and moist.   Eyes: Conjunctivae and EOM are normal. Pupils are equal, round, and reactive to light.   Neck: Normal range of motion. Neck supple. No JVD present. No thyromegaly present.   Cardiovascular: Normal rate, regular rhythm, normal heart sounds and  intact distal pulses.   Pulmonary/Chest: Effort normal and breath sounds normal.   Abdominal: Soft. Bowel sounds are normal.   Musculoskeletal:        Lumbar back: He exhibits decreased range of motion, pain and spasm.   Lymphadenopathy:     He has no cervical adenopathy.   Neurological: He is alert and oriented to person, place, and time. No cranial nerve deficit. Coordination normal.   Reflex Scores:       Patellar reflexes are 2+ on the right side and 2+ on the left side.       Achilles reflexes are 2+ on the right side and 2+ on the left side.  Skin: Skin is warm and dry. No rash noted.   Psychiatric: He has a normal mood and affect. His behavior is normal. Judgment and thought content normal.   Vitals reviewed.      Assessment/Plan   Problem List Items Addressed This Visit        Cardiovascular and Mediastinum    Hypertension       Endocrine    Uncontrolled type II diabetes mellitus with nephropathy (CMS/[facility])      Other Visit Diagnoses     DDD (degenerative disc disease), lumbar    -  Primary    Relevant Orders    Ambulatory Referral to Neurosurgery    Radiculopathy, unspecified spinal region        Relevant Orders    Ambulatory Referral to Neurosurgery      Referral to neurosurgery for options.  He'll also follow-up with endocrinology given his uncontrolled diabetes is doing better now than before as well as following chronic renal insufficiency.

## 2019-03-06 NOTE — PROGRESS NOTES
Subjective   Patient ID: Santino Retana is a 78 y.o. male is here today for follow-up on back pain that radiates into bilateral lower extremity's.    History of Present Illness    This patient has been having severe pain in his back with radiation into his legs.  He says the pain in his back gets really bad when he stands for any length of time but if he tries to walk he gets pain into his legs.  It radiates into the back of both legs.  He has no difficulty with bowel or bladder control or other associated symptoms.    The following portions of the patient's history were reviewed and updated as appropriate: allergies, current medications, past family history, past medical history, past social history, past surgical history and problem list.    Review of Systems   Respiratory: Negative for chest tightness and shortness of breath.    Cardiovascular: Negative for chest pain.   Musculoskeletal: Positive for back pain.        Left leg pain   All other systems reviewed and are negative.      Objective   Physical Exam   Constitutional: He is oriented to person, place, and time. He appears well-developed and well-nourished.   Eyes: EOM are normal. Pupils are equal, round, and reactive to light.   Neurological: He is oriented to person, place, and time. He has a normal Finger-Nose-Finger Test and a normal Heel to Shin Test. Gait normal.   Reflex Scores:       Tricep reflexes are 2+ on the right side and 2+ on the left side.       Bicep reflexes are 2+ on the right side and 2+ on the left side.       Brachioradialis reflexes are 2+ on the right side and 2+ on the left side.       Patellar reflexes are 2+ on the right side and 2+ on the left side.       Achilles reflexes are 2+ on the right side and 2+ on the left side.  Psychiatric: His speech is normal.     Neurologic Exam     Mental Status   Oriented to person, place, and time.   Registration of memory: Good recent and remote memory.   Attention: normal. Concentration:  normal.   Speech: speech is normal   Level of consciousness: alert  Knowledge: consistent with education.     Cranial Nerves     CN II   Visual fields full to confrontation.   Visual acuity: normal    CN III, IV, VI   Pupils are equal, round, and reactive to light.  Extraocular motions are normal.     CN V   Facial sensation intact.   Right corneal reflex: normal  Left corneal reflex: normal    CN VII   Facial expression full, symmetric.   Right facial weakness: none  Left facial weakness: none    CN VIII   Hearing: intact    CN IX, X   Palate: symmetric    CN XI   Right sternocleidomastoid strength: normal  Left sternocleidomastoid strength: normal    CN XII   Tongue: not atrophic  Tongue deviation: none    Motor Exam   Muscle bulk: normal  Right arm tone: normal  Left arm tone: normal  Right leg tone: normal  Left leg tone: normal    Strength   Strength 5/5 except as noted.     Sensory Exam   Light touch normal.     Gait, Coordination, and Reflexes     Gait  Gait: normal    Coordination   Finger to nose coordination: normal  Heel to shin coordination: normal    Reflexes   Right brachioradialis: 2+  Left brachioradialis: 2+  Right biceps: 2+  Left biceps: 2+  Right triceps: 2+  Left triceps: 2+  Right patellar: 2+  Left patellar: 2+  Right achilles: 2+  Left achilles: 2+  Right : 2+  Left : 2+      Assessment/Plan   Independent Review of Radiographic Studies:      I reviewed an MRI of his lumbar spine done on February 13 of last year.  This shows widely patent foramina and canal at L1 to.  There is a little central stenosis at L2-3 but not severe and the foramina are open.  L3-4 shows a little narrowing in the left neural foramen but not severe.  L4-5 is fairly open.  L5-S1 shows a synovial cyst in the left neural foramen which may be touching the nerve but is not compressing it.  The foramina are fairly open there.    Medical Decision Making:      Told the patient and his wife about the imaging from last  year.  I told him that we need new images.  I recommended that we proceed with a lumbar myelogram but he is somewhat hesitant to have a myelogram.  I told him we would go ahead with an MRI without contrast and also some plain films.  I will see him back when those have been done.  From last year I do not see a surgical option for him.    Santino was seen today for back pain and leg pain.    Diagnoses and all orders for this visit:    Osteoarthritis of spine with radiculopathy, lumbar region  -     MRI Lumbar Spine Without Contrast; Future  -     XR Spine Lumbar Complete With Flex & Ext; Future    Adrenal insufficiency (CMS/HCC)    Neuroendocrine carcinoma of small bowel (CMS/HCC)      Return for After radiology test.

## 2019-03-07 ENCOUNTER — OFFICE VISIT (OUTPATIENT)
Dept: NEUROSURGERY | Facility: CLINIC | Age: 79
End: 2019-03-07

## 2019-03-07 VITALS — DIASTOLIC BLOOD PRESSURE: 70 MMHG | HEART RATE: 90 BPM | SYSTOLIC BLOOD PRESSURE: 137 MMHG

## 2019-03-07 DIAGNOSIS — C7A.8 NEUROENDOCRINE CARCINOMA OF SMALL BOWEL (HCC): ICD-10-CM

## 2019-03-07 DIAGNOSIS — M47.26 OSTEOARTHRITIS OF SPINE WITH RADICULOPATHY, LUMBAR REGION: Primary | ICD-10-CM

## 2019-03-07 DIAGNOSIS — E27.40 ADRENAL INSUFFICIENCY (HCC): ICD-10-CM

## 2019-03-07 PROCEDURE — 99213 OFFICE O/P EST LOW 20 MIN: CPT | Performed by: NEUROLOGICAL SURGERY

## 2019-03-07 RX ORDER — ATORVASTATIN CALCIUM 20 MG/1
TABLET, FILM COATED ORAL
Qty: 90 TABLET | Refills: 3 | Status: SHIPPED | OUTPATIENT
Start: 2019-03-07 | End: 2020-01-01

## 2019-03-13 ENCOUNTER — HOSPITAL ENCOUNTER (OUTPATIENT)
Dept: GENERAL RADIOLOGY | Facility: HOSPITAL | Age: 79
Discharge: HOME | End: 2019-03-13

## 2019-03-13 ENCOUNTER — HOSPITAL ENCOUNTER (OUTPATIENT)
Dept: MRI IMAGING | Facility: HOSPITAL | Age: 79
Discharge: HOME | End: 2019-03-13
Admitting: NEUROLOGICAL SURGERY

## 2019-03-13 DIAGNOSIS — M47.26 OSTEOARTHRITIS OF SPINE WITH RADICULOPATHY, LUMBAR REGION: ICD-10-CM

## 2019-03-13 PROCEDURE — 72148 MRI LUMBAR SPINE W/O DYE: CPT

## 2019-03-13 PROCEDURE — 72114 X-RAY EXAM L-S SPINE BENDING: CPT

## 2019-04-02 ENCOUNTER — OFFICE VISIT (OUTPATIENT)
Dept: NEUROSURGERY | Facility: CLINIC | Age: 79
End: 2019-04-02

## 2019-04-02 VITALS — DIASTOLIC BLOOD PRESSURE: 60 MMHG | HEART RATE: 85 BPM | SYSTOLIC BLOOD PRESSURE: 118 MMHG

## 2019-04-02 DIAGNOSIS — M47.26 OSTEOARTHRITIS OF SPINE WITH RADICULOPATHY, LUMBAR REGION: Primary | ICD-10-CM

## 2019-04-02 PROCEDURE — 99213 OFFICE O/P EST LOW 20 MIN: CPT | Performed by: NEUROLOGICAL SURGERY

## 2019-04-08 RX ORDER — GLIPIZIDE 10 MG/1
TABLET ORAL
Qty: 30 TABLET | Refills: 3 | Status: ON HOLD | OUTPATIENT
Start: 2019-04-08 | End: 2019-01-01

## 2019-04-09 ENCOUNTER — OFFICE VISIT (OUTPATIENT)
Dept: ENDOCRINOLOGY | Age: 79
End: 2019-04-09

## 2019-04-09 VITALS
DIASTOLIC BLOOD PRESSURE: 70 MMHG | HEIGHT: 68 IN | BODY MASS INDEX: 33.49 KG/M2 | HEART RATE: 88 BPM | SYSTOLIC BLOOD PRESSURE: 130 MMHG | WEIGHT: 221 LBS

## 2019-04-09 DIAGNOSIS — IMO0002 UNCONTROLLED TYPE II DIABETES MELLITUS WITH NEPHROPATHY: ICD-10-CM

## 2019-04-09 DIAGNOSIS — N18.30 CKD (CHRONIC KIDNEY DISEASE) STAGE 3, GFR 30-59 ML/MIN (HCC): ICD-10-CM

## 2019-04-09 DIAGNOSIS — Z79.4 ENCOUNTER FOR LONG-TERM (CURRENT) USE OF INSULIN (HCC): ICD-10-CM

## 2019-04-09 DIAGNOSIS — IMO0002 UNCONTROLLED TYPE 2 DIABETES MELLITUS WITH COMPLICATION, WITHOUT LONG-TERM CURRENT USE OF INSULIN: Primary | ICD-10-CM

## 2019-04-09 DIAGNOSIS — Z86.73 HISTORY OF ISCHEMIC STROKE WITHOUT RESIDUAL DEFICITS: ICD-10-CM

## 2019-04-09 DIAGNOSIS — E16.1 HYPERINSULINISM: ICD-10-CM

## 2019-04-09 PROCEDURE — 99214 OFFICE O/P EST MOD 30 MIN: CPT | Performed by: INTERNAL MEDICINE

## 2019-04-09 NOTE — PROGRESS NOTES
78 y.o.    Patient Care Team:  Fahad Murray Jr., MD as PCP - General (Family Medicine)  Alex Simpson MD as Consulting Physician (Nephrology)    Chief Complaint:      F/U TYPE 2 DIABETES, UNCONTROLLED.  ADRENAL INSUFFICIENCY.  NO RECENT LABS.  Subjective     HPI   Patient is a 78-year-old white male with a past history of uncontrolled type 2 diabetes mellitus with complications came for follow-up    Uncontrolled type 2 diabetes mellitus  Patient reports that he is currently taking tresiba 14 units in the morning along with Tradjenta 5 mg and Glucotrol 10 mg in the morning  His blood sugars are ranging from   Hypoglycemia  Patient denies any recent episodes of hypoglycemia has had hypoglycemia in the past  Uncontrolled type 2 diabetes mellitus and neuropathy  Patient reports symptoms are neuropathy and is currently taking Neurontin  Nervous stomach and helps  Nephropathy  Patient has elevated creatinine and chronic kidney disease  Hyperlipidemia associated with diabetes  Patient is currently taking Lipitor 20 mg daily.  Once with the medication  Denies any side effects.        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  Patient's serum cortisol level was noted to be low subsequently the cosyntropin stimulation testing was also submaximal  Patient reported receiving epidural injections ×2 within the past 2 weeks which could explain the baseline low  cortisol     Potassium level continues to be high at this particular improvement in renal function     Patient reports receiving epidural injections ×2 within the past 2 weeks     Patient denies any prior history of elevated potassium  Patient has received IV fluids and his creatinine has improved from 2.4-1.8 but his potassium is still remains to be elevated  Nephrology suspecting adrenal insufficiency as possible cause for hyperkalemia     Patient denies any previous history of hypotension  He is on medication for hypertension     Cosyntropin stim testing suggest the maximum response was only 6.6 cortisol level  This is still consistent with a recent epidural injections ×2 within the past 2 weeks  But apart from that hypotension, hyperkalemia cannot be explained     Patient does have peripheral autonomic neuropathy which could be manifesting with orthostatic symptoms and signs     I suspect emperic therapy with Solu-Cortef 50 mg every 8 hours ×2 doses is reasonable at this point  If patient has significantly improved potassium levels as well as blood pressure levels then we could justify continuation of a low-dose steroid for some time  On the contrary if patient has no significant response then it is highly unlikely that he has any primary adrenal insufficiency     In that instance we will need to consider type for RTA which  is hyporeninemic hypoaldosteronism as a possible etiology for hyperkalemia in addition to chronic kidney disease and suspected dietary intake of potassium rich foods     I discussed this at length with the patient and his wife and both verbalized understanding and are willing to try  I also recommended discontinuation of glipizide/Amaryl at home and recommend Tradjenta with or without Starlix to minimize incidence of hypoglycemia and compliance.       The following portions of the patient's history were reviewed and updated as appropriate: allergies, current medications, past family history,  past medical history, past social history, past surgical history and problem list.    Past Medical History:   Diagnosis Date   • Arthritis    • Blind left eye    • Carotid artery disease (CMS/HCC)     Right CEA 16.  Left CEA 17 (with dionte-op CVA)   • Chronic kidney disease    • CKD (chronic kidney disease)    • CVA (cerebral vascular accident) (CMS/[facility])     on 17 dionte-operatively from left CEA.     • Diabetes mellitus (CMS/[facility])     DR CAROLINE DURON   • Hyperlipidemia    • Hypertension    • Low back pain    • NSTEMI (non-ST elevated myocardial infarction) (CMS/[facility])     NSTEMI following left CEA and dionte-op stroke in  ([facility]).     • Renal disorder    • SOB (shortness of breath)    • TIA (transient ischemic attack) 2018     Family History   Problem Relation Age of Onset   • Lymphoma Brother 65         at age 71   • Coronary artery disease Neg Hx      Social History     Socioeconomic History   • Marital status:      Spouse name: Gallito   • Number of children: 2   • Years of education: GED   • Highest education level: Not on file   Occupational History   • Occupation: Retired    Tobacco Use   • Smoking status: Former Smoker     Packs/day: 2.00     Years: 25.00     Pack years: 50.00     Last attempt to quit:      Years since quittin.2   • Smokeless tobacco: Never Used   Substance and Sexual Activity   • Alcohol use: No   • Drug use: No   • Sexual activity: Defer   Social History Narrative    Enjoys golf.    LIVES WITH GALLITO GERONIMO     Allergies   Allergen Reactions   • Terbinafine Unknown (See Comments)     RASH,    • Lamisil [Terbinafine] Rash       Current Outpatient Medications:   •  aspirin 81 MG tablet, Take 81 mg by mouth daily., Disp: , Rfl:   •  atorvastatin (LIPITOR) 20 MG tablet, take 1 tablet by mouth every evening, Disp: 90 tablet, Rfl: 3  •  carvedilol (COREG) 3.125 MG tablet, take 1 tablet by mouth every morning BEFORE BREAKFAST,  "Disp: 30 tablet, Rfl: 11  •  cholecalciferol (VITAMIN D3) 1000 UNITS tablet, Take 1,000 Units by mouth Daily., Disp: , Rfl:   •  Cinnamon 500 MG tablet, Take 1 tablet by mouth Daily., Disp: , Rfl:   •  Crisaborole (EUCRISA) 2 % ointment, Apply 1 g topically 2 (Two) Times a Day., Disp: 60 g, Rfl: 3  •  fludrocortisone 0.1 MG tablet, take 1 tablet by mouth once daily, Disp: 30 tablet, Rfl: 5  •  gabapentin (NEURONTIN) 400 MG capsule, Take 1 capsule by mouth 2 (Two) Times a Day., Disp: 180 capsule, Rfl: 1  •  glipiZIDE (GLUCOTROL) 10 MG tablet, take 1 tablet by mouth once daily with BREAKFAST, Disp: 30 tablet, Rfl: 3  •  glucose blood (FREESTYLE LITE) test strip, 1 each by Other route 4 (Four) Times a Day. e11.65, Disp: 150 each, Rfl: 5  •  Insulin Degludec (TRESIBA FLEXTOUCH) 200 UNIT/ML solution pen-injector, Inject 14 Units under the skin into the appropriate area as directed Daily., Disp: 9 mL, Rfl: 1  •  Insulin Pen Needle (BD PEN NEEDLE CONSUELO U/F) 32G X 4 MM misc, use to INJECT INSULIN once daily, Disp: 100 each, Rfl: 1  •  polyethylene glycol (MIRALAX) packet, Take 17 g by mouth Daily., Disp: 100 packet, Rfl: 11  •  tamsulosin (FLOMAX) 0.4 MG capsule 24 hr capsule, take 1 capsule by mouth every evening, Disp: 90 capsule, Rfl: 1  •  TRADJENTA 5 MG tablet tablet, take 1 tablet by mouth once daily, Disp: 30 tablet, Rfl: 5        Review of Systems   Constitutional: Positive for fatigue. Negative for chills and fever.   Cardiovascular: Negative for chest pain and palpitations.   Gastrointestinal: Negative for abdominal pain, constipation, diarrhea, nausea and vomiting.   Endocrine: Negative for cold intolerance and heat intolerance.   All other systems reviewed and are negative.      Objective       Vitals:    04/09/19 0937   BP: 130/70   Pulse: 88   Weight: 100 kg (221 lb)   Height: 172.7 cm (68\")     Body mass index is 33.6 kg/m².      Physical Exam   Constitutional: He is oriented to person, place, and time. "   Eyes: EOM are normal. Pupils are equal, round, and reactive to light.   Neck: Normal range of motion. Neck supple. No thyromegaly present.   Cardiovascular: Normal rate, regular rhythm, normal heart sounds and intact distal pulses.   Pulmonary/Chest: Effort normal and breath sounds normal.   Abdominal: Soft. Bowel sounds are normal. He exhibits distension.   Musculoskeletal: Normal range of motion.   Neurological: He is alert and oriented to person, place, and time.   Skin: Skin is warm and dry.   Psychiatric: He has a normal mood and affect. His behavior is normal.   Nursing note and vitals reviewed.    Results Review:     I reviewed the patient's new clinical results.    Medical records reviewed  Summary:      Office Visit on 01/03/2019   Component Date Value Ref Range Status   • Hemoglobin A1C 01/03/2019 11.54* 4.80 - 5.60 % Final    Comment: Hemoglobin A1C Ranges:  Increased Risk for Diabetes  5.7% to 6.4%  Diabetes                     >= 6.5%  Diabetic Goal                < 7.0%       Lab Results   Component Value Date    HGBA1C 11.54 (H) 01/03/2019    HGBA1C 13.20 (H) 12/07/2018    HGBA1C 8.91 (H) 06/19/2018     Lab Results   Component Value Date    MICROALBUR 176.8 12/07/2018    CREATININE 1.82 (H) 12/07/2018     Imaging Results (most recent)     None                Assessment and Plan:    Santino was seen today for diabetes and adrenal problem.    Diagnoses and all orders for this visit:    Uncontrolled type 2 diabetes mellitus with complication, without long-term current use of insulin (CMS/[facility])  -     Comprehensive Metabolic Panel  -     Hemoglobin A1c  -     TSH  -     Microalbumin / Creatinine Urine Ratio - Urine, Clean Catch    CKD (chronic kidney disease) stage 3, GFR 30-59 ml/min (CMS/[facility])    Encounter for long-term (current) use of insulin (CMS/[facility])    Uncontrolled type II diabetes mellitus with nephropathy (CMS/[facility])    Hyperinsulinism    History of ischemic stroke without residual  "deficits      Patient's glucose log reviewed  Majority are in the  range  Patient's last hemoglobin A1c was still 11.8%  Patient is only checking in the morning  I wonder if he has hyperglycemia rest of the day    I advised the patient to try a freestyle CGM professional to get more data  Patient will return to follow-up in 1 month for review and further advice    The total time spent  was more than 25 min of which greater than 15 min of time (greater than 50% of the total time)  was spent face to face with the patient counseling and coordination of care on recommended evaluation and treatment options, instructions for management/treatment and /or follow up and importance of compliance with chosen management or treatment options    Trevor Whyte MD. FACE    04/09/19      EMR Dragon / transcription disclaimer:     \"Dictated utilizing Dragon dictation\".         "

## 2019-04-10 LAB
ALBUMIN SERPL-MCNC: 4 G/DL (ref 3.5–5.2)
ALBUMIN/CREAT UR: 311.5 MG/G CREAT (ref 0–30)
ALBUMIN/GLOB SERPL: 1.7 G/DL
ALP SERPL-CCNC: 74 U/L (ref 39–117)
ALT SERPL-CCNC: 14 U/L (ref 1–41)
AST SERPL-CCNC: 8 U/L (ref 1–40)
BILIRUB SERPL-MCNC: 0.6 MG/DL (ref 0.2–1.2)
BUN SERPL-MCNC: 23 MG/DL (ref 8–23)
BUN/CREAT SERPL: 15 (ref 7–25)
CALCIUM SERPL-MCNC: 9.5 MG/DL (ref 8.6–10.5)
CHLORIDE SERPL-SCNC: 105 MMOL/L (ref 98–107)
CO2 SERPL-SCNC: 29.5 MMOL/L (ref 22–29)
CREAT SERPL-MCNC: 1.53 MG/DL (ref 0.76–1.27)
CREAT UR-MCNC: 78.6 MG/DL
GLOBULIN SER CALC-MCNC: 2.4 GM/DL
GLUCOSE SERPL-MCNC: 109 MG/DL (ref 65–99)
HBA1C MFR BLD: 6.8 % (ref 4.8–5.6)
MICROALBUMIN UR-MCNC: 244.8 UG/ML
POTASSIUM SERPL-SCNC: 4.8 MMOL/L (ref 3.5–5.2)
PROT SERPL-MCNC: 6.4 G/DL (ref 6–8.5)
SODIUM SERPL-SCNC: 143 MMOL/L (ref 136–145)
TSH SERPL DL<=0.005 MIU/L-ACNC: 1.84 MIU/ML (ref 0.27–4.2)

## 2019-04-24 ENCOUNTER — OFFICE VISIT (OUTPATIENT)
Dept: PAIN MEDICINE | Facility: CLINIC | Age: 79
End: 2019-04-24

## 2019-04-24 ENCOUNTER — RESULTS ENCOUNTER (OUTPATIENT)
Dept: PAIN MEDICINE | Facility: CLINIC | Age: 79
End: 2019-04-24

## 2019-04-24 VITALS
HEIGHT: 68 IN | OXYGEN SATURATION: 95 % | BODY MASS INDEX: 33.52 KG/M2 | SYSTOLIC BLOOD PRESSURE: 122 MMHG | HEART RATE: 87 BPM | TEMPERATURE: 98.6 F | RESPIRATION RATE: 16 BRPM | WEIGHT: 221.2 LBS | DIASTOLIC BLOOD PRESSURE: 70 MMHG

## 2019-04-24 DIAGNOSIS — M47.816 LUMBAR FACET ARTHROPATHY: ICD-10-CM

## 2019-04-24 DIAGNOSIS — M47.26 OSTEOARTHRITIS OF SPINE WITH RADICULOPATHY, LUMBAR REGION: ICD-10-CM

## 2019-04-24 DIAGNOSIS — M47.26 OSTEOARTHRITIS OF SPINE WITH RADICULOPATHY, LUMBAR REGION: Primary | ICD-10-CM

## 2019-04-24 DIAGNOSIS — G89.4 CHRONIC PAIN SYNDROME: ICD-10-CM

## 2019-04-24 LAB
POC AMPHETAMINES: NEGATIVE
POC BARBITURATES: NEGATIVE
POC BENZODIAZEPHINES: NEGATIVE
POC COCAINE: NEGATIVE
POC METHADONE: NEGATIVE
POC METHAMPHETAMINE SCREEN URINE: NEGATIVE
POC OPIATES: NEGATIVE
POC OXYCODONE: NEGATIVE
POC PHENCYCLIDINE: NEGATIVE
POC PROPOXYPHENE: NEGATIVE
POC THC: NEGATIVE
POC TRICYCLIC ANTIDEPRESSANTS: NEGATIVE

## 2019-04-24 PROCEDURE — 99204 OFFICE O/P NEW MOD 45 MIN: CPT | Performed by: ANESTHESIOLOGY

## 2019-04-24 PROCEDURE — 80305 DRUG TEST PRSMV DIR OPT OBS: CPT | Performed by: ANESTHESIOLOGY

## 2019-04-24 RX ORDER — CLOPIDOGREL BISULFATE 75 MG/1
75 TABLET ORAL DAILY
Refills: 0 | COMMUNITY
Start: 2019-04-13 | End: 2020-01-01

## 2019-04-24 NOTE — PROGRESS NOTES
CHIEF COMPLAINT   Patient referred by Dr. Rob for Low back pain,  bilateral buttocks and bilateral hip pain that has been occurring for several years. He describes the pain as a stabbing pain that radiates down from the back to the hip. At this time he takes no pain releif medication. He has done some PT in the past. He states that he has had three injections in his back in the past but he states that they did not help at all.     Subjective   Santino Retana is a 78 y.o. male.   He presents to the office for evaluation of low back pain. He was referred here by [facility] neurosurgeon Dr. Vikash Rob.     On chart review, this gentleman did have epidurals at the epidural clinic at the [facility], April 18 and May 9 and July 11, 2018.  I reviewed the April 2, 2019 office visit with neurosurgeon Dr. Vikash Rob.  The neurosurgeon did not recommend surgery, noting no significant nerve compression.  The consideration was that his back pain could be facet mediated in nature and was recommending a care path consistent with facet arthropathy pain.    Back Pain   This is a chronic problem. The current episode started more than 1 year ago. The problem occurs constantly. The problem is unchanged. The pain is present in the lumbar spine. The quality of the pain is described as aching and stabbing. The pain is moderate. The symptoms are aggravated by bending, standing and twisting. Associated symptoms include weakness (r arm- stroke in past). Pertinent negatives include no abdominal pain, chest pain, fever, headaches or numbness. Risk factors include lack of exercise and obesity. He has tried analgesics, bed rest, heat and ice (He is a poor candidate for NSAIDs.  Muscle relaxants do not seem to be very great option either.) for the symptoms. The treatment provided no relief.        PEG Assessment   What number best describes your pain on average in the past week?8  What number best describes how, during the past week, pain  has interfered with your enjoyment of life?10  What number best describes how, during the past week, pain has interfered with your general activity?  8    --  The aforementioned information the Chief Complaint section and above subjective data including any HPI data has been personally reviewed and affirmed.  --        Current Outpatient Medications:   •  aspirin 81 MG tablet, Take 81 mg by mouth daily., Disp: , Rfl:   •  atorvastatin (LIPITOR) 20 MG tablet, take 1 tablet by mouth every evening, Disp: 90 tablet, Rfl: 3  •  carvedilol (COREG) 3.125 MG tablet, take 1 tablet by mouth every morning BEFORE BREAKFAST, Disp: 30 tablet, Rfl: 11  •  cholecalciferol (VITAMIN D3) 1000 UNITS tablet, Take 1,000 Units by mouth Daily., Disp: , Rfl:   •  Cinnamon 500 MG tablet, Take 1 tablet by mouth Daily., Disp: , Rfl:   •  clopidogrel (PLAVIX) 75 MG tablet, , Disp: , Rfl: 0  •  fludrocortisone 0.1 MG tablet, take 1 tablet by mouth once daily, Disp: 30 tablet, Rfl: 5  •  gabapentin (NEURONTIN) 400 MG capsule, Take 1 capsule by mouth 2 (Two) Times a Day., Disp: 180 capsule, Rfl: 1  •  glipiZIDE (GLUCOTROL) 10 MG tablet, take 1 tablet by mouth once daily with BREAKFAST, Disp: 30 tablet, Rfl: 3  •  glucose blood (FREESTYLE LITE) test strip, 1 each by Other route 4 (Four) Times a Day. e11.65, Disp: 150 each, Rfl: 5  •  Insulin Degludec (TRESIBA FLEXTOUCH) 200 UNIT/ML solution pen-injector, Inject 14 Units under the skin into the appropriate area as directed Daily., Disp: 9 mL, Rfl: 1  •  Insulin Pen Needle (BD PEN NEEDLE CONSUELO U/F) 32G X 4 MM misc, use to INJECT INSULIN once daily, Disp: 100 each, Rfl: 1  •  polyethylene glycol (MIRALAX) packet, Take 17 g by mouth Daily., Disp: 100 packet, Rfl: 11  •  tamsulosin (FLOMAX) 0.4 MG capsule 24 hr capsule, take 1 capsule by mouth every evening, Disp: 90 capsule, Rfl: 1  •  TRADJENTA 5 MG tablet tablet, take 1 tablet by mouth once daily, Disp: 30 tablet, Rfl: 5  •  Crisaborole (EUCRISA) 2 %  ointment, Apply 1 g topically 2 (Two) Times a Day., Disp: 60 g, Rfl: 3    The following portions of the patient's history were reviewed and updated as appropriate: allergies, current medications, past family history, past medical history, past social history, past surgical history and problem list.    -------    The following portions of the patient's history were reviewed and updated as appropriate: allergies, current medications, past family history, past medical history, past social history, past surgical history and problem list.    Allergies   Allergen Reactions   • Terbinafine Unknown (See Comments)     RASH,    • Lamisil [Terbinafine] Rash       Current Outpatient Medications on File Prior to Visit   Medication Sig Dispense Refill   • aspirin 81 MG tablet Take 81 mg by mouth daily.     • atorvastatin (LIPITOR) 20 MG tablet take 1 tablet by mouth every evening 90 tablet 3   • carvedilol (COREG) 3.125 MG tablet take 1 tablet by mouth every morning BEFORE BREAKFAST 30 tablet 11   • cholecalciferol (VITAMIN D3) 1000 UNITS tablet Take 1,000 Units by mouth Daily.     • Cinnamon 500 MG tablet Take 1 tablet by mouth Daily.     • clopidogrel (PLAVIX) 75 MG tablet   0   • fludrocortisone 0.1 MG tablet take 1 tablet by mouth once daily 30 tablet 5   • gabapentin (NEURONTIN) 400 MG capsule Take 1 capsule by mouth 2 (Two) Times a Day. 180 capsule 1   • glipiZIDE (GLUCOTROL) 10 MG tablet take 1 tablet by mouth once daily with BREAKFAST 30 tablet 3   • glucose blood (FREESTYLE LITE) test strip 1 each by Other route 4 (Four) Times a Day. e11.65 150 each 5   • Insulin Degludec (TRESIBA FLEXTOUCH) 200 UNIT/ML solution pen-injector Inject 14 Units under the skin into the appropriate area as directed Daily. 9 mL 1   • Insulin Pen Needle (BD PEN NEEDLE CONSUELO U/F) 32G X 4 MM misc use to INJECT INSULIN once daily 100 each 1   • polyethylene glycol (MIRALAX) packet Take 17 g by mouth Daily. 100 packet 11   • tamsulosin (FLOMAX) 0.4  MG capsule 24 hr capsule take 1 capsule by mouth every evening 90 capsule 1   • TRADJENTA 5 MG tablet tablet take 1 tablet by mouth once daily 30 tablet 5   • Crisaborole (EUCRISA) 2 % ointment Apply 1 g topically 2 (Two) Times a Day. 60 g 3     No current facility-administered medications on file prior to visit.        Patient Active Problem List   Diagnosis   • Uncontrolled type 2 diabetes mellitus with complication, without long-term current use of insulin (CMS/[facility])   • Hypertension   • Carcinoid tumor of appendix   • Arthritis   • Neuroendocrine carcinoma of small bowel (CMS/HCC)   • CKD (chronic kidney disease) stage 3, GFR 30-59 ml/min (CMS/HCC)   • Anemia in stage 3 chronic kidney disease (CMS/HCC)   • History of ischemic stroke without residual deficits   • Osteoarthritis of spine with radiculopathy, lumbar region   • Chronic low back pain with sciatica   • Diabetic mononeuropathy associated with diabetes mellitus due to underlying condition (CMS/[facility])   • TIA (transient ischemic attack)   • Carotid artery disease (CMS/[facility])   • Diabetes mellitus (CMS/[facility])   • CKD (chronic kidney disease), stage III (CMS/HCC)   • HTN (hypertension)   • Hyperkalemia   • Adrenal insufficiency (CMS/HCC)   • Hyperinsulinism   • 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  Left CEA 7/18/17 (with dionte-op CVA)   • Chronic kidney disease    • CKD (chronic kidney disease)    • CVA (cerebral vascular accident) (CMS/[facility])     on 7/18/17 dionte-operatively from left CEA.     • Diabetes mellitus (CMS/HCC) 1998    DR CAROLINE DURON   • Hyperlipidemia    • Hypertension    • Low back pain    • NSTEMI (non-ST elevated myocardial infarction) (CMS/[facility])     NSTEMI following left CEA and dionte-op stroke in 7/18 (Amanuel Payan  [facility]     • Renal disorder    • SOB (shortness of breath)    • TIA (transient ischemic attack) 2018       Past Surgical History:   Procedure Laterality Date   • APPENDECTOMY  2016    [facility], Dr. Zimmerman   • CAROTID ENDARTERECTOMY Right 2016    [facility]    • CAROTID ENDARTERECTOMY Left 2017    [facility]    • CATARACT EXTRACTION     • CHOLECYSTECTOMY  1989    [facility]   • JOINT REPLACEMENT     • LUMBAR EPIDURAL INJECTION     • TOTAL HIP ARTHROPLASTY Right 2016    [facility]       Family History   Problem Relation Age of Onset   • Lymphoma Brother 65         at age 73   • Coronary artery disease Neg Hx        Social History     Socioeconomic History   • Marital status:      Spouse name: Gallito   • Number of children: 2   • Years of education: GED   • Highest education level: Not on file   Occupational History   • Occupation: Retired    Tobacco Use   • Smoking status: Former Smoker     Packs/day: 2.00     Years: 25.00     Pack years: 50.00     Last attempt to quit:      Years since quittin.3   • Smokeless tobacco: Never Used   Substance and Sexual Activity   • Alcohol use: No   • Drug use: No   • Sexual activity: Defer   Social History Narrative    Enjoys golf.    LIVES WITH GALLITO GERONIMO       -------      REVIEW OF PERTINENT MEDICAL DATA    Preliminary urine drug screen today was negative across the board.  I reviewed the sample and the immunoassay.  Reviewing the specimen, it was of normal color and warm temperature.  He had a conference of metabolic panel on  of this year.  His GFR is at 44.  Hemoglobin A1c was 6.8.    MRI LUMBAR SPINE WITHOUT CONTRAST     CLINICAL HISTORY: Chronic low back pain radiating down both legs with  numbness.     TECHNIQUE: MRI of the lumbar spine is obtained with sagittal T1,  proton-density, and T2-weighted images. Additionally, there are  axial T1  and T2-weighted images through the lumbar spine.     FINDINGS:     The conus medullaris terminates at the L1 level and has normal signal  intensity.     At L1-2, there is minimal disc bulging but no significant canal or  foraminal narrowing.     At L2-3, again there is minimal disc bulging without significant canal  or foraminal stenosis.     At L3-4, there is no significant canal or foraminal narrowing. There is  mild facet arthropathy.     A L4-5, there is bulging disc material with a posterior annular fissure.  There is minimal canal and mild bilateral foraminal narrowing as a  result of the disc bulging. Moderate arthritic changes are noted within  the facets.     At L5-S1, mild-to-moderate facet arthritic changes are identified. There  is mild foraminal narrowing secondary to bulging disc material.     IMPRESSION:     There is bulging disc material which results in only a relatively mild  degree of foraminal narrowing at L4-5 and L5-S1 levels. Otherwise, no  significant canal or foraminal stenosis is identified throughout the  remaining lumbar spine.     A posterior annular fissure was identified at the L4-5 level without  accompanying disc herniation.     Facet arthritic changes are identified within the lower lumbar spine.     This report was finalized on 3/14/2019 10:00 AM by Dr. Balaji Butler M.D.    --    LUMBAR SPINE SERIES WITH FLEXION AND EXTENSION VIEWS     CLINICAL HISTORY: Back pain and leg pain.     A total of 7 views were obtained. All of the vertebral bodies and disc  spaces appear within normal limits in height. There is minimal endplate  bony spurring at several levels. There is normal alignment.  Flexion-extension views demonstrate no abnormal motion. Extensive  calcification is noted in the abdominal aorta which appears normal in  caliber.     This report was finalized on 3/13/2019 8:17 PM by Dr. Casey Regalado M.D.    Review of Systems   Constitutional: Positive for activity  "change (decreased) and fatigue. Negative for chills and fever.   Respiratory: Negative for chest tightness and shortness of breath.    Cardiovascular: Negative for chest pain.   Gastrointestinal: Positive for constipation (occ- miralax). Negative for abdominal pain, diarrhea, nausea and vomiting.   Genitourinary: Positive for frequency (at night). Negative for difficulty urinating.   Musculoskeletal: Positive for back pain.   Skin: Negative for rash.   Allergic/Immunologic: Negative for immunocompromised state.   Neurological: Positive for dizziness (when getting up too quickly), weakness (r arm- stroke in past) and light-headedness (r/t Blood sugar). Negative for numbness and headaches.   Hematological: Bruises/bleeds easily.   Psychiatric/Behavioral: Positive for agitation. Negative for confusion, hallucinations, sleep disturbance and suicidal ideas. The patient is not nervous/anxious.          Vitals:    04/24/19 0844   BP: 122/70   Pulse: 87   Resp: 16   Temp: 98.6 °F (37 °C)   SpO2: 95%   Weight: 100 kg (221 lb 3.2 oz)   Height: 172.7 cm (68\")   PainSc:   2   PainLoc: Back         Objective   Physical Exam   Constitutional: He is oriented to person, place, and time. Vital signs are normal. He appears well-developed and well-nourished. He does not have a sickly appearance.   HENT:   Head: Normocephalic and atraumatic.   Right Ear: Hearing and external ear normal.   Left Ear: Hearing and external ear normal.   Nose: Nose normal.   Mouth/Throat: Uvula is midline and oropharynx is clear and moist.   Eyes: Conjunctivae and EOM are normal. Pupils are equal, round, and reactive to light.   Neck: Neck supple.   Cardiovascular: Normal rate, regular rhythm and normal heart sounds.   Pulmonary/Chest: Effort normal and breath sounds normal.   Abdominal: Soft. Normal appearance and bowel sounds are normal. There is no hepatosplenomegaly. There is no tenderness. There is no CVA tenderness.   Musculoskeletal:        Lumbar " back: He exhibits decreased range of motion and tenderness.   He displays pain on extension of the low back even at a neutral 0 degrees position.  Extension past 5 degrees is moderately painful.  He does have pain on flexion past 60 degrees but the extension is more painful.  Moderate bilateral facet tenderness and positive lumbar loading are noted.   Lymphadenopathy:     He has no cervical adenopathy.   Neurological: He is alert and oriented to person, place, and time. He has normal strength. He displays no atrophy and no tremor. No cranial nerve deficit or sensory deficit. Coordination and gait normal.   Reflex Scores:       Patellar reflexes are 1+ on the right side and 1+ on the left side.       Achilles reflexes are 1+ on the right side and 1+ on the left side.  Skin: Skin is warm and dry.   Psychiatric: He has a normal mood and affect. His behavior is normal. Judgment and thought content normal.   Nursing note and vitals reviewed.      Assessment/Plan   Santino was seen today for back pain.    Diagnoses and all orders for this visit:    Osteoarthritis of spine with radiculopathy, lumbar region  -     Case Request    Chronic pain syndrome    Lumbar facet arthropathy  -     Case Request        --- Follow-up for procedure....  Bilateral L35 LMBB, x2, 2 wks apart... These are being considered as diagnostic procedures.    .-------  Education about Medial Branch Blockade and RF Therapy:    This medial branch blockade (MBB) suggested is intended for diagnostic purposes, with the intent of offering the patient Radiofrequency thermal rhizotomy (RF) if the MBB is diagnostically effective.  The diagnostic blockade is necessary to determine the likelihood that RF therapy could be efficacious in providing long term relief to the patient.    Medial branches are sensory nerve branches that connect to a facet joint and transmit sensations & pain signals from that joint.  Facet is a term for the type of joints found in the  "spine.  Medial branches are the nerves that go to a facet, and therefore are also sometimes called \"facet joint nerves\" (FJNs).      In a medial branch blockade procedure, xray fluoroscopy is used to verify the locations of the outside of the joint lines which are being targeted.  Under xray guidance, needles are placed to these areas.  Contrast dye is injected to confirm proper placement, with dye flowing over the joint area, and to ensure that the dye does not flow into unintended areas such as a vein.  When this is confirmed, local anesthetic is injected to block the medial branch at that joint level.      If MBBs are diagnostically successful in blocking pain, then the patient is most likely a great candidate for Radiofrequency of those facet joint nerves.  In the RF procedure, needles are placed to the joint lines in the same fashion, and after testing, the needle tips are heated to thermally treat the nerves, blocking the nerves by in essence damaging the nerves with the heat treatment.       Medically, a successful RF procedure should provide a patient with 50% pain relief or more for at least 6 months.  Clinical experience suggests that successful patients receive relief more in the range of 12 months on average.  We also discussed that a fortunate minority of patients receive therapeutic success from the MBB, and may not require RF ablation.  If a patient receives more than 8 weeks of relief from MBB, then occasional repeat MBB for therapeutic purposes is a very reasonable alternative therapy.  This course of therapy is consistent with our LCDs according to our CMS  in the area, and therefore other insurance providers should follow accordingly.  We will monitor our patients to screen for these therapeutic responders and will offer RF therapy only when necessary.        We discussed that MBB & RF are not without risks.  Guidelines regarding anticoagulant use & neuraxial procedures will be " respected.  Patients that are ill or otherwise may be at risk for sepsis will not have their spines accessed by neuraxial injections of any type.  This patient will not be offered these therapies if there is an increased risk.   We discussed that there is a risk of postprocedural pain and also a risk of worsening of clinical picture with these procedures as with any neuraxial procedure.    -------    -He will confirm ability to hold Plavix for 7 days prior to procedure.  He has been able to do such in the past for the epidural injections as noted.    --------    Plavix (clopidogrel) must be held for seven days prior to a spinal injection.    Anticoagulation risks for procedures.... there are risks to discontinuation of anticoagulants such as Plavix for neuraxial procedures and surgery.  Risks include but are not limited to deep vein thromboses (blood clot, such as one in the leg), pulmonary emboli (blood clot in a lung), myocardial infarction (heart attack), and stroke.      Neuraxial access (approaching the spine with a needle), including spinal injection, is relatively contraindicated while anticoagulated because of the risk of hemorrhage and/or epidural hematoma (bleeding inside the spine), which can be a neurosurgical emergency to evacuate bleeding.  Left unchecked, bleeding can cause nerve damage leading to paralysis and/or death.    The risks and benefits of holding Plavix for a spinal injection must be weighed, and is a joint decision involving the patient as well as the prescriber of the anticoagulant drug.    --------               RUBEN REPORT    RUBEN report has been reviewed and scanned into the patient's chart.    As the clinician, I personally reviewed the RUBEN from while the patient was in the office today.    History and physical exam exhibit continued safe and appropriate use of controlled substances (Gabapentin).         EMR Dragon/Transcription disclaimer:   Much of this encounter note is an  electronic transcription/translation of spoken language to printed text. The electronic translation of spoken language may permit erroneous, or at times, nonsensical words or phrases to be inadvertently transcribed; Although I have reviewed the note for such errors, some may still exist.

## 2019-04-24 NOTE — PATIENT INSTRUCTIONS
"Follow-up for procedure....  Bilateral L35 LMBB, x2, 2 wks apart    .-------  Education about Medial Branch Blockade and RF Therapy:    This medial branch blockade (MBB) suggested is intended for diagnostic purposes, with the intent of offering the patient Radiofrequency thermal rhizotomy (RF) if the MBB is diagnostically effective.  The diagnostic blockade is necessary to determine the likelihood that RF therapy could be efficacious in providing long term relief to the patient.    Medial branches are sensory nerve branches that connect to a facet joint and transmit sensations & pain signals from that joint.  Facet is a term for the type of joints found in the spine.  Medial branches are the nerves that go to a facet, and therefore are also sometimes called \"facet joint nerves\" (FJNs).      In a medial branch blockade procedure, xray fluoroscopy is used to verify the locations of the outside of the joint lines which are being targeted.  Under xray guidance, needles are placed to these areas.  Contrast dye is injected to confirm proper placement, with dye flowing over the joint area, and to ensure that the dye does not flow into unintended areas such as a vein.  When this is confirmed, local anesthetic is injected to block the medial branch at that joint level.      If MBBs are diagnostically successful in blocking pain, then the patient is most likely a great candidate for Radiofrequency of those facet joint nerves.  In the RF procedure, needles are placed to the joint lines in the same fashion, and after testing, the needle tips are heated to thermally treat the nerves, blocking the nerves by in essence damaging the nerves with the heat treatment.       Medically, a successful RF procedure should provide a patient with 50% pain relief or more for at least 6 months.  Clinical experience suggests that successful patients receive relief more in the range of 12 months on average.  We also discussed that a fortunate " minority of patients receive therapeutic success from the MBB, and may not require RF ablation.  If a patient receives more than 8 weeks of relief from MBB, then occasional repeat MBB for therapeutic purposes is a very reasonable alternative therapy.  This course of therapy is consistent with our LCDs according to our CMS  in the area, and therefore other insurance providers should follow accordingly.  We will monitor our patients to screen for these therapeutic responders and will offer RF therapy only when necessary.        We discussed that MBB & RF are not without risks.  Guidelines regarding anticoagulant use & neuraxial procedures will be respected.  Patients that are ill or otherwise may be at risk for sepsis will not have their spines accessed by neuraxial injections of any type.  This patient will not be offered these therapies if there is an increased risk.   We discussed that there is a risk of postprocedural pain and also a risk of worsening of clinical picture with these procedures as with any neuraxial procedure.    -------      --------    Plavix (clopidogrel) must be held for seven days prior to a spinal injection.    Anticoagulation risks for procedures.... there are risks to discontinuation of anticoagulants such as Plavix for neuraxial procedures and surgery.  Risks include but are not limited to deep vein thromboses (blood clot, such as one in the leg), pulmonary emboli (blood clot in a lung), myocardial infarction (heart attack), and stroke.      Neuraxial access (approaching the spine with a needle), including spinal injection, is relatively contraindicated while anticoagulated because of the risk of hemorrhage and/or epidural hematoma (bleeding inside the spine), which can be a neurosurgical emergency to evacuate bleeding.  Left unchecked, bleeding can cause nerve damage leading to paralysis and/or death.    The risks and benefits of holding Plavix for a spinal injection must be  weighed, and is a joint decision involving the patient as well as the prescriber of the anticoagulant drug.    --------

## 2019-05-06 ENCOUNTER — OFFICE VISIT (OUTPATIENT)
Dept: ENDOCRINOLOGY | Age: 79
End: 2019-05-06

## 2019-05-06 VITALS
WEIGHT: 220.6 LBS | DIASTOLIC BLOOD PRESSURE: 66 MMHG | BODY MASS INDEX: 33.43 KG/M2 | HEART RATE: 86 BPM | SYSTOLIC BLOOD PRESSURE: 120 MMHG | HEIGHT: 68 IN

## 2019-05-06 DIAGNOSIS — IMO0002 UNCONTROLLED TYPE 2 DIABETES MELLITUS WITH COMPLICATION, WITHOUT LONG-TERM CURRENT USE OF INSULIN: Primary | ICD-10-CM

## 2019-05-06 DIAGNOSIS — Z79.4 ENCOUNTER FOR LONG-TERM (CURRENT) USE OF INSULIN (HCC): ICD-10-CM

## 2019-05-06 DIAGNOSIS — N18.30 CKD (CHRONIC KIDNEY DISEASE) STAGE 3, GFR 30-59 ML/MIN (HCC): ICD-10-CM

## 2019-05-06 DIAGNOSIS — IMO0002 UNCONTROLLED TYPE II DIABETES MELLITUS WITH NEPHROPATHY: ICD-10-CM

## 2019-05-06 DIAGNOSIS — Z86.73 HISTORY OF ISCHEMIC STROKE WITHOUT RESIDUAL DEFICITS: ICD-10-CM

## 2019-05-06 DIAGNOSIS — E16.1 HYPERINSULINISM: ICD-10-CM

## 2019-05-06 PROCEDURE — 95250 CONT GLUC MNTR PHYS/QHP EQP: CPT | Performed by: INTERNAL MEDICINE

## 2019-05-06 PROCEDURE — 99214 OFFICE O/P EST MOD 30 MIN: CPT | Performed by: INTERNAL MEDICINE

## 2019-05-06 PROCEDURE — 95251 CONT GLUC MNTR ANALYSIS I&R: CPT | Performed by: INTERNAL MEDICINE

## 2019-06-13 ENCOUNTER — OUTSIDE FACILITY SERVICE (OUTPATIENT)
Dept: PAIN MEDICINE | Facility: CLINIC | Age: 79
End: 2019-06-13

## 2019-06-13 ENCOUNTER — DOCUMENTATION (OUTPATIENT)
Dept: PAIN MEDICINE | Facility: CLINIC | Age: 79
End: 2019-06-13

## 2019-06-13 PROCEDURE — 64494 INJ PARAVERT F JNT L/S 2 LEV: CPT | Performed by: ANESTHESIOLOGY

## 2019-06-13 PROCEDURE — 64493 INJ PARAVERT F JNT L/S 1 LEV: CPT | Performed by: ANESTHESIOLOGY

## 2019-06-14 NOTE — PROGRESS NOTES
Bilateral L3-5 Lumbar Medial Branch Blockade  [facility]      PREOPERATIVE DIAGNOSIS:  Lumbar spondylosis without myelopathy    POSTOPERATIVE DIAGNOSIS:  Lumbar spondylosis without myelopathy    PROCEDURE:   Diagnostic Bilateral Lumbar Medial Branch Nerve Blockades, with fluoroscopy:  L3, L4, and L5 nerves (at the L4 & L5 transverse processes and the sacral alar groove) to block facet joints L4-5, and L5-S1  1. 82249-70 -- Bilateral Lumbar Facet blocks, 1st Level  2. 27693-14 -- Bilateral Lumbar Facet blocks, 2nd  Level    PRE-PROCEDURE DISCUSSION WITH PATIENT:    Risks and complications were discussed with the patient prior to starting the procedure and informed consent was obtained.      SURGEON:  Maciel Avery MD    REASON FOR PROCEDURE:    The patient complains of pain that seems to have a significant axial component and Tenderness of the affected facet joints on exam under fluoroscopy    SEDATION:  Patient declined administration of moderate sedation   and , but an IV access was obtained for safety.  ANESTHETIC:  Marcaine 0.5%  STEROID:  NONE  TOTAL VOLUME OF SOLUTION:  6ml    DESCRIPTON OF PROCEDURE:  After obtaining informed consent, IV access was obtained in the preoperative area.   The patient was taken to the operating room.  The patient was placed in the prone position with a pillow under the abdomen. All pressure points were well padded.  EKG, blood pressure, and pulse oximeter were monitored.  The patient was monitored and sedated by the RN under my direction. The lumbosacral area was prepped with Chloraprep and draped in a sterile fashion. Under fluoroscopic guidance the transverse processes of the L4 and L5 vertebrae at the junctions of the superior articular processes were identified on the right. Also identified was the groove between the ala and the superior articular process of the sacrum on the ipsilateral side.  Skin and subcutaneous tissue were anesthetized with 1%  lidocaine above each of these points. A 22-gauge spinal needle was introduced under fluoroscopic guidance at the above junctions. Aspiration was negative for blood and CSF.  After confirming the position of the needle with fluoroscope in all views, 0.25 mL of Omnipaque was injected, and after seeing the proper spread a total of 1 mL of the anesthetic solution noted above was injected at each of these points.  Needles were removed intact from each of the areas.  A similar procedure was repeated to block the L3, L4, and L5 nerves on the contralateral side.   Onset of analgesia was noted.  Vital signs remained stable throughout.      ESTIMATED BLOOD LOSS:  <5 mL  SPECIMENS:  none    COMPLICATIONS:   No complications were noted., There was no indication of vascular uptake on live injection of contrast dye. and The patient did not have any signs of postprocedure numbness nor weakness.    TOLERANCE & DISCHARGE CONDITION:    The patient tolerated the procedure well.  The patient was transported to the recovery area without difficulties.  The patient was discharged to home under the care of family in stable and satisfactory condition.    PLAN OF CARE:  1. The patient was given our standard instruction sheet.  2. We discussed that Lumbar Medial Branch Blockade is a diagnostic procedure in consideration for radiofrequency ablation if two diagnostic procedures prove to be positive for significant benefit.  If sustained relief of 6 to eight weeks is obtained, then an alternative plan could be therapeutic lumbar branch blockades.  3. The patient is asked to keep a pain log each hour for 8 hours after the procedure today.  4. The patient will  Repeat injection 2 wks.  5. The patient will resume all medications as per the medication reconciliation sheet.

## 2019-07-08 RX ORDER — CARVEDILOL 3.12 MG/1
3.12 TABLET ORAL
Qty: 90 TABLET | Refills: 4 | Status: SHIPPED | OUTPATIENT
Start: 2019-07-08 | End: 2019-01-01 | Stop reason: HOSPADM

## 2019-07-17 RX ORDER — LINAGLIPTIN 5 MG/1
TABLET, FILM COATED ORAL
Qty: 30 TABLET | Refills: 5 | Status: SHIPPED | OUTPATIENT
Start: 2019-07-17 | End: 2020-01-01

## 2019-07-22 RX ORDER — TAMSULOSIN HYDROCHLORIDE 0.4 MG/1
CAPSULE ORAL
Qty: 90 CAPSULE | Refills: 1 | Status: SHIPPED | OUTPATIENT
Start: 2019-07-22 | End: 2020-01-01

## 2019-07-30 ENCOUNTER — LAB (OUTPATIENT)
Dept: ENDOCRINOLOGY | Age: 79
End: 2019-07-30

## 2019-07-30 DIAGNOSIS — E16.1 HYPERINSULINISM: ICD-10-CM

## 2019-07-30 DIAGNOSIS — E16.1 HYPERINSULINISM: Primary | ICD-10-CM

## 2019-07-30 DIAGNOSIS — IMO0002 UNCONTROLLED TYPE II DIABETES MELLITUS WITH NEPHROPATHY: ICD-10-CM

## 2019-07-31 LAB
ALBUMIN SERPL-MCNC: 3.6 G/DL (ref 3.5–5.2)
ALBUMIN/CREAT UR: 248.5 MG/G CREAT (ref 0–30)
ALBUMIN/GLOB SERPL: 1.3 G/DL
ALP SERPL-CCNC: 92 U/L (ref 39–117)
ALT SERPL-CCNC: 13 U/L (ref 1–41)
AST SERPL-CCNC: 11 U/L (ref 1–40)
BILIRUB SERPL-MCNC: 0.4 MG/DL (ref 0.2–1.2)
BUN SERPL-MCNC: 25 MG/DL (ref 8–23)
BUN/CREAT SERPL: 12.5 (ref 7–25)
CALCIUM SERPL-MCNC: 9.1 MG/DL (ref 8.6–10.5)
CHLORIDE SERPL-SCNC: 106 MMOL/L (ref 98–107)
CO2 SERPL-SCNC: 29.8 MMOL/L (ref 22–29)
CREAT SERPL-MCNC: 2 MG/DL (ref 0.76–1.27)
CREAT UR-MCNC: 114 MG/DL
GLOBULIN SER CALC-MCNC: 2.8 GM/DL
GLUCOSE SERPL-MCNC: 177 MG/DL (ref 65–99)
HBA1C MFR BLD: 7.8 % (ref 4.8–5.6)
MICROALBUMIN UR-MCNC: 283.3 UG/ML
POTASSIUM SERPL-SCNC: 4.7 MMOL/L (ref 3.5–5.2)
PROT SERPL-MCNC: 6.4 G/DL (ref 6–8.5)
SODIUM SERPL-SCNC: 144 MMOL/L (ref 136–145)
TSH SERPL DL<=0.005 MIU/L-ACNC: 1.8 MIU/ML (ref 0.27–4.2)

## 2019-08-07 ENCOUNTER — OFFICE VISIT (OUTPATIENT)
Dept: INTERNAL MEDICINE | Facility: CLINIC | Age: 79
End: 2019-08-07

## 2019-08-07 ENCOUNTER — HOSPITAL ENCOUNTER (OUTPATIENT)
Dept: GENERAL RADIOLOGY | Facility: HOSPITAL | Age: 79
Discharge: HOME | End: 2019-08-07
Admitting: NURSE PRACTITIONER

## 2019-08-07 VITALS
DIASTOLIC BLOOD PRESSURE: 73 MMHG | HEIGHT: 68 IN | HEART RATE: 80 BPM | RESPIRATION RATE: 16 BRPM | TEMPERATURE: 97.9 F | WEIGHT: 227.8 LBS | BODY MASS INDEX: 34.53 KG/M2 | SYSTOLIC BLOOD PRESSURE: 119 MMHG | OXYGEN SATURATION: 90 %

## 2019-08-07 DIAGNOSIS — N18.30 CKD (CHRONIC KIDNEY DISEASE), STAGE III (HCC): ICD-10-CM

## 2019-08-07 DIAGNOSIS — IMO0002 UNCONTROLLED TYPE 2 DIABETES MELLITUS WITH COMPLICATION, WITHOUT LONG-TERM CURRENT USE OF INSULIN: ICD-10-CM

## 2019-08-07 DIAGNOSIS — E78.5 HYPERLIPIDEMIA, UNSPECIFIED HYPERLIPIDEMIA TYPE: ICD-10-CM

## 2019-08-07 DIAGNOSIS — E55.9 HYPOVITAMINOSIS D: ICD-10-CM

## 2019-08-07 DIAGNOSIS — R35.1 NOCTURIA: ICD-10-CM

## 2019-08-07 DIAGNOSIS — R06.83 SNORING: ICD-10-CM

## 2019-08-07 DIAGNOSIS — R53.83 FATIGUE, UNSPECIFIED TYPE: ICD-10-CM

## 2019-08-07 DIAGNOSIS — I10 ESSENTIAL HYPERTENSION: ICD-10-CM

## 2019-08-07 DIAGNOSIS — R09.89 SCATTERED RESPIRATORY CRACKLES OF LEFT LUNG: ICD-10-CM

## 2019-08-07 DIAGNOSIS — G45.9 TIA (TRANSIENT ISCHEMIC ATTACK): ICD-10-CM

## 2019-08-07 DIAGNOSIS — R60.9 PERIPHERAL EDEMA: ICD-10-CM

## 2019-08-07 DIAGNOSIS — Z00.00 HEALTHCARE MAINTENANCE: Primary | ICD-10-CM

## 2019-08-07 PROCEDURE — 99214 OFFICE O/P EST MOD 30 MIN: CPT | Performed by: NURSE PRACTITIONER

## 2019-08-07 PROCEDURE — 71046 X-RAY EXAM CHEST 2 VIEWS: CPT

## 2019-08-07 RX ORDER — HYDROCHLOROTHIAZIDE 12.5 MG/1
12.5 TABLET ORAL DAILY PRN
Qty: 30 TABLET | Refills: 2 | Status: SHIPPED | OUTPATIENT
Start: 2019-08-07 | End: 2020-01-01

## 2019-08-07 RX ORDER — LANCING DEVICE/LANCETS
KIT MISCELLANEOUS
Refills: 0 | COMMUNITY
Start: 2019-06-17

## 2019-08-07 RX ORDER — LANCETS
EACH MISCELLANEOUS
Refills: 0 | COMMUNITY
Start: 2019-06-17

## 2019-08-07 NOTE — PROGRESS NOTES
Please notify patient that his lungs look a bit underinflated, and he has some mild congestion of the blood vessels, but there is no evidence of fluid in the lungs or pneumonia.  The hydrochlorothiazide that I prescribed should help with this as well as the swelling in the legs.  Please let me know if symptoms persist or worsen.

## 2019-08-07 NOTE — PROGRESS NOTES
"Subjective   Santino Retana is a 79 y.o. male.   CC: 6-month follow-up, Healthcare maintenance, shortness of breath, lower leg swelling    Patient presents for six-month follow-up.  This is a 79-year-old male patient of Dr. silva.  This patient is new to me.    He has a history of type 2 diabetes followed by endocrinology and has an upcoming appointment next week.  He follows with Dr. Whyte.  He currently takes Tresiba, Tradjenta and glipizide as prescribed by endocrinology.    For hyperlipidemia he takes atorvastatin 20 mg daily and reports excellent compliance and toleration with this medication.    For hypertension control he takes Coreg 3.125 mg twice daily.  Blood pressure is well controlled today at 119/73.    He has a history of TIA and does take Plavix daily.  He reports no new neurologic symptoms.    He has a history of BPH and does take Flomax.  This controls the symptoms well.    He has chronic kidney disease stage III.  Last creatinine level was 2.0.  He follows with Dr. Simpson, and nephrology.    For the past 6 weeks he has had some progressive bilateral lower extremity edema.  He states that it is worse in the afternoon, better when he gets up in the morning.  No erythema or heat to either leg.  Both legs are equal and swelling.  He had an echocardiogram in 2018 which showed ejection fraction of 65%.  His cardiologist is Dr. Paul.    He reports that last night he had a bit of shortness of breath, no chest discomfort or palpitations though.  He is not feeling short of breath this morning.  The shortness of breath was with exertion.  This is not his baseline per patient.      He has felt overly fatigued for quite some time as well.  His wife reports that he snores a lot at night, but never wakes up \"gasping for breath.\"  He is very excessively sleepy during the day as well per patient and wife.    He denies development of any other new issues today.           The following portions of the " "patient's history were reviewed and updated as appropriate: allergies, current medications, past family history, past medical history, past social history, past surgical history and problem list.    Review of Systems   Constitutional: Negative for activity change, chills, fatigue, fever, unexpected weight gain and unexpected weight loss.   HENT: Negative for congestion, hearing loss, postnasal drip, sinus pressure, sneezing, sore throat and tinnitus.    Eyes: Negative for photophobia, pain and visual disturbance.   Respiratory: Positive for shortness of breath. Negative for cough, chest tightness and wheezing.    Cardiovascular: Positive for leg swelling. Negative for chest pain and palpitations.   Gastrointestinal: Negative for abdominal distention, abdominal pain, constipation, diarrhea, nausea and vomiting.   Endocrine: Negative for polydipsia, polyphagia and polyuria.   Genitourinary: Negative for dysuria, frequency, hematuria and urgency.   Neurological: Negative for dizziness, weakness, numbness and headache.   All other systems reviewed and are negative.      Objective    /73 (BP Location: Left arm, Patient Position: Sitting, Cuff Size: Adult)   Pulse 80   Temp 97.9 °F (36.6 °C) (Oral)   Resp 16   Ht 172.7 cm (68\")   Wt 103 kg (227 lb 12.8 oz)   SpO2 90%   BMI 34.64 kg/m²     Physical Exam   Constitutional: He is oriented to person, place, and time. He appears well-developed and well-nourished. No distress.   HENT:   Head: Normocephalic and atraumatic.   Right Ear: External ear normal.   Left Ear: External ear normal.   Nose: Nose normal.   Mouth/Throat: Oropharynx is clear and moist.   Eyes: EOM are normal. Pupils are equal, round, and reactive to light.   Neck: Normal range of motion. Neck supple. No JVD present. No tracheal deviation present. No thyromegaly present.   Cardiovascular: Normal rate, regular rhythm, normal heart sounds and intact distal pulses. Exam reveals no gallop and no " friction rub.   No murmur heard.  1+ pitting edema to bilateral lower legs.   Pulmonary/Chest: Effort normal. No stridor. No respiratory distress. He has no wheezes. He has rales ( Scattered rales to left lung). He exhibits no tenderness.   Abdominal: Soft. Bowel sounds are normal. He exhibits no distension. There is no tenderness.   Musculoskeletal: Normal range of motion.   Lymphadenopathy:     He has no cervical adenopathy.   Neurological: He is alert and oriented to person, place, and time.   Skin: Skin is warm and dry. Capillary refill takes less than 2 seconds. He is not diaphoretic.   Psychiatric: He has a normal mood and affect. His behavior is normal. Judgment and thought content normal.   Nursing note and vitals reviewed.    Current outpatient and discharge medications have been reconciled for the patient.  Reviewed by: EJ Parrish      Assessment/Plan   Santino was seen today for follow-up.    Diagnoses and all orders for this visit:    Healthcare maintenance    Nocturia  -     PSA DIAGNOSTIC    Essential hypertension  -     CBC & Differential    Hyperlipidemia, unspecified hyperlipidemia type  -     Lipid panel    Peripheral edema  -     hydrochlorothiazide (HYDRODIURIL) 12.5 MG tablet; Take 1 tablet by mouth Daily As Needed (Swelling in the legs).    Scattered respiratory crackles of left lung  -     XR Chest 2 View  -     hydrochlorothiazide (HYDRODIURIL) 12.5 MG tablet; Take 1 tablet by mouth Daily As Needed (Swelling in the legs).    Hypovitaminosis D  -     Vitamin D 25 Hydroxy    Fatigue, unspecified type  -     Vitamin B12  -     Vitamin D 25 Hydroxy    Snoring  -     Ambulatory Referral to Sleep Medicine    TIA (transient ischemic attack)    CKD (chronic kidney disease), stage III (CMS/HCC)    Uncontrolled type 2 diabetes mellitus with complication, without long-term current use of insulin (CMS/[facility])    -Nocturia: He does have a history of BPH.  We will check a PSA today.  Continue  Flomax.    -Hypertension: Very well controlled today at 119/73.  Continue with the Coreg.    -Hyper lipidemia: Continue atorvastatin 20 mg daily we will check a lipid panel and adjust if needed based on labs.    -Peripheral edema: He does have some 1+ pitting edema in the lower legs as well as some scattered crackles in the left lung.  We will do a chest x-ray today for further evaluation as he does state that he was a bit short of breath last night.  We will provide hydrochlorthiazide 12.5 mg daily as needed as well for swelling in the legs.    -Fatigue: We will check a B12 and vitamin D.  His wife states that he does snore excessively.  We will refer him to sleep medicine to rule out sleep apnea.    -History of TIA: Follows with neurology once a year.  Continue the Plavix per their recommendations.    -CKD stage III: Continue to follow with nephrology, Dr. Simpson.  Creatinine level drawn last at the endocrinologist office was 2.0.    -Type 2 diabetes: Followed by endocrinology.  Continue current therapy and he has a follow-up scheduled soon.    -We will contact patient with the results of his labs and imaging and any further recommendations.  Follow-up PRN and in 6 months with PCP, Dr. silva.

## 2019-08-08 LAB
25(OH)D3+25(OH)D2 SERPL-MCNC: 41.4 NG/ML (ref 30–100)
BASOPHILS # BLD AUTO: 0.06 10*3/MM3 (ref 0–0.2)
BASOPHILS NFR BLD AUTO: 0.8 % (ref 0–1.5)
CHOLEST SERPL-MCNC: 131 MG/DL (ref 0–200)
EOSINOPHIL # BLD AUTO: 0.25 10*3/MM3 (ref 0–0.4)
EOSINOPHIL NFR BLD AUTO: 3.5 % (ref 0.3–6.2)
ERYTHROCYTE [DISTWIDTH] IN BLOOD BY AUTOMATED COUNT: 14.2 % (ref 12.3–15.4)
HCT VFR BLD AUTO: 44.5 % (ref 37.5–51)
HDLC SERPL-MCNC: 36 MG/DL (ref 40–60)
HGB BLD-MCNC: 13.1 G/DL (ref 13–17.7)
IMM GRANULOCYTES # BLD AUTO: 0.03 10*3/MM3 (ref 0–0.05)
IMM GRANULOCYTES NFR BLD AUTO: 0.4 % (ref 0–0.5)
LDLC SERPL CALC-MCNC: 70 MG/DL (ref 0–100)
LYMPHOCYTES # BLD AUTO: 1.75 10*3/MM3 (ref 0.7–3.1)
LYMPHOCYTES NFR BLD AUTO: 24.5 % (ref 19.6–45.3)
MCH RBC QN AUTO: 28.7 PG (ref 26.6–33)
MCHC RBC AUTO-ENTMCNC: 29.4 G/DL (ref 31.5–35.7)
MCV RBC AUTO: 97.6 FL (ref 79–97)
MONOCYTES # BLD AUTO: 0.81 10*3/MM3 (ref 0.1–0.9)
MONOCYTES NFR BLD AUTO: 11.3 % (ref 5–12)
NEUTROPHILS # BLD AUTO: 4.24 10*3/MM3 (ref 1.7–7)
NEUTROPHILS NFR BLD AUTO: 59.5 % (ref 42.7–76)
NRBC BLD AUTO-RTO: 0 /100 WBC (ref 0–0.2)
PLATELET # BLD AUTO: 218 10*3/MM3 (ref 140–450)
PSA SERPL-MCNC: 0.84 NG/ML (ref 0–4)
RBC # BLD AUTO: 4.56 10*6/MM3 (ref 4.14–5.8)
TRIGL SERPL-MCNC: 124 MG/DL (ref 0–150)
VIT B12 SERPL-MCNC: 607 PG/ML (ref 211–946)
VLDLC SERPL CALC-MCNC: 24.8 MG/DL
WBC # BLD AUTO: 7.14 10*3/MM3 (ref 3.4–10.8)

## 2019-08-08 NOTE — PROGRESS NOTES
Please notify patient that his cholesterol looks good.  CBC is stable and PSA is normal.  B12 and vitamin D are normal.  We will proceed with a sleep medicine referral to see if sleep apnea is contributing to his fatigue.

## 2019-08-12 PROBLEM — G47.30 OBSERVED SLEEP APNEA: Status: ACTIVE | Noted: 2019-01-01

## 2019-08-12 NOTE — PROGRESS NOTES
[facility] SLEEP MEDICINE  [address]  [address]  [place] [facility] [address]  [phone]    Referring Physician: Dr. Murray  PCP: Fahad Murray Jr., MD    Reason for consult:  Sleep disorders of refd by pcp    Santino Retana is a 79 y.o.male was seen in the [facility] today. He sleeps from 10p to 7a. He wakes up rested most times. He snores loudly at night and wife notices apneas during sleep. He feels sleepy later in the day if he sits quietly. Gets drowsy with driving over 2 hrs. Hx stroke and perhaps MI in past. Also has CKD3.  [place] Sleepiness Score: 16. Caffeine intake 2-3 per day. Alcohol intake 2 per week.    Santino Retana  has a past medical history of Arthritis, Blind left eye, Carotid artery disease (CMS/[facility]), Chronic kidney disease, CKD (chronic kidney disease), CVA (cerebral vascular accident) (CMS/[facility]), Diabetes mellitus (CMS/[facility]) (1998), Hyperlipidemia, Hypertension, Low back pain, NSTEMI (non-ST elevated myocardial infarction) (CMS/[facility]), Renal disorder, SOB (shortness of breath), and TIA (transient ischemic attack) (05/2018).     Current Medications:    Current Outpatient Medications:   •  ACCU-CHEK SOFTCLIX LANCETS lancets, , Disp: , Rfl: 0  •  aspirin 81 MG tablet, Take 81 mg by mouth daily., Disp: , Rfl:   •  atorvastatin (LIPITOR) 20 MG tablet, take 1 tablet by mouth every evening, Disp: 90 tablet, Rfl: 3  •  carvedilol (COREG) 3.125 MG tablet, take 1 tablet by mouth every morning before breakfast, Disp: 90 tablet, Rfl: 4  •  cholecalciferol (VITAMIN D3) 1000 UNITS tablet, Take 1,000 Units by mouth Daily., Disp: , Rfl:   •  Cinnamon 500 MG tablet, Take 1 tablet by mouth Daily., Disp: , Rfl:   •  clopidogrel (PLAVIX) 75 MG tablet, , Disp: , Rfl: 0  •  Crisaborole (EUCRISA) 2 % ointment, Apply 1 g topically 2 (Two) Times a Day., Disp: 60 g, Rfl: 3  •  fludrocortisone 0.1 MG tablet, take 1 tablet by mouth once daily, Disp: 30 tablet, Rfl: 5  •  gabapentin (NEURONTIN) 400 MG  "capsule, Take 1 capsule by mouth 2 (Two) Times a Day., Disp: 180 capsule, Rfl: 1  •  glipiZIDE (GLUCOTROL) 10 MG tablet, take 1 tablet by mouth once daily with BREAKFAST, Disp: 30 tablet, Rfl: 3  •  glucose blood (FREESTYLE LITE) test strip, 1 each by Other route 4 (Four) Times a Day. e11.65, Disp: 150 each, Rfl: 5  •  hydrochlorothiazide (HYDRODIURIL) 12.5 MG tablet, Take 1 tablet by mouth Daily As Needed (Swelling in the legs)., Disp: 30 tablet, Rfl: 2  •  Insulin Degludec (TRESIBA FLEXTOUCH) 200 UNIT/ML solution pen-injector, Inject 14 Units under the skin into the appropriate area as directed Daily., Disp: 9 mL, Rfl: 1  •  Insulin Pen Needle (BD PEN NEEDLE CONSUELO U/F) 32G X 4 MM misc, use to INJECT INSULIN once daily, Disp: 100 each, Rfl: 1  •  Lancets Misc. (ACCU-CHEK SOFTCLIX LANCET DEV) kit, , Disp: , Rfl: 0  •  polyethylene glycol (MIRALAX) packet, Take 17 g by mouth Daily., Disp: 100 packet, Rfl: 11  •  tamsulosin (FLOMAX) 0.4 MG capsule 24 hr capsule, take 1 tablet by mouth every evening, Disp: 90 capsule, Rfl: 1  •  TRADJENTA 5 MG tablet tablet, take 1 tablet by mouth once daily, Disp: 30 tablet, Rfl: 5  •  zolpidem (AMBIEN) 5 MG tablet, Bring to sleep lab DO NOT use at home. PLEASE take if not asleep within 30 mins of start of study., Disp: 2 tablet, Rfl: 0   also listed in Sleep Questionnaire.    FH: family history includes Lymphoma (age of onset: 65) in his brother.  SH:  reports that he quit smoking about 30 years ago. He has a 50.00 pack-year smoking history. He has never used smokeless tobacco. He reports that he does not drink alcohol or use drugs.    Pertinent Positive Review of Systems of denies  Rest of Review of Systems was negative as recorded in Sleep Questionnaire.        Vital Signs: /52   Pulse 84   Ht 172.7 cm (68\")   Wt 103 kg (226 lb)   SpO2 90%   BMI 34.36 kg/m²     Body mass index is 34.36 kg/m².       Tongue: large      Dentition: good       Pharynx: Posterior pharyngeal " pillars are wide   Mallampatti: III (soft and hard palate and base of uvula visible)        General: Alert. Cooperative. Well developed. No acute distress.             Head:  Normocephalic. Symmetrical. Atraumatic.              Eyes: Sclera clear. No icterus. PERRLA. Normal EOM.             Ears: No deformities. Normal hearing.             Nose: No septal deviation. No drainage.          Throat: No oral lesions. No thrush. Moist mucous membranes.    Chest Wall:  Normal shape. Symmetric expansion with respiration. No tenderness.             Neck:  Trachea midline.           Lungs:  Clear to auscultation bilaterally. No wheezes. No rhonchi. No rales. Respirations regular, even and unlabored.            Heart:  Regular rhythm and normal rate. Normal S1 and S2. No murmur.     Abdomen:  Soft, non-tender and non-distended. Normal bowel sounds. No masses.  Extremities:  Moves all extremities well. No edema.           Pulses: Pulses palpable and equal bilaterally.               Skin: Dry. Intact. No bleeding. No rash.           Neuro: Moves all 4 extremities and cranial nerves grossly intact.  Psychiatric: Normal mood and affect.    Impression:  1. Observed sleep apnea     2. Snoring    3. Hypersomnolence    4. Risk factors for obstructive sleep apnea    5. Obesity (BMI 30-39.9)        Plan:  Santino requires further evaluation for hypersomnolence with elevated [place] score, snoring and witnessed apneas.  He has underlying history of coronary artery disease with previous MI as well as stroke.  Polysomnogram study will be ordered for further evaluation.  Decrease risk of further health problems with treatment of sleep apnea was discussed.  CPAP device was briefly discussed.    This patient has typical features of obstructive sleep apnea with hypersomnolence snoring and apneas along with elevated BMI and classic oropharyngeal structure.  Likelihood of obstructive sleep apnea is high and a polysomnogram study will therefore be  requested.      Possible diagnosis and pathophysiology of obstructive sleep apnea was discussed with the patient.  Health risks of untreated obstructive sleep apnea including cardiovascular risks were discussed.  Patient was cautioned about activities requiring full concentration especially driving at night or for longer distances, and until hypersomnolence is corrected.    Results of sleep testing will be reviewed to see if patient is a candidate for CPAP machine.  Alternatives to therapy include oral mandibular advancing device (OMAD) were discussed. However OMAD is usually only beneficial in mild obstructive sleep apnea.  The benefit of weight loss in reducing severity of obstructive sleep apnea was discussed.  Patient would benefit from adhering to a strict diet to achieve ideal BMI.     Patient will follow up in this clinic after study.    Thank you for allowing me to participate in your patient's care.    Migel Brown MD    Part of this note may be an electronic transcription/translation of spoken language to printed text using the Dragon Dictation System.

## 2019-08-13 NOTE — PROGRESS NOTES
79 y.o.    Patient Care Team:  Fahad Murray Jr., MD as PCP - General (Family Medicine)  Alex Simpson MD as Consulting Physician (Nephrology)    Chief Complaint:      F/U TYPE 2 DIABETES, UNCONTROLLED.  ADRENAL INSUFFICIENCY.  HERE TO DISCUSS LAB RESULTS.  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  He reports compliance with the medication and denies any side effects  Patient denies any knowledge of diabetic retinopathy      Interval History    Patient is a 78-year-old male admitted to the hospital for a slurred speech and was suspected to have TIA which he has recovered  For diabetes he was taking glipizide 5 mg twice daily and patient has moderate stage III chronic kidney disease and I recommended that he discontinue glipizide  I started him on Tradjenta yesterday  Patient tolerated the medication well  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  Patient's serum  cortisol level was noted to be low subsequently the cosyntropin stimulation testing was also submaximal  Patient reported receiving epidural injections ×2 within the past 2 weeks which could explain the baseline low cortisol     Potassium level continues to be high at this particular improvement in renal function     Patient reports receiving epidural injections ×2 within the past 2 weeks     Patient denies any prior history of elevated potassium  Patient has received IV fluids and his creatinine has improved from 2.4-1.8 but his potassium is still remains to be elevated  Nephrology suspecting adrenal insufficiency as possible cause for hyperkalemia     Patient denies any previous history of hypotension  He is on medication for hypertension     Cosyntropin stim testing suggest the maximum response was only 6.6 cortisol level  This is still consistent with a recent epidural injections ×2 within the past 2 weeks  But apart from that hypotension, hyperkalemia cannot be explained     Patient does have peripheral autonomic neuropathy which could be manifesting with orthostatic symptoms and signs     I suspect emperic therapy with Solu-Cortef 50 mg every 8 hours ×2 doses is reasonable at this point  If patient has significantly improved potassium levels as well as blood pressure levels then we could justify continuation of a low-dose steroid for some time  On the contrary if patient has no significant response then it is highly unlikely that he has any primary adrenal insufficiency     In that instance we will need to consider type for RTA which  is hyporeninemic hypoaldosteronism as a possible etiology for hyperkalemia in addition to chronic kidney disease and suspected dietary intake of potassium rich foods     I discussed this at length with the patient and his wife and both verbalized understanding and are willing to try  I also recommended discontinuation of glipizide/Amaryl at home and recommend Tradjenta with or  without Starlix to minimize incidence of hypoglycemia and compliance.  The following portions of the patient's history were reviewed and updated as appropriate: allergies, current medications, past family history, past medical history, past social history, past surgical history and problem list.    Past Medical History:   Diagnosis Date   • Arthritis    • Blind left eye    • Carotid artery disease (CMS/HCC)     Right CEA 16.  Left CEA 17 (with dionte-op CVA)   • Chronic kidney disease    • CKD (chronic kidney disease)    • CVA (cerebral vascular accident) (CMS/[facility])     on 17 dionte-operatively from left CEA.     • Diabetes mellitus (CMS/HCC)     DR CAROLINE DURON   • Hyperlipidemia    • Hypertension    • Low back pain    • NSTEMI (non-ST elevated myocardial infarction) (CMS/[facility])     NSTEMI following left CEA and dionte-op stroke in  ([facility]).     • Renal disorder    • SOB (shortness of breath)    • TIA (transient ischemic attack) 2018     Family History   Problem Relation Age of Onset   • Lymphoma Brother 65         at age 73   • Coronary artery disease Neg Hx      Social History     Socioeconomic History   • Marital status:      Spouse name: Gallito   • Number of children: 2   • Years of education: GED   • Highest education level: Not on file   Occupational History   • Occupation: Retired    Tobacco Use   • Smoking status: Former Smoker     Packs/day: 2.00     Years: 25.00     Pack years: 50.00     Last attempt to quit:      Years since quittin.6   • Smokeless tobacco: Never Used   Substance and Sexual Activity   • Alcohol use: No   • Drug use: No   • Sexual activity: Defer   Social History Narrative    Enjoys golf.    LIVES WITH GALLITO GERONIMO     Allergies   Allergen Reactions   • Terbinafine Unknown (See Comments)     RASH,    • Lamisil [Terbinafine] Rash       Current Outpatient Medications:   •  ACCU-CHEK SOFTCLIX LANCETS lancets, , Disp: , Rfl:  0  •  aspirin 81 MG tablet, Take 81 mg by mouth daily., Disp: , Rfl:   •  atorvastatin (LIPITOR) 20 MG tablet, take 1 tablet by mouth every evening, Disp: 90 tablet, Rfl: 3  •  carvedilol (COREG) 3.125 MG tablet, take 1 tablet by mouth every morning before breakfast, Disp: 90 tablet, Rfl: 4  •  cholecalciferol (VITAMIN D3) 1000 UNITS tablet, Take 1,000 Units by mouth Daily., Disp: , Rfl:   •  Cinnamon 500 MG tablet, Take 1 tablet by mouth Daily., Disp: , Rfl:   •  clopidogrel (PLAVIX) 75 MG tablet, , Disp: , Rfl: 0  •  Crisaborole (EUCRISA) 2 % ointment, Apply 1 g topically 2 (Two) Times a Day., Disp: 60 g, Rfl: 3  •  fludrocortisone 0.1 MG tablet, take 1 tablet by mouth once daily, Disp: 30 tablet, Rfl: 5  •  gabapentin (NEURONTIN) 400 MG capsule, Take 1 capsule by mouth 2 (Two) Times a Day., Disp: 180 capsule, Rfl: 1  •  glipiZIDE (GLUCOTROL) 10 MG tablet, take 1 tablet by mouth once daily with BREAKFAST, Disp: 30 tablet, Rfl: 3  •  glucose blood (FREESTYLE LITE) test strip, 1 each by Other route 4 (Four) Times a Day. e11.65, Disp: 150 each, Rfl: 5  •  hydrochlorothiazide (HYDRODIURIL) 12.5 MG tablet, Take 1 tablet by mouth Daily As Needed (Swelling in the legs)., Disp: 30 tablet, Rfl: 2  •  Insulin Degludec (TRESIBA FLEXTOUCH) 200 UNIT/ML solution pen-injector, Inject 14 Units under the skin into the appropriate area as directed Daily., Disp: 9 mL, Rfl: 1  •  Insulin Pen Needle (BD PEN NEEDLE CONSUELO U/F) 32G X 4 MM misc, use to INJECT INSULIN once daily, Disp: 100 each, Rfl: 1  •  Lancets Misc. (ACCU-CHEK SOFTCLIX LANCET DEV) kit, , Disp: , Rfl: 0  •  polyethylene glycol (MIRALAX) packet, Take 17 g by mouth Daily., Disp: 100 packet, Rfl: 11  •  tamsulosin (FLOMAX) 0.4 MG capsule 24 hr capsule, take 1 tablet by mouth every evening, Disp: 90 capsule, Rfl: 1  •  TRADJENTA 5 MG tablet tablet, take 1 tablet by mouth once daily, Disp: 30 tablet, Rfl: 5  •  zolpidem (AMBIEN) 5 MG tablet, Bring to sleep lab DO NOT use at  "home. PLEASE take if not asleep within 30 mins of start of study., Disp: 2 tablet, Rfl: 0        Review of Systems   Constitutional: Negative for chills, fatigue and fever.   Cardiovascular: Negative for chest pain and palpitations.   Gastrointestinal: Negative for abdominal pain, constipation, diarrhea, nausea and vomiting.   Endocrine: Negative for cold intolerance and heat intolerance.   All other systems reviewed and are negative.      Objective       Vitals:    08/13/19 1000   BP: 132/80   Pulse: 81   Weight: 103 kg (226 lb 6.4 oz)   Height: 172.7 cm (68\")     Body mass index is 34.42 kg/m².      Physical Exam   Constitutional: He is oriented to person, place, and time.   Eyes: EOM are normal. Pupils are equal, round, and reactive to light.   Neck: Normal range of motion. Neck supple. No thyromegaly present.   Cardiovascular: Normal rate, regular rhythm, normal heart sounds and intact distal pulses.   Pulmonary/Chest: Effort normal and breath sounds normal.   Abdominal: Soft. Bowel sounds are normal. He exhibits distension.   Musculoskeletal: Normal range of motion.   Neurological: He is alert and oriented to person, place, and time.   Skin: Skin is warm and dry.   Psychiatric: He has a normal mood and affect. His behavior is normal.   Nursing note and vitals reviewed.    Results Review:     None    Medical records reviewed  Summary:      Office Visit on 08/07/2019   Component Date Value Ref Range Status   • Total Cholesterol 08/07/2019 131  0 - 200 mg/dL Final   • Triglycerides 08/07/2019 124  0 - 150 mg/dL Final   • HDL Cholesterol 08/07/2019 36* 40 - 60 mg/dL Final   • VLDL Cholesterol 08/07/2019 24.8  mg/dL Final   • LDL Cholesterol  08/07/2019 70  0 - 100 mg/dL Final   • WBC 08/07/2019 7.14  3.40 - 10.80 10*3/mm3 Final   • RBC 08/07/2019 4.56  4.14 - 5.80 10*6/mm3 Final   • Hemoglobin 08/07/2019 13.1  13.0 - 17.7 g/dL Final   • Hematocrit 08/07/2019 44.5  37.5 - 51.0 % Final   • MCV 08/07/2019 97.6* 79.0 - " 97.0 fL Final   • MCH 08/07/2019 28.7  26.6 - 33.0 pg Final   • MCHC 08/07/2019 29.4* 31.5 - 35.7 g/dL Final   • RDW 08/07/2019 14.2  12.3 - 15.4 % Final   • Platelets 08/07/2019 218  140 - 450 10*3/mm3 Final   • Neutrophil Rel % 08/07/2019 59.5  42.7 - 76.0 % Final   • Lymphocyte Rel % 08/07/2019 24.5  19.6 - 45.3 % Final   • Monocyte Rel % 08/07/2019 11.3  5.0 - 12.0 % Final   • Eosinophil Rel % 08/07/2019 3.5  0.3 - 6.2 % Final   • Basophil Rel % 08/07/2019 0.8  0.0 - 1.5 % Final   • Neutrophils Absolute 08/07/2019 4.24  1.70 - 7.00 10*3/mm3 Final   • Lymphocytes Absolute 08/07/2019 1.75  0.70 - 3.10 10*3/mm3 Final   • Monocytes Absolute 08/07/2019 0.81  0.10 - 0.90 10*3/mm3 Final   • Eosinophils Absolute 08/07/2019 0.25  0.00 - 0.40 10*3/mm3 Final   • Basophils Absolute 08/07/2019 0.06  0.00 - 0.20 10*3/mm3 Final   • Immature Granulocyte Rel % 08/07/2019 0.4  0.0 - 0.5 % Final   • Immature Grans Absolute 08/07/2019 0.03  0.00 - 0.05 10*3/mm3 Final   • nRBC 08/07/2019 0.0  0.0 - 0.2 /100 WBC Final   • PSA 08/07/2019 0.844  0.000 - 4.000 ng/mL Final   • 25 Hydroxy, Vitamin D 08/07/2019 41.4  30.0 - 100.0 ng/ml Final    Comment: Reference Range for Total Vitamin D 25(OH)  Deficiency <20.0 ng/mL  Insufficiency 21-29 ng/mL  Sufficiency  ng/mL  Toxicity >100 ng/ml     • Vitamin B-12 08/07/2019 607  211 - 946 pg/mL Final     Lab Results   Component Value Date    HGBA1C 7.80 (H) 07/30/2019    HGBA1C 6.80 (H) 04/09/2019    HGBA1C 11.54 (H) 01/03/2019     Lab Results   Component Value Date    MICROALBUR 283.3 07/30/2019    CREATININE 2.00 (H) 07/30/2019     Imaging Results (most recent)     None                Assessment and Plan:    Santino was seen today for diabetes and adrenal problem.    Diagnoses and all orders for this visit:    Diabetes mellitus type 2, uncontrolled, with complications (CMS/HCC)    Uncontrolled type II diabetes mellitus with nephropathy (CMS/[facility])    CKD (chronic kidney disease), stage III  "(CMS/[facility])    Encounter for long-term (current) use of insulin (CMS/[facility])    History of ischemic stroke without residual deficits    Hyperinsulinism         Patient forgot to bring his glucometer today  He reports that majority of his blood sugars are less than 200  He is currently taking glipizide 5 mg daily in the morning, Tradjenta 5 mg daily in the morning, Tresiba 14 units daily in the morning,.  He denies any recent hypoglycemia  Patient's recent hemoglobin A1c 7.8% higher than before but considering his age of 79 and comorbid conditions I believe hemoglobin A1c below 8% should be optimal  Patient verbalized understanding    Patient will return to follow-up in 3 months    The total time spent  was more than 25 min of which greater than 15 min of time (greater than 50% of the total time)  was spent face to face with the patient counseling and coordination of care on recommended evaluation and treatment options, instructions for management/treatment and /or follow up and importance of compliance with chosen management or treatment options  Trevor Whyte MD. FACE    08/13/19      EMR Dragon / transcription disclaimer:     \"Dictated utilizing Dragon dictation\".         "

## 2019-08-16 NOTE — PROGRESS NOTES
Bilateral L3-5 Lumbar Medial Branch Blockade  [facility]      PREOPERATIVE DIAGNOSIS:  Lumbar spondylosis without myelopathy    POSTOPERATIVE DIAGNOSIS:  Lumbar spondylosis without myelopathy    PROCEDURE:   Diagnostic Bilateral Lumbar Medial Branch Nerve Blockades, with fluoroscopy:  L3, L4, and L5 nerves (at the L4 & L5 transverse processes and the sacral alar groove) to block facet joints L4-5, and L5-S1  1. 73207-51 -- Bilateral Lumbar Facet blocks, 1st Level  2. 95740-59 -- Bilateral Lumbar Facet blocks, 2nd  Level    PRE-PROCEDURE DISCUSSION WITH PATIENT:    Risks and complications were discussed with the patient prior to starting the procedure and informed consent was obtained.      SURGEON:  Maciel Avery MD    REASON FOR PROCEDURE:    The patient complains of pain that seems to have a significant axial component and he held Plavix for 7 days prior to procedure.    SEDATION:  Patient declined administration of moderate sedation    ANESTHETIC:  Marcaine 0.5%  STEROID:  Methylprednisolone (DEPO MEDROL) 60mg  TOTAL VOLUME OF SOLUTION:  6ml    DESCRIPTON OF PROCEDURE:  After obtaining informed consent, IV access was not obtained in the preoperative area.   The patient was taken to the operating room.  The patient was placed in the prone position with a pillow under the abdomen. All pressure points were well padded.  EKG, blood pressure, and pulse oximeter were monitored.  The patient was monitored and sedated by the RN under my direction. The lumbosacral area was prepped with Chloraprep and draped in a sterile fashion. Under fluoroscopic guidance the transverse processes of the L4 and L5 vertebrae at the junctions of the superior articular processes were identified on the right. Also identified was the groove between the ala and the superior articular process of the sacrum on the ipsilateral side.  Skin and subcutaneous tissue were anesthetized with 1% lidocaine above each of these points.  A 22-gauge spinal needle was introduced under fluoroscopic guidance at the above junctions. Aspiration was negative for blood and CSF.  After confirming the position of the needle with fluoroscope in all views, 0.25 mL of Omnipaque was injected, and after seeing the proper spread a total of 1 mL of the anesthetic solution noted above was injected at each of these points.  Needles were removed intact from each of the areas.  A similar procedure was repeated to block the L3, L4, and L5 nerves on the contralateral side.   Onset of analgesia was noted.  Vital signs remained stable throughout.      ESTIMATED BLOOD LOSS:  <5 mL  SPECIMENS:  none    COMPLICATIONS:   No complications were noted., There was no indication of vascular uptake on live injection of contrast dye. and The patient did not have any signs of postprocedure numbness nor weakness.    TOLERANCE & DISCHARGE CONDITION:    The patient tolerated the procedure well.  The patient was transported to the recovery area without difficulties.  The patient was discharged to home under the care of family in stable and satisfactory condition.    PLAN OF CARE:  1. The patient was given our standard instruction sheet.  2. We discussed that Lumbar Medial Branch Blockade is a diagnostic procedure in consideration for radiofrequency ablation if two diagnostic procedures prove to be positive for significant benefit.  If sustained relief of 6 to eight weeks is obtained, then an alternative plan could be therapeutic lumbar branch blockades.  3. The patient is asked to keep a pain log each hour for 8 hours after the procedure today.  4. The patient will  Return to clinic 1-2 wks.  5. The patient will resume all medications as per the medication reconciliation sheet.

## 2019-08-25 PROBLEM — E08.41 DIABETIC MONONEUROPATHY ASSOCIATED WITH DIABETES MELLITUS DUE TO UNDERLYING CONDITION (HCC): Status: RESOLVED | Noted: 2018-03-09 | Resolved: 2019-01-01

## 2019-08-28 NOTE — PROGRESS NOTES
CHIEF COMPLAINT  Follow-up for back pain.    Subjective   Santino Retana is a 79 y.o. male  who presents to the office for follow-up of procedure.  He completed a Bilateral L3-5 Lumbar Medial Branch Blockade   on  6/13/19 and 8/15/19 performed by Dr. Avery for management of back pain. Reports 80% diagnostic relief for two days after the procedure, some moderate ongoing relief for a few weeks.  Says he was able to walk to his neighbors house which he has not been able to do in a very long time.      Back Pain   This is a chronic problem. The current episode started more than 1 year ago. The problem occurs constantly. The problem is unchanged. The pain is present in the lumbar spine. The quality of the pain is described as aching and stabbing. The pain is at a severity of 8/10. The pain is moderate. The symptoms are aggravated by bending, standing and twisting. Pertinent negatives include no abdominal pain, chest pain, fever, headaches, numbness or weakness. Risk factors include lack of exercise and obesity. He has tried analgesics, bed rest, heat and ice (He is a poor candidate for NSAIDs) for the symptoms. The treatment provided no relief.      PEG Assessment   What number best describes your pain on average in the past week?4  What number best describes how, during the past week, pain has interfered with your enjoyment of life?5  What number best describes how, during the past week, pain has interfered with your general activity?  4    The following portions of the patient's history were reviewed and updated as appropriate: allergies, current medications, past family history, past medical history, past social history, past surgical history and problem list.    Review of Systems   Constitutional: Positive for fatigue. Negative for fever.   HENT: Negative for congestion.    Eyes: Negative for visual disturbance.   Respiratory: Negative for cough, shortness of breath and wheezing.    Cardiovascular: Negative.   "Negative for chest pain.   Gastrointestinal: Negative for abdominal pain, constipation and diarrhea.   Genitourinary: Negative for difficulty urinating.   Musculoskeletal: Positive for back pain.   Neurological: Negative for weakness, numbness and headaches.   Psychiatric/Behavioral: Negative for sleep disturbance and suicidal ideas. The patient is not nervous/anxious.        Vitals:    08/28/19 0845   BP: 173/78   Pulse: 83   Resp: 18   Temp: 97.7 °F (36.5 °C)   SpO2: 94%   Weight: 101 kg (223 lb 6.4 oz)   Height: 172.7 cm (68\")   PainSc:   8   PainLoc: Hip  Comment: with walking     Objective   Physical Exam   Constitutional: He is oriented to person, place, and time. He appears well-developed and well-nourished. No distress.   Eyes: Conjunctivae and EOM are normal.   Cardiovascular: Normal rate.   Pulmonary/Chest: Effort normal. No respiratory distress.   Musculoskeletal:        Lumbar back: He exhibits tenderness and pain.   +lumbar facet tenderness/loading    Neurological: He is alert and oriented to person, place, and time. He has normal strength. Gait normal.   Skin: Skin is warm and dry.   Psychiatric: He has a normal mood and affect. His behavior is normal.   Nursing note and vitals reviewed.    Assessment/Plan   Santino was seen today for back pain.    Diagnoses and all orders for this visit:    Other chronic pain    Lumbar facet arthropathy  -     Case Request    Anticoagulated      --- Bilateral L3-L5 RFL - MUST HOLD PLAVIX FOR 7 DAYS   -------  Education about Medial Branch Blockade and RF Therapy:    This medial branch blockade (MBB) suggested is intended for diagnostic purposes, with the intent of offering the patient Radiofrequency thermal rhizotomy (RF) if the MBB is diagnostically effective.  The diagnostic blockade is necessary to determine the likelihood that RF therapy could be efficacious in providing long term relief to the patient.    Medial branches are sensory nerve branches that connect to " "a facet joint and transmit sensations & pain signals from that joint.  Facet is a term for the type of joints found in the spine.  Medial branches are the nerves that go to a facet, and therefore are also sometimes called \"facet joint nerves\" (FJNs).      In a medial branch blockade procedure, xray fluoroscopy is used to verify the locations of the outside of the joint lines which are being targeted.  Under xray guidance, needles are placed to these areas.  Contrast dye is injected to confirm proper placement, with dye flowing over the joint area, and to ensure that the dye does not flow into unintended areas such as a vein.  When this is confirmed, local anesthetic is injected to block the medial branch at that joint level.      If MBBs are diagnostically successful in blocking pain, then the patient is most likely a great candidate for Radiofrequency of those facet joint nerves.  In the RF procedure, needles are placed to the joint lines in the same fashion, and after testing, the needle tips are heated to thermally treat the nerves, blocking the nerves by in essence damaging the nerves with the heat treatment.       Medically, a successful RF procedure should provide a patient with 50% pain relief or more for at least 6 months.  Clinical experience suggests that successful patients receive relief more in the range of 12 months on average.  We also discussed that a fortunate minority of patients receive therapeutic success from the MBB, and may not require RF ablation.  If a patient receives more than 8 weeks of relief from MBB, then occasional repeat MBB for therapeutic purposes is a very reasonable alternative therapy.  This course of therapy is consistent with our LCDs according to our CMS  in the area, and therefore other insurance providers should follow accordingly.  We will monitor our patients to screen for these therapeutic responders and will offer RF therapy only when necessary.      We " discussed that MBB & RF are not without risks.  Guidelines regarding anticoagulant use & neuraxial procedures will be respected.  Patients that are ill or otherwise may be at risk for sepsis will not have their spines accessed by neuraxial injections of any type.  This patient will not be offered these therapies if there is an increased risk.   We discussed that there is a risk of postprocedural pain and also a risk of worsening of clinical picture with these procedures as with any neuraxial procedure.    -------    --- Follow-up after procedure          RUBEN REPORT  RUBEN report has been reviewed and scanned into the patient's chart.    As the clinician, I personally reviewed the RUBEN from 8/28/19 while the patient was in the office today.    EMR Dragon/Transcription disclaimer:   Much of this encounter note is an electronic transcription/translation of spoken language to printed text. The electronic translation of spoken language may permit erroneous, or at times, nonsensical words or phrases to be inadvertently transcribed; Although I have reviewed the note for such errors, some may still exist.      INITIAL VISIT WITH EJ ROGER

## 2019-09-13 NOTE — TELEPHONE ENCOUNTER
Spoke with patient about sleep study results, sending orders to [facility] sleep, scheduled early follow up 10/11/19

## 2019-10-05 PROBLEM — I20.0 UNSTABLE ANGINA (HCC): Status: ACTIVE | Noted: 2019-01-01

## 2019-10-09 PROBLEM — I25.119 CORONARY ARTERY DISEASE INVOLVING NATIVE HEART WITH ANGINA PECTORIS (HCC): Status: ACTIVE | Noted: 2019-01-01

## 2019-10-09 NOTE — ANESTHESIA PREPROCEDURE EVALUATION
Anesthesia Evaluation     Patient summary reviewed and Nursing notes reviewed                Airway   Mallampati: III  TM distance: <3 FB  Neck ROM: full  Possible difficult intubation and Large neck circumference  Dental - normal exam     Pulmonary - normal exam   (+) a smoker Former, shortness of breath, sleep apnea on CPAP,   Cardiovascular     Rhythm: regular  Rate: normal    (+) hypertension, valvular problems/murmurs MR, past MI  >12 months, CAD, angina at rest, CHF, murmur, hyperlipidemia,  carotid artery disease carotid bilateral    ROS comment: S/p right CEA 9/16/NSTEMI and CVA after left CEA 7/17  PE comment: Faint systolic murmur LSB    Neuro/Psych  (+) TIA, CVA residual symptoms, numbness,       ROS Comment: CVA 7/17 perioperatively after left CVA with residual right hand weakness  GI/Hepatic/Renal/Endo    (+) obesity,   renal disease CRI, diabetes mellitus type 2 poorly controlled using insulin,     Musculoskeletal     (+) back pain, chronic pain,       ROS comment: Lumbar radiculopathy  Abdominal   (+) obese,    Substance History      OB/GYN          Other   (+) arthritis   history of cancer      Other Comment: Hx carcinoid tumor of appendix                Anesthesia Plan    ASA 4     general   (Art line/CVC/SGC/JERRY/post-op vent/may need CMAC for intubation)  intravenous induction   Anesthetic plan, all risks, benefits, and alternatives have been provided, discussed and informed consent has been obtained with: patient.

## 2019-10-10 NOTE — ANESTHESIA POSTPROCEDURE EVALUATION
Patient: Santino Retana    Procedure Summary     Date:  10/10/19 Room / Location:  [facility] OR [address] [place] [address] MAIN OR    Anesthesia Start:  0639 Anesthesia Stop:  1147    Procedure:  JERRY STERNOTOMY CORONARY ARTERY BYPASS GRAFT TIMES 4 USING LEFT INTERNAL MAMMARY ARTERY AND LEFT GREATER SAPHENOUS VEIN GRAFT PER ENDOSCOPIC VEIN HARVESTING, MITRAL VALVE REPLACEMENT AND PRP (N/A Chest) Diagnosis:       Coronary artery disease involving native heart with angina pectoris, unspecified vessel or lesion type (CMS/HCC)      (Coronary artery disease involving native heart with angina pectoris, unspecified vessel or lesion type (CMS/HCC) [I25.119])    Surgeon:  Lewis Serra MD Provider:  Chaitanya Leger MD    Anesthesia Type:  general ASA Status:  4          Anesthesia Type: general  Last vitals  BP   (!) 75/52 (10/10/19 1200)   Temp   36.9 °C (98.5 °F) (10/10/19 0406)   Pulse   89 (10/10/19 1200)   Resp   14 (10/10/19 1150)     SpO2   100 % (10/10/19 1200)     Post Anesthesia Care and Evaluation    Patient location during evaluation: PACU  Patient participation: complete - patient cannot participate  Level of consciousness: obtunded/minimal responses  Pain management: adequate  Airway patency: patent  Anesthetic complications: No anesthetic complications  PONV Status: NA  Cardiovascular status: acceptable  Respiratory status: acceptable, ETT, intubated and ventilator  Hydration status: acceptable

## 2019-10-10 NOTE — ANESTHESIA PROCEDURE NOTES
Airway  Urgency: elective    Date/Time: 10/10/2019 7:07 AM  Airway not difficult    General Information and Staff    Patient location during procedure: OR  Anesthesiologist: Chaitanya Leger MD    Indications and Patient Condition  Indications for airway management: airway protection    Preoxygenated: yes  MILS maintained throughout  Mask difficulty assessment: 1 - vent by mask    Final Airway Details  Final airway type: endotracheal airway      Successful airway: ETT  Cuffed: yes   Successful intubation technique: direct laryngoscopy  Facilitating devices/methods: intubating stylet  Endotracheal tube insertion site: oral  Blade: CMAC  Blade size: 3  ETT size (mm): 8.0  Cormack-Lehane Classification: grade I - full view of glottis  Placement verified by: chest auscultation and capnometry   Measured from: lips  ETT/EBT  to lips (cm): 22  Number of attempts at approach: 1    Additional Comments  No DL tried, but easy with CMAC

## 2019-10-10 NOTE — ANESTHESIA PROCEDURE NOTES
Arterial Line      Patient location during procedure: OR  Start time: 10/10/2019 6:52 AM  Stop Time:10/10/2019 6:56 AM       Line placed for hemodynamic monitoring.  Performed By   Anesthesiologist: Chaitanya Leger MD  Preanesthetic Checklist  Completed: patient identified, site marked, surgical consent, pre-op evaluation, timeout performed, IV checked, risks and benefits discussed and monitors and equipment checked  Arterial Line Prep   Sterile Tech: cap, gloves and mask  Prep: ChloraPrep  Patient monitoring: blood pressure monitoring, continuous pulse oximetry and EKG  Arterial Line Procedure   Laterality:left  Location:  radial artery  Catheter size: 20 G   Guidance: palpation technique  Number of attempts: 1  Successful placement: yes  Post Assessment   Dressing Type: biopatch applied, occlusive dressing applied, secured with tape and wrist guard applied.   Complications no  Circ/Move/Sens Assessment: normal and unchanged.   Patient Tolerance: patient tolerated the procedure well with no apparent complications

## 2019-10-10 NOTE — ANESTHESIA PROCEDURE NOTES
Procedure Performed: Emergent/Open-Heart Anesthesia JERRY     Start Time:        End Time:        General Procedure Information  JERRY Placed for monitoring purposes only -- This is not a diagnostic JERRY

## 2019-10-10 NOTE — ANESTHESIA PROCEDURE NOTES
Central Line      Patient location during procedure: OR  Start time: 10/10/2019 7:16 AM  Stop Time:10/10/2019 7:26 AM  Indications: vascular access  Staff  Anesthesiologist: Chaitanya Leger MD  Preanesthetic Checklist  Completed: patient identified, site marked, surgical consent, pre-op evaluation, timeout performed, IV checked, risks and benefits discussed and monitors and equipment checked  Central Line Prep  Sterile Tech:cap, gloves, gown, mask and sterile barriers  Prep: chloraprep  Patient monitoring: blood pressure monitoring, continuous pulse oximetry and EKG  Central Line Procedure  Laterality:right  Location:internal jugular  Catheter Type:Mexican Hat-Allie, Cordis and double lumen  Catheter Size:9 Fr  Guidance:landmark technique  Assessment  Post procedure:biopatch applied, line sutured and occlusive dressing applied  Assessement:blood return through all ports, free fluid flow and Mario Test  Complications:no  Patient Tolerance:patient tolerated the procedure well with no apparent complications

## 2019-10-13 PROBLEM — N17.9 AKI (ACUTE KIDNEY INJURY) (HCC): Status: ACTIVE | Noted: 2019-01-01

## 2019-10-18 NOTE — PROGRESS NOTES
No call no show for procedure on 10-17-19  Of note, staff called the patient to check on him, a female voice answered, and then hung up.

## 2019-10-22 PROBLEM — I20.0 UNSTABLE ANGINA (HCC): Status: RESOLVED | Noted: 2019-01-01 | Resolved: 2019-01-01

## 2019-10-22 PROBLEM — N18.30 CKD (CHRONIC KIDNEY DISEASE), STAGE III (HCC): Status: RESOLVED | Noted: 2018-05-16 | Resolved: 2019-01-01

## 2019-10-22 PROBLEM — N18.6 ESRD ON HEMODIALYSIS (HCC): Status: ACTIVE | Noted: 2019-01-01

## 2019-10-22 PROBLEM — Z99.2 ESRD ON HEMODIALYSIS (HCC): Status: ACTIVE | Noted: 2019-01-01

## 2019-10-22 NOTE — ANESTHESIA PREPROCEDURE EVALUATION
Anesthesia Evaluation     Patient summary reviewed and Nursing notes reviewed   history of anesthetic complications (porolonged emergence even from JERRY): prolonged sedation  NPO Solid Status: > 8 hours  NPO Liquid Status: > 2 hours           Airway   Mallampati: II  TM distance: >3 FB  Neck ROM: full  Possible difficult intubation  Dental    (+) implants    Pulmonary    (+) a smoker Former, shortness of breath, sleep apnea (CPAP use briefly before MI), decreased breath sounds,   Cardiovascular     ECG reviewed  Patient on routine beta blocker and Beta blocker given within 24 hours of surgery  Rhythm: regular  Rate: abnormal    (+) hypertension, past MI , CAD, dysrhythmias Tachycardia, angina at rest, CHF, hyperlipidemia,  carotid artery disease      Neuro/Psych  (+) TIA, CVA (with Left CEA 7/18/17), numbness (lumbar radic, sciatica),     GI/Hepatic/Renal/Endo    (+) obesity,   renal disease (Acute on chronic, dialysis after CABG via [facility] in [place]. Dialysis due today) ARF, CRI and dialysis, diabetes mellitus type 2 poorly controlled,     Musculoskeletal     Abdominal   (+) obese,    Substance History      OB/GYN          Other   (+) arthritis   history of cancer (neuroendocrine tumor of small bowell)    ROS/Med Hx Other: CABG 12 days ago                    Anesthesia Plan    ASA 4     MAC     intravenous induction   Anesthetic plan, all risks, benefits, and alternatives have been provided, discussed and informed consent has been obtained with: patient.    Plan discussed with CRNA.

## 2019-10-22 NOTE — ANESTHESIA POSTPROCEDURE EVALUATION
Patient: Santino Retana    Procedure Summary     Date:  10/22/19 Room / Location:  [facility] OR [address] [place] [address] MAIN OR    Anesthesia Start:  1439 Anesthesia Stop:  1531    Procedure:  TUNNEL DIALYSIS CATHETER INSERTION (N/A ) Diagnosis:  ESRD on hemodialysis (CMS/[facility])    Surgeon:  Alexx Monteiro MD Provider:  Adriana Kellogg MD    Anesthesia Type:  MAC ASA Status:  4          Anesthesia Type: MAC  Last vitals  BP   116/68 (10/22/19 1605)   Temp   36.8 °C (98.3 °F) (10/22/19 1610)   Pulse   (!) 122 (10/22/19 1605)   Resp   16 (10/22/19 1605)     SpO2   94 % (10/22/19 1605)     Post Anesthesia Care and Evaluation    Patient location during evaluation: PACU  Patient participation: complete - patient participated  Level of consciousness: awake and alert  Pain management: adequate  Airway patency: patent  Anesthetic complications: No anesthetic complications  PONV Status: none  Cardiovascular status: acceptable  Respiratory status: acceptable  Hydration status: acceptable

## 2019-11-07 NOTE — TELEPHONE ENCOUNTER
Dr. Paul, patient's wife called to report patient had open heart surgery.  He is in rehab at [facility].  She reports patient has a pressure wound on his bottom.  She wanted you to tell them if there is anything else they could be doing.  I called and left her a message explaining that this is out of your realm of practice and she needed to inform the house doctor at [facility].  Please advise is there is any additional information you would like relayed to patient. / KIKI     Patient's phone number is [phone]

## 2019-11-07 NOTE — TELEPHONE ENCOUNTER
Yolanda,    He was getting Arelis's Magic Butt Cream applied to it in the hospital.  Other than perhaps applying this and keeping him off of the pressure site, I am not sure if there is anything else I can offer.  She may want to also call Dr. Serra's office as well - the surgeons tend to deal with this more than me.  Thanks     GM

## 2019-11-14 NOTE — TELEPHONE ENCOUNTER
[facility] called stating patients wife called with issues with husbands CPAP machine. The patients power cord was not working . They offered to replace it, she stated the patients mask was popping off at night. Mary Ellen told the patient wife to  Bring everything back and they would look over machine and figure out what was going on the mask as well. Patients wife was very upset and said she was returning everything.

## 2019-11-22 PROBLEM — I48.91 ATRIAL FIBRILLATION (HCC): Status: ACTIVE | Noted: 2019-01-01

## 2019-11-22 NOTE — TELEPHONE ENCOUNTER
It is unfortunate that he was not discharged on warfarin, as I did not give any orders to discontinue it.  However, yes, I would like him followed in our anticoagulation clinic with goal INR 2-3 for atrial fibrillation.  Thanks     Chris Paul

## 2019-11-22 NOTE — PROGRESS NOTES
Anticoagulation Clinic Progress Note    Anticoagulation Summary  As of 2019    INR goal:   2.0-3.0   TTR:   --   INR used for dosin.30! (2019)   Warfarin maintenance plan:   2 mg every day   Weekly warfarin total:   14 mg   Plan last modified:   Bonita Martinez RPH (2019)   Next INR check:   2019   Target end date:   Indefinite    Indications    Atrial fibrillation (CMS/HCC) [I48.91] [I48.91]             Anticoagulation Episode Summary     INR check location:       Preferred lab:       Send INR reminders to:   [facility] CLINICAL POOL    Comments:         Anticoagulation Care Providers     Provider Role Specialty Phone number    Bennie Paul MD Referring Cardiology [phone]          INR History:  Anticoagulation Monitoring 2019   INR 1.30   INR Date 2019   INR Goal 2.0-3.0   Last Week Total 0 mg   Next Week Total 14 mg   Sun 2 mg   Mon 2 mg   Tue -   Wed -   Thu -   Fri 2 mg   Sat 2 mg   Visit Report -       Plan:  1. INR is Subtherapeutic today but pt has been off warfarin for one week- see above in Anticoagulation Summary.   Provided instructions to Diana with [facility] to restart their warfarin regimen- see above in Anticoagulation Summary.  2. Follow up in 5 days      Bonita Martinez RPH

## 2019-11-22 NOTE — TELEPHONE ENCOUNTER
Pt discharged from [facility] on 11/15 .  [facility] contacted us today per your office with INR 1.3 for warfarin orders. Pt did not have any warfarin at home and states he had not taken any warfarin since his discharge.    Per our hospital records he was discharged to rehab on warfarin after CABG on 10/10/19.  Per [facility] INR  2.1 on 11/13 -- warfarin 2mg daily.    Sent warfarin Rx today and had [facility] restart warfarin 2mg daily with next INR on 11/26.    Please confirm referral to our clinic with INR goal range of 2.0-3.0.   Thank You

## 2019-11-25 NOTE — TELEPHONE ENCOUNTER
11/25/19  12:05 PM  Santino Retana  1940  Home Phone [phone]   Mobile [phone]     Dr. Paul,    Home health Occupational Therapy called to tell you that the patient desats to 82% with any activity. The OT said that her recovers in 2-3 minutes. He is on a CPAP with 2L of O2 during the night, but nothing during the day. This is all new for him since his emergent open heart surgery at the beginning of October.    Do we need to address this? Is this something that needs to be addressed by Dr. Brown?    Thank you!    Janine Burnett, RN  Triage [facility]

## 2019-11-25 NOTE — TELEPHONE ENCOUNTER
Spoke with Bonita from [employer]. She is going to f/u with Dr. Brown's office and let them know about 's recommendations.    Janine Burnett RN  Triage [facility]

## 2019-11-25 NOTE — TELEPHONE ENCOUNTER
Janine,    This likely needs to be addressed by Dr. Brown.  It sounds like he may need supplemental O2 during the day as well.  Can you please let him know?  Thanks    GM

## 2019-11-25 NOTE — TELEPHONE ENCOUNTER
Called and left a message with Bonita from [employer]. Asked her to give me a call back to go over recommendations.    Thank you!    Janine Burnett RN  [place]riage [facility]

## 2019-12-02 NOTE — PROGRESS NOTES
[facility] CARDIOLOGY  [address]  [facility] [address]  Phone: [phone]      Patient Name: Santino Retana  :1940  Age: 79 y.o.  Primary Cardiologist: Chris Paul MD  Encounter Provider:  EJ Jimenez      Chief Complaint:   Chief Complaint   Patient presents with   • Follow-up     1 month Hospital         HPI  Santino Retana is a 79 y.o. male who presents today for hospital follow-up.     Pt has a  history significant for end-stage renal disease on dialysis, CAD, sleep apnea, type 2 diabetes, chronic low back pain, hypertension.    Patient was hospitalized 10/5-10/28 where he was admitted for chest pain and eventually ruled in for non-STEMI.  Patient underwent cardiac catheterization revealed severe three-vessel disease T10 was consulted.  Patient then underwent CABG x4 with LIMA to proximal 98, vein graft to OM1, vein graft to PDA, and distal RCA.  He also underwent mitral valve replacement with a [facility] procedure.  Patient had acute kidney injury on chronic kidney disease requiring dialysis and placement of a non-tunneled and eventually tunneled dialysis catheters.  Patient was discharged to the [facility] with initiation of Coumadin.  Unfortunately it appears that patient was discharged from the [facility] the Coumadin was not resumed.  Is currently taking Coumadin INR goal 2.0-3.0.    Patient reports that he is now been released from [facility] and is at home with 1 week left of home health with care tenders.  Reports that last week he had hemoglobin of 7.8 and required a blood transfusion as well as iron infusion.  He reports that he is doing well other than being very fatigued and generally weak.  Reports that he does have positional lightheadedness.  Upon review of patient's medications it is noted that his wife is giving him hydrochlorothiazide daily rather than as needed for swelling.  He is following with pulmonology  "and last week home health recorded oxygen saturation of 78%.  By the time he got to the pulmonology office sats were up to 92%.  He is currently on only using oxygen at night.  He currently denies any chest pain, shortness of breath, heart palpitations, lower extremity edema.  He does report dyspnea with minimal exertion.    The following portions of the patient's history were reviewed and updated as appropriate: allergies, current medications, past family history, past medical history, past social history, past surgical history and problem list.      Review of Systems   Constitution: Positive for malaise/fatigue.   Cardiovascular: Positive for claudication. Negative for chest pain and leg swelling.   Respiratory: Positive for shortness of breath and snoring.    Skin: Positive for poor wound healing.   Neurological: Positive for excessive daytime sleepiness and dizziness. Negative for light-headedness.   All other systems reviewed and are negative.      OBJECTIVE:     Vitals:    12/02/19 1306   BP Location: Right arm   Patient Position: Sitting   Pulse: 69   SpO2: 97%   Weight: 93 kg (205 lb)   Height: 177.8 cm (70\")     Body mass index is 29.41 kg/m².    Physical Exam   Constitutional: He is oriented to person, place, and time. Vital signs are normal. He appears well-developed and well-nourished.   Eyes: Conjunctivae are normal.   Neck: Carotid bruit is not present.   Cardiovascular: Normal rate, regular rhythm and normal heart sounds.   No murmur heard.  Pulmonary/Chest: Effort normal and breath sounds normal.   Abdominal: Normal appearance.   Musculoskeletal: Normal range of motion.   No pedal edema   Neurological: He is alert and oriented to person, place, and time. GCS eye subscore is 4. GCS verbal subscore is 5. GCS motor subscore is 6.   Skin: Skin is warm and dry.   Midsternal incision is well approximated without signs of wound dehiscence, erythema, soft tissue swelling, drainage.     Psychiatric: He has a " normal mood and affect. His speech is normal and behavior is normal. Judgment and thought content normal. Cognition and memory are normal.       Procedures    Cardiac Procedures:  1. Echocardiogram on 7/19/2017: The ejection fraction was 62%.  There was mild LVH.  There was no significant valvular disease.  2. Lexiscan Myoview stress test on 10/2/2017: There was a medium-sized infarct in the inferior wall with no significant ischemia.  Ejection fraction was calculated at 44%.  3. Echocardiogram on 10/12/2017: The ejection fraction was 56%.  There was severe hypokinesis of the basal inferior wall and basal inferoseptum.  There was mild to moderate left ventricular hypertrophy area there was grade 1 diastolic dysfunction.  There was aortic sclerosis without stenosis.  4. Echocardiogram on 5/17/2018: The ejection fraction was 65%.  Saline test was normal.  There was mild mitral regurgitation.  5. Cardiac catheterization 10/7/2019.  Severe multivessel CAD with ostial left main stenosis.  Recommend consult CT surgery.  6. Echocardiogram 10/8/2019.  EF 52%.  Grade 1 diastolic dysfunction.  Hypokinesis of the apical lateral and apex.  Severe calcification of the aortic valve.  No aortic valve stenosis.  Mild MAC present with moderate mitral valve regurgitation with centrally directed jet.  This is likely underestimated.  Mild tricuspid valve regurgitation.  Calculated RVSP 28 mmHg.  7. CABG x4 with LIMA to proximal 98, vein graft to OM1, vein graft to PDA, and distal RCA.  Mitral valve replacement with porcine valve 10/8/2019.  8. Echocardiogram 10/18/2019.  EF 53%.  Bioprosthetic mitral valve which is grossly normal.  Mean pressure gradient aortic valve 8.0 mmHg.  Maximum pressure gradient 14 mmHg.  Calculated RVSP 24 mmHg.  9. Limited echocardiogram 10/25/2019.  No evidence of pericardial effusion.  EF 46-50%.  Prominent septal bounce.        ASSESSMENT:      Diagnosis Plan   1. NSTEMI (non-ST elevation myocardial  infarction) (CMS/[facility])  Ambulatory Referral to Cardiac Rehab   2. Coronary artery disease involving native coronary artery of native heart with angina pectoris (CMS/[facility])     3. Hx of CABG  Ambulatory Referral to Cardiac Rehab   4. Status post mitral valve replacement  Ambulatory Referral to Cardiac Rehab   5. Essential hypertension           PLAN OF CARE:     1. NSTEMI.  Patient with recent hospitalization for non-STEMI and was found to have multivessel coronary disease.  He subsequently had four-vessel CABG.  Patient was discharged to [facility] and is now home with home health.  Patient's largest complaint is generalized weakness.  Patient had hemoglobin 7.8 last week during dialysis and required blood transfusion.  I believe the patient's weakness is multifactorial secondary to anemia as well as deconditioning as patient had very lengthy hospital stay.  Patient is now completed inpatient subacute rehab as well as home health physical therapy.  Will place referral for cardiac rehab.  Patient will continue with GDMT with aspirin, atorvastatin, Plavix, carvedilol.  2. CAD.  As above.  3. Recent CABG x4 vessels.  4.  mitral valve replacement.  Gradients across the valve are normal on echocardiogram October 2019.  5. HTN.  Patient actually with low blood pressure with blood pressure reading 104/68.  Patient is taking hydrochlorothiazide on a daily basis rather than as needed.  I have recommended decreasing hydrochlorothiazide to only as needed for lower extremity edema to see if this will improve lightheadedness.  6. Follow-up with Dr. Paul in 1-2 months.             Discharge Medications           Accurate as of 12/2/19  1:29 PM. If you have any questions, ask your nurse or doctor.               Changes to Medications      Instructions Start Date   Crisaborole 2 % ointment  Commonly known as:  EUCRISA  What changed:    · when to take this  · reasons to take this   1 g, Apply externally, 2 Times Daily       polyethylene glycol packet  Commonly known as:  MIRALAX  What changed:    · when to take this  · reasons to take this   17 g, Oral, Daily         Continue These Medications      Instructions Start Date   ACCU-CHEK SOFTCLIX LANCET DEV kit   No dose, route, or frequency recorded.      ACCU-CHEK SOFTCLIX LANCETS lancets   No dose, route, or frequency recorded.      aspirin 81 MG EC tablet   81 mg, Oral, Daily      atorvastatin 20 MG tablet  Commonly known as:  LIPITOR   take 1 tablet by mouth every evening      carvedilol 3.125 MG tablet  Commonly known as:  COREG   No dose, route, or frequency recorded.      cholecalciferol 25 MCG (1000 UT) tablet  Commonly known as:  VITAMIN D3   1,000 Units, Oral, Daily      Cinnamon 500 MG tablet   1 tablet, Oral, Daily      clopidogrel 75 MG tablet  Commonly known as:  PLAVIX   75 mg, Daily      fludrocortisone 0.1 MG tablet   take 1 tablet by mouth once daily      gabapentin 400 MG capsule  Commonly known as:  NEURONTIN   take 1 capsule by mouth twice a day      glipizide 5 MG tablet  Commonly known as:  GLUCOTROL   5 mg, Oral, Every Morning      glucose blood test strip  Commonly known as:  FREESTYLE LITE   1 each, Other, 4 Times Daily, e11.65      hydroCHLOROthiazide 12.5 MG tablet  Commonly known as:  HYDRODIURIL   12.5 mg, Oral, Daily PRN      Insulin Pen Needle 32G X 4 MM misc  Commonly known as:  BD PEN NEEDLE CONSUELO U/F   use to INJECT INSULIN once daily      metoprolol tartrate 25 MG tablet  Commonly known as:  LOPRESSOR   12.5 mg, Oral, Every 12 Hours Scheduled      midodrine 5 MG tablet  Commonly known as:  PROAMATINE   5 mg, Oral, 2 Times Daily      tamsulosin 0.4 MG capsule 24 hr capsule  Commonly known as:  FLOMAX   take 1 tablet by mouth every evening      TRADJENTA 5 MG tablet tablet  Generic drug:  linagliptin   take 1 tablet by mouth once daily      TRESIBA FLEXTOUCH 200 UNIT/ML solution pen-injector pen injection  Generic drug:  Insulin Degludec   inject 14  units UNDER THE SKIN INTO THE APPROPRIATE AREA AS DIRECTED DAILY      warfarin 1 MG tablet  Commonly known as:  COUMADIN   Take 2 tablets (2mg) by mouth each night or as directed             Thank you for allowing me to participate in the care of your patient,         EJ Jimenez  [facility]  12/02/19  1:29 PM    **Simon Disclaimer:**  Much of this encounter note is an electronic transcription/translation of spoken language to printed text. The electronic translation of spoken language may permit erroneous, or at times, nonsensical words or phrases to be inadvertently transcribed. Although I have reviewed the note for such errors, some may still exist.

## 2019-12-03 NOTE — PROGRESS NOTES
Anticoagulation Clinic Progress Note    Anticoagulation Summary  As of 12/3/2019    INR goal:   2.0-3.0   TTR:   0.0 % (1 d)   INR used for dosin.00 (12/3/2019)   Warfarin maintenance plan:   2 mg every day   Weekly warfarin total:   14 mg   Plan last modified:   Bonita Martinez RPH (2019)   Next INR check:   12/10/2019   Target end date:   Indefinite    Indications    Atrial fibrillation (CMS/HCC) [I48.91] [I48.91]             Anticoagulation Episode Summary     INR check location:       Preferred lab:       Send INR reminders to:   [facility]    Comments:         Anticoagulation Care Providers     Provider Role Specialty Phone number    Bennie Paul MD Referring Cardiology [phone]          INR History:  Anticoagulation Monitoring 2019 12/3/2019   INR 1.30 2.00   INR Date 2019 12/3/2019   INR Goal 2.0-3.0 2.0-3.0   Trend - Same   Last Week Total 0 mg 14 mg   Next Week Total 14 mg 14 mg   Sun 2 mg 2 mg   Mon 2 mg 2 mg   Tue - 2 mg   Wed - 2 mg   Thu - 2 mg   Fri 2 mg 2 mg   Sat 2 mg 2 mg   Visit Report - -       Plan:  1. INR is Therapeutic today- see above in Anticoagulation Summary.   Provided instructions to [facility] to Continue their warfarin regimen- see above in Anticoagulation Summary.  2. Follow up in 1 week      Tracy Xiong [facility]

## 2019-12-09 PROBLEM — Z95.1 S/P CABG X 4: Status: ACTIVE | Noted: 2019-01-01

## 2019-12-09 PROBLEM — Z95.2 S/P MVR (MITRAL VALVE REPLACEMENT): Status: ACTIVE | Noted: 2019-01-01

## 2019-12-09 NOTE — PROGRESS NOTES
"CARDIOVASCULAR SURGERY FOLLOW-UP PROGRESS NOTE  Chief Complaint: Here for follow-up after CABG x4 and mitral valve replacement.        HPI:   Dear Dr. Murray, Fahad JUDD Jr., MD and colleagues:    It was nice to see Santino Retana in follow up 2 months after surgery.  As you know, he is a 79 y.o. male with coronary disease and mitral incompetence who underwent CABG x4 and mitral valve replacement on 10/10/2019. He did well postoperatively and continues to do well. He comes in today complaining of fatigue and wobbliness when walking.  He is still on dialysis..  His activity level has been fair.  Home health is still coming with physical therapy and Occupational Therapy.  He has not yet started cardiac rehab since he is not been very active.  He still very weak.  He has a stage III sacral ulcer that has developed and is being treated now in the wound care center.  He remains on Coumadin.    Physical Exam:         /67 (BP Location: Right arm, Patient Position: Sitting, Cuff Size: Adult)   Pulse 84   Temp 98.4 °F (36.9 °C) (Oral)   Resp 20   Ht 172.7 cm (68\")   Wt 93.4 kg (206 lb)   SpO2 94%   BMI 31.32 kg/m²   Heart:  regular rate and rhythm, S1, S2 normal, no murmur, click, rub or gallop  Lungs:  clear to auscultation bilaterally  Extremities:  no edema  Incision(s):  mid chest healing well, left leg healing well, sternum stable    Assessment/Plan:     S/P CABG and AVR. Overall, he is doing well.    About as expected given his general condition.    Keep incisions clean and dry  OK to begin cardiac rehab  Follow-up as scheduled with cardiology  Follow-up as scheduled with PCP  Return to clinic in 3 month(s) with no new studies    No restrictions of activity.  I hope that he can transition into outpatient cardiac rehab which will help his stamina quite a bit.      Thank you for allowing me to participate in the care of your   patient.  Regards,  Lewis Serra MD    "

## 2019-12-13 NOTE — PROGRESS NOTES
Anticoagulation Clinic Progress Note    Anticoagulation Summary  As of 2019    INR goal:   2.0-3.0   TTR:   90.0 % (1.4 wk)   INR used for dosin.20 (2019)   Warfarin maintenance plan:   2 mg every day   Weekly warfarin total:   14 mg   No change documented:   Xuan Myles RPH   Plan last modified:   Bonita Martinez RPH (2019)   Next INR check:   2019   Target end date:   Indefinite    Indications    Atrial fibrillation (CMS/HCC) [I48.91] [I48.91]             Anticoagulation Episode Summary     INR check location:       Preferred lab:       Send INR reminders to:    [facility] CLINICAL POOL    Comments:         Anticoagulation Care Providers     Provider Role Specialty Phone number    Bennie Paul MD Referring Cardiology [phone]          Clinic Interview:  No pertinent clinical findings have been reported.    INR History:  Anticoagulation Monitoring 2019 12/3/2019 2019   INR 1.30 2.00 2.20   INR Date 2019 12/3/2019 2019   INR Goal 2.0-3.0 2.0-3.0 2.0-3.0   Trend - Same Same   Last Week Total 0 mg 14 mg 14 mg   Next Week Total 14 mg 14 mg 14 mg   Sun 2 mg 2 mg 2 mg   Mon 2 mg 2 mg 2 mg   Tue - 2 mg 2 mg   Wed - 2 mg 2 mg   Thu - 2 mg -   Fri 2 mg 2 mg 2 mg   Sat 2 mg 2 mg 2 mg   Visit Report - - -   Some recent data might be hidden       Plan:  1. INR is therapeutic today- see above in Anticoagulation Summary.   Spoke with  [facility] to continue their warfarin regimen- see above in Anticoagulation Summary.  2. Follow up in 1 weeks  3. Pt has agreed to only be called if INR out of range. They have been instructed to call if any changes in medications, doses, concerns, etc. Patient expresses understanding and has no further questions at this time.    Xuan Myles RPH

## 2019-12-16 NOTE — PROGRESS NOTES
Subjective   Santino Retana is a 79 y.o. male.     Chief Complaint   Patient presents with   • Atrial Fibrillation   • Hypertension   • Coronary Artery Disease   • Diabetes         History of Present Illness   Delightful gentleman with multiple medical problems he has a complex medical history.  He has had CABG x4+ porcine valve placement this fall.  He has end-stage renal disease and is currently on dialysis through a catheter near the right clavicle.  Prior to heart surgery had a tick bite to the posterior scalp he feels and he has a continued ulceration at the posterior scalp and is also treated for decubitus ulcer of the sacrum.  He has home health following vigorously and he gets a pro time INR once a week.  He is on aspirin Plavix and warfarin.  Treatment of chronic back pain is reviewed he has deconditioning and difficulty walking history of chronic back pain and postoperative deconditioning.    He is running about the tick bite situation where the not this is affected him.  We will get serology for anaplasmosis Lyme disease Meridian spotted fever.  Treat empirically for 7 days with doxycycline 100 twice daily following warfarin carefully.      The following portions of the patient's history were reviewed and updated as appropriate: allergies, current medications, past social history and problem list.    Review of Systems   Constitutional: Positive for fatigue.   HENT: Negative.    Eyes: Negative.    Respiratory: Negative.    Cardiovascular: Negative.    Gastrointestinal: Negative.    Endocrine: Negative.    Genitourinary: Negative.    Musculoskeletal: Positive for back pain and gait problem.   Skin: Positive for wound.   Allergic/Immunologic: Negative.    Neurological: Positive for weakness.   Hematological: Negative.    Psychiatric/Behavioral: Negative.        Objective   Vitals:    12/16/19 0923   BP: 100/60   Pulse: 92   Temp: 98 °F (36.7 °C)   SpO2: 92%     Physical Exam   Constitutional: He is  oriented to person, place, and time. He appears well-developed and well-nourished.   HENT:   Head: Normocephalic and atraumatic.       Right Ear: Tympanic membrane and external ear normal.   Left Ear: Tympanic membrane and external ear normal.   Nose: Nose normal.   Mouth/Throat: Oropharynx is clear and moist.   Eyes: Pupils are equal, round, and reactive to light. Conjunctivae and EOM are normal.   Neck: Normal range of motion. Neck supple. No JVD present. No thyromegaly present.   Cardiovascular: Normal rate, normal heart sounds and intact distal pulses. An irregularly irregular rhythm present.   Pulmonary/Chest: Effort normal and breath sounds normal.   Abdominal: Soft. Bowel sounds are normal.   Musculoskeletal:        Lumbar back: He exhibits decreased range of motion.   Lymphadenopathy:     He has no cervical adenopathy.   Neurological: He is alert and oriented to person, place, and time. A sensory deficit is present. No cranial nerve deficit. He exhibits abnormal muscle tone. Coordination and gait abnormal.   Skin: Skin is warm and dry. No rash noted.   Psychiatric: He has a normal mood and affect. His behavior is normal. Judgment and thought content normal.   Vitals reviewed.      Assessment/Plan   Problem List Items Addressed This Visit        Cardiovascular and Mediastinum    Atrial fibrillation (CMS/HCC) [I48.91]    Hypertension    Coronary artery disease involving native heart with angina pectoris (CMS/HCC)    Carotid artery disease (CMS/HCC)       Endocrine    Diabetes mellitus type 2, uncontrolled, with complications (CMS/HCC)       Genitourinary    ESRD on hemodialysis (CMS/HCC)    Relevant Orders    [facility] (IgG / M)    Lyme, Total Antibody Test / Reflex    Human Gran. Ehrlichiosis (IgG)    Basic Metabolic Panel    CBC & Differential      Other Visit Diagnoses     Tick bite of occipital region of scalp, sequela    -  Primary    Relevant Medications    mupirocin (BACTROBAN) 2 % ointment     Other Relevant Orders    [facility] (IgG / M)    Lyme, Total Antibody Test / Reflex    Human Gran. Ehrlichiosis (IgG)    Basic Metabolic Panel    CBC & Differential    Open wound of scalp, unspecified open wound type, sequela        Relevant Orders    [facility] (IgG / M)    Lyme, Total Antibody Test / Reflex    Human Gran. Ehrlichiosis (IgG)    Basic Metabolic Panel    CBC & Differential    Medicare annual wellness visit, subsequent          Plan: Serology for tick bite illness.  BMP CBC follow-up for dialysis follow-up with endocrinology.  Follow-up with nephrology.  Follow-up with cardiology.  Recheck in 6 months sooner if needed..  Treat with doxycycline 100 twice daily for 7 days pending serology.  Warfarin checked weekly by home health.  Bactroban ointment applied 3 times daily to scalp lesion/ulceration.

## 2019-12-16 NOTE — PROGRESS NOTES
The ABCs of the Annual Wellness Visit  Subsequent Medicare Wellness Visit    No chief complaint on file.      Subjective   History of Present Illness:  Santino Retana is a 79 y.o. male who presents for a Subsequent Medicare Wellness Visit.    HEALTH RISK ASSESSMENT    Recent Hospitalizations:  Recently treated at the following:  [facility]    Current Medical Providers:  Patient Care Team:  Fahad Murray Jr., MD as PCP - General (Family Medicine)  Alex Simpson MD as Consulting Physician (Nephrology)    Smoking Status:  Social History     Tobacco Use   Smoking Status Former Smoker   • Packs/day: 2.00   • Years: 25.00   • Pack years: 50.00   • Types: Cigarettes   • Last attempt to quit:    • Years since quittin.9   Smokeless Tobacco Never Used       Alcohol Consumption:  Social History     Substance and Sexual Activity   Alcohol Use No       Depression Screen:   PHQ-2/PHQ-9 Depression Screening 2019   Little interest or pleasure in doing things 0   Feeling down, depressed, or hopeless 0   Total Score 0       Fall Risk Screen:  HIADI Fall Risk Assessment was completed, and patient is at LOW risk for falls.Assessment completed on:2019    Health Habits and Functional and Cognitive Screening:  Functional & Cognitive Status 2019   Do you have difficulty preparing food and eating? No   Do you have difficulty bathing yourself, getting dressed or grooming yourself? No   Do you have difficulty using the toilet? No   Do you have difficulty moving around from place to place? Yes   Do you have trouble with steps or getting out of a bed or a chair? Yes   Current Diet Well Balanced Diet   Dental Exam Up to date   Eye Exam Up to date   Exercise (times per week) 3 times per week   Current Exercise Activities Include Tapes/CDs/TV Fitness Programs at Home   Do you need help using the phone?  No   Are you deaf or do you have serious difficulty hearing?  No   Do you need help with  transportation? Yes   Do you need help shopping? No   Do you need help preparing meals?  No   Do you need help with housework?  No   Do you need help with laundry? No   Do you need help taking your medications? No   Do you need help managing money? No   Do you ever drive or ride in a car without wearing a seat belt? No   Have you felt unusual stress, anger or loneliness in the last month? No   Who do you live with? Spouse   If you need help, do you have trouble finding someone available to you? No   Have you been bothered in the last four weeks by sexual problems? No   Do you have difficulty concentrating, remembering or making decisions? No         Does the patient have evidence of cognitive impairment? No    Asprin use counseling:Taking ASA appropriately as indicated    Age-appropriate Screening Schedule:  Refer to the list below for future screening recommendations based on patient's age, sex and/or medical conditions. Orders for these recommended tests are listed in the plan section. The patient has been provided with a written plan.    Health Maintenance   Topic Date Due   • TDAP/TD VACCINES (1 - Tdap) 04/26/1951   • ZOSTER VACCINE (1 of 2) 04/26/1992   • INFLUENZA VACCINE  08/01/2019   • DIABETIC EYE EXAM  02/15/2020   • HEMOGLOBIN A1C  04/08/2020   • URINE MICROALBUMIN  07/30/2020   • LIPID PANEL  10/06/2020   • COLONOSCOPY  06/13/2027   • PNEUMOCOCCAL VACCINE (65+ HIGH RISK)  Completed          The following portions of the patient's history were reviewed and updated as appropriate: allergies, current medications, past family history, past medical history, past social history, past surgical history and problem list.    Outpatient Medications Prior to Visit   Medication Sig Dispense Refill   • ACCU-CHEK SOFTCLIX LANCETS lancets   0   • aspirin 81 MG EC tablet Take 1 tablet by mouth Daily. 30 tablet 5   • atorvastatin (LIPITOR) 20 MG tablet take 1 tablet by mouth every evening 90 tablet 3   • carvedilol  (COREG) 3.125 MG tablet   0   • cholecalciferol (VITAMIN D3) 1000 UNITS tablet Take 1,000 Units by mouth Daily.     • Cinnamon 500 MG tablet Take 1 tablet by mouth Daily.     • clopidogrel (PLAVIX) 75 MG tablet 75 mg Daily.  0   • Crisaborole (EUCRISA) 2 % ointment Apply 1 g topically 2 (Two) Times a Day. (Patient taking differently: Apply 1 g topically 2 (Two) Times a Day As Needed.) 60 g 3   • fludrocortisone 0.1 MG tablet take 1 tablet by mouth once daily 30 tablet 5   • gabapentin (NEURONTIN) 400 MG capsule take 1 capsule by mouth twice a day 180 capsule 0   • glipizide (GLUCOTROL) 5 MG tablet Take 5 mg by mouth Every Morning.     • glucose blood (FREESTYLE LITE) test strip 1 each by Other route 4 (Four) Times a Day. e11.65 150 each 5   • hydrochlorothiazide (HYDRODIURIL) 12.5 MG tablet Take 1 tablet by mouth Daily As Needed (Swelling in the legs). 30 tablet 2   • Insulin Pen Needle (BD PEN NEEDLE CONSUELO U/F) 32G X 4 MM misc use to INJECT INSULIN once daily 100 each 1   • Lancets Misc. (ACCU-CHEK SOFTCLIX LANCET DEV) kit   0   • midodrine (PROAMATINE) 5 MG tablet Take 5 mg by mouth 2 (Two) Times a Day.  0   • polyethylene glycol (MIRALAX) packet Take 17 g by mouth Daily. (Patient taking differently: Take 17 g by mouth Daily As Needed.) 100 packet 11   • tamsulosin (FLOMAX) 0.4 MG capsule 24 hr capsule take 1 tablet by mouth every evening 90 capsule 1   • TRADJENTA 5 MG tablet tablet take 1 tablet by mouth once daily 30 tablet 5   • TRESIBA FLEXTOUCH 200 UNIT/ML solution pen-injector pen injection inject 14 units UNDER THE SKIN INTO THE APPROPRIATE AREA AS DIRECTED DAILY 9 mL 3   • warfarin (COUMADIN) 1 MG tablet Take 2 tablets (2mg) by mouth each night or as directed 60 tablet 0     No facility-administered medications prior to visit.        Patient Active Problem List   Diagnosis   • Hypertension   • Carcinoid tumor of appendix   • Arthritis   • Neuroendocrine carcinoma of small bowel (CMS/HCC)   • CKD (chronic  kidney disease) stage 3, GFR 30-59 ml/min (CMS/[facility])   • History of ischemic stroke without residual deficits   • Osteoarthritis of spine with radiculopathy, lumbar region   • Chronic low back pain with sciatica   • TIA (transient ischemic attack)   • Carotid artery disease (CMS/[facility])   • Diabetes mellitus (CMS/[facility])   • HTN (hypertension)   • Hyperkalemia   • Adrenal insufficiency (CMS/[facility])   • Hyperinsulinism   • Diabetes mellitus type 2, uncontrolled, with complications (CMS/[facility])   • Encounter for long-term (current) use of insulin (CMS/[facility])   • Observed sleep apnea    • Coronary artery disease involving native heart with angina pectoris (CMS/[facility])   • EMMA (acute kidney injury) (CMS/[facility])   • ESRD on hemodialysis (CMS/[facility])   • Atrial fibrillation (CMS/HCC) [I48.91]   • S/P CABG x 4   • S/P MVR (mitral valve replacement)       Advanced Care Planning:  Patient has an advance directive - a copy has been provided and is visible in patient header    Review of Systems    Compared to one year ago, the patient feels his physical health is worse.  Compared to one year ago, the patient feels his mental health is the same.    Reviewed chart for potential of high risk medication in the elderly: yes  Reviewed chart for potential of harmful drug interactions in the elderly:yes    Objective         Vitals:    12/16/19 0923   Pulse: 92   Temp: 98 °F (36.7 °C)   TempSrc: Tympanic   SpO2: 92%   Weight: 96.2 kg (212 lb)   PainSc:   4       Body mass index is 32.23 kg/m².  Discussed the patient's BMI with him. The BMI is above average; no BMI management plan is appropriate..    Physical Exam    Lab Results   Component Value Date    TRIG 94 10/06/2019    HDL 32 (L) 10/06/2019    LDL 58 10/06/2019    VLDL 18.8 10/06/2019    HGBA1C 7.60 (H) 10/08/2019        Assessment/Plan   Medicare Risks and Personalized Health Plan  CMS Preventative Services Quick Reference  Cardiovascular risk  Chronic Pain   Fall Risk    The above risks/problems have  been discussed with the patient.  Pertinent information has been shared with the patient in the After Visit Summary.  Follow up plans and orders are seen below in the Assessment/Plan Section.    There are no diagnoses linked to this encounter.  Follow Up:  No follow-ups on file.     An After Visit Summary and PPPS were given to the patient.

## 2019-12-16 NOTE — PATIENT INSTRUCTIONS
Medicare Wellness  Personal Prevention Plan of Service     Date of Office Visit:  2019  Encounter Provider:  Fahad Murray Jr., MD  Place of Service:  [facility] INTERNAL MEDICINE  Patient Name: Santino Retana  :  1940    As part of the Medicare Wellness portion of your visit today, we are providing you with this personalized preventive plan of services (PPPS). This plan is based upon recommendations of the [place] Preventive Services Task Force (USPSTF) and the Advisory Committee on Immunization Practices (ACIP).    This lists the preventive care services that should be considered, and provides dates of when you are due. Items listed as completed are up-to-date and do not require any further intervention.    Health Maintenance   Topic Date Due   • TDAP/TD VACCINES (1 - Tdap) 1953   • ZOSTER VACCINE (1 of 2) 1992   • MEDICARE ANNUAL WELLNESS  2017   • INFLUENZA VACCINE  2019   • DIABETIC EYE EXAM  02/15/2020   • HEMOGLOBIN A1C  2020   • URINE MICROALBUMIN  2020   • LIPID PANEL  10/06/2020   • COLONOSCOPY  2027   • PNEUMOCOCCAL VACCINE (65+ HIGH RISK)  Completed       No orders of the defined types were placed in this encounter.      No follow-ups on file.

## 2019-12-20 NOTE — PROGRESS NOTES
Anticoagulation Clinic Progress Note    Anticoagulation Summary  As of 2019    INR goal:   2.0-3.0   TTR:   94.4 % (2.6 wk)   INR used for dosin.30 (2019)   Warfarin maintenance plan:   2 mg every day   Weekly warfarin total:   14 mg   No change documented:   Bk Lang RPH   Plan last modified:   Bonita Martinez RPH (2019)   Next INR check:   1/10/2020   Target end date:   Indefinite    Indications    Atrial fibrillation (CMS/HCC) [I48.91] [I48.91]             Anticoagulation Episode Summary     INR check location:       Preferred lab:       Send INR reminders to:    [facility]    Comments:         Anticoagulation Care Providers     Provider Role Specialty Phone number    Bennie Paul MD Referring Cardiology [phone]        Pertinent info:  Will be discharged from [facility] next wk. Started on doxycycline x 7 days .    INR History:  Anticoagulation Monitoring 12/3/2019 2019 2019   INR 2.00 2.20 2.30   INR Date 12/3/2019 2019 2019   INR Goal 2.0-3.0 2.0-3.0 2.0-3.0   Trend Same Same Same   Last Week Total 14 mg 14 mg 14 mg   Next Week Total 14 mg 14 mg 14 mg   Sun 2 mg 2 mg 2 mg   Mon 2 mg 2 mg 2 mg   Tue 2 mg 2 mg 2 mg   Wed 2 mg 2 mg 2 mg   Thu 2 mg - 2 mg   Fri 2 mg 2 mg 2 mg   Sat 2 mg 2 mg 2 mg   Visit Report - - -   Some recent data might be hidden       Plan:  1. INR is Therapeutic today- see above in Anticoagulation Summary.   Provided instructions to Diana and pt's spouse with [facility] to Continue their warfarin regimen- see above in Anticoagulation Summary.  2. Follow up in 4 days      Bk Lang RPH

## 2020-01-01 ENCOUNTER — OFFICE VISIT (OUTPATIENT)
Dept: INTERNAL MEDICINE | Facility: CLINIC | Age: 80
End: 2020-01-01

## 2020-01-01 ENCOUNTER — APPOINTMENT (OUTPATIENT)
Dept: CARDIAC REHAB | Facility: HOSPITAL | Age: 80
End: 2020-01-01

## 2020-01-01 ENCOUNTER — TELEPHONE (OUTPATIENT)
Dept: CARDIOLOGY | Facility: CLINIC | Age: 80
End: 2020-01-01

## 2020-01-01 ENCOUNTER — TREATMENT (OUTPATIENT)
Dept: CARDIAC REHAB | Facility: HOSPITAL | Age: 80
End: 2020-01-01

## 2020-01-01 ENCOUNTER — TELEPHONE (OUTPATIENT)
Dept: INTERNAL MEDICINE | Facility: CLINIC | Age: 80
End: 2020-01-01

## 2020-01-01 ENCOUNTER — APPOINTMENT (OUTPATIENT)
Dept: GENERAL RADIOLOGY | Facility: HOSPITAL | Age: 80
End: 2020-01-01

## 2020-01-01 ENCOUNTER — HOSPITAL ENCOUNTER (OUTPATIENT)
Facility: HOSPITAL | Age: 80
Setting detail: HOSPITAL OUTPATIENT SURGERY
Discharge: HOME | End: 2020-02-25
Attending: SURGERY | Admitting: SURGERY

## 2020-01-01 ENCOUNTER — ANTICOAGULATION VISIT (OUTPATIENT)
Dept: PHARMACY | Facility: HOSPITAL | Age: 80
End: 2020-01-01

## 2020-01-01 ENCOUNTER — APPOINTMENT (OUTPATIENT)
Dept: CARDIOLOGY | Facility: HOSPITAL | Age: 80
End: 2020-01-01

## 2020-01-01 ENCOUNTER — APPOINTMENT (OUTPATIENT)
Dept: PHARMACY | Facility: HOSPITAL | Age: 80
End: 2020-01-01

## 2020-01-01 ENCOUNTER — OFFICE VISIT (OUTPATIENT)
Dept: CARDIAC REHAB | Facility: HOSPITAL | Age: 80
End: 2020-01-01

## 2020-01-01 ENCOUNTER — OFFICE VISIT (OUTPATIENT)
Dept: WOUND CARE | Facility: HOSPITAL | Age: 80
End: 2020-01-01

## 2020-01-01 ENCOUNTER — OFFICE VISIT (OUTPATIENT)
Dept: CARDIAC SURGERY | Facility: CLINIC | Age: 80
End: 2020-01-01

## 2020-01-01 ENCOUNTER — HOSPITAL ENCOUNTER (OUTPATIENT)
Dept: CARDIOLOGY | Facility: HOSPITAL | Age: 80
Discharge: HOME | End: 2020-02-21
Admitting: INTERNAL MEDICINE

## 2020-01-01 ENCOUNTER — ANESTHESIA EVENT (OUTPATIENT)
Dept: PERIOP | Facility: HOSPITAL | Age: 80
End: 2020-01-01

## 2020-01-01 ENCOUNTER — APPOINTMENT (OUTPATIENT)
Dept: PREADMISSION TESTING | Facility: HOSPITAL | Age: 80
End: 2020-01-01

## 2020-01-01 ENCOUNTER — TELEPHONE (OUTPATIENT)
Dept: PHARMACY | Facility: HOSPITAL | Age: 80
End: 2020-01-01

## 2020-01-01 ENCOUNTER — APPOINTMENT (OUTPATIENT)
Dept: CT IMAGING | Facility: HOSPITAL | Age: 80
End: 2020-01-01

## 2020-01-01 ENCOUNTER — TELEPHONE (OUTPATIENT)
Dept: CARDIAC REHAB | Facility: HOSPITAL | Age: 80
End: 2020-01-01

## 2020-01-01 ENCOUNTER — OFFICE VISIT (OUTPATIENT)
Dept: CARDIOLOGY | Facility: CLINIC | Age: 80
End: 2020-01-01

## 2020-01-01 ENCOUNTER — ANESTHESIA (OUTPATIENT)
Dept: PERIOP | Facility: HOSPITAL | Age: 80
End: 2020-01-01

## 2020-01-01 ENCOUNTER — APPOINTMENT (OUTPATIENT)
Dept: WOUND CARE | Facility: HOSPITAL | Age: 80
End: 2020-01-01

## 2020-01-01 ENCOUNTER — HOSPITAL ENCOUNTER (INPATIENT)
Facility: HOSPITAL | Age: 80
LOS: 27 days | End: 2020-08-12
Attending: EMERGENCY MEDICINE | Admitting: INTERNAL MEDICINE

## 2020-01-01 VITALS
BODY MASS INDEX: 32.58 KG/M2 | SYSTOLIC BLOOD PRESSURE: 90 MMHG | HEIGHT: 68 IN | WEIGHT: 215 LBS | TEMPERATURE: 98.3 F | HEART RATE: 88 BPM | DIASTOLIC BLOOD PRESSURE: 60 MMHG | OXYGEN SATURATION: 93 % | RESPIRATION RATE: 16 BRPM

## 2020-01-01 VITALS
WEIGHT: 212.4 LBS | RESPIRATION RATE: 16 BRPM | OXYGEN SATURATION: 94 % | SYSTOLIC BLOOD PRESSURE: 99 MMHG | BODY MASS INDEX: 32.19 KG/M2 | HEIGHT: 68 IN | TEMPERATURE: 97.5 F | HEART RATE: 93 BPM | DIASTOLIC BLOOD PRESSURE: 52 MMHG

## 2020-01-01 VITALS
WEIGHT: 212.3 LBS | HEART RATE: 96 BPM | SYSTOLIC BLOOD PRESSURE: 112 MMHG | RESPIRATION RATE: 16 BRPM | BODY MASS INDEX: 32.18 KG/M2 | TEMPERATURE: 98.2 F | OXYGEN SATURATION: 92 % | HEIGHT: 68 IN | DIASTOLIC BLOOD PRESSURE: 46 MMHG

## 2020-01-01 VITALS
HEART RATE: 82 BPM | SYSTOLIC BLOOD PRESSURE: 110 MMHG | BODY MASS INDEX: 29.83 KG/M2 | WEIGHT: 196.8 LBS | DIASTOLIC BLOOD PRESSURE: 74 MMHG | HEIGHT: 68 IN

## 2020-01-01 VITALS — WEIGHT: 187.83 LBS | HEIGHT: 68 IN | TEMPERATURE: 101.2 F | BODY MASS INDEX: 28.47 KG/M2

## 2020-01-01 VITALS
DIASTOLIC BLOOD PRESSURE: 60 MMHG | HEIGHT: 68 IN | HEART RATE: 88 BPM | SYSTOLIC BLOOD PRESSURE: 114 MMHG | BODY MASS INDEX: 29.83 KG/M2 | OXYGEN SATURATION: 96 % | WEIGHT: 196.8 LBS

## 2020-01-01 VITALS
TEMPERATURE: 97.8 F | HEART RATE: 65 BPM | DIASTOLIC BLOOD PRESSURE: 51 MMHG | SYSTOLIC BLOOD PRESSURE: 84 MMHG | BODY MASS INDEX: 32.13 KG/M2 | WEIGHT: 212 LBS | HEIGHT: 68 IN

## 2020-01-01 VITALS
BODY MASS INDEX: 32.13 KG/M2 | OXYGEN SATURATION: 99 % | WEIGHT: 212 LBS | DIASTOLIC BLOOD PRESSURE: 50 MMHG | SYSTOLIC BLOOD PRESSURE: 100 MMHG | HEIGHT: 68 IN | HEART RATE: 97 BPM

## 2020-01-01 DIAGNOSIS — I21.4 NON-STEMI (NON-ST ELEVATED MYOCARDIAL INFARCTION) (HCC): Primary | ICD-10-CM

## 2020-01-01 DIAGNOSIS — E29.1 HYPOGONADISM IN MALE: ICD-10-CM

## 2020-01-01 DIAGNOSIS — I77.9 CAROTID ARTERY DISEASE, UNSPECIFIED LATERALITY, UNSPECIFIED TYPE (HCC): ICD-10-CM

## 2020-01-01 DIAGNOSIS — N18.6 ESRD ON HEMODIALYSIS (HCC): ICD-10-CM

## 2020-01-01 DIAGNOSIS — N18.6 END STAGE RENAL FAILURE ON DIALYSIS (HCC): Primary | ICD-10-CM

## 2020-01-01 DIAGNOSIS — Z95.2 S/P MVR (MITRAL VALVE REPLACEMENT): ICD-10-CM

## 2020-01-01 DIAGNOSIS — M54.10 RADICULOPATHY, UNSPECIFIED SPINAL REGION: Primary | ICD-10-CM

## 2020-01-01 DIAGNOSIS — R29.898 BILATERAL LEG WEAKNESS: Primary | ICD-10-CM

## 2020-01-01 DIAGNOSIS — R53.1 GENERAL WEAKNESS: Primary | ICD-10-CM

## 2020-01-01 DIAGNOSIS — N18.30 ANEMIA IN STAGE 3 CHRONIC KIDNEY DISEASE (HCC): ICD-10-CM

## 2020-01-01 DIAGNOSIS — Z95.3 STATUS POST MITRAL VALVE REPLACEMENT WITH TISSUE VALVE: ICD-10-CM

## 2020-01-01 DIAGNOSIS — Z99.2 ESRD ON HEMODIALYSIS (HCC): ICD-10-CM

## 2020-01-01 DIAGNOSIS — Z95.3 S/P MITRAL VALVE REPLACEMENT WITH TISSUE VALVE: ICD-10-CM

## 2020-01-01 DIAGNOSIS — E27.40 ADRENAL INSUFFICIENCY (HCC): ICD-10-CM

## 2020-01-01 DIAGNOSIS — IMO0002 DIABETES MELLITUS TYPE 2, UNCONTROLLED, WITH COMPLICATIONS: ICD-10-CM

## 2020-01-01 DIAGNOSIS — R29.6 FALLING EPISODES: ICD-10-CM

## 2020-01-01 DIAGNOSIS — I63.9 CEREBROVASCULAR ACCIDENT (CVA), UNSPECIFIED MECHANISM (HCC): ICD-10-CM

## 2020-01-01 DIAGNOSIS — R47.1 DYSARTHRIA: ICD-10-CM

## 2020-01-01 DIAGNOSIS — I25.5 ISCHEMIC CARDIOMYOPATHY: ICD-10-CM

## 2020-01-01 DIAGNOSIS — I48.0 PAROXYSMAL ATRIAL FIBRILLATION (HCC): ICD-10-CM

## 2020-01-01 DIAGNOSIS — Z95.1 S/P CABG X 4: ICD-10-CM

## 2020-01-01 DIAGNOSIS — D63.1 ANEMIA IN STAGE 3 CHRONIC KIDNEY DISEASE (HCC): ICD-10-CM

## 2020-01-01 DIAGNOSIS — I25.810 CORONARY ARTERY DISEASE INVOLVING CORONARY BYPASS GRAFT OF NATIVE HEART WITHOUT ANGINA PECTORIS: Primary | ICD-10-CM

## 2020-01-01 DIAGNOSIS — Z99.2 END STAGE RENAL FAILURE ON DIALYSIS (HCC): Primary | ICD-10-CM

## 2020-01-01 DIAGNOSIS — I25.10 CORONARY ARTERY DISEASE INVOLVING NATIVE CORONARY ARTERY OF NATIVE HEART WITHOUT ANGINA PECTORIS: ICD-10-CM

## 2020-01-01 DIAGNOSIS — I48.19 OTHER PERSISTENT ATRIAL FIBRILLATION (HCC): Primary | ICD-10-CM

## 2020-01-01 DIAGNOSIS — I25.119 CORONARY ARTERY DISEASE INVOLVING NATIVE CORONARY ARTERY OF NATIVE HEART WITH ANGINA PECTORIS (HCC): ICD-10-CM

## 2020-01-01 LAB
ABO GROUP BLD: NORMAL
ALBUMIN SERPL-MCNC: 2.8 G/DL (ref 3.5–5.2)
ALBUMIN SERPL-MCNC: 2.9 G/DL (ref 3.5–5.2)
ALBUMIN SERPL-MCNC: 2.9 G/DL (ref 3.5–5.2)
ALBUMIN SERPL-MCNC: 3 G/DL (ref 3.5–5.2)
ALBUMIN SERPL-MCNC: 3.1 G/DL (ref 3.5–5.2)
ALBUMIN SERPL-MCNC: 3.2 G/DL (ref 3.5–5.2)
ALBUMIN SERPL-MCNC: 3.3 G/DL (ref 3.5–5.2)
ALBUMIN SERPL-MCNC: 3.4 G/DL (ref 3.5–5.2)
ALBUMIN SERPL-MCNC: 3.5 G/DL (ref 3.5–5.2)
ALBUMIN SERPL-MCNC: 3.6 G/DL (ref 3.5–5.2)
ALBUMIN SERPL-MCNC: 3.6 G/DL (ref 3.5–5.2)
ALBUMIN SERPL-MCNC: 3.7 G/DL (ref 3.5–5.2)
ALBUMIN SERPL-MCNC: 3.8 G/DL (ref 3.5–5.2)
ALBUMIN SERPL-MCNC: 3.8 G/DL (ref 3.5–5.2)
ALBUMIN SERPL-MCNC: 4 G/DL (ref 3.5–5.2)
ALBUMIN SERPL-MCNC: 4.2 G/DL (ref 3.5–5.2)
ALBUMIN/GLOB SERPL: 1 G/DL
ALBUMIN/GLOB SERPL: 1.1 G/DL
ALBUMIN/GLOB SERPL: 1.2 G/DL
ALBUMIN/GLOB SERPL: 1.4 G/DL
ALBUMIN/GLOB SERPL: 1.4 G/DL
ALP SERPL-CCNC: 31 U/L (ref 39–117)
ALP SERPL-CCNC: 34 U/L (ref 39–117)
ALP SERPL-CCNC: 35 U/L (ref 39–117)
ALP SERPL-CCNC: 39 U/L (ref 39–117)
ALP SERPL-CCNC: 40 U/L (ref 39–117)
ALP SERPL-CCNC: 40 U/L (ref 39–117)
ALP SERPL-CCNC: 43 U/L (ref 39–117)
ALP SERPL-CCNC: 50 U/L (ref 39–117)
ALP SERPL-CCNC: 60 U/L (ref 39–117)
ALT SERPL W P-5'-P-CCNC: 10 U/L (ref 1–41)
ALT SERPL W P-5'-P-CCNC: 11 U/L (ref 1–41)
ALT SERPL W P-5'-P-CCNC: 12 U/L (ref 1–41)
ALT SERPL W P-5'-P-CCNC: 14 U/L (ref 1–41)
ALT SERPL W P-5'-P-CCNC: 17 U/L (ref 1–41)
ALT SERPL W P-5'-P-CCNC: 18 U/L (ref 1–41)
ALT SERPL W P-5'-P-CCNC: 19 U/L (ref 1–41)
ALT SERPL W P-5'-P-CCNC: 7 U/L (ref 1–41)
ALT SERPL W P-5'-P-CCNC: 9 U/L (ref 1–41)
ANION GAP SERPL CALCULATED.3IONS-SCNC: 10.5 MMOL/L (ref 5–15)
ANION GAP SERPL CALCULATED.3IONS-SCNC: 13 MMOL/L (ref 5–15)
ANION GAP SERPL CALCULATED.3IONS-SCNC: 13.6 MMOL/L (ref 5–15)
ANION GAP SERPL CALCULATED.3IONS-SCNC: 13.7 MMOL/L (ref 5–15)
ANION GAP SERPL CALCULATED.3IONS-SCNC: 14.1 MMOL/L (ref 5–15)
ANION GAP SERPL CALCULATED.3IONS-SCNC: 14.2 MMOL/L (ref 5–15)
ANION GAP SERPL CALCULATED.3IONS-SCNC: 15.1 MMOL/L (ref 5–15)
ANION GAP SERPL CALCULATED.3IONS-SCNC: 15.3 MMOL/L (ref 5–15)
ANION GAP SERPL CALCULATED.3IONS-SCNC: 15.6 MMOL/L (ref 5–15)
ANION GAP SERPL CALCULATED.3IONS-SCNC: 16 MMOL/L (ref 5–15)
ANION GAP SERPL CALCULATED.3IONS-SCNC: 16.8 MMOL/L (ref 5–15)
ANION GAP SERPL CALCULATED.3IONS-SCNC: 18.2 MMOL/L (ref 5–15)
ANION GAP SERPL CALCULATED.3IONS-SCNC: 18.4 MMOL/L (ref 5–15)
ANION GAP SERPL CALCULATED.3IONS-SCNC: 19.2 MMOL/L (ref 5–15)
ANION GAP SERPL CALCULATED.3IONS-SCNC: 19.7 MMOL/L (ref 5–15)
ANION GAP SERPL CALCULATED.3IONS-SCNC: 19.9 MMOL/L (ref 5–15)
ANION GAP SERPL CALCULATED.3IONS-SCNC: 21.2 MMOL/L (ref 5–15)
ANION GAP SERPL CALCULATED.3IONS-SCNC: 23.3 MMOL/L (ref 5–15)
AORTIC DIMENSIONLESS INDEX: 0.4 (DI)
AORTIC ROOT ANNULUS: 2.1 CM
ARTERIAL PATENCY WRIST A: POSITIVE
ASCENDING AORTA: 2.7 CM
ASCENDING AORTA: 3.5 CM
AST SERPL-CCNC: 12 U/L (ref 1–40)
AST SERPL-CCNC: 14 U/L (ref 1–40)
AST SERPL-CCNC: 15 U/L (ref 1–40)
AST SERPL-CCNC: 16 U/L (ref 1–40)
AST SERPL-CCNC: 17 U/L (ref 1–40)
AST SERPL-CCNC: 18 U/L (ref 1–40)
AST SERPL-CCNC: 5 U/L (ref 1–40)
AST SERPL-CCNC: 6 U/L (ref 1–40)
AST SERPL-CCNC: 7 U/L (ref 1–40)
ATMOSPHERIC PRESS: 750.4 MMHG
ATMOSPHERIC PRESS: 750.8 MMHG
ATMOSPHERIC PRESS: 751.5 MMHG
ATMOSPHERIC PRESS: 752.1 MMHG
ATMOSPHERIC PRESS: 752.5 MMHG
ATMOSPHERIC PRESS: 753.1 MMHG
B PARAPERT DNA SPEC QL NAA+PROBE: NOT DETECTED
B PERT DNA SPEC QL NAA+PROBE: NOT DETECTED
BACTERIA SPEC AEROBE CULT: ABNORMAL
BACTERIA SPEC AEROBE CULT: NORMAL
BACTERIA SPEC RESP CULT: ABNORMAL
BACTERIA SPEC RESP CULT: ABNORMAL
BACTERIA UR QL AUTO: ABNORMAL /HPF
BACTERIA UR QL AUTO: ABNORMAL /HPF
BASE EXCESS BLDA CALC-SCNC: -0.3 MMOL/L (ref 0–2)
BASE EXCESS BLDA CALC-SCNC: -1.5 MMOL/L (ref 0–2)
BASE EXCESS BLDA CALC-SCNC: -6.9 MMOL/L (ref 0–2)
BASE EXCESS BLDA CALC-SCNC: -8.8 MMOL/L (ref 0–2)
BASE EXCESS BLDA CALC-SCNC: 0.2 MMOL/L (ref 0–2)
BASE EXCESS BLDV CALC-SCNC: -6.1 MMOL/L
BASOPHILS # BLD AUTO: 0.01 10*3/MM3 (ref 0–0.2)
BASOPHILS # BLD AUTO: 0.02 10*3/MM3 (ref 0–0.2)
BASOPHILS # BLD AUTO: 0.05 10*3/MM3 (ref 0–0.2)
BASOPHILS # BLD AUTO: 0.05 10*3/MM3 (ref 0–0.2)
BASOPHILS # BLD AUTO: 0.06 10*3/MM3 (ref 0–0.2)
BASOPHILS # BLD AUTO: 0.07 10*3/MM3 (ref 0–0.2)
BASOPHILS # BLD AUTO: 0.08 10*3/MM3 (ref 0–0.2)
BASOPHILS # BLD AUTO: 0.09 10*3/MM3 (ref 0–0.2)
BASOPHILS # BLD AUTO: 0.09 10*3/MM3 (ref 0–0.2)
BASOPHILS # BLD AUTO: 0.1 10*3/MM3 (ref 0–0.2)
BASOPHILS NFR BLD AUTO: 0.1 % (ref 0–1.5)
BASOPHILS NFR BLD AUTO: 0.2 % (ref 0–1.5)
BASOPHILS NFR BLD AUTO: 0.5 % (ref 0–1.5)
BASOPHILS NFR BLD AUTO: 0.6 % (ref 0–1.5)
BASOPHILS NFR BLD AUTO: 0.6 % (ref 0–1.5)
BASOPHILS NFR BLD AUTO: 0.7 % (ref 0–1.5)
BASOPHILS NFR BLD AUTO: 0.8 % (ref 0–1.5)
BASOPHILS NFR BLD AUTO: 0.9 % (ref 0–1.5)
BASOPHILS NFR BLD AUTO: 0.9 % (ref 0–1.5)
BDY SITE: ABNORMAL
BH BB BLOOD EXPIRATION DATE: NORMAL
BH BB BLOOD EXPIRATION DATE: NORMAL
BH BB BLOOD TYPE BARCODE: 6200
BH BB BLOOD TYPE BARCODE: 6200
BH BB DISPENSE STATUS: NORMAL
BH BB DISPENSE STATUS: NORMAL
BH BB PRODUCT CODE: NORMAL
BH BB PRODUCT CODE: NORMAL
BH BB UNIT NUMBER: NORMAL
BH BB UNIT NUMBER: NORMAL
BH CV ECHO MEAS - ACS: 1.4 CM
BH CV ECHO MEAS - ACS: 1.7 CM
BH CV ECHO MEAS - AO MAX PG (FULL): 14.5 MMHG
BH CV ECHO MEAS - AO MAX PG (FULL): 6.4 MMHG
BH CV ECHO MEAS - AO MAX PG: 17.5 MMHG
BH CV ECHO MEAS - AO MAX PG: 8.7 MMHG
BH CV ECHO MEAS - AO MEAN PG (FULL): 4.1 MMHG
BH CV ECHO MEAS - AO MEAN PG (FULL): 6 MMHG
BH CV ECHO MEAS - AO MEAN PG: 5.5 MMHG
BH CV ECHO MEAS - AO MEAN PG: 8 MMHG
BH CV ECHO MEAS - AO ROOT AREA (BSA CORRECTED): 1.5
BH CV ECHO MEAS - AO ROOT AREA (BSA CORRECTED): 1.5
BH CV ECHO MEAS - AO ROOT AREA: 7.5 CM^2
BH CV ECHO MEAS - AO ROOT AREA: 8.1 CM^2
BH CV ECHO MEAS - AO ROOT DIAM: 3.1 CM
BH CV ECHO MEAS - AO ROOT DIAM: 3.2 CM
BH CV ECHO MEAS - AO V2 MAX: 147.4 CM/SEC
BH CV ECHO MEAS - AO V2 MAX: 209 CM/SEC
BH CV ECHO MEAS - AO V2 MEAN: 112.3 CM/SEC
BH CV ECHO MEAS - AO V2 MEAN: 133 CM/SEC
BH CV ECHO MEAS - AO V2 VTI: 27.9 CM
BH CV ECHO MEAS - AO V2 VTI: 46.6 CM
BH CV ECHO MEAS - ASC AORTA: 3.5 CM
BH CV ECHO MEAS - AVA(I,A): 1.3 CM^2
BH CV ECHO MEAS - AVA(I,A): 1.4 CM^2
BH CV ECHO MEAS - AVA(I,D): 1.3 CM^2
BH CV ECHO MEAS - AVA(I,D): 1.4 CM^2
BH CV ECHO MEAS - AVA(V,A): 1.3 CM^2
BH CV ECHO MEAS - AVA(V,A): 1.5 CM^2
BH CV ECHO MEAS - AVA(V,D): 1.3 CM^2
BH CV ECHO MEAS - AVA(V,D): 1.5 CM^2
BH CV ECHO MEAS - BSA(HAYCOCK): 2.2 M^2
BH CV ECHO MEAS - BSA: 2.1 M^2
BH CV ECHO MEAS - BZI_BMI: 32.1 KILOGRAMS/M^2
BH CV ECHO MEAS - BZI_BMI: 32.2 KILOGRAMS/M^2
BH CV ECHO MEAS - BZI_BMI: 34.5 KILOGRAMS/M^2
BH CV ECHO MEAS - BZI_METRIC_HEIGHT: 170.2 CM
BH CV ECHO MEAS - BZI_METRIC_HEIGHT: 172.7 CM
BH CV ECHO MEAS - BZI_METRIC_HEIGHT: 172.7 CM
BH CV ECHO MEAS - BZI_METRIC_WEIGHT: 95.7 KG
BH CV ECHO MEAS - BZI_METRIC_WEIGHT: 96.2 KG
BH CV ECHO MEAS - BZI_METRIC_WEIGHT: 99.8 KG
BH CV ECHO MEAS - EDV(CUBED): 103.8 ML
BH CV ECHO MEAS - EDV(MOD-SP2): 75 ML
BH CV ECHO MEAS - EDV(MOD-SP4): 107 ML
BH CV ECHO MEAS - EDV(MOD-SP4): 113 ML
BH CV ECHO MEAS - EDV(MOD-SP4): 89 ML
BH CV ECHO MEAS - EDV(TEICH): 102.4 ML
BH CV ECHO MEAS - EDV(TEICH): 123.8 ML
BH CV ECHO MEAS - EDV(TEICH): 65.4 ML
BH CV ECHO MEAS - EF(CUBED): 54 %
BH CV ECHO MEAS - EF(CUBED): 71.3 %
BH CV ECHO MEAS - EF(CUBED): 72.9 %
BH CV ECHO MEAS - EF(MOD-BP): 44 %
BH CV ECHO MEAS - EF(MOD-BP): 50 %
BH CV ECHO MEAS - EF(MOD-BP): 55 %
BH CV ECHO MEAS - EF(MOD-SP2): 45.3 %
BH CV ECHO MEAS - EF(MOD-SP4): 44.9 %
BH CV ECHO MEAS - EF(MOD-SP4): 53.1 %
BH CV ECHO MEAS - EF(MOD-SP4): 55.1 %
BH CV ECHO MEAS - EF(TEICH): 46.4 %
BH CV ECHO MEAS - EF(TEICH): 63 %
BH CV ECHO MEAS - EF(TEICH): 64.4 %
BH CV ECHO MEAS - ESV(CUBED): 29.8 ML
BH CV ECHO MEAS - ESV(MOD-SP2): 41 ML
BH CV ECHO MEAS - ESV(MOD-SP4): 40 ML
BH CV ECHO MEAS - ESV(MOD-SP4): 53 ML
BH CV ECHO MEAS - ESV(MOD-SP4): 59 ML
BH CV ECHO MEAS - ESV(TEICH): 35.1 ML
BH CV ECHO MEAS - ESV(TEICH): 37.9 ML
BH CV ECHO MEAS - ESV(TEICH): 44.1 ML
BH CV ECHO MEAS - FS: 22.8 %
BH CV ECHO MEAS - FS: 34 %
BH CV ECHO MEAS - FS: 35.3 %
BH CV ECHO MEAS - IVS/LVPW: 1
BH CV ECHO MEAS - IVS/LVPW: 1
BH CV ECHO MEAS - IVS/LVPW: 1.1
BH CV ECHO MEAS - IVSD: 1 CM
BH CV ECHO MEAS - IVSD: 1.3 CM
BH CV ECHO MEAS - IVSD: 1.3 CM
BH CV ECHO MEAS - LAT PEAK E' VEL: 5 CM/SEC
BH CV ECHO MEAS - LAT PEAK E' VEL: 5.7 CM/SEC
BH CV ECHO MEAS - LV DIASTOLIC VOL/BSA (35-75): 42.6 ML/M^2
BH CV ECHO MEAS - LV DIASTOLIC VOL/BSA (35-75): 50.8 ML/M^2
BH CV ECHO MEAS - LV DIASTOLIC VOL/BSA (35-75): 53.9 ML/M^2
BH CV ECHO MEAS - LV MASS(C)D: 179.1 GRAMS
BH CV ECHO MEAS - LV MASS(C)D: 188 GRAMS
BH CV ECHO MEAS - LV MASS(C)D: 224.8 GRAMS
BH CV ECHO MEAS - LV MASS(C)DI: 107.5 GRAMS/M^2
BH CV ECHO MEAS - LV MASS(C)DI: 85.5 GRAMS/M^2
BH CV ECHO MEAS - LV MASS(C)DI: 89.3 GRAMS/M^2
BH CV ECHO MEAS - LV MAX PG: 2.3 MMHG
BH CV ECHO MEAS - LV MAX PG: 2.9 MMHG
BH CV ECHO MEAS - LV MEAN PG: 1.4 MMHG
BH CV ECHO MEAS - LV MEAN PG: 2 MMHG
BH CV ECHO MEAS - LV SYSTOLIC VOL/BSA (12-30): 19.1 ML/M^2
BH CV ECHO MEAS - LV SYSTOLIC VOL/BSA (12-30): 25.3 ML/M^2
BH CV ECHO MEAS - LV SYSTOLIC VOL/BSA (12-30): 28 ML/M^2
BH CV ECHO MEAS - LV V1 MAX: 75.3 CM/SEC
BH CV ECHO MEAS - LV V1 MAX: 85.8 CM/SEC
BH CV ECHO MEAS - LV V1 MEAN: 56.2 CM/SEC
BH CV ECHO MEAS - LV V1 MEAN: 63.4 CM/SEC
BH CV ECHO MEAS - LV V1 VTI: 12.2 CM
BH CV ECHO MEAS - LV V1 VTI: 20.2 CM
BH CV ECHO MEAS - LVIDD: 3.9 CM
BH CV ECHO MEAS - LVIDD: 4.7 CM
BH CV ECHO MEAS - LVIDD: 5.1 CM
BH CV ECHO MEAS - LVIDS: 3 CM
BH CV ECHO MEAS - LVIDS: 3.1 CM
BH CV ECHO MEAS - LVIDS: 3.3 CM
BH CV ECHO MEAS - LVLD AP2: 6.8 CM
BH CV ECHO MEAS - LVLD AP4: 6.9 CM
BH CV ECHO MEAS - LVLD AP4: 7.3 CM
BH CV ECHO MEAS - LVLD AP4: 7.3 CM
BH CV ECHO MEAS - LVLS AP2: 6.1 CM
BH CV ECHO MEAS - LVLS AP4: 6 CM
BH CV ECHO MEAS - LVLS AP4: 6.1 CM
BH CV ECHO MEAS - LVLS AP4: 6.2 CM
BH CV ECHO MEAS - LVOT AREA (M): 2.8 CM^2
BH CV ECHO MEAS - LVOT AREA (M): 3.1 CM^2
BH CV ECHO MEAS - LVOT AREA: 3 CM^2
BH CV ECHO MEAS - LVOT AREA: 3.1 CM^2
BH CV ECHO MEAS - LVOT DIAM: 1.9 CM
BH CV ECHO MEAS - LVOT DIAM: 2 CM
BH CV ECHO MEAS - LVPWD: 1 CM
BH CV ECHO MEAS - LVPWD: 1.2 CM
BH CV ECHO MEAS - LVPWD: 1.3 CM
BH CV ECHO MEAS - MED PEAK E' VEL: 4.9 CM/SEC
BH CV ECHO MEAS - MED PEAK E' VEL: 5 CM/SEC
BH CV ECHO MEAS - MV A DUR: 0.08 SEC
BH CV ECHO MEAS - MV A DUR: 0.14 SEC
BH CV ECHO MEAS - MV A MAX VEL: 131 CM/SEC
BH CV ECHO MEAS - MV A MAX VEL: 144.7 CM/SEC
BH CV ECHO MEAS - MV DEC SLOPE: 670.8 CM/SEC^2
BH CV ECHO MEAS - MV DEC SLOPE: 734.5 CM/SEC^2
BH CV ECHO MEAS - MV DEC TIME: 0.2 SEC
BH CV ECHO MEAS - MV DEC TIME: 0.24 SEC
BH CV ECHO MEAS - MV E MAX VEL: 158 CM/SEC
BH CV ECHO MEAS - MV E MAX VEL: 77.7 CM/SEC
BH CV ECHO MEAS - MV E/A: 0.54
BH CV ECHO MEAS - MV E/A: 1.2
BH CV ECHO MEAS - MV MAX PG: 10 MMHG
BH CV ECHO MEAS - MV MAX PG: 10.4 MMHG
BH CV ECHO MEAS - MV MEAN PG: 3.7 MMHG
BH CV ECHO MEAS - MV MEAN PG: 5 MMHG
BH CV ECHO MEAS - MV P1/2T MAX VEL: 174 CM/SEC
BH CV ECHO MEAS - MV P1/2T MAX VEL: 94.1 CM/SEC
BH CV ECHO MEAS - MV P1/2T: 41.1 MSEC
BH CV ECHO MEAS - MV P1/2T: 69.4 MSEC
BH CV ECHO MEAS - MV V2 MAX: 158 CM/SEC
BH CV ECHO MEAS - MV V2 MAX: 161 CM/SEC
BH CV ECHO MEAS - MV V2 MEAN: 107 CM/SEC
BH CV ECHO MEAS - MV V2 MEAN: 89.7 CM/SEC
BH CV ECHO MEAS - MV V2 VTI: 30.3 CM
BH CV ECHO MEAS - MV V2 VTI: 39.5 CM
BH CV ECHO MEAS - MVA P1/2T LCG: 1.3 CM^2
BH CV ECHO MEAS - MVA P1/2T LCG: 2.3 CM^2
BH CV ECHO MEAS - MVA(P1/2T): 3.2 CM^2
BH CV ECHO MEAS - MVA(P1/2T): 5.4 CM^2
BH CV ECHO MEAS - MVA(VTI): 1.2 CM^2
BH CV ECHO MEAS - MVA(VTI): 1.6 CM^2
BH CV ECHO MEAS - PA ACC TIME: 0.07 SEC
BH CV ECHO MEAS - PA ACC TIME: 0.12 SEC
BH CV ECHO MEAS - PA MAX PG (FULL): 2.7 MMHG
BH CV ECHO MEAS - PA MAX PG (FULL): 7.8 MMHG
BH CV ECHO MEAS - PA MAX PG: 3.8 MMHG
BH CV ECHO MEAS - PA MAX PG: 8.9 MMHG
BH CV ECHO MEAS - PA PR(ACCEL): 26.8 MMHG
BH CV ECHO MEAS - PA PR(ACCEL): 48.1 MMHG
BH CV ECHO MEAS - PA V2 MAX: 149 CM/SEC
BH CV ECHO MEAS - PA V2 MAX: 97.7 CM/SEC
BH CV ECHO MEAS - PULM A REVS DUR: 0.08 SEC
BH CV ECHO MEAS - PULM A REVS DUR: 0.11 SEC
BH CV ECHO MEAS - PULM A REVS VEL: 23.1 CM/SEC
BH CV ECHO MEAS - PULM A REVS VEL: 23.3 CM/SEC
BH CV ECHO MEAS - PULM DIAS VEL: 29.1 CM/SEC
BH CV ECHO MEAS - PULM DIAS VEL: 35.6 CM/SEC
BH CV ECHO MEAS - PULM S/D: 1.1
BH CV ECHO MEAS - PULM S/D: 1.2
BH CV ECHO MEAS - PULM SYS VEL: 32.6 CM/SEC
BH CV ECHO MEAS - PULM SYS VEL: 41.5 CM/SEC
BH CV ECHO MEAS - PVA(V,A): 1.3 CM^2
BH CV ECHO MEAS - PVA(V,A): 2 CM^2
BH CV ECHO MEAS - PVA(V,D): 1.3 CM^2
BH CV ECHO MEAS - PVA(V,D): 2 CM^2
BH CV ECHO MEAS - QP/QS: 0.59
BH CV ECHO MEAS - QP/QS: 0.79
BH CV ECHO MEAS - RAP SYSTOLE: 15 MMHG
BH CV ECHO MEAS - RAP SYSTOLE: 3 MMHG
BH CV ECHO MEAS - RV MAX PG: 1.1 MMHG
BH CV ECHO MEAS - RV MAX PG: 1.1 MMHG
BH CV ECHO MEAS - RV MEAN PG: 0 MMHG
BH CV ECHO MEAS - RV MEAN PG: 0.59 MMHG
BH CV ECHO MEAS - RV V1 MAX: 52.2 CM/SEC
BH CV ECHO MEAS - RV V1 MAX: 52.3 CM/SEC
BH CV ECHO MEAS - RV V1 MEAN: 30.7 CM/SEC
BH CV ECHO MEAS - RV V1 MEAN: 36.6 CM/SEC
BH CV ECHO MEAS - RV V1 VTI: 7.5 CM
BH CV ECHO MEAS - RV V1 VTI: 9.9 CM
BH CV ECHO MEAS - RVOT AREA: 3.8 CM^2
BH CV ECHO MEAS - RVOT AREA: 3.8 CM^2
BH CV ECHO MEAS - RVOT DIAM: 2.2 CM
BH CV ECHO MEAS - RVOT DIAM: 2.2 CM
BH CV ECHO MEAS - RVSP: 23 MMHG
BH CV ECHO MEAS - RVSP: 72 MMHG
BH CV ECHO MEAS - SI(AO): 108 ML/M^2
BH CV ECHO MEAS - SI(AO): 167 ML/M^2
BH CV ECHO MEAS - SI(CUBED): 15.1 ML/M^2
BH CV ECHO MEAS - SI(CUBED): 35.4 ML/M^2
BH CV ECHO MEAS - SI(CUBED): 45.9 ML/M^2
BH CV ECHO MEAS - SI(LVOT): 17.2 ML/M^2
BH CV ECHO MEAS - SI(LVOT): 30.1 ML/M^2
BH CV ECHO MEAS - SI(MOD-SP2): 16.2 ML/M^2
BH CV ECHO MEAS - SI(MOD-SP4): 22.8 ML/M^2
BH CV ECHO MEAS - SI(MOD-SP4): 23.4 ML/M^2
BH CV ECHO MEAS - SI(MOD-SP4): 28.6 ML/M^2
BH CV ECHO MEAS - SI(TEICH): 14.5 ML/M^2
BH CV ECHO MEAS - SI(TEICH): 30.8 ML/M^2
BH CV ECHO MEAS - SI(TEICH): 37.8 ML/M^2
BH CV ECHO MEAS - SUP REN AO DIAM: 2 CM
BH CV ECHO MEAS - SV(AO): 226.2 ML
BH CV ECHO MEAS - SV(AO): 351.7 ML
BH CV ECHO MEAS - SV(CUBED): 31.7 ML
BH CV ECHO MEAS - SV(CUBED): 74 ML
BH CV ECHO MEAS - SV(CUBED): 96.7 ML
BH CV ECHO MEAS - SV(LVOT): 36 ML
BH CV ECHO MEAS - SV(LVOT): 63.5 ML
BH CV ECHO MEAS - SV(MOD-SP2): 34 ML
BH CV ECHO MEAS - SV(MOD-SP4): 48 ML
BH CV ECHO MEAS - SV(MOD-SP4): 49 ML
BH CV ECHO MEAS - SV(MOD-SP4): 60 ML
BH CV ECHO MEAS - SV(RVOT): 28.6 ML
BH CV ECHO MEAS - SV(RVOT): 37.7 ML
BH CV ECHO MEAS - SV(TEICH): 30.4 ML
BH CV ECHO MEAS - SV(TEICH): 64.4 ML
BH CV ECHO MEAS - SV(TEICH): 79.7 ML
BH CV ECHO MEAS - TAPSE (>1.6): 1.4 CM2
BH CV ECHO MEAS - TAPSE (>1.6): 1.9 CM2
BH CV ECHO MEAS - TR MAX VEL: 225.3 CM/SEC
BH CV ECHO MEAS - TR MAX VEL: 376 CM/SEC
BH CV ECHO MEASUREMENTS AVERAGE E/E' RATIO: 15.54
BH CV ECHO MEASUREMENTS AVERAGE E/E' RATIO: 29.81
BH CV XLRA - RV BASE: 3.2 CM
BH CV XLRA - RV BASE: 4 CM
BH CV XLRA - RV LENGTH: 7.1 CM
BH CV XLRA - RV LENGTH: 8.9 CM
BH CV XLRA - RV MID: 3 CM
BH CV XLRA - RV MID: 3.7 CM
BH CV XLRA - TDI S': 11 CM/SEC
BH CV XLRA - TDI S': 15 CM/SEC
BILIRUB CONJ SERPL-MCNC: 0.2 MG/DL (ref 0–0.3)
BILIRUB INDIRECT SERPL-MCNC: 0.5 MG/DL
BILIRUB SERPL-MCNC: 0.3 MG/DL (ref 0–1.2)
BILIRUB SERPL-MCNC: 0.3 MG/DL (ref 0–1.2)
BILIRUB SERPL-MCNC: 0.4 MG/DL (ref 0–1.2)
BILIRUB SERPL-MCNC: 0.4 MG/DL (ref 0–1.2)
BILIRUB SERPL-MCNC: 0.5 MG/DL (ref 0–1.2)
BILIRUB SERPL-MCNC: 0.5 MG/DL (ref 0–1.2)
BILIRUB SERPL-MCNC: 0.6 MG/DL (ref 0–1.2)
BILIRUB SERPL-MCNC: 0.6 MG/DL (ref 0–1.2)
BILIRUB SERPL-MCNC: 0.7 MG/DL (ref 0–1.2)
BILIRUB UR QL STRIP: NEGATIVE
BILIRUB UR QL STRIP: NEGATIVE
BLD GP AB SCN SERPL QL: NEGATIVE
BUN BLD-MCNC: 27 MG/DL (ref 8–23)
BUN BLD-MCNC: 30 MG/DL (ref 8–23)
BUN SERPL-MCNC: 22 MG/DL (ref 8–23)
BUN SERPL-MCNC: 28 MG/DL (ref 8–23)
BUN SERPL-MCNC: 29 MG/DL (ref 8–23)
BUN SERPL-MCNC: 31 MG/DL (ref 8–23)
BUN SERPL-MCNC: 40 MG/DL (ref 8–23)
BUN SERPL-MCNC: 41 MG/DL (ref 8–23)
BUN SERPL-MCNC: 42 MG/DL (ref 8–23)
BUN SERPL-MCNC: 43 MG/DL (ref 8–23)
BUN SERPL-MCNC: 44 MG/DL (ref 8–23)
BUN SERPL-MCNC: 44 MG/DL (ref 8–23)
BUN SERPL-MCNC: 46 MG/DL (ref 8–23)
BUN SERPL-MCNC: 63 MG/DL (ref 8–23)
BUN SERPL-MCNC: 64 MG/DL (ref 8–23)
BUN/CREAT SERPL: 10.1 (ref 7–25)
BUN/CREAT SERPL: 2.8 (ref 7–25)
BUN/CREAT SERPL: 3.5 (ref 7–25)
BUN/CREAT SERPL: 4.6 (ref 7–25)
BUN/CREAT SERPL: 5.3 (ref 7–25)
BUN/CREAT SERPL: 5.6 (ref 7–25)
BUN/CREAT SERPL: 5.6 (ref 7–25)
BUN/CREAT SERPL: 6.2 (ref 7–25)
BUN/CREAT SERPL: 6.3 (ref 7–25)
BUN/CREAT SERPL: 6.4 (ref 7–25)
BUN/CREAT SERPL: 6.5 (ref 7–25)
BUN/CREAT SERPL: 6.8 (ref 7–25)
BUN/CREAT SERPL: 7.1 (ref 7–25)
BUN/CREAT SERPL: 7.1 (ref 7–25)
BUN/CREAT SERPL: 7.4 (ref 7–25)
BUN/CREAT SERPL: 7.5 (ref 7–25)
BUN/CREAT SERPL: 7.6 (ref 7–25)
BUN/CREAT SERPL: 8.4 (ref 7–25)
C PNEUM DNA NPH QL NAA+NON-PROBE: NOT DETECTED
CA-I BLD-MCNC: 4.2 MG/DL (ref 4.6–5.4)
CA-I BLD-MCNC: 4.4 MG/DL (ref 4.6–5.4)
CA-I BLD-MCNC: 4.5 MG/DL (ref 4.6–5.4)
CA-I SERPL ISE-MCNC: 1.06 MMOL/L (ref 1.15–1.35)
CA-I SERPL ISE-MCNC: 1.11 MMOL/L (ref 1.15–1.35)
CA-I SERPL ISE-MCNC: 1.13 MMOL/L (ref 1.15–1.35)
CALCIUM SPEC-SCNC: 7.3 MG/DL (ref 8.6–10.5)
CALCIUM SPEC-SCNC: 7.3 MG/DL (ref 8.6–10.5)
CALCIUM SPEC-SCNC: 7.4 MG/DL (ref 8.6–10.5)
CALCIUM SPEC-SCNC: 7.5 MG/DL (ref 8.6–10.5)
CALCIUM SPEC-SCNC: 7.6 MG/DL (ref 8.6–10.5)
CALCIUM SPEC-SCNC: 7.7 MG/DL (ref 8.6–10.5)
CALCIUM SPEC-SCNC: 7.9 MG/DL (ref 8.6–10.5)
CALCIUM SPEC-SCNC: 8 MG/DL (ref 8.6–10.5)
CALCIUM SPEC-SCNC: 8.1 MG/DL (ref 8.6–10.5)
CALCIUM SPEC-SCNC: 8.2 MG/DL (ref 8.6–10.5)
CALCIUM SPEC-SCNC: 8.2 MG/DL (ref 8.6–10.5)
CALCIUM SPEC-SCNC: 8.3 MG/DL (ref 8.6–10.5)
CALCIUM SPEC-SCNC: 8.3 MG/DL (ref 8.6–10.5)
CALCIUM SPEC-SCNC: 8.4 MG/DL (ref 8.6–10.5)
CALCIUM SPEC-SCNC: 8.4 MG/DL (ref 8.6–10.5)
CALCIUM SPEC-SCNC: 8.6 MG/DL (ref 8.6–10.5)
CALCIUM SPEC-SCNC: 8.6 MG/DL (ref 8.6–10.5)
CALCIUM SPEC-SCNC: 8.8 MG/DL (ref 8.6–10.5)
CALCIUM SPEC-SCNC: 9.1 MG/DL (ref 8.6–10.5)
CALCIUM SPEC-SCNC: 9.3 MG/DL (ref 8.6–10.5)
CALCIUM SPEC-SCNC: 9.4 MG/DL (ref 8.6–10.5)
CALCIUM SPEC-SCNC: 9.5 MG/DL (ref 8.6–10.5)
CHLORIDE SERPL-SCNC: 100 MMOL/L (ref 98–107)
CHLORIDE SERPL-SCNC: 101 MMOL/L (ref 98–107)
CHLORIDE SERPL-SCNC: 102 MMOL/L (ref 98–107)
CHLORIDE SERPL-SCNC: 102 MMOL/L (ref 98–107)
CHLORIDE SERPL-SCNC: 103 MMOL/L (ref 98–107)
CHLORIDE SERPL-SCNC: 103 MMOL/L (ref 98–107)
CHLORIDE SERPL-SCNC: 93 MMOL/L (ref 98–107)
CHLORIDE SERPL-SCNC: 94 MMOL/L (ref 98–107)
CHLORIDE SERPL-SCNC: 95 MMOL/L (ref 98–107)
CHLORIDE SERPL-SCNC: 95 MMOL/L (ref 98–107)
CHLORIDE SERPL-SCNC: 98 MMOL/L (ref 98–107)
CHLORIDE SERPL-SCNC: 99 MMOL/L (ref 98–107)
CLARITY UR: ABNORMAL
CLARITY UR: ABNORMAL
CO2 SERPL-SCNC: 14.7 MMOL/L (ref 22–29)
CO2 SERPL-SCNC: 16.1 MMOL/L (ref 22–29)
CO2 SERPL-SCNC: 16.8 MMOL/L (ref 22–29)
CO2 SERPL-SCNC: 17.2 MMOL/L (ref 22–29)
CO2 SERPL-SCNC: 17.6 MMOL/L (ref 22–29)
CO2 SERPL-SCNC: 17.8 MMOL/L (ref 22–29)
CO2 SERPL-SCNC: 19.8 MMOL/L (ref 22–29)
CO2 SERPL-SCNC: 20.2 MMOL/L (ref 22–29)
CO2 SERPL-SCNC: 21 MMOL/L (ref 22–29)
CO2 SERPL-SCNC: 22.2 MMOL/L (ref 22–29)
CO2 SERPL-SCNC: 22.3 MMOL/L (ref 22–29)
CO2 SERPL-SCNC: 22.9 MMOL/L (ref 22–29)
CO2 SERPL-SCNC: 23.4 MMOL/L (ref 22–29)
CO2 SERPL-SCNC: 23.8 MMOL/L (ref 22–29)
CO2 SERPL-SCNC: 24.7 MMOL/L (ref 22–29)
CO2 SERPL-SCNC: 25.3 MMOL/L (ref 22–29)
CO2 SERPL-SCNC: 25.4 MMOL/L (ref 22–29)
CO2 SERPL-SCNC: 25.9 MMOL/L (ref 22–29)
CO2 SERPL-SCNC: 26.5 MMOL/L (ref 22–29)
CO2 SERPL-SCNC: 27 MMOL/L (ref 22–29)
COLOR UR: ABNORMAL
COLOR UR: ABNORMAL
CORTIS SERPL-MCNC: 16.96 MCG/DL
CREAT BLD-MCNC: 2.98 MG/DL (ref 0.76–1.27)
CREAT BLD-MCNC: 3.2 MG/DL (ref 0.76–1.27)
CREAT SERPL-MCNC: 4.35 MG/DL (ref 0.76–1.27)
CREAT SERPL-MCNC: 4.41 MG/DL (ref 0.76–1.27)
CREAT SERPL-MCNC: 4.47 MG/DL (ref 0.76–1.27)
CREAT SERPL-MCNC: 4.6 MG/DL (ref 0.76–1.27)
CREAT SERPL-MCNC: 4.79 MG/DL (ref 0.76–1.27)
CREAT SERPL-MCNC: 5.16 MG/DL (ref 0.76–1.27)
CREAT SERPL-MCNC: 5.17 MG/DL (ref 0.76–1.27)
CREAT SERPL-MCNC: 5.24 MG/DL (ref 0.76–1.27)
CREAT SERPL-MCNC: 5.64 MG/DL (ref 0.76–1.27)
CREAT SERPL-MCNC: 5.74 MG/DL (ref 0.76–1.27)
CREAT SERPL-MCNC: 5.93 MG/DL (ref 0.76–1.27)
CREAT SERPL-MCNC: 6.21 MG/DL (ref 0.76–1.27)
CREAT SERPL-MCNC: 6.26 MG/DL (ref 0.76–1.27)
CREAT SERPL-MCNC: 6.42 MG/DL (ref 0.76–1.27)
CREAT SERPL-MCNC: 6.91 MG/DL (ref 0.76–1.27)
CREAT SERPL-MCNC: 7.09 MG/DL (ref 0.76–1.27)
CREAT SERPL-MCNC: 7.78 MG/DL (ref 0.76–1.27)
CREAT SERPL-MCNC: 8.22 MG/DL (ref 0.76–1.27)
CREAT SERPL-MCNC: 8.3 MG/DL (ref 0.76–1.27)
CREAT SERPL-MCNC: 9.35 MG/DL (ref 0.76–1.27)
D-LACTATE SERPL-SCNC: 0.9 MMOL/L (ref 0.5–2)
D-LACTATE SERPL-SCNC: 1.8 MMOL/L (ref 0.5–2)
D-LACTATE SERPL-SCNC: 2.2 MMOL/L (ref 0.5–2)
D-LACTATE SERPL-SCNC: 2.6 MMOL/L (ref 0.5–2)
DEPRECATED RDW RBC AUTO: 48.3 FL (ref 37–54)
DEPRECATED RDW RBC AUTO: 48.5 FL (ref 37–54)
DEPRECATED RDW RBC AUTO: 48.8 FL (ref 37–54)
DEPRECATED RDW RBC AUTO: 48.8 FL (ref 37–54)
DEPRECATED RDW RBC AUTO: 49.1 FL (ref 37–54)
DEPRECATED RDW RBC AUTO: 49.3 FL (ref 37–54)
DEPRECATED RDW RBC AUTO: 49.9 FL (ref 37–54)
DEPRECATED RDW RBC AUTO: 50.6 FL (ref 37–54)
DEPRECATED RDW RBC AUTO: 50.9 FL (ref 37–54)
DEPRECATED RDW RBC AUTO: 50.9 FL (ref 37–54)
DEPRECATED RDW RBC AUTO: 51 FL (ref 37–54)
DEPRECATED RDW RBC AUTO: 51 FL (ref 37–54)
DEPRECATED RDW RBC AUTO: 51.3 FL (ref 37–54)
DEPRECATED RDW RBC AUTO: 51.5 FL (ref 37–54)
DEPRECATED RDW RBC AUTO: 52.2 FL (ref 37–54)
DEPRECATED RDW RBC AUTO: 52.9 FL (ref 37–54)
DEPRECATED RDW RBC AUTO: 53.1 FL (ref 37–54)
EOSINOPHIL # BLD AUTO: 0 10*3/MM3 (ref 0–0.4)
EOSINOPHIL # BLD AUTO: 0.04 10*3/MM3 (ref 0–0.4)
EOSINOPHIL # BLD AUTO: 0.09 10*3/MM3 (ref 0–0.4)
EOSINOPHIL # BLD AUTO: 0.1 10*3/MM3 (ref 0–0.4)
EOSINOPHIL # BLD AUTO: 0.11 10*3/MM3 (ref 0–0.4)
EOSINOPHIL # BLD AUTO: 0.17 10*3/MM3 (ref 0–0.4)
EOSINOPHIL # BLD AUTO: 0.19 10*3/MM3 (ref 0–0.4)
EOSINOPHIL # BLD AUTO: 0.19 10*3/MM3 (ref 0–0.4)
EOSINOPHIL # BLD AUTO: 0.32 10*3/MM3 (ref 0–0.4)
EOSINOPHIL # BLD AUTO: 0.34 10*3/MM3 (ref 0–0.4)
EOSINOPHIL # BLD AUTO: 0.64 10*3/MM3 (ref 0–0.4)
EOSINOPHIL NFR BLD AUTO: 0 % (ref 0.3–6.2)
EOSINOPHIL NFR BLD AUTO: 0.3 % (ref 0.3–6.2)
EOSINOPHIL NFR BLD AUTO: 0.8 % (ref 0.3–6.2)
EOSINOPHIL NFR BLD AUTO: 1 % (ref 0.3–6.2)
EOSINOPHIL NFR BLD AUTO: 1.1 % (ref 0.3–6.2)
EOSINOPHIL NFR BLD AUTO: 1.6 % (ref 0.3–6.2)
EOSINOPHIL NFR BLD AUTO: 1.8 % (ref 0.3–6.2)
EOSINOPHIL NFR BLD AUTO: 1.8 % (ref 0.3–6.2)
EOSINOPHIL NFR BLD AUTO: 2.1 % (ref 0.3–6.2)
EOSINOPHIL NFR BLD AUTO: 3.6 % (ref 0.3–6.2)
EOSINOPHIL NFR BLD AUTO: 4 % (ref 0.3–6.2)
ERYTHROCYTE [DISTWIDTH] IN BLOOD BY AUTOMATED COUNT: 14.1 % (ref 12.3–15.4)
ERYTHROCYTE [DISTWIDTH] IN BLOOD BY AUTOMATED COUNT: 14.2 % (ref 12.3–15.4)
ERYTHROCYTE [DISTWIDTH] IN BLOOD BY AUTOMATED COUNT: 14.3 % (ref 12.3–15.4)
ERYTHROCYTE [DISTWIDTH] IN BLOOD BY AUTOMATED COUNT: 14.4 % (ref 12.3–15.4)
ERYTHROCYTE [DISTWIDTH] IN BLOOD BY AUTOMATED COUNT: 14.5 % (ref 12.3–15.4)
ERYTHROCYTE [DISTWIDTH] IN BLOOD BY AUTOMATED COUNT: 14.6 % (ref 12.3–15.4)
ERYTHROCYTE [DISTWIDTH] IN BLOOD BY AUTOMATED COUNT: 14.7 % (ref 12.3–15.4)
ERYTHROCYTE [DISTWIDTH] IN BLOOD BY AUTOMATED COUNT: 15 % (ref 12.3–15.4)
ERYTHROCYTE [DISTWIDTH] IN BLOOD BY AUTOMATED COUNT: 15 % (ref 12.3–15.4)
FIBRINOGEN PPP-MCNC: 556 MG/DL (ref 219–464)
FLUAV H1 2009 PAND RNA NPH QL NAA+PROBE: NOT DETECTED
FLUAV H1 HA GENE NPH QL NAA+PROBE: NOT DETECTED
FLUAV H3 RNA NPH QL NAA+PROBE: NOT DETECTED
FLUAV SUBTYP SPEC NAA+PROBE: NOT DETECTED
FLUBV RNA ISLT QL NAA+PROBE: NOT DETECTED
FSP PPP LA-ACNC: NORMAL
GFR SERPL CREATININE-BSD FRML MDRD: 10 ML/MIN/1.73
GFR SERPL CREATININE-BSD FRML MDRD: 10 ML/MIN/1.73
GFR SERPL CREATININE-BSD FRML MDRD: 11 ML/MIN/1.73
GFR SERPL CREATININE-BSD FRML MDRD: 12 ML/MIN/1.73
GFR SERPL CREATININE-BSD FRML MDRD: 12 ML/MIN/1.73
GFR SERPL CREATININE-BSD FRML MDRD: 13 ML/MIN/1.73
GFR SERPL CREATININE-BSD FRML MDRD: 19 ML/MIN/1.73
GFR SERPL CREATININE-BSD FRML MDRD: 21 ML/MIN/1.73
GFR SERPL CREATININE-BSD FRML MDRD: 5 ML/MIN/1.73
GFR SERPL CREATININE-BSD FRML MDRD: 6 ML/MIN/1.73
GFR SERPL CREATININE-BSD FRML MDRD: 6 ML/MIN/1.73
GFR SERPL CREATININE-BSD FRML MDRD: 7 ML/MIN/1.73
GFR SERPL CREATININE-BSD FRML MDRD: 8 ML/MIN/1.73
GFR SERPL CREATININE-BSD FRML MDRD: 9 ML/MIN/1.73
GFR SERPL CREATININE-BSD FRML MDRD: ABNORMAL ML/MIN/{1.73_M2}
GLOBULIN UR ELPH-MCNC: 2.6 GM/DL
GLOBULIN UR ELPH-MCNC: 2.7 GM/DL
GLOBULIN UR ELPH-MCNC: 2.7 GM/DL
GLOBULIN UR ELPH-MCNC: 2.9 GM/DL
GLOBULIN UR ELPH-MCNC: 3 GM/DL
GLOBULIN UR ELPH-MCNC: 3.1 GM/DL
GLUCOSE BLD-MCNC: 183 MG/DL (ref 65–99)
GLUCOSE BLD-MCNC: 94 MG/DL (ref 65–99)
GLUCOSE BLDC GLUCOMTR-MCNC: 104 MG/DL (ref 70–130)
GLUCOSE BLDC GLUCOMTR-MCNC: 106 MG/DL (ref 70–130)
GLUCOSE BLDC GLUCOMTR-MCNC: 108 MG/DL (ref 70–130)
GLUCOSE BLDC GLUCOMTR-MCNC: 109 MG/DL (ref 70–130)
GLUCOSE BLDC GLUCOMTR-MCNC: 110 MG/DL (ref 70–130)
GLUCOSE BLDC GLUCOMTR-MCNC: 112 MG/DL (ref 70–130)
GLUCOSE BLDC GLUCOMTR-MCNC: 112 MG/DL (ref 70–130)
GLUCOSE BLDC GLUCOMTR-MCNC: 113 MG/DL (ref 70–130)
GLUCOSE BLDC GLUCOMTR-MCNC: 115 MG/DL (ref 70–130)
GLUCOSE BLDC GLUCOMTR-MCNC: 116 MG/DL (ref 70–130)
GLUCOSE BLDC GLUCOMTR-MCNC: 116 MG/DL (ref 70–130)
GLUCOSE BLDC GLUCOMTR-MCNC: 117 MG/DL (ref 70–130)
GLUCOSE BLDC GLUCOMTR-MCNC: 117 MG/DL (ref 70–130)
GLUCOSE BLDC GLUCOMTR-MCNC: 121 MG/DL (ref 70–130)
GLUCOSE BLDC GLUCOMTR-MCNC: 123 MG/DL (ref 70–130)
GLUCOSE BLDC GLUCOMTR-MCNC: 127 MG/DL (ref 70–130)
GLUCOSE BLDC GLUCOMTR-MCNC: 127 MG/DL (ref 70–130)
GLUCOSE BLDC GLUCOMTR-MCNC: 137 MG/DL (ref 70–130)
GLUCOSE BLDC GLUCOMTR-MCNC: 137 MG/DL (ref 70–130)
GLUCOSE BLDC GLUCOMTR-MCNC: 138 MG/DL (ref 70–130)
GLUCOSE BLDC GLUCOMTR-MCNC: 139 MG/DL (ref 70–130)
GLUCOSE BLDC GLUCOMTR-MCNC: 141 MG/DL (ref 70–130)
GLUCOSE BLDC GLUCOMTR-MCNC: 143 MG/DL (ref 70–130)
GLUCOSE BLDC GLUCOMTR-MCNC: 143 MG/DL (ref 70–130)
GLUCOSE BLDC GLUCOMTR-MCNC: 144 MG/DL (ref 70–130)
GLUCOSE BLDC GLUCOMTR-MCNC: 144 MG/DL (ref 70–130)
GLUCOSE BLDC GLUCOMTR-MCNC: 146 MG/DL (ref 70–130)
GLUCOSE BLDC GLUCOMTR-MCNC: 148 MG/DL (ref 70–130)
GLUCOSE BLDC GLUCOMTR-MCNC: 151 MG/DL (ref 70–130)
GLUCOSE BLDC GLUCOMTR-MCNC: 151 MG/DL (ref 70–130)
GLUCOSE BLDC GLUCOMTR-MCNC: 152 MG/DL (ref 70–130)
GLUCOSE BLDC GLUCOMTR-MCNC: 153 MG/DL (ref 70–130)
GLUCOSE BLDC GLUCOMTR-MCNC: 154 MG/DL (ref 70–130)
GLUCOSE BLDC GLUCOMTR-MCNC: 156 MG/DL (ref 70–130)
GLUCOSE BLDC GLUCOMTR-MCNC: 156 MG/DL (ref 70–130)
GLUCOSE BLDC GLUCOMTR-MCNC: 157 MG/DL (ref 70–130)
GLUCOSE BLDC GLUCOMTR-MCNC: 166 MG/DL (ref 70–130)
GLUCOSE BLDC GLUCOMTR-MCNC: 167 MG/DL (ref 70–130)
GLUCOSE BLDC GLUCOMTR-MCNC: 167 MG/DL (ref 70–130)
GLUCOSE BLDC GLUCOMTR-MCNC: 169 MG/DL (ref 70–130)
GLUCOSE BLDC GLUCOMTR-MCNC: 170 MG/DL (ref 70–130)
GLUCOSE BLDC GLUCOMTR-MCNC: 174 MG/DL (ref 70–130)
GLUCOSE BLDC GLUCOMTR-MCNC: 178 MG/DL (ref 70–130)
GLUCOSE BLDC GLUCOMTR-MCNC: 179 MG/DL (ref 70–130)
GLUCOSE BLDC GLUCOMTR-MCNC: 183 MG/DL (ref 70–130)
GLUCOSE BLDC GLUCOMTR-MCNC: 184 MG/DL (ref 70–130)
GLUCOSE BLDC GLUCOMTR-MCNC: 187 MG/DL (ref 70–130)
GLUCOSE BLDC GLUCOMTR-MCNC: 187 MG/DL (ref 70–130)
GLUCOSE BLDC GLUCOMTR-MCNC: 189 MG/DL (ref 70–130)
GLUCOSE BLDC GLUCOMTR-MCNC: 189 MG/DL (ref 70–130)
GLUCOSE BLDC GLUCOMTR-MCNC: 190 MG/DL (ref 70–130)
GLUCOSE BLDC GLUCOMTR-MCNC: 193 MG/DL (ref 70–130)
GLUCOSE BLDC GLUCOMTR-MCNC: 194 MG/DL (ref 70–130)
GLUCOSE BLDC GLUCOMTR-MCNC: 202 MG/DL (ref 70–130)
GLUCOSE BLDC GLUCOMTR-MCNC: 205 MG/DL (ref 70–130)
GLUCOSE BLDC GLUCOMTR-MCNC: 205 MG/DL (ref 70–130)
GLUCOSE BLDC GLUCOMTR-MCNC: 212 MG/DL (ref 70–130)
GLUCOSE BLDC GLUCOMTR-MCNC: 216 MG/DL (ref 70–130)
GLUCOSE BLDC GLUCOMTR-MCNC: 223 MG/DL (ref 70–130)
GLUCOSE BLDC GLUCOMTR-MCNC: 223 MG/DL (ref 70–130)
GLUCOSE BLDC GLUCOMTR-MCNC: 235 MG/DL (ref 70–130)
GLUCOSE BLDC GLUCOMTR-MCNC: 236 MG/DL (ref 70–130)
GLUCOSE BLDC GLUCOMTR-MCNC: 236 MG/DL (ref 70–130)
GLUCOSE BLDC GLUCOMTR-MCNC: 237 MG/DL (ref 70–130)
GLUCOSE BLDC GLUCOMTR-MCNC: 237 MG/DL (ref 70–130)
GLUCOSE BLDC GLUCOMTR-MCNC: 239 MG/DL (ref 70–130)
GLUCOSE BLDC GLUCOMTR-MCNC: 242 MG/DL (ref 70–130)
GLUCOSE BLDC GLUCOMTR-MCNC: 254 MG/DL (ref 70–130)
GLUCOSE BLDC GLUCOMTR-MCNC: 286 MG/DL (ref 70–130)
GLUCOSE BLDC GLUCOMTR-MCNC: 292 MG/DL (ref 70–130)
GLUCOSE BLDC GLUCOMTR-MCNC: 296 MG/DL (ref 70–130)
GLUCOSE BLDC GLUCOMTR-MCNC: 305 MG/DL (ref 70–130)
GLUCOSE BLDC GLUCOMTR-MCNC: 336 MG/DL (ref 70–130)
GLUCOSE BLDC GLUCOMTR-MCNC: 384 MG/DL (ref 70–130)
GLUCOSE BLDC GLUCOMTR-MCNC: 400 MG/DL (ref 70–130)
GLUCOSE BLDC GLUCOMTR-MCNC: 429 MG/DL (ref 70–130)
GLUCOSE BLDC GLUCOMTR-MCNC: 436 MG/DL (ref 70–130)
GLUCOSE BLDC GLUCOMTR-MCNC: 61 MG/DL (ref 70–130)
GLUCOSE BLDC GLUCOMTR-MCNC: 66 MG/DL (ref 70–130)
GLUCOSE BLDC GLUCOMTR-MCNC: 70 MG/DL (ref 70–130)
GLUCOSE BLDC GLUCOMTR-MCNC: 70 MG/DL (ref 70–130)
GLUCOSE BLDC GLUCOMTR-MCNC: 79 MG/DL (ref 70–130)
GLUCOSE BLDC GLUCOMTR-MCNC: 79 MG/DL (ref 70–130)
GLUCOSE BLDC GLUCOMTR-MCNC: 82 MG/DL (ref 70–130)
GLUCOSE BLDC GLUCOMTR-MCNC: 86 MG/DL (ref 70–130)
GLUCOSE BLDC GLUCOMTR-MCNC: 89 MG/DL (ref 70–130)
GLUCOSE BLDC GLUCOMTR-MCNC: 90 MG/DL (ref 70–130)
GLUCOSE BLDC GLUCOMTR-MCNC: 91 MG/DL (ref 70–130)
GLUCOSE BLDC GLUCOMTR-MCNC: 95 MG/DL (ref 70–130)
GLUCOSE BLDC GLUCOMTR-MCNC: 97 MG/DL (ref 70–130)
GLUCOSE BLDC GLUCOMTR-MCNC: 97 MG/DL (ref 70–130)
GLUCOSE BLDC GLUCOMTR-MCNC: 99 MG/DL (ref 70–130)
GLUCOSE SERPL-MCNC: 112 MG/DL (ref 65–99)
GLUCOSE SERPL-MCNC: 112 MG/DL (ref 65–99)
GLUCOSE SERPL-MCNC: 114 MG/DL (ref 65–99)
GLUCOSE SERPL-MCNC: 115 MG/DL (ref 65–99)
GLUCOSE SERPL-MCNC: 120 MG/DL (ref 65–99)
GLUCOSE SERPL-MCNC: 128 MG/DL (ref 65–99)
GLUCOSE SERPL-MCNC: 147 MG/DL (ref 65–99)
GLUCOSE SERPL-MCNC: 149 MG/DL (ref 65–99)
GLUCOSE SERPL-MCNC: 160 MG/DL (ref 65–99)
GLUCOSE SERPL-MCNC: 169 MG/DL (ref 65–99)
GLUCOSE SERPL-MCNC: 175 MG/DL (ref 65–99)
GLUCOSE SERPL-MCNC: 194 MG/DL (ref 65–99)
GLUCOSE SERPL-MCNC: 211 MG/DL (ref 65–99)
GLUCOSE SERPL-MCNC: 230 MG/DL (ref 65–99)
GLUCOSE SERPL-MCNC: 259 MG/DL (ref 65–99)
GLUCOSE SERPL-MCNC: 283 MG/DL (ref 65–99)
GLUCOSE SERPL-MCNC: 442 MG/DL (ref 65–99)
GLUCOSE SERPL-MCNC: 57 MG/DL (ref 65–99)
GLUCOSE SERPL-MCNC: 84 MG/DL (ref 65–99)
GLUCOSE SERPL-MCNC: 98 MG/DL (ref 65–99)
GLUCOSE UR STRIP-MCNC: NEGATIVE MG/DL
GLUCOSE UR STRIP-MCNC: NEGATIVE MG/DL
GRAM STN SPEC: ABNORMAL
HADV DNA SPEC NAA+PROBE: NOT DETECTED
HBA1C MFR BLD: 6.4 % (ref 4.8–5.6)
HCO3 BLDA-SCNC: 14.9 MMOL/L (ref 22–28)
HCO3 BLDA-SCNC: 18.9 MMOL/L (ref 22–28)
HCO3 BLDA-SCNC: 22.3 MMOL/L (ref 22–28)
HCO3 BLDA-SCNC: 23.7 MMOL/L (ref 22–28)
HCO3 BLDA-SCNC: 24.6 MMOL/L (ref 22–28)
HCO3 BLDV-SCNC: 25.1 MMOL/L (ref 22–28)
HCOV 229E RNA SPEC QL NAA+PROBE: NOT DETECTED
HCOV HKU1 RNA SPEC QL NAA+PROBE: NOT DETECTED
HCOV NL63 RNA SPEC QL NAA+PROBE: NOT DETECTED
HCOV OC43 RNA SPEC QL NAA+PROBE: NOT DETECTED
HCT VFR BLD AUTO: 32.9 % (ref 37.5–51)
HCT VFR BLD AUTO: 34.2 % (ref 37.5–51)
HCT VFR BLD AUTO: 34.5 % (ref 37.5–51)
HCT VFR BLD AUTO: 34.9 % (ref 37.5–51)
HCT VFR BLD AUTO: 34.9 % (ref 37.5–51)
HCT VFR BLD AUTO: 35.3 % (ref 37.5–51)
HCT VFR BLD AUTO: 36 % (ref 37.5–51)
HCT VFR BLD AUTO: 36.1 % (ref 37.5–51)
HCT VFR BLD AUTO: 36.1 % (ref 37.5–51)
HCT VFR BLD AUTO: 36.8 % (ref 37.5–51)
HCT VFR BLD AUTO: 37 % (ref 37.5–51)
HCT VFR BLD AUTO: 37.1 % (ref 37.5–51)
HCT VFR BLD AUTO: 38.6 % (ref 37.5–51)
HCT VFR BLD AUTO: 38.9 % (ref 37.5–51)
HCT VFR BLD AUTO: 41.2 % (ref 37.5–51)
HCT VFR BLD AUTO: 41.5 % (ref 37.5–51)
HCT VFR BLD AUTO: 41.7 % (ref 37.5–51)
HGB BLD-MCNC: 10.4 G/DL (ref 13–17.7)
HGB BLD-MCNC: 11.1 G/DL (ref 13–17.7)
HGB BLD-MCNC: 11.2 G/DL (ref 13–17.7)
HGB BLD-MCNC: 11.2 G/DL (ref 13–17.7)
HGB BLD-MCNC: 11.3 G/DL (ref 13–17.7)
HGB BLD-MCNC: 11.5 G/DL (ref 13–17.7)
HGB BLD-MCNC: 11.6 G/DL (ref 13–17.7)
HGB BLD-MCNC: 11.8 G/DL (ref 13–17.7)
HGB BLD-MCNC: 11.9 G/DL (ref 13–17.7)
HGB BLD-MCNC: 12.3 G/DL (ref 13–17.7)
HGB BLD-MCNC: 12.5 G/DL (ref 13–17.7)
HGB BLD-MCNC: 12.6 G/DL (ref 13–17.7)
HGB BLD-MCNC: 12.9 G/DL (ref 13–17.7)
HGB BLD-MCNC: 13.2 G/DL (ref 13–17.7)
HGB UR QL STRIP.AUTO: ABNORMAL
HGB UR QL STRIP.AUTO: ABNORMAL
HMPV RNA NPH QL NAA+NON-PROBE: NOT DETECTED
HPIV1 RNA SPEC QL NAA+PROBE: NOT DETECTED
HPIV2 RNA SPEC QL NAA+PROBE: NOT DETECTED
HPIV3 RNA NPH QL NAA+PROBE: NOT DETECTED
HPIV4 P GENE NPH QL NAA+PROBE: NOT DETECTED
HYALINE CASTS UR QL AUTO: ABNORMAL /LPF
HYALINE CASTS UR QL AUTO: ABNORMAL /LPF
IMM GRANULOCYTES # BLD AUTO: 0.03 10*3/MM3 (ref 0–0.05)
IMM GRANULOCYTES # BLD AUTO: 0.04 10*3/MM3 (ref 0–0.05)
IMM GRANULOCYTES # BLD AUTO: 0.05 10*3/MM3 (ref 0–0.05)
IMM GRANULOCYTES # BLD AUTO: 0.05 10*3/MM3 (ref 0–0.05)
IMM GRANULOCYTES # BLD AUTO: 0.06 10*3/MM3 (ref 0–0.05)
IMM GRANULOCYTES # BLD AUTO: 0.07 10*3/MM3 (ref 0–0.05)
IMM GRANULOCYTES # BLD AUTO: 0.08 10*3/MM3 (ref 0–0.05)
IMM GRANULOCYTES # BLD AUTO: 0.08 10*3/MM3 (ref 0–0.05)
IMM GRANULOCYTES # BLD AUTO: 0.14 10*3/MM3 (ref 0–0.05)
IMM GRANULOCYTES # BLD AUTO: 0.18 10*3/MM3 (ref 0–0.05)
IMM GRANULOCYTES NFR BLD AUTO: 0.3 % (ref 0–0.5)
IMM GRANULOCYTES NFR BLD AUTO: 0.4 % (ref 0–0.5)
IMM GRANULOCYTES NFR BLD AUTO: 0.5 % (ref 0–0.5)
IMM GRANULOCYTES NFR BLD AUTO: 0.6 % (ref 0–0.5)
IMM GRANULOCYTES NFR BLD AUTO: 0.6 % (ref 0–0.5)
IMM GRANULOCYTES NFR BLD AUTO: 0.7 % (ref 0–0.5)
IMM GRANULOCYTES NFR BLD AUTO: 1.5 % (ref 0–0.5)
INHALED O2 CONCENTRATION: 100 %
INHALED O2 CONCENTRATION: 24 %
INHALED O2 CONCENTRATION: 30 %
INR PPP: 1.3 (ref 0.91–1.09)
INR PPP: 1.3 (ref 0.9–1.1)
INR PPP: 1.4 (ref 0.91–1.09)
INR PPP: 1.41 (ref 0.9–1.1)
INR PPP: 1.49 (ref 0.9–1.1)
INR PPP: 1.5
INR PPP: 1.5 (ref 0.9–1.1)
INR PPP: 1.7 (ref 0.91–1.09)
INR PPP: 1.8 (ref 0.9–1.1)
INR PPP: 1.9
INR PPP: 2 (ref 0.91–1.09)
INR PPP: 2.17 (ref 0.9–1.1)
INR PPP: 2.18 (ref 0.9–1.1)
INR PPP: 2.4 (ref 0.91–1.09)
INR PPP: 2.54 (ref 0.9–1.1)
INR PPP: 2.84 (ref 0.9–1.1)
INR PPP: 2.85 (ref 0.9–1.1)
INR PPP: 2.9 (ref 0.91–1.09)
INR PPP: 2.95 (ref 0.9–1.1)
INR PPP: 3.05 (ref 0.9–1.1)
INR PPP: 3.12 (ref 0.9–1.1)
INR PPP: 3.23 (ref 0.9–1.1)
INR PPP: 3.39 (ref 0.9–1.1)
INR PPP: 3.4 (ref 0.9–1.1)
INR PPP: 3.44 (ref 0.9–1.1)
INR PPP: 3.48 (ref 0.9–1.1)
INR PPP: 3.68 (ref 0.9–1.1)
INR PPP: 3.71 (ref 0.9–1.1)
INR PPP: 3.91 (ref 0.9–1.1)
INR PPP: 3.92 (ref 0.9–1.1)
INR PPP: 5.46 (ref 0.9–1.1)
INR PPP: 8.45 (ref 0.9–1.1)
KETONES UR QL STRIP: ABNORMAL
KETONES UR QL STRIP: ABNORMAL
LACTATE HOLD SPECIMEN: NORMAL
LDH SERPL-CCNC: 263 U/L (ref 135–225)
LEFT ATRIUM VOLUME INDEX: 20 ML/M2
LEFT ATRIUM VOLUME INDEX: 35 ML/M2
LEUKOCYTE ESTERASE UR QL STRIP.AUTO: ABNORMAL
LEUKOCYTE ESTERASE UR QL STRIP.AUTO: ABNORMAL
LYMPHOCYTES # BLD AUTO: 0.77 10*3/MM3 (ref 0.7–3.1)
LYMPHOCYTES # BLD AUTO: 1 10*3/MM3 (ref 0.7–3.1)
LYMPHOCYTES # BLD AUTO: 1.15 10*3/MM3 (ref 0.7–3.1)
LYMPHOCYTES # BLD AUTO: 1.22 10*3/MM3 (ref 0.7–3.1)
LYMPHOCYTES # BLD AUTO: 1.33 10*3/MM3 (ref 0.7–3.1)
LYMPHOCYTES # BLD AUTO: 1.47 10*3/MM3 (ref 0.7–3.1)
LYMPHOCYTES # BLD AUTO: 1.55 10*3/MM3 (ref 0.7–3.1)
LYMPHOCYTES # BLD AUTO: 1.7 10*3/MM3 (ref 0.7–3.1)
LYMPHOCYTES # BLD AUTO: 1.82 10*3/MM3 (ref 0.7–3.1)
LYMPHOCYTES # BLD AUTO: 1.84 10*3/MM3 (ref 0.7–3.1)
LYMPHOCYTES # BLD AUTO: 1.94 10*3/MM3 (ref 0.7–3.1)
LYMPHOCYTES # BLD AUTO: 1.95 10*3/MM3 (ref 0.7–3.1)
LYMPHOCYTES # BLD AUTO: 2.59 10*3/MM3 (ref 0.7–3.1)
LYMPHOCYTES # BLD AUTO: 3.61 10*3/MM3 (ref 0.7–3.1)
LYMPHOCYTES NFR BLD AUTO: 10.8 % (ref 19.6–45.3)
LYMPHOCYTES NFR BLD AUTO: 12.1 % (ref 19.6–45.3)
LYMPHOCYTES NFR BLD AUTO: 14 % (ref 19.6–45.3)
LYMPHOCYTES NFR BLD AUTO: 14.1 % (ref 19.6–45.3)
LYMPHOCYTES NFR BLD AUTO: 14.7 % (ref 19.6–45.3)
LYMPHOCYTES NFR BLD AUTO: 14.8 % (ref 19.6–45.3)
LYMPHOCYTES NFR BLD AUTO: 16.1 % (ref 19.6–45.3)
LYMPHOCYTES NFR BLD AUTO: 16.4 % (ref 19.6–45.3)
LYMPHOCYTES NFR BLD AUTO: 17.2 % (ref 19.6–45.3)
LYMPHOCYTES NFR BLD AUTO: 17.5 % (ref 19.6–45.3)
LYMPHOCYTES NFR BLD AUTO: 18.1 % (ref 19.6–45.3)
LYMPHOCYTES NFR BLD AUTO: 18.5 % (ref 19.6–45.3)
LYMPHOCYTES NFR BLD AUTO: 20.4 % (ref 19.6–45.3)
LYMPHOCYTES NFR BLD AUTO: 9.4 % (ref 19.6–45.3)
M PNEUMO IGG SER IA-ACNC: NOT DETECTED
MAGNESIUM SERPL-MCNC: 2.1 MG/DL (ref 1.6–2.4)
MAGNESIUM SERPL-MCNC: 2.1 MG/DL (ref 1.6–2.4)
MAGNESIUM SERPL-MCNC: 2.3 MG/DL (ref 1.6–2.4)
MAGNESIUM SERPL-MCNC: 2.3 MG/DL (ref 1.6–2.4)
MAGNESIUM SERPL-MCNC: 2.4 MG/DL (ref 1.6–2.4)
MAGNESIUM SERPL-MCNC: 2.4 MG/DL (ref 1.6–2.4)
MAGNESIUM SERPL-MCNC: 2.5 MG/DL (ref 1.6–2.4)
MAGNESIUM SERPL-MCNC: 2.6 MG/DL (ref 1.6–2.4)
MAGNESIUM SERPL-MCNC: 2.6 MG/DL (ref 1.6–2.4)
MAXIMAL PREDICTED HEART RATE: 142 BPM
MCH RBC QN AUTO: 29.1 PG (ref 26.6–33)
MCH RBC QN AUTO: 29.5 PG (ref 26.6–33)
MCH RBC QN AUTO: 29.7 PG (ref 26.6–33)
MCH RBC QN AUTO: 29.7 PG (ref 26.6–33)
MCH RBC QN AUTO: 30.1 PG (ref 26.6–33)
MCH RBC QN AUTO: 30.3 PG (ref 26.6–33)
MCH RBC QN AUTO: 30.4 PG (ref 26.6–33)
MCH RBC QN AUTO: 30.6 PG (ref 26.6–33)
MCH RBC QN AUTO: 30.7 PG (ref 26.6–33)
MCH RBC QN AUTO: 30.8 PG (ref 26.6–33)
MCH RBC QN AUTO: 30.9 PG (ref 26.6–33)
MCHC RBC AUTO-ENTMCNC: 30.6 G/DL (ref 31.5–35.7)
MCHC RBC AUTO-ENTMCNC: 30.9 G/DL (ref 31.5–35.7)
MCHC RBC AUTO-ENTMCNC: 31 G/DL (ref 31.5–35.7)
MCHC RBC AUTO-ENTMCNC: 31.1 G/DL (ref 31.5–35.7)
MCHC RBC AUTO-ENTMCNC: 31.3 G/DL (ref 31.5–35.7)
MCHC RBC AUTO-ENTMCNC: 31.4 G/DL (ref 31.5–35.7)
MCHC RBC AUTO-ENTMCNC: 31.5 G/DL (ref 31.5–35.7)
MCHC RBC AUTO-ENTMCNC: 31.6 G/DL (ref 31.5–35.7)
MCHC RBC AUTO-ENTMCNC: 31.6 G/DL (ref 31.5–35.7)
MCHC RBC AUTO-ENTMCNC: 31.8 G/DL (ref 31.5–35.7)
MCHC RBC AUTO-ENTMCNC: 31.8 G/DL (ref 31.5–35.7)
MCHC RBC AUTO-ENTMCNC: 32.1 G/DL (ref 31.5–35.7)
MCHC RBC AUTO-ENTMCNC: 32.1 G/DL (ref 31.5–35.7)
MCHC RBC AUTO-ENTMCNC: 32.2 G/DL (ref 31.5–35.7)
MCHC RBC AUTO-ENTMCNC: 32.4 G/DL (ref 31.5–35.7)
MCHC RBC AUTO-ENTMCNC: 32.5 G/DL (ref 31.5–35.7)
MCHC RBC AUTO-ENTMCNC: 32.8 G/DL (ref 31.5–35.7)
MCV RBC AUTO: 90 FL (ref 79–97)
MCV RBC AUTO: 92.5 FL (ref 79–97)
MCV RBC AUTO: 93.5 FL (ref 79–97)
MCV RBC AUTO: 94.6 FL (ref 79–97)
MCV RBC AUTO: 95 FL (ref 79–97)
MCV RBC AUTO: 95.6 FL (ref 79–97)
MCV RBC AUTO: 95.8 FL (ref 79–97)
MCV RBC AUTO: 95.9 FL (ref 79–97)
MCV RBC AUTO: 96.1 FL (ref 79–97)
MCV RBC AUTO: 96.1 FL (ref 79–97)
MCV RBC AUTO: 96.5 FL (ref 79–97)
MCV RBC AUTO: 96.8 FL (ref 79–97)
MCV RBC AUTO: 96.8 FL (ref 79–97)
MCV RBC AUTO: 97 FL (ref 79–97)
MCV RBC AUTO: 97.6 FL (ref 79–97)
MCV RBC AUTO: 98.3 FL (ref 79–97)
MCV RBC AUTO: 98.4 FL (ref 79–97)
MODALITY: ABNORMAL
MONOCYTES # BLD AUTO: 0.52 10*3/MM3 (ref 0.1–0.9)
MONOCYTES # BLD AUTO: 0.82 10*3/MM3 (ref 0.1–0.9)
MONOCYTES # BLD AUTO: 0.82 10*3/MM3 (ref 0.1–0.9)
MONOCYTES # BLD AUTO: 1.14 10*3/MM3 (ref 0.1–0.9)
MONOCYTES # BLD AUTO: 1.17 10*3/MM3 (ref 0.1–0.9)
MONOCYTES # BLD AUTO: 1.26 10*3/MM3 (ref 0.1–0.9)
MONOCYTES # BLD AUTO: 1.3 10*3/MM3 (ref 0.1–0.9)
MONOCYTES # BLD AUTO: 1.43 10*3/MM3 (ref 0.1–0.9)
MONOCYTES # BLD AUTO: 1.43 10*3/MM3 (ref 0.1–0.9)
MONOCYTES # BLD AUTO: 1.45 10*3/MM3 (ref 0.1–0.9)
MONOCYTES # BLD AUTO: 1.51 10*3/MM3 (ref 0.1–0.9)
MONOCYTES # BLD AUTO: 1.68 10*3/MM3 (ref 0.1–0.9)
MONOCYTES # BLD AUTO: 1.87 10*3/MM3 (ref 0.1–0.9)
MONOCYTES # BLD AUTO: 2.47 10*3/MM3 (ref 0.1–0.9)
MONOCYTES NFR BLD AUTO: 10.3 % (ref 5–12)
MONOCYTES NFR BLD AUTO: 11.5 % (ref 5–12)
MONOCYTES NFR BLD AUTO: 11.7 % (ref 5–12)
MONOCYTES NFR BLD AUTO: 11.8 % (ref 5–12)
MONOCYTES NFR BLD AUTO: 12.6 % (ref 5–12)
MONOCYTES NFR BLD AUTO: 13.6 % (ref 5–12)
MONOCYTES NFR BLD AUTO: 13.8 % (ref 5–12)
MONOCYTES NFR BLD AUTO: 14.2 % (ref 5–12)
MONOCYTES NFR BLD AUTO: 14.4 % (ref 5–12)
MONOCYTES NFR BLD AUTO: 14.9 % (ref 5–12)
MONOCYTES NFR BLD AUTO: 15.4 % (ref 5–12)
MONOCYTES NFR BLD AUTO: 6.3 % (ref 5–12)
MONOCYTES NFR BLD AUTO: 9.9 % (ref 5–12)
MONOCYTES NFR BLD AUTO: 9.9 % (ref 5–12)
NEUTROPHILS NFR BLD AUTO: 10.55 10*3/MM3 (ref 1.7–7)
NEUTROPHILS NFR BLD AUTO: 12.9 10*3/MM3 (ref 1.7–7)
NEUTROPHILS NFR BLD AUTO: 5.88 10*3/MM3 (ref 1.7–7)
NEUTROPHILS NFR BLD AUTO: 6.15 10*3/MM3 (ref 1.7–7)
NEUTROPHILS NFR BLD AUTO: 6.15 10*3/MM3 (ref 1.7–7)
NEUTROPHILS NFR BLD AUTO: 6.18 10*3/MM3 (ref 1.7–7)
NEUTROPHILS NFR BLD AUTO: 6.35 10*3/MM3 (ref 1.7–7)
NEUTROPHILS NFR BLD AUTO: 6.54 10*3/MM3 (ref 1.7–7)
NEUTROPHILS NFR BLD AUTO: 6.85 10*3/MM3 (ref 1.7–7)
NEUTROPHILS NFR BLD AUTO: 6.92 10*3/MM3 (ref 1.7–7)
NEUTROPHILS NFR BLD AUTO: 61.5 % (ref 42.7–76)
NEUTROPHILS NFR BLD AUTO: 65.9 % (ref 42.7–76)
NEUTROPHILS NFR BLD AUTO: 66.1 % (ref 42.7–76)
NEUTROPHILS NFR BLD AUTO: 66.1 % (ref 42.7–76)
NEUTROPHILS NFR BLD AUTO: 66.8 % (ref 42.7–76)
NEUTROPHILS NFR BLD AUTO: 67.2 % (ref 42.7–76)
NEUTROPHILS NFR BLD AUTO: 68.2 % (ref 42.7–76)
NEUTROPHILS NFR BLD AUTO: 69 % (ref 42.7–76)
NEUTROPHILS NFR BLD AUTO: 69.3 % (ref 42.7–76)
NEUTROPHILS NFR BLD AUTO: 7.66 10*3/MM3 (ref 1.7–7)
NEUTROPHILS NFR BLD AUTO: 7.68 10*3/MM3 (ref 1.7–7)
NEUTROPHILS NFR BLD AUTO: 71.9 % (ref 42.7–76)
NEUTROPHILS NFR BLD AUTO: 74.4 % (ref 42.7–76)
NEUTROPHILS NFR BLD AUTO: 74.9 % (ref 42.7–76)
NEUTROPHILS NFR BLD AUTO: 77.1 % (ref 42.7–76)
NEUTROPHILS NFR BLD AUTO: 8.29 10*3/MM3 (ref 1.7–7)
NEUTROPHILS NFR BLD AUTO: 8.54 10*3/MM3 (ref 1.7–7)
NEUTROPHILS NFR BLD AUTO: 83.6 % (ref 42.7–76)
NITRITE UR QL STRIP: POSITIVE
NITRITE UR QL STRIP: POSITIVE
NRBC BLD AUTO-RTO: 0 /100 WBC (ref 0–0.2)
NRBC BLD AUTO-RTO: 0.1 /100 WBC (ref 0–0.2)
NRBC BLD AUTO-RTO: 0.1 /100 WBC (ref 0–0.2)
NRBC BLD AUTO-RTO: 0.4 /100 WBC (ref 0–0.2)
NRBC BLD AUTO-RTO: 0.6 /100 WBC (ref 0–0.2)
NRBC BLD AUTO-RTO: 0.6 /100 WBC (ref 0–0.2)
NRBC BLD AUTO-RTO: 0.7 /100 WBC (ref 0–0.2)
NRBC BLD AUTO-RTO: 1.3 /100 WBC (ref 0–0.2)
O2 A-A PPRESDIFF RESPIRATORY: 0.1 MMHG
O2 A-A PPRESDIFF RESPIRATORY: 0.5 MMHG
O2 A-A PPRESDIFF RESPIRATORY: 0.6 MMHG
PCO2 BLDA: 25.9 MM HG (ref 35–45)
PCO2 BLDA: 34.5 MM HG (ref 35–45)
PCO2 BLDA: 35.9 MM HG (ref 35–45)
PCO2 BLDA: 38.1 MM HG (ref 35–45)
PCO2 BLDA: 38.5 MM HG (ref 35–45)
PCO2 BLDV: 79.4 MM HG (ref 41–51)
PEEP RESPIRATORY: 5 CM[H2O]
PH BLDA: 7.3 PH UNITS (ref 7.35–7.45)
PH BLDA: 7.37 PH UNITS (ref 7.35–7.45)
PH BLDA: 7.42 PH UNITS (ref 7.35–7.45)
PH BLDA: 7.42 PH UNITS (ref 7.35–7.45)
PH BLDA: 7.43 PH UNITS (ref 7.35–7.45)
PH BLDV: 7.11 PH UNITS (ref 7.31–7.41)
PH UR STRIP.AUTO: 6.5 [PH] (ref 5–8)
PH UR STRIP.AUTO: <=5 [PH] (ref 5–8)
PHOSPHATE SERPL-MCNC: 3.3 MG/DL (ref 2.5–4.5)
PHOSPHATE SERPL-MCNC: 4 MG/DL (ref 2.5–4.5)
PHOSPHATE SERPL-MCNC: 5.1 MG/DL (ref 2.5–4.5)
PHOSPHATE SERPL-MCNC: 5.5 MG/DL (ref 2.5–4.5)
PHOSPHATE SERPL-MCNC: 5.5 MG/DL (ref 2.5–4.5)
PHOSPHATE SERPL-MCNC: 5.6 MG/DL (ref 2.5–4.5)
PHOSPHATE SERPL-MCNC: 5.7 MG/DL (ref 2.5–4.5)
PHOSPHATE SERPL-MCNC: 6 MG/DL (ref 2.5–4.5)
PHOSPHATE SERPL-MCNC: 6.1 MG/DL (ref 2.5–4.5)
PHOSPHATE SERPL-MCNC: 6.4 MG/DL (ref 2.5–4.5)
PHOSPHATE SERPL-MCNC: 8.6 MG/DL (ref 2.5–4.5)
PLATELET # BLD AUTO: 155 10*3/MM3 (ref 140–450)
PLATELET # BLD AUTO: 159 10*3/MM3 (ref 140–450)
PLATELET # BLD AUTO: 197 10*3/MM3 (ref 140–450)
PLATELET # BLD AUTO: 199 10*3/MM3 (ref 140–450)
PLATELET # BLD AUTO: 215 10*3/MM3 (ref 140–450)
PLATELET # BLD AUTO: 228 10*3/MM3 (ref 140–450)
PLATELET # BLD AUTO: 235 10*3/MM3 (ref 140–450)
PLATELET # BLD AUTO: 240 10*3/MM3 (ref 140–450)
PLATELET # BLD AUTO: 258 10*3/MM3 (ref 140–450)
PLATELET # BLD AUTO: 259 10*3/MM3 (ref 140–450)
PLATELET # BLD AUTO: 260 10*3/MM3 (ref 140–450)
PLATELET # BLD AUTO: 268 10*3/MM3 (ref 140–450)
PLATELET # BLD AUTO: 272 10*3/MM3 (ref 140–450)
PLATELET # BLD AUTO: 287 10*3/MM3 (ref 140–450)
PLATELET # BLD AUTO: 300 10*3/MM3 (ref 140–450)
PLATELET # BLD AUTO: 324 10*3/MM3 (ref 140–450)
PLATELET # BLD AUTO: 353 10*3/MM3 (ref 140–450)
PMV BLD AUTO: 10 FL (ref 6–12)
PMV BLD AUTO: 10.1 FL (ref 6–12)
PMV BLD AUTO: 10.2 FL (ref 6–12)
PMV BLD AUTO: 10.4 FL (ref 6–12)
PMV BLD AUTO: 10.6 FL (ref 6–12)
PMV BLD AUTO: 10.7 FL (ref 6–12)
PMV BLD AUTO: 10.9 FL (ref 6–12)
PMV BLD AUTO: 10.9 FL (ref 6–12)
PMV BLD AUTO: 11 FL (ref 6–12)
PMV BLD AUTO: 11 FL (ref 6–12)
PMV BLD AUTO: 11.1 FL (ref 6–12)
PMV BLD AUTO: 11.1 FL (ref 6–12)
PMV BLD AUTO: 11.2 FL (ref 6–12)
PMV BLD AUTO: 11.3 FL (ref 6–12)
PMV BLD AUTO: 11.6 FL (ref 6–12)
PO2 BLDA: 101.9 MM HG (ref 80–100)
PO2 BLDA: 109.8 MM HG (ref 80–100)
PO2 BLDA: 48.7 MM HG (ref 80–100)
PO2 BLDA: 76.8 MM HG (ref 80–100)
PO2 BLDA: 99.1 MM HG (ref 80–100)
PO2 BLDV: 55.6 MM HG (ref 35–45)
POTASSIUM BLD-SCNC: 3.9 MMOL/L (ref 3.5–5.2)
POTASSIUM BLD-SCNC: 4.1 MMOL/L (ref 3.5–5.2)
POTASSIUM SERPL-SCNC: 2.8 MMOL/L (ref 3.5–5.2)
POTASSIUM SERPL-SCNC: 2.8 MMOL/L (ref 3.5–5.2)
POTASSIUM SERPL-SCNC: 3 MMOL/L (ref 3.5–5.2)
POTASSIUM SERPL-SCNC: 3.3 MMOL/L (ref 3.5–5.2)
POTASSIUM SERPL-SCNC: 3.4 MMOL/L (ref 3.5–5.2)
POTASSIUM SERPL-SCNC: 3.5 MMOL/L (ref 3.5–5.2)
POTASSIUM SERPL-SCNC: 3.6 MMOL/L (ref 3.5–5.2)
POTASSIUM SERPL-SCNC: 3.7 MMOL/L (ref 3.5–5.2)
POTASSIUM SERPL-SCNC: 3.8 MMOL/L (ref 3.5–5.2)
POTASSIUM SERPL-SCNC: 3.9 MMOL/L (ref 3.5–5.2)
POTASSIUM SERPL-SCNC: 3.9 MMOL/L (ref 3.5–5.2)
POTASSIUM SERPL-SCNC: 4 MMOL/L (ref 3.5–5.2)
POTASSIUM SERPL-SCNC: 4.1 MMOL/L (ref 3.5–5.2)
POTASSIUM SERPL-SCNC: 4.2 MMOL/L (ref 3.5–5.2)
POTASSIUM SERPL-SCNC: 4.3 MMOL/L (ref 3.5–5.2)
POTASSIUM SERPL-SCNC: 4.4 MMOL/L (ref 3.5–5.2)
POTASSIUM SERPL-SCNC: 4.4 MMOL/L (ref 3.5–5.2)
POTASSIUM SERPL-SCNC: 4.8 MMOL/L (ref 3.5–5.2)
POTASSIUM SERPL-SCNC: 5.2 MMOL/L (ref 3.5–5.2)
PROCALCITONIN SERPL-MCNC: 0.52 NG/ML (ref 0–0.25)
PROT SERPL-MCNC: 5.6 G/DL (ref 6–8.5)
PROT SERPL-MCNC: 5.7 G/DL (ref 6–8.5)
PROT SERPL-MCNC: 6.2 G/DL (ref 6–8.5)
PROT SERPL-MCNC: 6.2 G/DL (ref 6–8.5)
PROT SERPL-MCNC: 6.3 G/DL (ref 6–8.5)
PROT SERPL-MCNC: 6.5 G/DL (ref 6–8.5)
PROT SERPL-MCNC: 7 G/DL (ref 6–8.5)
PROT UR QL STRIP: ABNORMAL
PROT UR QL STRIP: ABNORMAL
PROTHROMBIN TIME: 15.4 SECONDS (ref 10–13.8)
PROTHROMBIN TIME: 15.9 SECONDS (ref 11.7–14.2)
PROTHROMBIN TIME: 16.3 SECONDS (ref 10–13.8)
PROTHROMBIN TIME: 17 SECONDS (ref 11.7–14.2)
PROTHROMBIN TIME: 17.7 SECONDS (ref 11.7–14.2)
PROTHROMBIN TIME: 17.8 SECONDS (ref 11.7–14.2)
PROTHROMBIN TIME: 19.9 SECONDS (ref 10–13.8)
PROTHROMBIN TIME: 20.4 SECONDS (ref 11.7–14.2)
PROTHROMBIN TIME: 23.5 SECONDS (ref 11.7–14.2)
PROTHROMBIN TIME: 23.7 SECONDS (ref 11.7–14.2)
PROTHROMBIN TIME: 24.5 SECONDS (ref 10–13.8)
PROTHROMBIN TIME: 26.6 SECONDS (ref 11.7–14.2)
PROTHROMBIN TIME: 28.9 SECONDS (ref 11.7–14.2)
PROTHROMBIN TIME: 29 SECONDS (ref 11.7–14.2)
PROTHROMBIN TIME: 29.2 SECONDS (ref 10–13.8)
PROTHROMBIN TIME: 29.7 SECONDS (ref 11.7–14.2)
PROTHROMBIN TIME: 30.5 SECONDS (ref 11.7–14.2)
PROTHROMBIN TIME: 31 SECONDS (ref 11.7–14.2)
PROTHROMBIN TIME: 31.8 SECONDS (ref 11.7–14.2)
PROTHROMBIN TIME: 33.1 SECONDS (ref 11.7–14.2)
PROTHROMBIN TIME: 33.1 SECONDS (ref 11.7–14.2)
PROTHROMBIN TIME: 33.4 SECONDS (ref 11.7–14.2)
PROTHROMBIN TIME: 33.7 SECONDS (ref 11.7–14.2)
PROTHROMBIN TIME: 34.6 SECONDS (ref 10–13.8)
PROTHROMBIN TIME: 35.1 SECONDS (ref 11.7–14.2)
PROTHROMBIN TIME: 35.4 SECONDS (ref 11.7–14.2)
PROTHROMBIN TIME: 36.8 SECONDS (ref 11.7–14.2)
PROTHROMBIN TIME: 36.9 SECONDS (ref 11.7–14.2)
PROTHROMBIN TIME: 47.4 SECONDS (ref 11.7–14.2)
PROTHROMBIN TIME: 66 SECONDS (ref 11.7–14.2)
PSV: 8 CMH2O
RBC # BLD AUTO: 3.43 10*6/MM3 (ref 4.14–5.8)
RBC # BLD AUTO: 3.59 10*6/MM3 (ref 4.14–5.8)
RBC # BLD AUTO: 3.6 10*6/MM3 (ref 4.14–5.8)
RBC # BLD AUTO: 3.6 10*6/MM3 (ref 4.14–5.8)
RBC # BLD AUTO: 3.63 10*6/MM3 (ref 4.14–5.8)
RBC # BLD AUTO: 3.65 10*6/MM3 (ref 4.14–5.8)
RBC # BLD AUTO: 3.72 10*6/MM3 (ref 4.14–5.8)
RBC # BLD AUTO: 3.73 10*6/MM3 (ref 4.14–5.8)
RBC # BLD AUTO: 3.76 10*6/MM3 (ref 4.14–5.8)
RBC # BLD AUTO: 3.77 10*6/MM3 (ref 4.14–5.8)
RBC # BLD AUTO: 3.89 10*6/MM3 (ref 4.14–5.8)
RBC # BLD AUTO: 3.92 10*6/MM3 (ref 4.14–5.8)
RBC # BLD AUTO: 4.02 10*6/MM3 (ref 4.14–5.8)
RBC # BLD AUTO: 4.27 10*6/MM3 (ref 4.14–5.8)
RBC # BLD AUTO: 4.29 10*6/MM3 (ref 4.14–5.8)
RBC # BLD AUTO: 4.34 10*6/MM3 (ref 4.14–5.8)
RBC # BLD AUTO: 4.44 10*6/MM3 (ref 4.14–5.8)
RBC # UR: ABNORMAL /HPF
RBC # UR: ABNORMAL /HPF
REF LAB TEST METHOD: ABNORMAL
REF LAB TEST METHOD: ABNORMAL
REF LAB TEST METHOD: NORMAL
RH BLD: NEGATIVE
RHINOVIRUS RNA SPEC NAA+PROBE: NOT DETECTED
RSV RNA NPH QL NAA+NON-PROBE: NOT DETECTED
SAO2 % BLDCOA: 75.2 % (ref 92–99)
SAO2 % BLDCOA: 80.3 % (ref 92–99)
SAO2 % BLDCOA: 95.6 % (ref 92–99)
SAO2 % BLDCOA: 97.9 % (ref 92–99)
SAO2 % BLDCOA: 98 % (ref 92–99)
SAO2 % BLDCOA: 98.3 % (ref 92–99)
SARS-COV-2 RNA NPH QL NAA+NON-PROBE: NOT DETECTED
SARS-COV-2 RNA RESP QL NAA+PROBE: NOT DETECTED
SET MECH RESP RATE: 22
SET MECH RESP RATE: 22
SET MECH RESP RATE: 24
SINUS: 2.7 CM
SINUS: 3.1 CM
SODIUM BLD-SCNC: 137 MMOL/L (ref 136–145)
SODIUM BLD-SCNC: 138 MMOL/L (ref 136–145)
SODIUM SERPL-SCNC: 133 MMOL/L (ref 136–145)
SODIUM SERPL-SCNC: 134 MMOL/L (ref 136–145)
SODIUM SERPL-SCNC: 135 MMOL/L (ref 136–145)
SODIUM SERPL-SCNC: 135 MMOL/L (ref 136–145)
SODIUM SERPL-SCNC: 136 MMOL/L (ref 136–145)
SODIUM SERPL-SCNC: 137 MMOL/L (ref 136–145)
SODIUM SERPL-SCNC: 137 MMOL/L (ref 136–145)
SODIUM SERPL-SCNC: 138 MMOL/L (ref 136–145)
SODIUM SERPL-SCNC: 139 MMOL/L (ref 136–145)
SODIUM SERPL-SCNC: 139 MMOL/L (ref 136–145)
SODIUM SERPL-SCNC: 140 MMOL/L (ref 136–145)
SODIUM SERPL-SCNC: 141 MMOL/L (ref 136–145)
SODIUM SERPL-SCNC: 141 MMOL/L (ref 136–145)
SP GR UR STRIP: 1.02 (ref 1–1.03)
SP GR UR STRIP: 1.02 (ref 1–1.03)
SQUAMOUS #/AREA URNS HPF: ABNORMAL /HPF
SQUAMOUS #/AREA URNS HPF: ABNORMAL /HPF
STJ: 2.3 CM
STJ: 3 CM
STRESS TARGET HR: 121 BPM
T&S EXPIRATION DATE: NORMAL
TOTAL RATE: 22 BREATHS/MINUTE
TOTAL RATE: 22 BREATHS/MINUTE
TOTAL RATE: 23 BREATHS/MINUTE
TOTAL RATE: 24 BREATHS/MINUTE
TOTAL RATE: 25 BREATHS/MINUTE
TRIGL SERPL-MCNC: 288 MG/DL (ref 0–150)
TROPONIN T SERPL-MCNC: 0.09 NG/ML (ref 0–0.03)
TROPONIN T SERPL-MCNC: 0.16 NG/ML (ref 0–0.03)
UNIT  ABO: NORMAL
UNIT  ABO: NORMAL
UNIT  RH: NORMAL
UNIT  RH: NORMAL
UROBILINOGEN UR QL STRIP: ABNORMAL
UROBILINOGEN UR QL STRIP: ABNORMAL
VENTILATOR MODE: ABNORMAL
VENTILATOR MODE: AC
VENTILATOR MODE: AC
VT ON VENT VENT: 600 ML
VT ON VENT VENT: 600 ML
WBC # BLD AUTO: 10.15 10*3/MM3 (ref 3.4–10.8)
WBC # BLD AUTO: 10.19 10*3/MM3 (ref 3.4–10.8)
WBC # BLD AUTO: 10.67 10*3/MM3 (ref 3.4–10.8)
WBC # BLD AUTO: 11.07 10*3/MM3 (ref 3.4–10.8)
WBC # BLD AUTO: 11.1 10*3/MM3 (ref 3.4–10.8)
WBC # BLD AUTO: 11.3 10*3/MM3 (ref 3.4–10.8)
WBC # BLD AUTO: 12.34 10*3/MM3 (ref 3.4–10.8)
WBC # BLD AUTO: 15.81 10*3/MM3 (ref 3.4–10.8)
WBC # BLD AUTO: 19.54 10*3/MM3 (ref 3.4–10.8)
WBC # BLD AUTO: 8.2 10*3/MM3 (ref 3.4–10.8)
WBC # BLD AUTO: 8.25 10*3/MM3 (ref 3.4–10.8)
WBC # BLD AUTO: 8.27 10*3/MM3 (ref 3.4–10.8)
WBC # BLD AUTO: 9.15 10*3/MM3 (ref 3.4–10.8)
WBC # BLD AUTO: 9.4 10*3/MM3 (ref 3.4–10.8)
WBC # BLD AUTO: 9.57 10*3/MM3 (ref 3.4–10.8)
WBC # BLD AUTO: 9.9 10*3/MM3 (ref 3.4–10.8)
WBC NRBC COR # BLD: 8.03 10*3/MM3 (ref 3.4–10.8)
WBC UR QL AUTO: ABNORMAL /HPF
WBC UR QL AUTO: ABNORMAL /HPF

## 2020-01-01 PROCEDURE — 94799 UNLISTED PULMONARY SVC/PX: CPT

## 2020-01-01 PROCEDURE — 71045 X-RAY EXAM CHEST 1 VIEW: CPT

## 2020-01-01 PROCEDURE — 25010000002 PHENYLEPHRINE 10 MG/ML SOLUTION 5 ML VIAL: Performed by: INTERNAL MEDICINE

## 2020-01-01 PROCEDURE — 85610 PROTHROMBIN TIME: CPT | Performed by: INTERNAL MEDICINE

## 2020-01-01 PROCEDURE — 25010000003 CEFAZOLIN PER 500 MG: Performed by: SURGERY

## 2020-01-01 PROCEDURE — 85025 COMPLETE CBC W/AUTO DIFF WBC: CPT | Performed by: INTERNAL MEDICINE

## 2020-01-01 PROCEDURE — 36600 WITHDRAWAL OF ARTERIAL BLOOD: CPT

## 2020-01-01 PROCEDURE — 80069 RENAL FUNCTION PANEL: CPT | Performed by: INTERNAL MEDICINE

## 2020-01-01 PROCEDURE — 25010000002 VANCOMYCIN 10 G RECONSTITUTED SOLUTION: Performed by: INTERNAL MEDICINE

## 2020-01-01 PROCEDURE — 74230 X-RAY XM SWLNG FUNCJ C+: CPT

## 2020-01-01 PROCEDURE — 97116 GAIT TRAINING THERAPY: CPT

## 2020-01-01 PROCEDURE — 82962 GLUCOSE BLOOD TEST: CPT

## 2020-01-01 PROCEDURE — 25010000003 POTASSIUM CHLORIDE PER 2 MEQ: Performed by: INTERNAL MEDICINE

## 2020-01-01 PROCEDURE — 85610 PROTHROMBIN TIME: CPT

## 2020-01-01 PROCEDURE — 80053 COMPREHEN METABOLIC PANEL: CPT | Performed by: NURSE PRACTITIONER

## 2020-01-01 PROCEDURE — 93010 ELECTROCARDIOGRAM REPORT: CPT | Performed by: INTERNAL MEDICINE

## 2020-01-01 PROCEDURE — 25010000002 FENTANYL CITRATE (PF) 100 MCG/2ML SOLUTION: Performed by: INTERNAL MEDICINE

## 2020-01-01 PROCEDURE — 99285 EMERGENCY DEPT VISIT HI MDM: CPT

## 2020-01-01 PROCEDURE — 93306 TTE W/DOPPLER COMPLETE: CPT | Performed by: INTERNAL MEDICINE

## 2020-01-01 PROCEDURE — 25010000002 PIPERACILLIN SOD-TAZOBACTAM PER 1 G: Performed by: INTERNAL MEDICINE

## 2020-01-01 PROCEDURE — 25010000002 ALBUMIN HUMAN 25% PER 50 ML: Performed by: INTERNAL MEDICINE

## 2020-01-01 PROCEDURE — 25010000002 HALOPERIDOL LACTATE PER 5 MG: Performed by: PSYCHIATRY & NEUROLOGY

## 2020-01-01 PROCEDURE — 97530 THERAPEUTIC ACTIVITIES: CPT

## 2020-01-01 PROCEDURE — 63710000001 INSULIN REGULAR HUMAN PER 5 UNITS: Performed by: INTERNAL MEDICINE

## 2020-01-01 PROCEDURE — 86927 PLASMA FRESH FROZEN: CPT

## 2020-01-01 PROCEDURE — 85025 COMPLETE CBC W/AUTO DIFF WBC: CPT | Performed by: NURSE PRACTITIONER

## 2020-01-01 PROCEDURE — 80048 BASIC METABOLIC PNL TOTAL CA: CPT | Performed by: INTERNAL MEDICINE

## 2020-01-01 PROCEDURE — 84132 ASSAY OF SERUM POTASSIUM: CPT | Performed by: INTERNAL MEDICINE

## 2020-01-01 PROCEDURE — 74018 RADEX ABDOMEN 1 VIEW: CPT

## 2020-01-01 PROCEDURE — 82803 BLOOD GASES ANY COMBINATION: CPT

## 2020-01-01 PROCEDURE — 94770: CPT

## 2020-01-01 PROCEDURE — 93798 PHYS/QHP OP CAR RHAB W/ECG: CPT

## 2020-01-01 PROCEDURE — G0463 HOSPITAL OUTPT CLINIC VISIT: HCPCS

## 2020-01-01 PROCEDURE — 36416 COLLJ CAPILLARY BLOOD SPEC: CPT

## 2020-01-01 PROCEDURE — 93005 ELECTROCARDIOGRAM TRACING: CPT | Performed by: INTERNAL MEDICINE

## 2020-01-01 PROCEDURE — 99232 SBSQ HOSP IP/OBS MODERATE 35: CPT | Performed by: INTERNAL MEDICINE

## 2020-01-01 PROCEDURE — 97110 THERAPEUTIC EXERCISES: CPT

## 2020-01-01 PROCEDURE — 94003 VENT MGMT INPAT SUBQ DAY: CPT

## 2020-01-01 PROCEDURE — 80048 BASIC METABOLIC PNL TOTAL CA: CPT | Performed by: SURGERY

## 2020-01-01 PROCEDURE — 25010000002 CALCIUM GLUCONATE PER 10 ML: Performed by: INTERNAL MEDICINE

## 2020-01-01 PROCEDURE — 25010000002 HEPARIN (PORCINE) PER 1000 UNITS: Performed by: INTERNAL MEDICINE

## 2020-01-01 PROCEDURE — 25010000002 PERFLUTREN (DEFINITY) 8.476 MG IN SODIUM CHLORIDE (PF) 0.9 % 10 ML INJECTION: Performed by: INTERNAL MEDICINE

## 2020-01-01 PROCEDURE — 99221 1ST HOSP IP/OBS SF/LOW 40: CPT | Performed by: PSYCHIATRY & NEUROLOGY

## 2020-01-01 PROCEDURE — 93308 TTE F-UP OR LMTD: CPT | Performed by: INTERNAL MEDICINE

## 2020-01-01 PROCEDURE — 87070 CULTURE OTHR SPECIMN AEROBIC: CPT | Performed by: INTERNAL MEDICINE

## 2020-01-01 PROCEDURE — 25010000003 CEFAZOLIN IN DEXTROSE 2-4 GM/100ML-% SOLUTION: Performed by: SURGERY

## 2020-01-01 PROCEDURE — 99214 OFFICE O/P EST MOD 30 MIN: CPT | Performed by: INTERNAL MEDICINE

## 2020-01-01 PROCEDURE — 80053 COMPREHEN METABOLIC PANEL: CPT | Performed by: INTERNAL MEDICINE

## 2020-01-01 PROCEDURE — 25010000003 MEPIVACAINE PER 10 ML: Performed by: ANESTHESIOLOGY

## 2020-01-01 PROCEDURE — 5A1D70Z PERFORMANCE OF URINARY FILTRATION, INTERMITTENT, LESS THAN 6 HOURS PER DAY: ICD-10-PCS | Performed by: INTERNAL MEDICINE

## 2020-01-01 PROCEDURE — 25010000002 HYDROCORTISONE SODIUM SUCCINATE 100 MG RECONSTITUTED SOLUTION: Performed by: INTERNAL MEDICINE

## 2020-01-01 PROCEDURE — 87086 URINE CULTURE/COLONY COUNT: CPT | Performed by: INTERNAL MEDICINE

## 2020-01-01 PROCEDURE — P9047 ALBUMIN (HUMAN), 25%, 50ML: HCPCS | Performed by: INTERNAL MEDICINE

## 2020-01-01 PROCEDURE — 99231 SBSQ HOSP IP/OBS SF/LOW 25: CPT | Performed by: PSYCHIATRY & NEUROLOGY

## 2020-01-01 PROCEDURE — 99222 1ST HOSP IP/OBS MODERATE 55: CPT | Performed by: INTERNAL MEDICINE

## 2020-01-01 PROCEDURE — 70450 CT HEAD/BRAIN W/O DYE: CPT

## 2020-01-01 PROCEDURE — 86900 BLOOD TYPING SEROLOGIC ABO: CPT | Performed by: INTERNAL MEDICINE

## 2020-01-01 PROCEDURE — 63710000001 INSULIN GLARGINE PER 5 UNITS: Performed by: INTERNAL MEDICINE

## 2020-01-01 PROCEDURE — 25010000002 PROPOFOL 10 MG/ML EMULSION: Performed by: INTERNAL MEDICINE

## 2020-01-01 PROCEDURE — 63710000001 INSULIN LISPRO (HUMAN) PER 5 UNITS: Performed by: INTERNAL MEDICINE

## 2020-01-01 PROCEDURE — 80076 HEPATIC FUNCTION PANEL: CPT | Performed by: INTERNAL MEDICINE

## 2020-01-01 PROCEDURE — 0BH17EZ INSERTION OF ENDOTRACHEAL AIRWAY INTO TRACHEA, VIA NATURAL OR ARTIFICIAL OPENING: ICD-10-PCS | Performed by: INTERNAL MEDICINE

## 2020-01-01 PROCEDURE — 83036 HEMOGLOBIN GLYCOSYLATED A1C: CPT | Performed by: INTERNAL MEDICINE

## 2020-01-01 PROCEDURE — 84484 ASSAY OF TROPONIN QUANT: CPT | Performed by: INTERNAL MEDICINE

## 2020-01-01 PROCEDURE — 25010000003 CEFTRIAXONE PER 250 MG: Performed by: INTERNAL MEDICINE

## 2020-01-01 PROCEDURE — 97166 OT EVAL MOD COMPLEX 45 MIN: CPT

## 2020-01-01 PROCEDURE — 99024 POSTOP FOLLOW-UP VISIT: CPT | Performed by: NURSE PRACTITIONER

## 2020-01-01 PROCEDURE — 25010000002 LORAZEPAM PER 2 MG: Performed by: INTERNAL MEDICINE

## 2020-01-01 PROCEDURE — 25010000002 FENTANYL CITRATE (PF) 100 MCG/2ML SOLUTION

## 2020-01-01 PROCEDURE — 94660 CPAP INITIATION&MGMT: CPT

## 2020-01-01 PROCEDURE — 82533 TOTAL CORTISOL: CPT | Performed by: INTERNAL MEDICINE

## 2020-01-01 PROCEDURE — 84478 ASSAY OF TRIGLYCERIDES: CPT | Performed by: INTERNAL MEDICINE

## 2020-01-01 PROCEDURE — 92610 EVALUATE SWALLOWING FUNCTION: CPT

## 2020-01-01 PROCEDURE — 83735 ASSAY OF MAGNESIUM: CPT | Performed by: INTERNAL MEDICINE

## 2020-01-01 PROCEDURE — 83605 ASSAY OF LACTIC ACID: CPT | Performed by: INTERNAL MEDICINE

## 2020-01-01 PROCEDURE — 97162 PT EVAL MOD COMPLEX 30 MIN: CPT

## 2020-01-01 PROCEDURE — 85027 COMPLETE CBC AUTOMATED: CPT | Performed by: SURGERY

## 2020-01-01 PROCEDURE — 93005 ELECTROCARDIOGRAM TRACING: CPT | Performed by: NURSE PRACTITIONER

## 2020-01-01 PROCEDURE — P9017 PLASMA 1 DONOR FRZ W/IN 8 HR: HCPCS

## 2020-01-01 PROCEDURE — 92611 MOTION FLUOROSCOPY/SWALLOW: CPT | Performed by: SPEECH-LANGUAGE PATHOLOGIST

## 2020-01-01 PROCEDURE — 86850 RBC ANTIBODY SCREEN: CPT | Performed by: INTERNAL MEDICINE

## 2020-01-01 PROCEDURE — 84100 ASSAY OF PHOSPHORUS: CPT | Performed by: INTERNAL MEDICINE

## 2020-01-01 PROCEDURE — 99223 1ST HOSP IP/OBS HIGH 75: CPT | Performed by: INTERNAL MEDICINE

## 2020-01-01 PROCEDURE — 83735 ASSAY OF MAGNESIUM: CPT | Performed by: HOSPITALIST

## 2020-01-01 PROCEDURE — 25010000002 PROPOFOL 10 MG/ML EMULSION: Performed by: NURSE ANESTHETIST, CERTIFIED REGISTERED

## 2020-01-01 PROCEDURE — 25010000002 ROPIVACAINE PER 1 MG: Performed by: ANESTHESIOLOGY

## 2020-01-01 PROCEDURE — 25010000002 PHENYLEPHRINE PER 1 ML: Performed by: NURSE ANESTHETIST, CERTIFIED REGISTERED

## 2020-01-01 PROCEDURE — 99233 SBSQ HOSP IP/OBS HIGH 50: CPT | Performed by: INTERNAL MEDICINE

## 2020-01-01 PROCEDURE — 99231 SBSQ HOSP IP/OBS SF/LOW 25: CPT | Performed by: INTERNAL MEDICINE

## 2020-01-01 PROCEDURE — 85027 COMPLETE CBC AUTOMATED: CPT | Performed by: INTERNAL MEDICINE

## 2020-01-01 PROCEDURE — 87040 BLOOD CULTURE FOR BACTERIA: CPT | Performed by: INTERNAL MEDICINE

## 2020-01-01 PROCEDURE — 25010000002 HEPARIN (PORCINE) PER 1000 UNITS: Performed by: SURGERY

## 2020-01-01 PROCEDURE — 25010000003 POTASSIUM CHLORIDE 10 MEQ/100ML SOLUTION: Performed by: INTERNAL MEDICINE

## 2020-01-01 PROCEDURE — 94002 VENT MGMT INPAT INIT DAY: CPT

## 2020-01-01 PROCEDURE — 85610 PROTHROMBIN TIME: CPT | Performed by: NURSE PRACTITIONER

## 2020-01-01 PROCEDURE — 84145 PROCALCITONIN (PCT): CPT | Performed by: NURSE PRACTITIONER

## 2020-01-01 PROCEDURE — 94640 AIRWAY INHALATION TREATMENT: CPT

## 2020-01-01 PROCEDURE — 25010000002 MORPHINE PER 10 MG: Performed by: INTERNAL MEDICINE

## 2020-01-01 PROCEDURE — 25010000002 AMIODARONE IN DEXTROSE 5% 150-4.21 MG/100ML-% SOLUTION: Performed by: INTERNAL MEDICINE

## 2020-01-01 PROCEDURE — 87205 SMEAR GRAM STAIN: CPT | Performed by: INTERNAL MEDICINE

## 2020-01-01 PROCEDURE — 81001 URINALYSIS AUTO W/SCOPE: CPT | Performed by: INTERNAL MEDICINE

## 2020-01-01 PROCEDURE — 85362 FIBRIN DEGRADATION PRODUCTS: CPT | Performed by: INTERNAL MEDICINE

## 2020-01-01 PROCEDURE — 74176 CT ABD & PELVIS W/O CONTRAST: CPT

## 2020-01-01 PROCEDURE — 71250 CT THORAX DX C-: CPT

## 2020-01-01 PROCEDURE — U0004 COV-19 TEST NON-CDC HGH THRU: HCPCS | Performed by: INTERNAL MEDICINE

## 2020-01-01 PROCEDURE — 25010000002 ONDANSETRON PER 1 MG: Performed by: INTERNAL MEDICINE

## 2020-01-01 PROCEDURE — 87040 BLOOD CULTURE FOR BACTERIA: CPT | Performed by: NURSE PRACTITIONER

## 2020-01-01 PROCEDURE — 85384 FIBRINOGEN ACTIVITY: CPT | Performed by: INTERNAL MEDICINE

## 2020-01-01 PROCEDURE — 84484 ASSAY OF TROPONIN QUANT: CPT | Performed by: NURSE PRACTITIONER

## 2020-01-01 PROCEDURE — 82330 ASSAY OF CALCIUM: CPT | Performed by: INTERNAL MEDICINE

## 2020-01-01 PROCEDURE — 83615 LACTATE (LD) (LDH) ENZYME: CPT | Performed by: INTERNAL MEDICINE

## 2020-01-01 PROCEDURE — 93306 TTE W/DOPPLER COMPLETE: CPT

## 2020-01-01 PROCEDURE — 36430 TRANSFUSION BLD/BLD COMPNT: CPT

## 2020-01-01 PROCEDURE — 99443 PR PHYS/QHP TELEPHONE EVALUATION 21-30 MIN: CPT | Performed by: FAMILY MEDICINE

## 2020-01-01 PROCEDURE — 06HY33Z INSERTION OF INFUSION DEVICE INTO LOWER VEIN, PERCUTANEOUS APPROACH: ICD-10-PCS | Performed by: INTERNAL MEDICINE

## 2020-01-01 PROCEDURE — B24BZZZ ULTRASONOGRAPHY OF HEART WITH AORTA: ICD-10-PCS | Performed by: INTERNAL MEDICINE

## 2020-01-01 PROCEDURE — 83605 ASSAY OF LACTIC ACID: CPT | Performed by: NURSE PRACTITIONER

## 2020-01-01 PROCEDURE — 93797 PHYS/QHP OP CAR RHAB WO ECG: CPT

## 2020-01-01 PROCEDURE — 25010000002 HEPARIN (PORCINE) PER 1000 UNITS: Performed by: NURSE ANESTHETIST, CERTIFIED REGISTERED

## 2020-01-01 PROCEDURE — 86901 BLOOD TYPING SEROLOGIC RH(D): CPT | Performed by: INTERNAL MEDICINE

## 2020-01-01 PROCEDURE — 97112 NEUROMUSCULAR REEDUCATION: CPT

## 2020-01-01 PROCEDURE — 85610 PROTHROMBIN TIME: CPT | Performed by: SURGERY

## 2020-01-01 PROCEDURE — 99214 OFFICE O/P EST MOD 30 MIN: CPT | Performed by: FAMILY MEDICINE

## 2020-01-01 PROCEDURE — 92526 ORAL FUNCTION THERAPY: CPT

## 2020-01-01 PROCEDURE — 93308 TTE F-UP OR LMTD: CPT

## 2020-01-01 PROCEDURE — 5A1955Z RESPIRATORY VENTILATION, GREATER THAN 96 CONSECUTIVE HOURS: ICD-10-PCS | Performed by: INTERNAL MEDICINE

## 2020-01-01 PROCEDURE — 0202U NFCT DS 22 TRGT SARS-COV-2: CPT | Performed by: INTERNAL MEDICINE

## 2020-01-01 PROCEDURE — 36415 COLL VENOUS BLD VENIPUNCTURE: CPT

## 2020-01-01 PROCEDURE — 97535 SELF CARE MNGMENT TRAINING: CPT

## 2020-01-01 RX ORDER — SODIUM CHLORIDE 0.9 % (FLUSH) 0.9 %
3-10 SYRINGE (ML) INJECTION AS NEEDED
Status: DISCONTINUED | OUTPATIENT
Start: 2020-01-01 | End: 2020-01-01 | Stop reason: HOSPADM

## 2020-01-01 RX ORDER — WARFARIN SODIUM 2 MG/1
TABLET ORAL
Qty: 135 TABLET | Refills: 0 | Status: SHIPPED | OUTPATIENT
Start: 2020-01-01 | End: 2020-01-01

## 2020-01-01 RX ORDER — OLANZAPINE 5 MG/1
5 TABLET ORAL
Status: DISCONTINUED | OUTPATIENT
Start: 2020-01-01 | End: 2020-01-01

## 2020-01-01 RX ORDER — CEFAZOLIN SODIUM 2 G/100ML
2 INJECTION, SOLUTION INTRAVENOUS ONCE
Status: COMPLETED | OUTPATIENT
Start: 2020-01-01 | End: 2020-01-01

## 2020-01-01 RX ORDER — WARFARIN SODIUM 4 MG/1
4 TABLET ORAL
Status: COMPLETED | OUTPATIENT
Start: 2020-01-01 | End: 2020-01-01

## 2020-01-01 RX ORDER — NOREPINEPHRINE BIT/0.9 % NACL 8 MG/250ML
INFUSION BOTTLE (ML) INTRAVENOUS
Status: DISPENSED
Start: 2020-01-01 | End: 2020-01-01

## 2020-01-01 RX ORDER — FAMOTIDINE 20 MG/1
20 TABLET, FILM COATED ORAL DAILY
Status: DISCONTINUED | OUTPATIENT
Start: 2020-01-01 | End: 2020-01-01 | Stop reason: HOSPADM

## 2020-01-01 RX ORDER — SODIUM CHLORIDE, SODIUM LACTATE, POTASSIUM CHLORIDE, CALCIUM CHLORIDE 600; 310; 30; 20 MG/100ML; MG/100ML; MG/100ML; MG/100ML
9 INJECTION, SOLUTION INTRAVENOUS CONTINUOUS
Status: DISCONTINUED | OUTPATIENT
Start: 2020-01-01 | End: 2020-01-01 | Stop reason: HOSPADM

## 2020-01-01 RX ORDER — FLUDROCORTISONE ACETATE 0.1 MG/1
100 TABLET ORAL DAILY
Status: DISCONTINUED | OUTPATIENT
Start: 2020-01-01 | End: 2020-01-01

## 2020-01-01 RX ORDER — DIPHENHYDRAMINE HCL 25 MG
25 CAPSULE ORAL
Status: DISCONTINUED | OUTPATIENT
Start: 2020-01-01 | End: 2020-01-01 | Stop reason: HOSPADM

## 2020-01-01 RX ORDER — LORAZEPAM 2 MG/ML
0.5 INJECTION INTRAMUSCULAR
Status: DISCONTINUED | OUTPATIENT
Start: 2020-01-01 | End: 2020-01-01 | Stop reason: HOSPADM

## 2020-01-01 RX ORDER — LORAZEPAM 2 MG/ML
2 INJECTION INTRAMUSCULAR
Status: DISCONTINUED | OUTPATIENT
Start: 2020-01-01 | End: 2020-01-01 | Stop reason: HOSPADM

## 2020-01-01 RX ORDER — WARFARIN SODIUM 2 MG/1
2 TABLET ORAL
Status: DISCONTINUED | OUTPATIENT
Start: 2020-01-01 | End: 2020-01-01

## 2020-01-01 RX ORDER — WARFARIN SODIUM 2 MG/1
TABLET ORAL
Qty: 135 TABLET | Refills: 0 | Status: SHIPPED | OUTPATIENT
Start: 2020-01-01

## 2020-01-01 RX ORDER — WARFARIN SODIUM 3 MG/1
1.5 TABLET ORAL
Status: DISCONTINUED | OUTPATIENT
Start: 2020-01-01 | End: 2020-01-01

## 2020-01-01 RX ORDER — NICOTINE POLACRILEX 4 MG
15 LOZENGE BUCCAL
Status: DISCONTINUED | OUTPATIENT
Start: 2020-01-01 | End: 2020-01-01

## 2020-01-01 RX ORDER — HYDRALAZINE HYDROCHLORIDE 20 MG/ML
5 INJECTION INTRAMUSCULAR; INTRAVENOUS
Status: DISCONTINUED | OUTPATIENT
Start: 2020-01-01 | End: 2020-01-01 | Stop reason: HOSPADM

## 2020-01-01 RX ORDER — FAMOTIDINE 10 MG/ML
20 INJECTION, SOLUTION INTRAVENOUS ONCE
Status: DISCONTINUED | OUTPATIENT
Start: 2020-01-01 | End: 2020-01-01 | Stop reason: HOSPADM

## 2020-01-01 RX ORDER — ACETAMINOPHEN 325 MG/1
650 TABLET ORAL ONCE AS NEEDED
Status: DISCONTINUED | OUTPATIENT
Start: 2020-01-01 | End: 2020-01-01 | Stop reason: HOSPADM

## 2020-01-01 RX ORDER — DIPHENHYDRAMINE HYDROCHLORIDE 50 MG/ML
12.5 INJECTION INTRAMUSCULAR; INTRAVENOUS
Status: DISCONTINUED | OUTPATIENT
Start: 2020-01-01 | End: 2020-01-01 | Stop reason: HOSPADM

## 2020-01-01 RX ORDER — DEXTROSE MONOHYDRATE 25 G/50ML
25 INJECTION, SOLUTION INTRAVENOUS
Status: DISCONTINUED | OUTPATIENT
Start: 2020-01-01 | End: 2020-01-01

## 2020-01-01 RX ORDER — FENTANYL CITRATE 50 UG/ML
50 INJECTION, SOLUTION INTRAMUSCULAR; INTRAVENOUS
Status: DISCONTINUED | OUTPATIENT
Start: 2020-01-01 | End: 2020-01-01 | Stop reason: HOSPADM

## 2020-01-01 RX ORDER — ASPIRIN 81 MG/1
81 TABLET ORAL DAILY
Status: DISCONTINUED | OUTPATIENT
Start: 2020-01-01 | End: 2020-01-01

## 2020-01-01 RX ORDER — LIDOCAINE HYDROCHLORIDE 10 MG/ML
0.5 INJECTION, SOLUTION EPIDURAL; INFILTRATION; INTRACAUDAL; PERINEURAL ONCE AS NEEDED
Status: DISCONTINUED | OUTPATIENT
Start: 2020-01-01 | End: 2020-01-01 | Stop reason: HOSPADM

## 2020-01-01 RX ORDER — HYDROMORPHONE HYDROCHLORIDE 1 MG/ML
0.5 INJECTION, SOLUTION INTRAMUSCULAR; INTRAVENOUS; SUBCUTANEOUS
Status: DISCONTINUED | OUTPATIENT
Start: 2020-01-01 | End: 2020-01-01 | Stop reason: HOSPADM

## 2020-01-01 RX ORDER — EPHEDRINE SULFATE 50 MG/ML
5 INJECTION, SOLUTION INTRAVENOUS ONCE AS NEEDED
Status: DISCONTINUED | OUTPATIENT
Start: 2020-01-01 | End: 2020-01-01 | Stop reason: HOSPADM

## 2020-01-01 RX ORDER — PSEUDOEPHEDRINE HCL 30 MG
15 TABLET ORAL EVERY 12 HOURS
Status: DISCONTINUED | OUTPATIENT
Start: 2020-01-01 | End: 2020-01-01

## 2020-01-01 RX ORDER — ALBUMIN (HUMAN) 12.5 G/50ML
12.5 SOLUTION INTRAVENOUS AS NEEDED
Status: DISPENSED | OUTPATIENT
Start: 2020-01-01 | End: 2020-01-01

## 2020-01-01 RX ORDER — PROMETHAZINE HYDROCHLORIDE 25 MG/ML
6.25 INJECTION, SOLUTION INTRAMUSCULAR; INTRAVENOUS
Status: DISCONTINUED | OUTPATIENT
Start: 2020-01-01 | End: 2020-01-01 | Stop reason: HOSPADM

## 2020-01-01 RX ORDER — SEVELAMER CARBONATE 800 MG/1
800 TABLET, FILM COATED ORAL
Status: DISCONTINUED | OUTPATIENT
Start: 2020-01-01 | End: 2020-01-01

## 2020-01-01 RX ORDER — ROPIVACAINE HYDROCHLORIDE 5 MG/ML
INJECTION, SOLUTION EPIDURAL; INFILTRATION; PERINEURAL
Status: COMPLETED | OUTPATIENT
Start: 2020-01-01 | End: 2020-01-01

## 2020-01-01 RX ORDER — ETOMIDATE 2 MG/ML
10 INJECTION INTRAVENOUS ONCE
Status: COMPLETED | OUTPATIENT
Start: 2020-01-01 | End: 2020-01-01

## 2020-01-01 RX ORDER — ATORVASTATIN CALCIUM 20 MG/1
TABLET, FILM COATED ORAL
Qty: 90 TABLET | Refills: 1 | Status: ON HOLD | OUTPATIENT
Start: 2020-01-01 | End: 2020-01-01

## 2020-01-01 RX ORDER — GABAPENTIN 400 MG/1
CAPSULE ORAL
Qty: 180 CAPSULE | Refills: 1 | Status: SHIPPED | OUTPATIENT
Start: 2020-01-01

## 2020-01-01 RX ORDER — TAMSULOSIN HYDROCHLORIDE 0.4 MG/1
CAPSULE ORAL
Qty: 90 CAPSULE | Refills: 1 | Status: SHIPPED | OUTPATIENT
Start: 2020-01-01

## 2020-01-01 RX ORDER — POTASSIUM CHLORIDE 7.45 MG/ML
10 INJECTION INTRAVENOUS ONCE
Status: COMPLETED | OUTPATIENT
Start: 2020-01-01 | End: 2020-01-01

## 2020-01-01 RX ORDER — ACETAMINOPHEN 160 MG/5ML
650 SOLUTION ORAL EVERY 4 HOURS PRN
Status: DISCONTINUED | OUTPATIENT
Start: 2020-01-01 | End: 2020-01-01 | Stop reason: HOSPADM

## 2020-01-01 RX ORDER — ATORVASTATIN CALCIUM 20 MG/1
20 TABLET, FILM COATED ORAL NIGHTLY
COMMUNITY

## 2020-01-01 RX ORDER — INSULIN GLARGINE 100 [IU]/ML
20 INJECTION, SOLUTION SUBCUTANEOUS EVERY MORNING
Status: DISCONTINUED | OUTPATIENT
Start: 2020-01-01 | End: 2020-01-01

## 2020-01-01 RX ORDER — QUETIAPINE FUMARATE 25 MG/1
25 TABLET, FILM COATED ORAL NIGHTLY
Status: DISCONTINUED | OUTPATIENT
Start: 2020-01-01 | End: 2020-01-01

## 2020-01-01 RX ORDER — LIDOCAINE HYDROCHLORIDE 20 MG/ML
INJECTION, SOLUTION INFILTRATION; PERINEURAL AS NEEDED
Status: DISCONTINUED | OUTPATIENT
Start: 2020-01-01 | End: 2020-01-01 | Stop reason: SURG

## 2020-01-01 RX ORDER — GABAPENTIN 400 MG/1
400 CAPSULE ORAL 2 TIMES DAILY
Status: DISCONTINUED | OUTPATIENT
Start: 2020-01-01 | End: 2020-01-01

## 2020-01-01 RX ORDER — PROPOFOL 10 MG/ML
VIAL (ML) INTRAVENOUS
Status: DISPENSED
Start: 2020-01-01 | End: 2020-01-01

## 2020-01-01 RX ORDER — TAMSULOSIN HYDROCHLORIDE 0.4 MG/1
0.4 CAPSULE ORAL EVERY EVENING
Status: DISCONTINUED | OUTPATIENT
Start: 2020-01-01 | End: 2020-01-01

## 2020-01-01 RX ORDER — OLANZAPINE 2.5 MG/1
2.5 TABLET ORAL
Status: DISCONTINUED | OUTPATIENT
Start: 2020-01-01 | End: 2020-01-01 | Stop reason: HOSPADM

## 2020-01-01 RX ORDER — ACETAMINOPHEN 650 MG/1
650 SUPPOSITORY RECTAL EVERY 4 HOURS PRN
Status: DISCONTINUED | OUTPATIENT
Start: 2020-01-01 | End: 2020-01-01 | Stop reason: HOSPADM

## 2020-01-01 RX ORDER — ONDANSETRON 2 MG/ML
4 INJECTION INTRAMUSCULAR; INTRAVENOUS EVERY 6 HOURS PRN
Status: DISCONTINUED | OUTPATIENT
Start: 2020-01-01 | End: 2020-01-01 | Stop reason: HOSPADM

## 2020-01-01 RX ORDER — TESTOSTERONE 10 MG/.5G
1 GEL, METERED TOPICAL DAILY
Qty: 60 G | Refills: 5 | Status: SHIPPED | OUTPATIENT
Start: 2020-01-01

## 2020-01-01 RX ORDER — HEPARIN SODIUM 1000 [USP'U]/ML
INJECTION, SOLUTION INTRAVENOUS; SUBCUTANEOUS AS NEEDED
Status: DISCONTINUED | OUTPATIENT
Start: 2020-01-01 | End: 2020-01-01

## 2020-01-01 RX ORDER — MORPHINE SULFATE 2 MG/ML
2 INJECTION, SOLUTION INTRAMUSCULAR; INTRAVENOUS
Status: DISCONTINUED | OUTPATIENT
Start: 2020-01-01 | End: 2020-01-01 | Stop reason: HOSPADM

## 2020-01-01 RX ORDER — MORPHINE SULFATE 20 MG/ML
5 SOLUTION ORAL
Status: DISCONTINUED | OUTPATIENT
Start: 2020-01-01 | End: 2020-01-01 | Stop reason: HOSPADM

## 2020-01-01 RX ORDER — ETOMIDATE 2 MG/ML
INJECTION INTRAVENOUS
Status: COMPLETED
Start: 2020-01-01 | End: 2020-01-01

## 2020-01-01 RX ORDER — MIDODRINE HYDROCHLORIDE 5 MG/1
5 TABLET ORAL
Status: DISCONTINUED | OUTPATIENT
Start: 2020-01-01 | End: 2020-01-01

## 2020-01-01 RX ORDER — LORAZEPAM 2 MG/ML
1 INJECTION INTRAMUSCULAR
Status: DISCONTINUED | OUTPATIENT
Start: 2020-01-01 | End: 2020-01-01 | Stop reason: HOSPADM

## 2020-01-01 RX ORDER — ALBUMIN (HUMAN) 12.5 G/50ML
12.5 SOLUTION INTRAVENOUS AS NEEDED
Status: ACTIVE | OUTPATIENT
Start: 2020-01-01 | End: 2020-01-01

## 2020-01-01 RX ORDER — INSULIN GLARGINE 100 [IU]/ML
25 INJECTION, SOLUTION SUBCUTANEOUS DAILY
Status: DISCONTINUED | OUTPATIENT
Start: 2020-01-01 | End: 2020-01-01

## 2020-01-01 RX ORDER — TESTOSTERONE 10 MG/.5G
1 GEL, METERED TOPICAL DAILY
Qty: 60 G | Refills: 5 | Status: SHIPPED | OUTPATIENT
Start: 2020-01-01 | End: 2020-01-01 | Stop reason: SDUPTHER

## 2020-01-01 RX ORDER — SODIUM CHLORIDE 0.9 % (FLUSH) 0.9 %
3 SYRINGE (ML) INJECTION EVERY 12 HOURS SCHEDULED
Status: DISCONTINUED | OUTPATIENT
Start: 2020-01-01 | End: 2020-01-01 | Stop reason: HOSPADM

## 2020-01-01 RX ORDER — GABAPENTIN 300 MG/1
300 CAPSULE ORAL DAILY
Status: DISCONTINUED | OUTPATIENT
Start: 2020-01-01 | End: 2020-01-01

## 2020-01-01 RX ORDER — PROPOFOL 10 MG/ML
VIAL (ML) INTRAVENOUS CONTINUOUS PRN
Status: DISCONTINUED | OUTPATIENT
Start: 2020-01-01 | End: 2020-01-01 | Stop reason: SURG

## 2020-01-01 RX ORDER — POTASSIUM CHLORIDE 1.5 G/1.77G
20 POWDER, FOR SOLUTION ORAL ONCE
Status: COMPLETED | OUTPATIENT
Start: 2020-01-01 | End: 2020-01-01

## 2020-01-01 RX ORDER — SODIUM CHLORIDE 0.9 % (FLUSH) 0.9 %
10 SYRINGE (ML) INJECTION AS NEEDED
Status: DISCONTINUED | OUTPATIENT
Start: 2020-01-01 | End: 2020-01-01 | Stop reason: HOSPADM

## 2020-01-01 RX ORDER — FENTANYL CITRATE 50 UG/ML
50 INJECTION, SOLUTION INTRAMUSCULAR; INTRAVENOUS ONCE
Status: DISCONTINUED | OUTPATIENT
Start: 2020-01-01 | End: 2020-01-01

## 2020-01-01 RX ORDER — HYDROCODONE BITARTRATE AND ACETAMINOPHEN 5; 325 MG/1; MG/1
1 TABLET ORAL EVERY 6 HOURS PRN
Status: DISCONTINUED | OUTPATIENT
Start: 2020-01-01 | End: 2020-01-01 | Stop reason: HOSPADM

## 2020-01-01 RX ORDER — GABAPENTIN 400 MG/1
CAPSULE ORAL
Qty: 180 CAPSULE | OUTPATIENT
Start: 2020-01-01

## 2020-01-01 RX ORDER — NOREPINEPHRINE BIT/0.9 % NACL 8 MG/250ML
.02-.3 INFUSION BOTTLE (ML) INTRAVENOUS
Status: DISCONTINUED | OUTPATIENT
Start: 2020-01-01 | End: 2020-01-01

## 2020-01-01 RX ORDER — MAGNESIUM SULFATE HEPTAHYDRATE 40 MG/ML
2 INJECTION, SOLUTION INTRAVENOUS AS NEEDED
Status: ACTIVE | OUTPATIENT
Start: 2020-01-01 | End: 2020-01-01

## 2020-01-01 RX ORDER — FENTANYL CITRATE 50 UG/ML
INJECTION, SOLUTION INTRAMUSCULAR; INTRAVENOUS
Status: COMPLETED
Start: 2020-01-01 | End: 2020-01-01

## 2020-01-01 RX ORDER — HEPARIN SODIUM 1000 [USP'U]/ML
INJECTION, SOLUTION INTRAVENOUS; SUBCUTANEOUS AS NEEDED
Status: DISCONTINUED | OUTPATIENT
Start: 2020-01-01 | End: 2020-01-01 | Stop reason: SURG

## 2020-01-01 RX ORDER — PROPOFOL 10 MG/ML
VIAL (ML) INTRAVENOUS AS NEEDED
Status: DISCONTINUED | OUTPATIENT
Start: 2020-01-01 | End: 2020-01-01 | Stop reason: SURG

## 2020-01-01 RX ORDER — PSEUDOEPHEDRINE HCL 30 MG
15 TABLET ORAL 2 TIMES DAILY
Status: DISCONTINUED | OUTPATIENT
Start: 2020-01-01 | End: 2020-01-01 | Stop reason: HOSPADM

## 2020-01-01 RX ORDER — PSEUDOEPHEDRINE HCL 30 MG
30 TABLET ORAL EVERY 12 HOURS
Status: DISCONTINUED | OUTPATIENT
Start: 2020-01-01 | End: 2020-01-01

## 2020-01-01 RX ORDER — ERGOCALCIFEROL 1.25 MG/1
CAPSULE ORAL
COMMUNITY
Start: 2020-01-01

## 2020-01-01 RX ORDER — POTASSIUM CHLORIDE 29.8 MG/ML
20 INJECTION INTRAVENOUS AS NEEDED
Status: DISCONTINUED | OUTPATIENT
Start: 2020-01-01 | End: 2020-01-01

## 2020-01-01 RX ORDER — FLUDROCORTISONE ACETATE 0.1 MG/1
0.1 TABLET ORAL DAILY
Qty: 90 TABLET | Refills: 3 | Status: SHIPPED | OUTPATIENT
Start: 2020-01-01

## 2020-01-01 RX ORDER — CALCIUM CARBONATE 200(500)MG
1 TABLET,CHEWABLE ORAL 3 TIMES DAILY PRN
Status: DISCONTINUED | OUTPATIENT
Start: 2020-01-01 | End: 2020-01-01 | Stop reason: HOSPADM

## 2020-01-01 RX ORDER — MIDODRINE HYDROCHLORIDE 5 MG/1
5 TABLET ORAL ONCE
Status: COMPLETED | OUTPATIENT
Start: 2020-01-01 | End: 2020-01-01

## 2020-01-01 RX ORDER — FAMOTIDINE 10 MG/ML
20 INJECTION, SOLUTION INTRAVENOUS DAILY
Status: DISCONTINUED | OUTPATIENT
Start: 2020-01-01 | End: 2020-01-01

## 2020-01-01 RX ORDER — SODIUM CHLORIDE 9 MG/ML
9 INJECTION, SOLUTION INTRAVENOUS CONTINUOUS
Status: DISCONTINUED | OUTPATIENT
Start: 2020-01-01 | End: 2020-01-01 | Stop reason: HOSPADM

## 2020-01-01 RX ORDER — HEPARIN SODIUM 1000 [USP'U]/ML
3800 INJECTION, SOLUTION INTRAVENOUS; SUBCUTANEOUS AS NEEDED
Status: DISCONTINUED | OUTPATIENT
Start: 2020-01-01 | End: 2020-01-01

## 2020-01-01 RX ORDER — WARFARIN SODIUM 2 MG/1
2 TABLET ORAL
Status: COMPLETED | OUTPATIENT
Start: 2020-01-01 | End: 2020-01-01

## 2020-01-01 RX ORDER — PROMETHAZINE HYDROCHLORIDE 25 MG/ML
12.5 INJECTION, SOLUTION INTRAMUSCULAR; INTRAVENOUS ONCE AS NEEDED
Status: DISCONTINUED | OUTPATIENT
Start: 2020-01-01 | End: 2020-01-01 | Stop reason: HOSPADM

## 2020-01-01 RX ORDER — CEFTRIAXONE SODIUM 2 G/50ML
2 INJECTION, SOLUTION INTRAVENOUS EVERY 24 HOURS
Status: DISCONTINUED | OUTPATIENT
Start: 2020-01-01 | End: 2020-01-01

## 2020-01-01 RX ORDER — ALBUMIN (HUMAN) 12.5 G/50ML
12.5 SOLUTION INTRAVENOUS AS NEEDED
Status: DISCONTINUED | OUTPATIENT
Start: 2020-01-01 | End: 2020-01-01

## 2020-01-01 RX ORDER — PROMETHAZINE HYDROCHLORIDE 25 MG/1
25 SUPPOSITORY RECTAL ONCE AS NEEDED
Status: DISCONTINUED | OUTPATIENT
Start: 2020-01-01 | End: 2020-01-01 | Stop reason: HOSPADM

## 2020-01-01 RX ORDER — POTASSIUM CHLORIDE 1.5 G/1.77G
40 POWDER, FOR SOLUTION ORAL ONCE
Status: COMPLETED | OUTPATIENT
Start: 2020-01-01 | End: 2020-01-01

## 2020-01-01 RX ORDER — ACETAMINOPHEN 325 MG/1
650 TABLET ORAL EVERY 4 HOURS PRN
Status: DISCONTINUED | OUTPATIENT
Start: 2020-01-01 | End: 2020-01-01 | Stop reason: HOSPADM

## 2020-01-01 RX ORDER — QUETIAPINE FUMARATE 25 MG/1
12.5 TABLET, FILM COATED ORAL NIGHTLY
Status: DISCONTINUED | OUTPATIENT
Start: 2020-01-01 | End: 2020-01-01

## 2020-01-01 RX ORDER — LORAZEPAM 2 MG/ML
2 CONCENTRATE ORAL
Status: DISCONTINUED | OUTPATIENT
Start: 2020-01-01 | End: 2020-01-01 | Stop reason: HOSPADM

## 2020-01-01 RX ORDER — ONDANSETRON 2 MG/ML
4 INJECTION INTRAMUSCULAR; INTRAVENOUS ONCE AS NEEDED
Status: DISCONTINUED | OUTPATIENT
Start: 2020-01-01 | End: 2020-01-01 | Stop reason: HOSPADM

## 2020-01-01 RX ORDER — LORAZEPAM 2 MG/ML
0.5 CONCENTRATE ORAL
Status: DISCONTINUED | OUTPATIENT
Start: 2020-01-01 | End: 2020-01-01 | Stop reason: HOSPADM

## 2020-01-01 RX ORDER — NALOXONE HCL 0.4 MG/ML
0.2 VIAL (ML) INJECTION AS NEEDED
Status: DISCONTINUED | OUTPATIENT
Start: 2020-01-01 | End: 2020-01-01 | Stop reason: HOSPADM

## 2020-01-01 RX ORDER — ASPIRIN 81 MG/1
81 TABLET, CHEWABLE ORAL DAILY
Status: DISCONTINUED | OUTPATIENT
Start: 2020-01-01 | End: 2020-01-01

## 2020-01-01 RX ORDER — LINAGLIPTIN 5 MG/1
TABLET, FILM COATED ORAL
Qty: 30 TABLET | Refills: 5 | Status: SHIPPED | OUTPATIENT
Start: 2020-01-01 | End: 2020-01-01

## 2020-01-01 RX ORDER — WARFARIN SODIUM 4 MG/1
4 TABLET ORAL
Status: DISCONTINUED | OUTPATIENT
Start: 2020-01-01 | End: 2020-01-01

## 2020-01-01 RX ORDER — FENTANYL CITRATE 50 UG/ML
25 INJECTION, SOLUTION INTRAMUSCULAR; INTRAVENOUS
Status: DISCONTINUED | OUTPATIENT
Start: 2020-01-01 | End: 2020-01-01

## 2020-01-01 RX ORDER — PROMETHAZINE HYDROCHLORIDE 25 MG/1
25 TABLET ORAL ONCE AS NEEDED
Status: DISCONTINUED | OUTPATIENT
Start: 2020-01-01 | End: 2020-01-01 | Stop reason: HOSPADM

## 2020-01-01 RX ORDER — FLUMAZENIL 0.1 MG/ML
0.2 INJECTION INTRAVENOUS AS NEEDED
Status: DISCONTINUED | OUTPATIENT
Start: 2020-01-01 | End: 2020-01-01 | Stop reason: HOSPADM

## 2020-01-01 RX ORDER — HYDROCODONE BITARTRATE AND ACETAMINOPHEN 5; 325 MG/1; MG/1
1-2 TABLET ORAL EVERY 6 HOURS PRN
Qty: 30 TABLET | Refills: 0 | Status: SHIPPED | OUTPATIENT
Start: 2020-01-01

## 2020-01-01 RX ORDER — LINAGLIPTIN 5 MG/1
TABLET, FILM COATED ORAL
Qty: 30 TABLET | Refills: 5 | Status: SHIPPED | OUTPATIENT
Start: 2020-01-01

## 2020-01-01 RX ORDER — FLUDROCORTISONE ACETATE 0.1 MG/1
0.1 TABLET ORAL DAILY
Status: DISCONTINUED | OUTPATIENT
Start: 2020-01-01 | End: 2020-01-01

## 2020-01-01 RX ORDER — ARFORMOTEROL TARTRATE 15 UG/2ML
15 SOLUTION RESPIRATORY (INHALATION)
Status: DISCONTINUED | OUTPATIENT
Start: 2020-01-01 | End: 2020-01-01

## 2020-01-01 RX ORDER — INSULIN GLARGINE 100 [IU]/ML
20 INJECTION, SOLUTION SUBCUTANEOUS EVERY 12 HOURS SCHEDULED
Status: DISCONTINUED | OUTPATIENT
Start: 2020-01-01 | End: 2020-01-01

## 2020-01-01 RX ORDER — LORAZEPAM 2 MG/ML
1 CONCENTRATE ORAL
Status: DISCONTINUED | OUTPATIENT
Start: 2020-01-01 | End: 2020-01-01 | Stop reason: HOSPADM

## 2020-01-01 RX ORDER — OXYCODONE AND ACETAMINOPHEN 7.5; 325 MG/1; MG/1
1 TABLET ORAL ONCE AS NEEDED
Status: DISCONTINUED | OUTPATIENT
Start: 2020-01-01 | End: 2020-01-01 | Stop reason: HOSPADM

## 2020-01-01 RX ORDER — HYDROCORTISONE 10 MG/1
20 TABLET ORAL 2 TIMES DAILY WITH MEALS
Status: DISCONTINUED | OUTPATIENT
Start: 2020-01-01 | End: 2020-01-01

## 2020-01-01 RX ORDER — DEXMEDETOMIDINE HYDROCHLORIDE 4 UG/ML
.2-1.5 INJECTION, SOLUTION INTRAVENOUS
Status: DISCONTINUED | OUTPATIENT
Start: 2020-01-01 | End: 2020-01-01

## 2020-01-01 RX ORDER — DIPHENHYDRAMINE HCL 25 MG
25 CAPSULE ORAL EVERY 4 HOURS PRN
Status: DISCONTINUED | OUTPATIENT
Start: 2020-01-01 | End: 2020-01-01 | Stop reason: HOSPADM

## 2020-01-01 RX ORDER — POTASSIUM CHLORIDE 29.8 MG/ML
20 INJECTION INTRAVENOUS ONCE
Status: COMPLETED | OUTPATIENT
Start: 2020-01-01 | End: 2020-01-01

## 2020-01-01 RX ORDER — INSULIN GLARGINE 100 [IU]/ML
10 INJECTION, SOLUTION SUBCUTANEOUS DAILY
Status: DISCONTINUED | OUTPATIENT
Start: 2020-01-01 | End: 2020-01-01

## 2020-01-01 RX ORDER — HYDROCODONE BITARTRATE AND ACETAMINOPHEN 7.5; 325 MG/1; MG/1
1 TABLET ORAL ONCE AS NEEDED
Status: DISCONTINUED | OUTPATIENT
Start: 2020-01-01 | End: 2020-01-01 | Stop reason: HOSPADM

## 2020-01-01 RX ORDER — DIPHENOXYLATE HYDROCHLORIDE AND ATROPINE SULFATE 2.5; .025 MG/1; MG/1
1 TABLET ORAL
Status: DISCONTINUED | OUTPATIENT
Start: 2020-01-01 | End: 2020-01-01 | Stop reason: HOSPADM

## 2020-01-01 RX ORDER — ATORVASTATIN CALCIUM 20 MG/1
20 TABLET, FILM COATED ORAL EVERY EVENING
Status: DISCONTINUED | OUTPATIENT
Start: 2020-01-01 | End: 2020-01-01

## 2020-01-01 RX ORDER — CHOLECALCIFEROL (VITAMIN D3) 125 MCG
10 CAPSULE ORAL NIGHTLY
Status: DISCONTINUED | OUTPATIENT
Start: 2020-01-01 | End: 2020-01-01 | Stop reason: HOSPADM

## 2020-01-01 RX ORDER — LABETALOL HYDROCHLORIDE 5 MG/ML
5 INJECTION, SOLUTION INTRAVENOUS
Status: DISCONTINUED | OUTPATIENT
Start: 2020-01-01 | End: 2020-01-01 | Stop reason: HOSPADM

## 2020-01-01 RX ORDER — INSULIN GLARGINE 100 [IU]/ML
30 INJECTION, SOLUTION SUBCUTANEOUS EVERY 12 HOURS SCHEDULED
Status: DISCONTINUED | OUTPATIENT
Start: 2020-01-01 | End: 2020-01-01

## 2020-01-01 RX ORDER — HYDROCORTISONE 10 MG/1
10 TABLET ORAL 2 TIMES DAILY WITH MEALS
Status: DISCONTINUED | OUTPATIENT
Start: 2020-01-01 | End: 2020-01-01 | Stop reason: HOSPADM

## 2020-01-01 RX ORDER — MIDODRINE HYDROCHLORIDE 5 MG/1
10 TABLET ORAL
Status: DISCONTINUED | OUTPATIENT
Start: 2020-01-01 | End: 2020-01-01 | Stop reason: HOSPADM

## 2020-01-01 RX ORDER — HALOPERIDOL 5 MG/ML
2 INJECTION INTRAMUSCULAR
Status: DISCONTINUED | OUTPATIENT
Start: 2020-01-01 | End: 2020-01-01 | Stop reason: HOSPADM

## 2020-01-01 RX ORDER — SEVELAMER CARBONATE 800 MG/1
800 TABLET, FILM COATED ORAL
COMMUNITY
Start: 2020-01-01

## 2020-01-01 RX ADMIN — INSULIN LISPRO 4 UNITS: 100 INJECTION, SOLUTION INTRAVENOUS; SUBCUTANEOUS at 08:30

## 2020-01-01 RX ADMIN — INSULIN GLARGINE 30 UNITS: 100 INJECTION, SOLUTION SUBCUTANEOUS at 20:41

## 2020-01-01 RX ADMIN — HYDROCORTISONE 10 MG: 10 TABLET ORAL at 08:00

## 2020-01-01 RX ADMIN — LORAZEPAM 0.5 MG: 2 INJECTION INTRAMUSCULAR; INTRAVENOUS at 12:06

## 2020-01-01 RX ADMIN — PROPOFOL 20 MCG/KG/MIN: 10 INJECTION, EMULSION INTRAVENOUS at 14:56

## 2020-01-01 RX ADMIN — HYDROCORTISONE SODIUM SUCCINATE 100 MG: 100 INJECTION, POWDER, FOR SOLUTION INTRAMUSCULAR; INTRAVENOUS at 11:36

## 2020-01-01 RX ADMIN — MIDODRINE HYDROCHLORIDE 10 MG: 5 TABLET ORAL at 13:46

## 2020-01-01 RX ADMIN — PHENYLEPHRINE HYDROCHLORIDE 100 MCG: 10 INJECTION INTRAVENOUS at 12:47

## 2020-01-01 RX ADMIN — ATORVASTATIN CALCIUM 20 MG: 20 TABLET, FILM COATED ORAL at 18:56

## 2020-01-01 RX ADMIN — MIDODRINE HYDROCHLORIDE 10 MG: 5 TABLET ORAL at 06:46

## 2020-01-01 RX ADMIN — ARFORMOTEROL TARTRATE 15 MCG: 15 SOLUTION RESPIRATORY (INHALATION) at 20:59

## 2020-01-01 RX ADMIN — ARFORMOTEROL TARTRATE 15 MCG: 15 SOLUTION RESPIRATORY (INHALATION) at 07:42

## 2020-01-01 RX ADMIN — DEXMEDETOMIDINE HYDROCHLORIDE 0.2 MCG/KG/HR: 100 INJECTION, SOLUTION INTRAVENOUS at 10:48

## 2020-01-01 RX ADMIN — ALBUMIN HUMAN 12.5 G: 0.25 SOLUTION INTRAVENOUS at 11:16

## 2020-01-01 RX ADMIN — MIDODRINE HYDROCHLORIDE 10 MG: 5 TABLET ORAL at 11:30

## 2020-01-01 RX ADMIN — ARFORMOTEROL TARTRATE 15 MCG: 15 SOLUTION RESPIRATORY (INHALATION) at 07:37

## 2020-01-01 RX ADMIN — POTASSIUM CHLORIDE 20 MEQ: 29.8 INJECTION, SOLUTION INTRAVENOUS at 07:43

## 2020-01-01 RX ADMIN — INSULIN LISPRO 4 UNITS: 100 INJECTION, SOLUTION INTRAVENOUS; SUBCUTANEOUS at 16:40

## 2020-01-01 RX ADMIN — FAMOTIDINE 20 MG: 20 TABLET, FILM COATED ORAL at 08:11

## 2020-01-01 RX ADMIN — Medication 10 MG: at 20:01

## 2020-01-01 RX ADMIN — FAMOTIDINE 20 MG: 20 TABLET, FILM COATED ORAL at 08:03

## 2020-01-01 RX ADMIN — PSEUDOEPHEDRINE HCL 30 MG: 30 TABLET, FILM COATED ORAL at 06:51

## 2020-01-01 RX ADMIN — MIDODRINE HYDROCHLORIDE 10 MG: 5 TABLET ORAL at 16:41

## 2020-01-01 RX ADMIN — ASPIRIN 81 MG: 81 TABLET, CHEWABLE ORAL at 08:11

## 2020-01-01 RX ADMIN — CALCIUM CHLORIDE, MAGNESIUM CHLORIDE, SODIUM CHLORIDE, SODIUM BICARBONATE, POTASSIUM CHLORIDE AND SODIUM PHOSPHATE DIBASIC DIHYDRATE 1000 ML/HR: 3.68; 3.05; 6.34; 3.09; .314; .187 INJECTION INTRAVENOUS at 15:20

## 2020-01-01 RX ADMIN — Medication 10 MG: at 20:27

## 2020-01-01 RX ADMIN — ASPIRIN 81 MG: 81 TABLET, CHEWABLE ORAL at 08:05

## 2020-01-01 RX ADMIN — INSULIN GLARGINE 20 UNITS: 100 INJECTION, SOLUTION SUBCUTANEOUS at 14:11

## 2020-01-01 RX ADMIN — HYDROCORTISONE 10 MG: 10 TABLET ORAL at 17:19

## 2020-01-01 RX ADMIN — FAMOTIDINE 20 MG: 20 TABLET, FILM COATED ORAL at 14:53

## 2020-01-01 RX ADMIN — AMIODARONE HYDROCHLORIDE 150 MG: 1.5 INJECTION, SOLUTION INTRAVENOUS at 23:17

## 2020-01-01 RX ADMIN — FLUDROCORTISONE ACETATE 0.1 MG: 0.1 TABLET ORAL at 08:16

## 2020-01-01 RX ADMIN — MIDODRINE HYDROCHLORIDE 10 MG: 5 TABLET ORAL at 08:03

## 2020-01-01 RX ADMIN — FAMOTIDINE 20 MG: 20 TABLET, FILM COATED ORAL at 08:01

## 2020-01-01 RX ADMIN — OLANZAPINE 2.5 MG: 2.5 TABLET ORAL at 18:16

## 2020-01-01 RX ADMIN — PERFLUTREN 2 ML: 6.52 INJECTION, SUSPENSION INTRAVENOUS at 10:11

## 2020-01-01 RX ADMIN — ALBUMIN HUMAN 12.5 G: 0.25 SOLUTION INTRAVENOUS at 13:14

## 2020-01-01 RX ADMIN — HYDROCORTISONE 20 MG: 10 TABLET ORAL at 17:01

## 2020-01-01 RX ADMIN — ARFORMOTEROL TARTRATE 15 MCG: 15 SOLUTION RESPIRATORY (INHALATION) at 19:37

## 2020-01-01 RX ADMIN — MIDODRINE HYDROCHLORIDE 10 MG: 5 TABLET ORAL at 06:39

## 2020-01-01 RX ADMIN — QUETIAPINE FUMARATE 12.5 MG: 25 TABLET ORAL at 21:33

## 2020-01-01 RX ADMIN — PROPOFOL 5 MCG/KG/MIN: 10 INJECTION, EMULSION INTRAVENOUS at 07:07

## 2020-01-01 RX ADMIN — MIDODRINE HYDROCHLORIDE 10 MG: 5 TABLET ORAL at 11:01

## 2020-01-01 RX ADMIN — PROPOFOL 25 MCG/KG/MIN: 10 INJECTION, EMULSION INTRAVENOUS at 10:48

## 2020-01-01 RX ADMIN — WARFARIN 2 MG: 2 TABLET ORAL at 18:25

## 2020-01-01 RX ADMIN — PROPOFOL 25 MCG/KG/MIN: 10 INJECTION, EMULSION INTRAVENOUS at 10:30

## 2020-01-01 RX ADMIN — INSULIN LISPRO 2 UNITS: 100 INJECTION, SOLUTION INTRAVENOUS; SUBCUTANEOUS at 18:19

## 2020-01-01 RX ADMIN — PSEUDOEPHEDRINE HCL 15 MG: 30 TABLET, FILM COATED ORAL at 18:14

## 2020-01-01 RX ADMIN — PROPOFOL 20 MCG/KG/MIN: 10 INJECTION, EMULSION INTRAVENOUS at 21:39

## 2020-01-01 RX ADMIN — PROPOFOL 30 MCG/KG/MIN: 10 INJECTION, EMULSION INTRAVENOUS at 21:21

## 2020-01-01 RX ADMIN — POTASSIUM CHLORIDE 40 MEQ: 1.5 POWDER, FOR SOLUTION ORAL at 08:16

## 2020-01-01 RX ADMIN — Medication 10 MG: at 20:35

## 2020-01-01 RX ADMIN — PHENYLEPHRINE HYDROCHLORIDE 0.25 MCG/KG/MIN: 10 INJECTION INTRAVENOUS at 12:30

## 2020-01-01 RX ADMIN — MIDODRINE HYDROCHLORIDE 10 MG: 5 TABLET ORAL at 07:16

## 2020-01-01 RX ADMIN — LIDOCAINE HYDROCHLORIDE 60 MG: 20 INJECTION, SOLUTION INFILTRATION; PERINEURAL at 11:46

## 2020-01-01 RX ADMIN — MIDODRINE HYDROCHLORIDE 10 MG: 5 TABLET ORAL at 13:05

## 2020-01-01 RX ADMIN — ALBUMIN HUMAN 12.5 G: 0.25 SOLUTION INTRAVENOUS at 09:38

## 2020-01-01 RX ADMIN — FAMOTIDINE 20 MG: 20 TABLET, FILM COATED ORAL at 08:05

## 2020-01-01 RX ADMIN — PHENYLEPHRINE HYDROCHLORIDE 2.8 MCG/KG/MIN: 10 INJECTION INTRAVENOUS at 11:02

## 2020-01-01 RX ADMIN — Medication 10 MG: at 20:00

## 2020-01-01 RX ADMIN — INSULIN LISPRO 3 UNITS: 100 INJECTION, SOLUTION INTRAVENOUS; SUBCUTANEOUS at 08:15

## 2020-01-01 RX ADMIN — WARFARIN 2 MG: 2 TABLET ORAL at 18:36

## 2020-01-01 RX ADMIN — TAZOBACTAM SODIUM AND PIPERACILLIN SODIUM 3.38 G: 375; 3 INJECTION, SOLUTION INTRAVENOUS at 22:16

## 2020-01-01 RX ADMIN — MIDODRINE HYDROCHLORIDE 5 MG: 5 TABLET ORAL at 03:04

## 2020-01-01 RX ADMIN — MIDODRINE HYDROCHLORIDE 10 MG: 5 TABLET ORAL at 11:41

## 2020-01-01 RX ADMIN — SODIUM CHLORIDE: 9 INJECTION, SOLUTION INTRAVENOUS at 11:39

## 2020-01-01 RX ADMIN — OLANZAPINE 2.5 MG: 2.5 TABLET ORAL at 17:45

## 2020-01-01 RX ADMIN — MIDODRINE HYDROCHLORIDE 10 MG: 5 TABLET ORAL at 06:57

## 2020-01-01 RX ADMIN — WARFARIN 2 MG: 2 TABLET ORAL at 17:26

## 2020-01-01 RX ADMIN — HYDROCORTISONE 10 MG: 10 TABLET ORAL at 09:35

## 2020-01-01 RX ADMIN — PSEUDOEPHEDRINE HCL 15 MG: 30 TABLET, FILM COATED ORAL at 21:32

## 2020-01-01 RX ADMIN — MIDODRINE HYDROCHLORIDE 10 MG: 5 TABLET ORAL at 06:33

## 2020-01-01 RX ADMIN — MIDODRINE HYDROCHLORIDE 10 MG: 5 TABLET ORAL at 18:16

## 2020-01-01 RX ADMIN — ATORVASTATIN CALCIUM 20 MG: 20 TABLET, FILM COATED ORAL at 17:44

## 2020-01-01 RX ADMIN — Medication 10 MG: at 22:28

## 2020-01-01 RX ADMIN — ARFORMOTEROL TARTRATE 15 MCG: 15 SOLUTION RESPIRATORY (INHALATION) at 07:34

## 2020-01-01 RX ADMIN — POTASSIUM CHLORIDE 20 MEQ: 2 INJECTION, SOLUTION, CONCENTRATE INTRAVENOUS at 18:05

## 2020-01-01 RX ADMIN — MEPIVACAINE HYDROCHLORIDE 5 ML: 15 INJECTION, SOLUTION EPIDURAL; INFILTRATION at 10:42

## 2020-01-01 RX ADMIN — HYDROCORTISONE 10 MG: 10 TABLET ORAL at 08:58

## 2020-01-01 RX ADMIN — INSULIN LISPRO 10 UNITS: 100 INJECTION, SOLUTION INTRAVENOUS; SUBCUTANEOUS at 12:55

## 2020-01-01 RX ADMIN — OLANZAPINE 2.5 MG: 2.5 TABLET ORAL at 17:19

## 2020-01-01 RX ADMIN — PROPOFOL 30 MCG/KG/MIN: 10 INJECTION, EMULSION INTRAVENOUS at 04:01

## 2020-01-01 RX ADMIN — ASPIRIN 81 MG: 81 TABLET, CHEWABLE ORAL at 15:27

## 2020-01-01 RX ADMIN — ATORVASTATIN CALCIUM 20 MG: 20 TABLET, FILM COATED ORAL at 17:13

## 2020-01-01 RX ADMIN — FLUDROCORTISONE ACETATE 0.1 MG: 0.1 TABLET ORAL at 13:28

## 2020-01-01 RX ADMIN — INSULIN LISPRO 5 UNITS: 100 INJECTION, SOLUTION INTRAVENOUS; SUBCUTANEOUS at 16:22

## 2020-01-01 RX ADMIN — WARFARIN 2 MG: 2 TABLET ORAL at 17:05

## 2020-01-01 RX ADMIN — FENTANYL CITRATE 25 MCG: 50 INJECTION INTRAMUSCULAR; INTRAVENOUS at 03:38

## 2020-01-01 RX ADMIN — MORPHINE SULFATE 2 MG: 2 INJECTION, SOLUTION INTRAMUSCULAR; INTRAVENOUS at 01:17

## 2020-01-01 RX ADMIN — INSULIN GLARGINE 20 UNITS: 100 INJECTION, SOLUTION SUBCUTANEOUS at 21:32

## 2020-01-01 RX ADMIN — TAZOBACTAM SODIUM AND PIPERACILLIN SODIUM 3.38 G: 375; 3 INJECTION, SOLUTION INTRAVENOUS at 13:57

## 2020-01-01 RX ADMIN — INSULIN LISPRO 9 UNITS: 100 INJECTION, SOLUTION INTRAVENOUS; SUBCUTANEOUS at 10:58

## 2020-01-01 RX ADMIN — WARFARIN 4 MG: 4 TABLET ORAL at 16:23

## 2020-01-01 RX ADMIN — MIDODRINE HYDROCHLORIDE 10 MG: 5 TABLET ORAL at 12:53

## 2020-01-01 RX ADMIN — MIDODRINE HYDROCHLORIDE 10 MG: 5 TABLET ORAL at 11:10

## 2020-01-01 RX ADMIN — Medication 0.06 MCG/KG/MIN: at 06:20

## 2020-01-01 RX ADMIN — HYDROCORTISONE 10 MG: 10 TABLET ORAL at 19:12

## 2020-01-01 RX ADMIN — FAMOTIDINE 20 MG: 20 TABLET, FILM COATED ORAL at 08:43

## 2020-01-01 RX ADMIN — FAMOTIDINE 20 MG: 20 TABLET, FILM COATED ORAL at 14:15

## 2020-01-01 RX ADMIN — MIDODRINE HYDROCHLORIDE 10 MG: 5 TABLET ORAL at 16:40

## 2020-01-01 RX ADMIN — ARFORMOTEROL TARTRATE 15 MCG: 15 SOLUTION RESPIRATORY (INHALATION) at 08:12

## 2020-01-01 RX ADMIN — CALCIUM CHLORIDE, MAGNESIUM CHLORIDE, SODIUM CHLORIDE, SODIUM BICARBONATE, POTASSIUM CHLORIDE AND SODIUM PHOSPHATE DIBASIC DIHYDRATE 1000 ML/HR: 3.68; 3.05; 6.34; 3.09; .314; .187 INJECTION INTRAVENOUS at 23:00

## 2020-01-01 RX ADMIN — FENTANYL CITRATE 50 MCG: 50 INJECTION, SOLUTION INTRAMUSCULAR; INTRAVENOUS at 06:24

## 2020-01-01 RX ADMIN — ASPIRIN 81 MG: 81 TABLET, CHEWABLE ORAL at 09:29

## 2020-01-01 RX ADMIN — BARIUM SULFATE 50 ML: 400 SUSPENSION ORAL at 09:15

## 2020-01-01 RX ADMIN — TAZOBACTAM SODIUM AND PIPERACILLIN SODIUM 3.38 G: 375; 3 INJECTION, SOLUTION INTRAVENOUS at 23:45

## 2020-01-01 RX ADMIN — Medication 0.02 MCG/KG/MIN: at 17:13

## 2020-01-01 RX ADMIN — HYDROCORTISONE 10 MG: 10 TABLET ORAL at 08:01

## 2020-01-01 RX ADMIN — WARFARIN 4 MG: 4 TABLET ORAL at 17:24

## 2020-01-01 RX ADMIN — HYDROCORTISONE 10 MG: 10 TABLET ORAL at 09:29

## 2020-01-01 RX ADMIN — Medication 10 MG: at 20:30

## 2020-01-01 RX ADMIN — FLUDROCORTISONE ACETATE 100 MCG: 0.1 TABLET ORAL at 14:12

## 2020-01-01 RX ADMIN — MIDODRINE HYDROCHLORIDE 10 MG: 5 TABLET ORAL at 17:28

## 2020-01-01 RX ADMIN — OLANZAPINE 2.5 MG: 2.5 TABLET ORAL at 19:12

## 2020-01-01 RX ADMIN — PSEUDOEPHEDRINE HCL 15 MG: 30 TABLET, FILM COATED ORAL at 20:30

## 2020-01-01 RX ADMIN — QUETIAPINE FUMARATE 12.5 MG: 25 TABLET ORAL at 22:15

## 2020-01-01 RX ADMIN — CALCIUM CHLORIDE, MAGNESIUM CHLORIDE, SODIUM CHLORIDE, SODIUM BICARBONATE, POTASSIUM CHLORIDE AND SODIUM PHOSPHATE DIBASIC DIHYDRATE 1000 ML/HR: 3.68; 3.05; 6.34; 3.09; .314; .187 INJECTION INTRAVENOUS at 10:05

## 2020-01-01 RX ADMIN — HYDROCORTISONE SODIUM SUCCINATE 100 MG: 100 INJECTION, POWDER, FOR SOLUTION INTRAMUSCULAR; INTRAVENOUS at 18:57

## 2020-01-01 RX ADMIN — ALBUMIN HUMAN 12.5 G: 0.25 SOLUTION INTRAVENOUS at 12:15

## 2020-01-01 RX ADMIN — Medication 10 MG: at 21:32

## 2020-01-01 RX ADMIN — FAMOTIDINE 20 MG: 10 INJECTION INTRAVENOUS at 08:17

## 2020-01-01 RX ADMIN — HYDROCORTISONE 10 MG: 10 TABLET ORAL at 09:21

## 2020-01-01 RX ADMIN — HYDROCORTISONE SODIUM SUCCINATE 50 MG: 100 INJECTION, POWDER, FOR SOLUTION INTRAMUSCULAR; INTRAVENOUS at 05:16

## 2020-01-01 RX ADMIN — MIDODRINE HYDROCHLORIDE 10 MG: 5 TABLET ORAL at 17:26

## 2020-01-01 RX ADMIN — ATORVASTATIN CALCIUM 20 MG: 20 TABLET, FILM COATED ORAL at 16:32

## 2020-01-01 RX ADMIN — HYDROCORTISONE 10 MG: 10 TABLET ORAL at 14:54

## 2020-01-01 RX ADMIN — WARFARIN 2 MG: 2 TABLET ORAL at 17:45

## 2020-01-01 RX ADMIN — HYDROCORTISONE SODIUM SUCCINATE 50 MG: 100 INJECTION, POWDER, FOR SOLUTION INTRAMUSCULAR; INTRAVENOUS at 18:43

## 2020-01-01 RX ADMIN — INSULIN GLARGINE 30 UNITS: 100 INJECTION, SOLUTION SUBCUTANEOUS at 08:00

## 2020-01-01 RX ADMIN — TAZOBACTAM SODIUM AND PIPERACILLIN SODIUM 3.38 G: 375; 3 INJECTION, SOLUTION INTRAVENOUS at 11:36

## 2020-01-01 RX ADMIN — POTASSIUM CHLORIDE 10 MEQ: 7.46 INJECTION, SOLUTION INTRAVENOUS at 18:56

## 2020-01-01 RX ADMIN — INSULIN LISPRO 3 UNITS: 100 INJECTION, SOLUTION INTRAVENOUS; SUBCUTANEOUS at 22:25

## 2020-01-01 RX ADMIN — OLANZAPINE 5 MG: 5 TABLET ORAL at 17:26

## 2020-01-01 RX ADMIN — PSEUDOEPHEDRINE HCL 30 MG: 30 TABLET, FILM COATED ORAL at 17:26

## 2020-01-01 RX ADMIN — INSULIN HUMAN 6 UNITS: 100 INJECTION, SOLUTION PARENTERAL at 00:50

## 2020-01-01 RX ADMIN — PSEUDOEPHEDRINE HCL 15 MG: 30 TABLET, FILM COATED ORAL at 08:59

## 2020-01-01 RX ADMIN — ASPIRIN 81 MG: 81 TABLET, CHEWABLE ORAL at 14:15

## 2020-01-01 RX ADMIN — CEFAZOLIN SODIUM 2 G: 2 INJECTION, SOLUTION INTRAVENOUS at 11:47

## 2020-01-01 RX ADMIN — INSULIN HUMAN 14 UNITS: 100 INJECTION, SOLUTION PARENTERAL at 06:27

## 2020-01-01 RX ADMIN — ARFORMOTEROL TARTRATE 15 MCG: 15 SOLUTION RESPIRATORY (INHALATION) at 22:14

## 2020-01-01 RX ADMIN — HEPARIN SODIUM 3800 UNITS: 1000 INJECTION INTRAVENOUS; SUBCUTANEOUS at 12:51

## 2020-01-01 RX ADMIN — MIDODRINE HYDROCHLORIDE 10 MG: 5 TABLET ORAL at 18:35

## 2020-01-01 RX ADMIN — TAZOBACTAM SODIUM AND PIPERACILLIN SODIUM 3.38 G: 375; 3 INJECTION, SOLUTION INTRAVENOUS at 10:52

## 2020-01-01 RX ADMIN — HYDROCORTISONE 20 MG: 10 TABLET ORAL at 08:11

## 2020-01-01 RX ADMIN — WARFARIN 1.5 MG: 3 TABLET ORAL at 18:14

## 2020-01-01 RX ADMIN — LORAZEPAM 0.5 MG: 2 INJECTION INTRAMUSCULAR; INTRAVENOUS at 15:52

## 2020-01-01 RX ADMIN — WARFARIN 2 MG: 2 TABLET ORAL at 17:27

## 2020-01-01 RX ADMIN — ARFORMOTEROL TARTRATE 15 MCG: 15 SOLUTION RESPIRATORY (INHALATION) at 08:07

## 2020-01-01 RX ADMIN — MIDODRINE HYDROCHLORIDE 10 MG: 5 TABLET ORAL at 11:05

## 2020-01-01 RX ADMIN — OLANZAPINE 5 MG: 5 TABLET ORAL at 17:28

## 2020-01-01 RX ADMIN — MIDODRINE HYDROCHLORIDE 10 MG: 5 TABLET ORAL at 18:45

## 2020-01-01 RX ADMIN — OLANZAPINE 5 MG: 5 TABLET ORAL at 17:32

## 2020-01-01 RX ADMIN — LORAZEPAM 0.5 MG: 2 INJECTION INTRAMUSCULAR; INTRAVENOUS at 01:17

## 2020-01-01 RX ADMIN — INSULIN LISPRO 5 UNITS: 100 INJECTION, SOLUTION INTRAVENOUS; SUBCUTANEOUS at 12:56

## 2020-01-01 RX ADMIN — HYDROCORTISONE 10 MG: 10 TABLET ORAL at 17:32

## 2020-01-01 RX ADMIN — PSEUDOEPHEDRINE HCL 30 MG: 30 TABLET, FILM COATED ORAL at 15:11

## 2020-01-01 RX ADMIN — PSEUDOEPHEDRINE HCL 30 MG: 30 TABLET, FILM COATED ORAL at 06:48

## 2020-01-01 RX ADMIN — HYDROCORTISONE 10 MG: 10 TABLET ORAL at 18:45

## 2020-01-01 RX ADMIN — PSEUDOEPHEDRINE HCL 30 MG: 30 TABLET, FILM COATED ORAL at 17:13

## 2020-01-01 RX ADMIN — Medication 10 MG: at 20:40

## 2020-01-01 RX ADMIN — MIDODRINE HYDROCHLORIDE 10 MG: 5 TABLET ORAL at 06:30

## 2020-01-01 RX ADMIN — Medication 0.02 MCG/KG/MIN: at 21:37

## 2020-01-01 RX ADMIN — Medication 10 MG: at 20:05

## 2020-01-01 RX ADMIN — HYDROCORTISONE SODIUM SUCCINATE 100 MG: 100 INJECTION, POWDER, FOR SOLUTION INTRAMUSCULAR; INTRAVENOUS at 12:04

## 2020-01-01 RX ADMIN — HYDROCORTISONE 10 MG: 10 TABLET ORAL at 18:14

## 2020-01-01 RX ADMIN — ARFORMOTEROL TARTRATE 15 MCG: 15 SOLUTION RESPIRATORY (INHALATION) at 20:31

## 2020-01-01 RX ADMIN — PROPOFOL 35 MCG/KG/MIN: 10 INJECTION, EMULSION INTRAVENOUS at 21:06

## 2020-01-01 RX ADMIN — INSULIN GLARGINE 10 UNITS: 100 INJECTION, SOLUTION SUBCUTANEOUS at 15:06

## 2020-01-01 RX ADMIN — HYDROCORTISONE 20 MG: 10 TABLET ORAL at 18:35

## 2020-01-01 RX ADMIN — HEPARIN SODIUM 3800 UNITS: 1000 INJECTION INTRAVENOUS; SUBCUTANEOUS at 12:57

## 2020-01-01 RX ADMIN — ATORVASTATIN CALCIUM 20 MG: 20 TABLET, FILM COATED ORAL at 16:59

## 2020-01-01 RX ADMIN — ATORVASTATIN CALCIUM 20 MG: 20 TABLET, FILM COATED ORAL at 21:01

## 2020-01-01 RX ADMIN — PSEUDOEPHEDRINE HCL 30 MG: 30 TABLET, FILM COATED ORAL at 06:46

## 2020-01-01 RX ADMIN — VASOPRESSIN 0.03 UNITS/MIN: 20 INJECTION INTRAVENOUS at 17:18

## 2020-01-01 RX ADMIN — MIDODRINE HYDROCHLORIDE 10 MG: 5 TABLET ORAL at 06:40

## 2020-01-01 RX ADMIN — ATORVASTATIN CALCIUM 20 MG: 20 TABLET, FILM COATED ORAL at 18:25

## 2020-01-01 RX ADMIN — PSEUDOEPHEDRINE HCL 30 MG: 30 TABLET, FILM COATED ORAL at 17:28

## 2020-01-01 RX ADMIN — FAMOTIDINE 20 MG: 10 INJECTION INTRAVENOUS at 11:36

## 2020-01-01 RX ADMIN — ANTACID TABLETS 1 TABLET: 500 TABLET, CHEWABLE ORAL at 18:25

## 2020-01-01 RX ADMIN — FAMOTIDINE 20 MG: 20 TABLET, FILM COATED ORAL at 09:35

## 2020-01-01 RX ADMIN — MIDODRINE HYDROCHLORIDE 10 MG: 5 TABLET ORAL at 18:14

## 2020-01-01 RX ADMIN — ASPIRIN 81 MG: 81 TABLET, COATED ORAL at 11:42

## 2020-01-01 RX ADMIN — FENTANYL CITRATE 25 MCG: 50 INJECTION INTRAMUSCULAR; INTRAVENOUS at 15:09

## 2020-01-01 RX ADMIN — FAMOTIDINE 20 MG: 20 TABLET, FILM COATED ORAL at 15:27

## 2020-01-01 RX ADMIN — INSULIN LISPRO 2 UNITS: 100 INJECTION, SOLUTION INTRAVENOUS; SUBCUTANEOUS at 11:36

## 2020-01-01 RX ADMIN — FLUDROCORTISONE ACETATE 0.1 MG: 0.1 TABLET ORAL at 08:23

## 2020-01-01 RX ADMIN — PROPOFOL 25 MCG/KG/MIN: 10 INJECTION, EMULSION INTRAVENOUS at 11:47

## 2020-01-01 RX ADMIN — MIDODRINE HYDROCHLORIDE 10 MG: 5 TABLET ORAL at 12:31

## 2020-01-01 RX ADMIN — INSULIN LISPRO 2 UNITS: 100 INJECTION, SOLUTION INTRAVENOUS; SUBCUTANEOUS at 11:14

## 2020-01-01 RX ADMIN — ASPIRIN 81 MG: 81 TABLET, CHEWABLE ORAL at 09:00

## 2020-01-01 RX ADMIN — FENTANYL CITRATE 25 MCG: 50 INJECTION INTRAMUSCULAR; INTRAVENOUS at 22:54

## 2020-01-01 RX ADMIN — ASPIRIN 81 MG: 81 TABLET, CHEWABLE ORAL at 09:21

## 2020-01-01 RX ADMIN — Medication 10 MG: at 21:01

## 2020-01-01 RX ADMIN — INSULIN LISPRO 3 UNITS: 100 INJECTION, SOLUTION INTRAVENOUS; SUBCUTANEOUS at 17:22

## 2020-01-01 RX ADMIN — MIDODRINE HYDROCHLORIDE 10 MG: 5 TABLET ORAL at 15:27

## 2020-01-01 RX ADMIN — HYDROCORTISONE 10 MG: 10 TABLET ORAL at 17:26

## 2020-01-01 RX ADMIN — HYDROCORTISONE 10 MG: 10 TABLET ORAL at 17:44

## 2020-01-01 RX ADMIN — ASPIRIN 81 MG: 81 TABLET, CHEWABLE ORAL at 14:54

## 2020-01-01 RX ADMIN — MIDODRINE HYDROCHLORIDE 10 MG: 5 TABLET ORAL at 17:19

## 2020-01-01 RX ADMIN — MIDODRINE HYDROCHLORIDE 10 MG: 5 TABLET ORAL at 06:49

## 2020-01-01 RX ADMIN — DEXMEDETOMIDINE HYDROCHLORIDE 0.8 MCG/KG/HR: 100 INJECTION, SOLUTION INTRAVENOUS at 22:06

## 2020-01-01 RX ADMIN — FAMOTIDINE 20 MG: 10 INJECTION INTRAVENOUS at 08:24

## 2020-01-01 RX ADMIN — ETOMIDATE 10 MG: 2 INJECTION INTRAVENOUS at 06:00

## 2020-01-01 RX ADMIN — VASOPRESSIN 0.03 UNITS/MIN: 20 INJECTION INTRAVENOUS at 06:23

## 2020-01-01 RX ADMIN — ARFORMOTEROL TARTRATE 15 MCG: 15 SOLUTION RESPIRATORY (INHALATION) at 07:44

## 2020-01-01 RX ADMIN — ATORVASTATIN CALCIUM 20 MG: 20 TABLET, FILM COATED ORAL at 18:29

## 2020-01-01 RX ADMIN — SODIUM CHLORIDE 500 ML: 9 INJECTION, SOLUTION INTRAVENOUS at 21:54

## 2020-01-01 RX ADMIN — MIDODRINE HYDROCHLORIDE 10 MG: 5 TABLET ORAL at 18:15

## 2020-01-01 RX ADMIN — PSEUDOEPHEDRINE HCL 15 MG: 30 TABLET, FILM COATED ORAL at 08:03

## 2020-01-01 RX ADMIN — MIDODRINE HYDROCHLORIDE 10 MG: 5 TABLET ORAL at 12:36

## 2020-01-01 RX ADMIN — MIDODRINE HYDROCHLORIDE 10 MG: 5 TABLET ORAL at 17:32

## 2020-01-01 RX ADMIN — MIDODRINE HYDROCHLORIDE 10 MG: 5 TABLET ORAL at 08:11

## 2020-01-01 RX ADMIN — INSULIN LISPRO 2 UNITS: 100 INJECTION, SOLUTION INTRAVENOUS; SUBCUTANEOUS at 17:31

## 2020-01-01 RX ADMIN — Medication 0.02 MCG/KG/MIN: at 15:15

## 2020-01-01 RX ADMIN — VASOPRESSIN 0.03 UNITS/MIN: 20 INJECTION INTRAVENOUS at 08:30

## 2020-01-01 RX ADMIN — HYDROCORTISONE SODIUM SUCCINATE 50 MG: 100 INJECTION, POWDER, FOR SOLUTION INTRAMUSCULAR; INTRAVENOUS at 04:25

## 2020-01-01 RX ADMIN — DEXMEDETOMIDINE HYDROCHLORIDE 0.6 MCG/KG/HR: 100 INJECTION, SOLUTION INTRAVENOUS at 05:09

## 2020-01-01 RX ADMIN — Medication 10 MG: at 21:50

## 2020-01-01 RX ADMIN — MIDODRINE HYDROCHLORIDE 10 MG: 5 TABLET ORAL at 16:59

## 2020-01-01 RX ADMIN — PSEUDOEPHEDRINE HCL 15 MG: 30 TABLET, FILM COATED ORAL at 20:12

## 2020-01-01 RX ADMIN — HYDROCORTISONE SODIUM SUCCINATE 50 MG: 100 INJECTION, POWDER, FOR SOLUTION INTRAMUSCULAR; INTRAVENOUS at 10:49

## 2020-01-01 RX ADMIN — MIDODRINE HYDROCHLORIDE 10 MG: 5 TABLET ORAL at 17:44

## 2020-01-01 RX ADMIN — WARFARIN 2 MG: 2 TABLET ORAL at 18:54

## 2020-01-01 RX ADMIN — PROPOFOL 35 MCG/KG/MIN: 10 INJECTION, EMULSION INTRAVENOUS at 05:48

## 2020-01-01 RX ADMIN — PHENYLEPHRINE HYDROCHLORIDE 0.7 MCG/KG/MIN: 10 INJECTION INTRAVENOUS at 04:53

## 2020-01-01 RX ADMIN — TAZOBACTAM SODIUM AND PIPERACILLIN SODIUM 3.38 G: 375; 3 INJECTION, SOLUTION INTRAVENOUS at 12:56

## 2020-01-01 RX ADMIN — QUETIAPINE FUMARATE 25 MG: 25 TABLET ORAL at 20:27

## 2020-01-01 RX ADMIN — INSULIN LISPRO 4 UNITS: 100 INJECTION, SOLUTION INTRAVENOUS; SUBCUTANEOUS at 06:31

## 2020-01-01 RX ADMIN — CALCIUM GLUCONATE 2 G: 98 INJECTION, SOLUTION INTRAVENOUS at 19:45

## 2020-01-01 RX ADMIN — ASPIRIN 81 MG: 81 TABLET, COATED ORAL at 08:24

## 2020-01-01 RX ADMIN — TAZOBACTAM SODIUM AND PIPERACILLIN SODIUM 3.38 G: 375; 3 INJECTION, SOLUTION INTRAVENOUS at 10:27

## 2020-01-01 RX ADMIN — HYDROCORTISONE 10 MG: 10 TABLET ORAL at 09:51

## 2020-01-01 RX ADMIN — TAZOBACTAM SODIUM AND PIPERACILLIN SODIUM 3.38 G: 375; 3 INJECTION, SOLUTION INTRAVENOUS at 23:01

## 2020-01-01 RX ADMIN — HEPARIN SODIUM 3800 UNITS: 1000 INJECTION INTRAVENOUS; SUBCUTANEOUS at 13:07

## 2020-01-01 RX ADMIN — FENTANYL CITRATE 25 MCG: 50 INJECTION INTRAMUSCULAR; INTRAVENOUS at 07:46

## 2020-01-01 RX ADMIN — FENTANYL CITRATE 25 MCG: 50 INJECTION INTRAMUSCULAR; INTRAVENOUS at 22:57

## 2020-01-01 RX ADMIN — MIDODRINE HYDROCHLORIDE 10 MG: 5 TABLET ORAL at 11:02

## 2020-01-01 RX ADMIN — ALBUMIN HUMAN 12.5 G: 0.25 SOLUTION INTRAVENOUS at 09:00

## 2020-01-01 RX ADMIN — MIDODRINE HYDROCHLORIDE 10 MG: 5 TABLET ORAL at 17:24

## 2020-01-01 RX ADMIN — ATORVASTATIN CALCIUM 20 MG: 20 TABLET, FILM COATED ORAL at 16:41

## 2020-01-01 RX ADMIN — MIDODRINE HYDROCHLORIDE 10 MG: 5 TABLET ORAL at 06:50

## 2020-01-01 RX ADMIN — FENTANYL CITRATE 25 MCG: 50 INJECTION INTRAMUSCULAR; INTRAVENOUS at 10:45

## 2020-01-01 RX ADMIN — Medication 10 MG: at 20:12

## 2020-01-01 RX ADMIN — ALBUMIN HUMAN 12.5 G: 0.25 SOLUTION INTRAVENOUS at 20:04

## 2020-01-01 RX ADMIN — FAMOTIDINE 20 MG: 20 TABLET, FILM COATED ORAL at 09:29

## 2020-01-01 RX ADMIN — TAZOBACTAM SODIUM AND PIPERACILLIN SODIUM 3.38 G: 375; 3 INJECTION, SOLUTION INTRAVENOUS at 19:45

## 2020-01-01 RX ADMIN — ASPIRIN 81 MG: 81 TABLET, CHEWABLE ORAL at 15:11

## 2020-01-01 RX ADMIN — MIDODRINE HYDROCHLORIDE 10 MG: 5 TABLET ORAL at 11:17

## 2020-01-01 RX ADMIN — MIDODRINE HYDROCHLORIDE 10 MG: 5 TABLET ORAL at 06:36

## 2020-01-01 RX ADMIN — MORPHINE SULFATE 2 MG: 2 INJECTION, SOLUTION INTRAMUSCULAR; INTRAVENOUS at 04:49

## 2020-01-01 RX ADMIN — ATORVASTATIN CALCIUM 20 MG: 20 TABLET, FILM COATED ORAL at 17:32

## 2020-01-01 RX ADMIN — HYDROCORTISONE 10 MG: 10 TABLET ORAL at 17:45

## 2020-01-01 RX ADMIN — FENTANYL CITRATE 25 MCG: 50 INJECTION INTRAMUSCULAR; INTRAVENOUS at 01:18

## 2020-01-01 RX ADMIN — PSEUDOEPHEDRINE HCL 30 MG: 30 TABLET, FILM COATED ORAL at 16:32

## 2020-01-01 RX ADMIN — ATORVASTATIN CALCIUM 20 MG: 20 TABLET, FILM COATED ORAL at 00:17

## 2020-01-01 RX ADMIN — PSEUDOEPHEDRINE HCL 15 MG: 30 TABLET, FILM COATED ORAL at 08:02

## 2020-01-01 RX ADMIN — MIDODRINE HYDROCHLORIDE 10 MG: 5 TABLET ORAL at 16:32

## 2020-01-01 RX ADMIN — FLUDROCORTISONE ACETATE 100 MCG: 0.1 TABLET ORAL at 17:24

## 2020-01-01 RX ADMIN — HYDROCORTISONE SODIUM SUCCINATE 50 MG: 100 INJECTION, POWDER, FOR SOLUTION INTRAMUSCULAR; INTRAVENOUS at 03:36

## 2020-01-01 RX ADMIN — FAMOTIDINE 20 MG: 20 TABLET, FILM COATED ORAL at 09:21

## 2020-01-01 RX ADMIN — PROPOFOL 30 MCG/KG/MIN: 10 INJECTION, EMULSION INTRAVENOUS at 16:00

## 2020-01-01 RX ADMIN — OLANZAPINE 2.5 MG: 2.5 TABLET ORAL at 18:14

## 2020-01-01 RX ADMIN — PSEUDOEPHEDRINE HCL 15 MG: 30 TABLET, FILM COATED ORAL at 22:28

## 2020-01-01 RX ADMIN — Medication 10 MG: at 20:50

## 2020-01-01 RX ADMIN — FAMOTIDINE 20 MG: 10 INJECTION INTRAVENOUS at 10:46

## 2020-01-01 RX ADMIN — PSEUDOEPHEDRINE HCL 30 MG: 30 TABLET, FILM COATED ORAL at 06:40

## 2020-01-01 RX ADMIN — ATORVASTATIN CALCIUM 20 MG: 20 TABLET, FILM COATED ORAL at 17:26

## 2020-01-01 RX ADMIN — VASOPRESSIN 0.03 UNITS/MIN: 20 INJECTION INTRAVENOUS at 06:46

## 2020-01-01 RX ADMIN — MIDODRINE HYDROCHLORIDE 10 MG: 5 TABLET ORAL at 17:45

## 2020-01-01 RX ADMIN — CALCIUM CHLORIDE, MAGNESIUM CHLORIDE, SODIUM CHLORIDE, SODIUM BICARBONATE, POTASSIUM CHLORIDE AND SODIUM PHOSPHATE DIBASIC DIHYDRATE 1000 ML/HR: 3.68; 3.05; 6.34; 3.09; .314; .187 INJECTION INTRAVENOUS at 06:21

## 2020-01-01 RX ADMIN — HYDROCORTISONE SODIUM SUCCINATE 50 MG: 100 INJECTION, POWDER, FOR SOLUTION INTRAMUSCULAR; INTRAVENOUS at 19:48

## 2020-01-01 RX ADMIN — PSEUDOEPHEDRINE HCL 15 MG: 30 TABLET, FILM COATED ORAL at 08:00

## 2020-01-01 RX ADMIN — TAZOBACTAM SODIUM AND PIPERACILLIN SODIUM 3.38 G: 375; 3 INJECTION, SOLUTION INTRAVENOUS at 02:12

## 2020-01-01 RX ADMIN — PHENYLEPHRINE HYDROCHLORIDE 0.5 MCG/KG/MIN: 10 INJECTION INTRAVENOUS at 04:55

## 2020-01-01 RX ADMIN — FAMOTIDINE 20 MG: 20 TABLET, FILM COATED ORAL at 08:00

## 2020-01-01 RX ADMIN — QUETIAPINE FUMARATE 25 MG: 25 TABLET ORAL at 21:50

## 2020-01-01 RX ADMIN — HYDROCORTISONE 10 MG: 10 TABLET ORAL at 08:03

## 2020-01-01 RX ADMIN — PSEUDOEPHEDRINE HCL 15 MG: 30 TABLET, FILM COATED ORAL at 09:51

## 2020-01-01 RX ADMIN — MIDODRINE HYDROCHLORIDE 10 MG: 5 TABLET ORAL at 06:34

## 2020-01-01 RX ADMIN — FENTANYL CITRATE 25 MCG: 50 INJECTION INTRAMUSCULAR; INTRAVENOUS at 03:45

## 2020-01-01 RX ADMIN — HALOPERIDOL LACTATE 2 MG: 5 INJECTION, SOLUTION INTRAMUSCULAR at 04:18

## 2020-01-01 RX ADMIN — MIDODRINE HYDROCHLORIDE 10 MG: 5 TABLET ORAL at 09:50

## 2020-01-01 RX ADMIN — OLANZAPINE 2.5 MG: 2.5 TABLET ORAL at 18:45

## 2020-01-01 RX ADMIN — SODIUM CHLORIDE, PRESERVATIVE FREE 10 ML: 5 INJECTION INTRAVENOUS at 08:05

## 2020-01-01 RX ADMIN — DEXTROSE 15 G: 15 GEL ORAL at 11:08

## 2020-01-01 RX ADMIN — HYDROCORTISONE 10 MG: 10 TABLET ORAL at 15:14

## 2020-01-01 RX ADMIN — PROPOFOL 25 MCG/KG/MIN: 10 INJECTION, EMULSION INTRAVENOUS at 07:45

## 2020-01-01 RX ADMIN — ASPIRIN 81 MG: 81 TABLET, CHEWABLE ORAL at 08:00

## 2020-01-01 RX ADMIN — ALBUMIN HUMAN 12.5 G: 0.25 SOLUTION INTRAVENOUS at 11:51

## 2020-01-01 RX ADMIN — HEPARIN SODIUM 3000 UNITS: 1000 INJECTION, SOLUTION INTRAVENOUS; SUBCUTANEOUS at 12:12

## 2020-01-01 RX ADMIN — INSULIN GLARGINE 25 UNITS: 100 INJECTION, SOLUTION SUBCUTANEOUS at 09:55

## 2020-01-01 RX ADMIN — PHENYLEPHRINE HYDROCHLORIDE 100 MCG: 10 INJECTION INTRAVENOUS at 12:25

## 2020-01-01 RX ADMIN — PSEUDOEPHEDRINE HCL 30 MG: 30 TABLET, FILM COATED ORAL at 06:57

## 2020-01-01 RX ADMIN — HYDROCORTISONE 10 MG: 10 TABLET ORAL at 14:12

## 2020-01-01 RX ADMIN — INSULIN LISPRO 6 UNITS: 100 INJECTION, SOLUTION INTRAVENOUS; SUBCUTANEOUS at 06:30

## 2020-01-01 RX ADMIN — MIDODRINE HYDROCHLORIDE 10 MG: 5 TABLET ORAL at 08:16

## 2020-01-01 RX ADMIN — INSULIN GLARGINE 30 UNITS: 100 INJECTION, SOLUTION SUBCUTANEOUS at 08:05

## 2020-01-01 RX ADMIN — CALCIUM CHLORIDE, MAGNESIUM CHLORIDE, SODIUM CHLORIDE, SODIUM BICARBONATE, POTASSIUM CHLORIDE AND SODIUM PHOSPHATE DIBASIC DIHYDRATE 1000 ML/HR: 3.68; 3.05; 6.34; 3.09; .314; .187 INJECTION INTRAVENOUS at 06:32

## 2020-01-01 RX ADMIN — MIDODRINE HYDROCHLORIDE 10 MG: 5 TABLET ORAL at 08:01

## 2020-01-01 RX ADMIN — FLUDROCORTISONE ACETATE 100 MCG: 0.1 TABLET ORAL at 08:11

## 2020-01-01 RX ADMIN — INSULIN GLARGINE 30 UNITS: 100 INJECTION, SOLUTION SUBCUTANEOUS at 22:32

## 2020-01-01 RX ADMIN — MIDODRINE HYDROCHLORIDE 10 MG: 5 TABLET ORAL at 14:53

## 2020-01-01 RX ADMIN — HYDROCORTISONE SODIUM SUCCINATE 100 MG: 100 INJECTION, POWDER, FOR SOLUTION INTRAMUSCULAR; INTRAVENOUS at 17:22

## 2020-01-01 RX ADMIN — TAZOBACTAM SODIUM AND PIPERACILLIN SODIUM 3.38 G: 375; 3 INJECTION, SOLUTION INTRAVENOUS at 23:22

## 2020-01-01 RX ADMIN — POTASSIUM CHLORIDE 10 MEQ: 7.46 INJECTION, SOLUTION INTRAVENOUS at 11:10

## 2020-01-01 RX ADMIN — VANCOMYCIN HYDROCHLORIDE 1500 MG: 10 INJECTION, POWDER, LYOPHILIZED, FOR SOLUTION INTRAVENOUS at 03:03

## 2020-01-01 RX ADMIN — ATORVASTATIN CALCIUM 20 MG: 20 TABLET, FILM COATED ORAL at 17:24

## 2020-01-01 RX ADMIN — FAMOTIDINE 20 MG: 10 INJECTION INTRAVENOUS at 21:10

## 2020-01-01 RX ADMIN — FAMOTIDINE 20 MG: 20 TABLET, FILM COATED ORAL at 09:50

## 2020-01-01 RX ADMIN — DEXMEDETOMIDINE HYDROCHLORIDE 0.5 MCG/KG/HR: 100 INJECTION, SOLUTION INTRAVENOUS at 08:23

## 2020-01-01 RX ADMIN — PSEUDOEPHEDRINE HCL 30 MG: 30 TABLET, FILM COATED ORAL at 17:45

## 2020-01-01 RX ADMIN — INSULIN LISPRO 2 UNITS: 100 INJECTION, SOLUTION INTRAVENOUS; SUBCUTANEOUS at 22:30

## 2020-01-01 RX ADMIN — ONDANSETRON 4 MG: 2 INJECTION INTRAMUSCULAR; INTRAVENOUS at 05:11

## 2020-01-01 RX ADMIN — SODIUM CHLORIDE 250 ML: 9 INJECTION, SOLUTION INTRAVENOUS at 15:41

## 2020-01-01 RX ADMIN — HEPARIN SODIUM 1900 UNITS: 1000 INJECTION INTRAVENOUS; SUBCUTANEOUS at 14:38

## 2020-01-01 RX ADMIN — HYDROCORTISONE 10 MG: 10 TABLET ORAL at 18:16

## 2020-01-01 RX ADMIN — HYDROCORTISONE SODIUM SUCCINATE 50 MG: 100 INJECTION, POWDER, FOR SOLUTION INTRAMUSCULAR; INTRAVENOUS at 18:40

## 2020-01-01 RX ADMIN — Medication 10 MG: at 20:44

## 2020-01-01 RX ADMIN — BARIUM SULFATE 183 ML: 960 POWDER, FOR SUSPENSION ORAL at 09:20

## 2020-01-01 RX ADMIN — DEXMEDETOMIDINE HYDROCHLORIDE 0.7 MCG/KG/HR: 100 INJECTION, SOLUTION INTRAVENOUS at 15:20

## 2020-01-01 RX ADMIN — FENTANYL CITRATE 25 MCG: 50 INJECTION INTRAMUSCULAR; INTRAVENOUS at 08:34

## 2020-01-01 RX ADMIN — ALBUMIN HUMAN 12.5 G: 0.25 SOLUTION INTRAVENOUS at 08:59

## 2020-01-01 RX ADMIN — LORAZEPAM 0.5 MG: 2 INJECTION INTRAMUSCULAR; INTRAVENOUS at 18:04

## 2020-01-01 RX ADMIN — MIDODRINE HYDROCHLORIDE 10 MG: 5 TABLET ORAL at 15:11

## 2020-01-01 RX ADMIN — POTASSIUM CHLORIDE 20 MEQ: 1.5 POWDER, FOR SOLUTION ORAL at 07:43

## 2020-01-01 RX ADMIN — FENTANYL CITRATE 25 MCG: 50 INJECTION INTRAMUSCULAR; INTRAVENOUS at 20:56

## 2020-01-01 RX ADMIN — ALBUMIN HUMAN 12.5 G: 0.25 SOLUTION INTRAVENOUS at 12:53

## 2020-01-01 RX ADMIN — PROPOFOL 15 MCG/KG/MIN: 10 INJECTION, EMULSION INTRAVENOUS at 14:31

## 2020-01-01 RX ADMIN — ASPIRIN 81 MG: 81 TABLET, CHEWABLE ORAL at 12:55

## 2020-01-01 RX ADMIN — HYDROCORTISONE 10 MG: 10 TABLET ORAL at 17:13

## 2020-01-01 RX ADMIN — HYDROCORTISONE SODIUM SUCCINATE 50 MG: 100 INJECTION, POWDER, FOR SOLUTION INTRAMUSCULAR; INTRAVENOUS at 13:58

## 2020-01-01 RX ADMIN — CARBAMIDE PEROXIDE 6.5% 5 DROP: 6.5 LIQUID AURICULAR (OTIC) at 20:19

## 2020-01-01 RX ADMIN — PROPOFOL 10 MCG/KG/MIN: 10 INJECTION, EMULSION INTRAVENOUS at 18:48

## 2020-01-01 RX ADMIN — ASPIRIN 81 MG: 81 TABLET, CHEWABLE ORAL at 09:35

## 2020-01-01 RX ADMIN — WARFARIN 4 MG: 4 TABLET ORAL at 16:41

## 2020-01-01 RX ADMIN — TAZOBACTAM SODIUM AND PIPERACILLIN SODIUM 3.38 G: 375; 3 INJECTION, SOLUTION INTRAVENOUS at 10:45

## 2020-01-01 RX ADMIN — MIDODRINE HYDROCHLORIDE 10 MG: 5 TABLET ORAL at 06:37

## 2020-01-01 RX ADMIN — MIDODRINE HYDROCHLORIDE 10 MG: 5 TABLET ORAL at 14:12

## 2020-01-01 RX ADMIN — INSULIN LISPRO 2 UNITS: 100 INJECTION, SOLUTION INTRAVENOUS; SUBCUTANEOUS at 22:37

## 2020-01-01 RX ADMIN — ASPIRIN 81 MG: 81 TABLET, CHEWABLE ORAL at 08:03

## 2020-01-01 RX ADMIN — MIDODRINE HYDROCHLORIDE 10 MG: 5 TABLET ORAL at 11:55

## 2020-01-01 RX ADMIN — CALCIUM CHLORIDE, MAGNESIUM CHLORIDE, SODIUM CHLORIDE, SODIUM BICARBONATE, POTASSIUM CHLORIDE AND SODIUM PHOSPHATE DIBASIC DIHYDRATE 1000 ML/HR: 3.68; 3.05; 6.34; 3.09; .314; .187 INJECTION INTRAVENOUS at 06:31

## 2020-01-01 RX ADMIN — MIDODRINE HYDROCHLORIDE 10 MG: 5 TABLET ORAL at 06:48

## 2020-01-01 RX ADMIN — HYDROCORTISONE SODIUM SUCCINATE 100 MG: 100 INJECTION, POWDER, FOR SOLUTION INTRAMUSCULAR; INTRAVENOUS at 02:12

## 2020-01-01 RX ADMIN — MIDODRINE HYDROCHLORIDE 10 MG: 5 TABLET ORAL at 08:05

## 2020-01-01 RX ADMIN — INSULIN GLARGINE 30 UNITS: 100 INJECTION, SOLUTION SUBCUTANEOUS at 08:19

## 2020-01-01 RX ADMIN — INSULIN GLARGINE 20 UNITS: 100 INJECTION, SOLUTION SUBCUTANEOUS at 08:30

## 2020-01-01 RX ADMIN — MIDODRINE HYDROCHLORIDE 10 MG: 5 TABLET ORAL at 11:04

## 2020-01-01 RX ADMIN — MIDODRINE HYDROCHLORIDE 5 MG: 5 TABLET ORAL at 01:22

## 2020-01-01 RX ADMIN — MIDODRINE HYDROCHLORIDE 10 MG: 5 TABLET ORAL at 08:30

## 2020-01-01 RX ADMIN — PHENYLEPHRINE HYDROCHLORIDE 1.6 MCG/KG/MIN: 10 INJECTION INTRAVENOUS at 14:48

## 2020-01-01 RX ADMIN — HEPARIN SODIUM 3800 UNITS: 1000 INJECTION INTRAVENOUS; SUBCUTANEOUS at 09:03

## 2020-01-01 RX ADMIN — MIDODRINE HYDROCHLORIDE 10 MG: 5 TABLET ORAL at 07:54

## 2020-01-01 RX ADMIN — ETOMIDATE 10 MG: 2 INJECTION, SOLUTION INTRAVENOUS at 06:00

## 2020-01-01 RX ADMIN — FAMOTIDINE 20 MG: 20 TABLET, FILM COATED ORAL at 08:58

## 2020-01-01 RX ADMIN — ASPIRIN 81 MG: 81 TABLET, CHEWABLE ORAL at 08:43

## 2020-01-01 RX ADMIN — PSEUDOEPHEDRINE HCL 15 MG: 30 TABLET, FILM COATED ORAL at 20:18

## 2020-01-01 RX ADMIN — MIDODRINE HYDROCHLORIDE 10 MG: 5 TABLET ORAL at 18:25

## 2020-01-01 RX ADMIN — MIDODRINE HYDROCHLORIDE 10 MG: 5 TABLET ORAL at 17:30

## 2020-01-01 RX ADMIN — INSULIN LISPRO 4 UNITS: 100 INJECTION, SOLUTION INTRAVENOUS; SUBCUTANEOUS at 06:49

## 2020-01-01 RX ADMIN — HYDROCORTISONE 10 MG: 10 TABLET ORAL at 17:28

## 2020-01-01 RX ADMIN — Medication 10 MG: at 20:18

## 2020-01-01 RX ADMIN — HYDROCORTISONE SODIUM SUCCINATE 100 MG: 100 INJECTION, POWDER, FOR SOLUTION INTRAMUSCULAR; INTRAVENOUS at 02:38

## 2020-01-01 RX ADMIN — HYDROCODONE BITARTRATE AND ACETAMINOPHEN 1 TABLET: 5; 325 TABLET ORAL at 21:27

## 2020-01-01 RX ADMIN — INSULIN HUMAN 8 UNITS: 100 INJECTION, SOLUTION PARENTERAL at 18:20

## 2020-01-01 RX ADMIN — WARFARIN 2 MG: 2 TABLET ORAL at 17:31

## 2020-01-01 RX ADMIN — ALBUMIN HUMAN 12.5 G: 0.25 SOLUTION INTRAVENOUS at 17:44

## 2020-01-01 RX ADMIN — ATORVASTATIN CALCIUM 20 MG: 20 TABLET, FILM COATED ORAL at 16:40

## 2020-01-01 RX ADMIN — PHENYLEPHRINE HYDROCHLORIDE 100 MCG: 10 INJECTION INTRAVENOUS at 12:13

## 2020-01-01 RX ADMIN — INSULIN LISPRO 3 UNITS: 100 INJECTION, SOLUTION INTRAVENOUS; SUBCUTANEOUS at 10:45

## 2020-01-01 RX ADMIN — FAMOTIDINE 20 MG: 10 INJECTION INTRAVENOUS at 09:00

## 2020-01-01 RX ADMIN — ROPIVACAINE HYDROCHLORIDE 30 ML: 5 INJECTION, SOLUTION EPIDURAL; INFILTRATION; PERINEURAL at 10:42

## 2020-01-01 RX ADMIN — PROPOFOL 30 MCG/KG/MIN: 10 INJECTION, EMULSION INTRAVENOUS at 04:13

## 2020-01-01 RX ADMIN — PSEUDOEPHEDRINE HCL 15 MG: 30 TABLET, FILM COATED ORAL at 06:33

## 2020-01-01 RX ADMIN — FLUDROCORTISONE ACETATE 100 MCG: 0.1 TABLET ORAL at 08:05

## 2020-01-01 RX ADMIN — MIDODRINE HYDROCHLORIDE 10 MG: 5 TABLET ORAL at 17:29

## 2020-01-01 RX ADMIN — FAMOTIDINE 20 MG: 20 TABLET, FILM COATED ORAL at 15:11

## 2020-01-01 RX ADMIN — MIDODRINE HYDROCHLORIDE 10 MG: 5 TABLET ORAL at 13:09

## 2020-01-01 RX ADMIN — PHENYLEPHRINE HYDROCHLORIDE 2.8 MCG/KG/MIN: 10 INJECTION INTRAVENOUS at 08:30

## 2020-01-01 RX ADMIN — DEXMEDETOMIDINE HYDROCHLORIDE 0.7 MCG/KG/HR: 100 INJECTION, SOLUTION INTRAVENOUS at 22:10

## 2020-01-01 RX ADMIN — INSULIN LISPRO 2 UNITS: 100 INJECTION, SOLUTION INTRAVENOUS; SUBCUTANEOUS at 22:12

## 2020-01-01 RX ADMIN — HEPARIN SODIUM 3800 UNITS: 1000 INJECTION INTRAVENOUS; SUBCUTANEOUS at 12:56

## 2020-01-01 RX ADMIN — ARFORMOTEROL TARTRATE 15 MCG: 15 SOLUTION RESPIRATORY (INHALATION) at 19:19

## 2020-01-01 RX ADMIN — ARFORMOTEROL TARTRATE 15 MCG: 15 SOLUTION RESPIRATORY (INHALATION) at 21:04

## 2020-01-01 RX ADMIN — DEXMEDETOMIDINE HYDROCHLORIDE 0.8 MCG/KG/HR: 100 INJECTION, SOLUTION INTRAVENOUS at 02:23

## 2020-01-01 RX ADMIN — TAZOBACTAM SODIUM AND PIPERACILLIN SODIUM 3.38 G: 375; 3 INJECTION, SOLUTION INTRAVENOUS at 22:51

## 2020-01-01 RX ADMIN — ARFORMOTEROL TARTRATE 15 MCG: 15 SOLUTION RESPIRATORY (INHALATION) at 21:36

## 2020-01-01 RX ADMIN — ASPIRIN 81 MG: 81 TABLET, COATED ORAL at 08:16

## 2020-01-01 RX ADMIN — HEPARIN SODIUM 3800 UNITS: 1000 INJECTION INTRAVENOUS; SUBCUTANEOUS at 13:25

## 2020-01-01 RX ADMIN — TAZOBACTAM SODIUM AND PIPERACILLIN SODIUM 3.38 G: 375; 3 INJECTION, SOLUTION INTRAVENOUS at 12:00

## 2020-01-01 RX ADMIN — INSULIN LISPRO 2 UNITS: 100 INJECTION, SOLUTION INTRAVENOUS; SUBCUTANEOUS at 17:26

## 2020-01-01 RX ADMIN — PROPOFOL 50 MG: 10 INJECTION, EMULSION INTRAVENOUS at 11:46

## 2020-01-01 RX ADMIN — CEFTRIAXONE SODIUM 2 G: 2 INJECTION, SOLUTION INTRAVENOUS at 02:16

## 2020-01-01 RX ADMIN — CARBAMIDE PEROXIDE 6.5% 5 DROP: 6.5 LIQUID AURICULAR (OTIC) at 11:55

## 2020-01-01 RX ADMIN — ATORVASTATIN CALCIUM 20 MG: 20 TABLET, FILM COATED ORAL at 17:27

## 2020-01-01 RX ADMIN — VASOPRESSIN 0.03 UNITS/MIN: 20 INJECTION INTRAVENOUS at 17:19

## 2020-01-01 RX ADMIN — PSEUDOEPHEDRINE HCL 30 MG: 30 TABLET, FILM COATED ORAL at 17:31

## 2020-01-01 RX ADMIN — PSEUDOEPHEDRINE HCL 30 MG: 30 TABLET, FILM COATED ORAL at 17:44

## 2020-01-01 RX ADMIN — FENTANYL CITRATE 25 MCG: 50 INJECTION INTRAMUSCULAR; INTRAVENOUS at 17:14

## 2020-01-01 RX ADMIN — MIDODRINE HYDROCHLORIDE 10 MG: 5 TABLET ORAL at 10:48

## 2020-01-01 RX ADMIN — LORAZEPAM 0.5 MG: 2 INJECTION INTRAMUSCULAR; INTRAVENOUS at 04:49

## 2020-01-01 RX ADMIN — ARFORMOTEROL TARTRATE 15 MCG: 15 SOLUTION RESPIRATORY (INHALATION) at 06:53

## 2020-01-01 RX ADMIN — TAZOBACTAM SODIUM AND PIPERACILLIN SODIUM 3.38 G: 375; 3 INJECTION, SOLUTION INTRAVENOUS at 23:05

## 2020-01-01 RX ADMIN — HYDROCORTISONE 10 MG: 10 TABLET ORAL at 09:00

## 2020-01-01 RX ADMIN — ARFORMOTEROL TARTRATE 15 MCG: 15 SOLUTION RESPIRATORY (INHALATION) at 20:33

## 2020-01-01 RX ADMIN — MIDODRINE HYDROCHLORIDE 10 MG: 5 TABLET ORAL at 12:26

## 2020-01-01 RX ADMIN — HYDROCORTISONE 10 MG: 10 TABLET ORAL at 08:43

## 2020-01-08 NOTE — TELEPHONE ENCOUNTER
The PT assistant called to report the pt fell and his head, he denied a headache, blurred vision or any other sx's.  The asst. Advised him to go to the ER if he did have any sx's at all.

## 2020-01-08 NOTE — TELEPHONE ENCOUNTER
Faith canseco/ Elizabeth called the [facility] to report that an INR had been drawn on 12/24/19; however, this was never received by the [facility]. Faith is agreeable to performing INR check today. If unable to do this, she is agreeable to have it completed by Fri, 1/10/20.

## 2020-01-13 NOTE — PROGRESS NOTES
Anticoagulation Clinic Progress Note    Anticoagulation Summary  As of 2020    INR goal:   2.0-3.0   TTR:   47.6 % (1.4 mo)   INR used for dosin.90! (2020)   Warfarin maintenance plan:   2 mg every day   Weekly warfarin total:   14 mg   No change documented:   Bk Lang RPH   Plan last modified:   Bonita Mratinez RPH (2019)   Next INR check:   2020   Target end date:   Indefinite    Indications    Atrial fibrillation (CMS/HCC) [I48.91] [I48.91]             Anticoagulation Episode Summary     INR check location:       Preferred lab:       Send INR reminders to:    [facility]    Comments:         Anticoagulation Care Providers     Provider Role Specialty Phone number    Bennie Paul MD Referring Cardiology [phone]        Pertinent info:  INR 1.9 (), 1.5 (1/10, received after hours [Fri]), 1.9 (, never received/addressed). Aspen/nurse w/ [facility] reports pt took 4 mg Sat/Sun.    INR History:  Anticoagulation Monitoring 2019   INR 2.20 2.30 1.90   INR Date 2019   INR Goal 2.0-3.0 2.0-3.0 2.0-3.0   Trend Same Same Same   Last Week Total 14 mg 14 mg 18 mg   Next Week Total 14 mg 14 mg 14 mg   Sun 2 mg 2 mg -   Mon 2 mg 2 mg 2 mg   Tue 2 mg - 2 mg   Wed 2 mg - 2 mg   Thu - - -   Fri 2 mg 2 mg -   Sat 2 mg 2 mg -   Visit Report - - -   Some recent data might be hidden       Plan:  1. INR is Subtherapeutic today- see above in Anticoagulation Summary.   Provided instructions to Aspen ([phone]) with [facility] to resume their usual warfarin regimen (2 mg daily) - see above in Anticoagulation Summary.  2. Follow up in 3 days      Bk Lang RPH

## 2020-01-29 NOTE — PROGRESS NOTES
Anticoagulation Clinic Progress Note  Anticoagulation Summary  As of 2020    INR goal:   2.0-3.0   TTR:   34.2 % (1.9 mo)   INR used for dosin.4! (2020)   Warfarin maintenance plan:   4 mg every Sun, Wed; 2 mg all other days   Weekly warfarin total:   18 mg   Plan last modified:   Bk Lang RPH (2020)   Next INR check:   2020   Target end date:   Indefinite    Indications    Atrial fibrillation (CMS/HCC) [I48.91] [I48.91]             Anticoagulation Episode Summary     INR check location:       Preferred lab:       Send INR reminders to:   [facility]    Comments:         Anticoagulation Care Providers     Provider Role Specialty Phone number    Bennie Paul MD Referring Cardiology [phone]          Clinic Interview:  Patient Findings     Negatives:   Signs/symptoms of thrombosis, Signs/symptoms of bleeding,   Laboratory test error suspected, Change in health, Change in alcohol use,   Change in activity, Upcoming invasive procedure, Emergency department   visit, Upcoming dental procedure, Missed doses, Extra doses, Change in   medications, Change in diet/appetite, Hospital admission, Bruising, Other   complaints      Clinical Outcomes     Negatives:   Major bleeding event, Thromboembolic event,   Anticoagulation-related hospital admission, Anticoagulation-related ED   visit, Anticoagulation-related fatality        Education:  Santino Retana is a new start in the [facility]. We discussed the followin) Warfarin's indication, mechanism, and dosing  2) Enforced the importance of taking warfarin as instructed and at the same time every day, preferably in the evening so that we can make dose adjustments more easily following subsequent clinic visits  3) What he should do about a missed dose; pts can take missed doses within about 12 hours of their usual scheduled dose, but he was instructed on the importance of not doubling up on doses  unless told to do so by the [facility]  4) Explained possible side effects of warfarin therapy, including increased risk of bleeding, s/sx of bleeding and s/sx of any additional clots/PE/CVA.   5) Discussed monitoring of warfarin, the INR, goal INR range, and the frequency of monitoring  6) Reviewed drug/food/tobacco/EtOH interactions and provided written information covering these topics in more detail, explaining that green, leafy vegetables interact most heavily with warfarin  7) Instructed the pt not to take or discontinue any medications without informing his physician/pharmacist and reminded him to inform us of any dietary changes, as well  8) Explained that he would be coming into the clinic more frequently in these first few weeks of therapy as we try to adjust his dose and achieve a therapeutic INR x 2 consecutive readings. Once that is achieved, patient will follow up in clinic every 4 weeks, on average.    He stated no problems with transportation or scheduling clinic appts in this manner. he expressed understanding of the information provided and has no additional questions at this time.    Santino Retana was presented with a copy of the Patients Rights and Responsibilities. he expressed verbal consent and agreement to receive care in the [facility] under the current collaborative care agreement with [facility].       INR History:  1.4 (1/29/20)    Plan:  1. INR is Subtherapeutic today- see above in Anticoagulation Summary.   Will instruct Santino Retana to Increase their warfarin regimen- see above in Anticoagulation Summary.  2. Follow up in 1 week alongside Cardiology appt  3. Patient declines warfarin refills.  4. Verbal and written information provided. Patient expresses understanding and has no further questions at this time.    Bk Lang [facility]

## 2020-01-30 NOTE — TELEPHONE ENCOUNTER
FYI  Patient stopped by our department yesterday to schedule himself to start cardiac rehab!  We had spoken with the patient's wife by phone in early December and the patient was too weak and incapacitated to attend at that time.  He is scheduled to begin cardiac rehab next Wednesday, February 5th.

## 2020-02-05 NOTE — PROGRESS NOTES
"Cardiac Rehab Initial Assessment      Name: Santino Retana  :1940 Allergies:Lamisil [terbinafine] and Terbinafine   MRN: [id number] 79 y.o. Physician: Fahad Murray Jr., MD   Primary Diagnosis:    Diagnosis Plan   1. Non-STEMI (non-ST elevated myocardial infarction) (CMS/HCC)     2. S/P CABG x 4     3. S/P mitral valve replacement with tissue valve      Event Date: 10/10/2019 Specialist: Bennie Paul MD   Secondary Diagnosis:  Risk Stratification:Moderate Risk Note Author: Cynthia Peña RN     Cardiovascular History: Previous MI 2016 and Chronic Atrial Fibrillation since recent surgery and CVA 2016      EXERCISE AT HOME  no  NA  N/A    EF: 46-50%      Source: echo  10/25/2019          Ambulatory Status: Uses Cane  Ambulatory Fall Risk Assessed on Initial Visit: yes 6 Minute Walk Pre- Cardiac Rehab:  Distance:282 ft      RPE:5  Max. HR: 99       SPO2:94-98%    MET: 1.0  MPH: 0.05             RPD: 0  Resting BP: NONE LA, 114/60 RA   Peak BP: 120/76  Recovery BP: 84/56  Comments: Both hips started burning 2:14 minutes into the walk and we stopped the walk at 2:22.  Patient was barely able to make it into chair just inside back door of department.  It looked like his knees were buckling.       NUTRITION  Lipids:yes If yes, labs as follows;  Total: No components found for: CHOLESTEROL  HDL:   HDL Cholesterol   Date Value Ref Range Status   10/06/2019 32 (L) 40 - 60 mg/dL Final    Lipids continued:  LDL:  LDL Cholesterol    Date Value Ref Range Status   10/06/2019 58 0 - 100 mg/dL Final     Triglyceride: No components found for: TRIGLYCERIDE   Weight Management:                 Weight: 196.8 lbs  Height: 68 inches                            BMI: Body mass index is 29.92 kg/m².  Waist Circumference: 46  inches   Alcohol Use: none, \"due to being on blood thinners\" Diabetes:Yes,  Monitors BS at home- no, Frequency: NA, Random BS: 114    Last HGBA1C with date if applicable:No components found for: A1C     "     SOCIAL HISTORY  Social History     Socioeconomic History   • Marital status:      Spouse name: Gallito   • Number of children: 2   • Years of education: GED   • Highest education level: GED or equivalent   Occupational History   • Occupation: Retired    Tobacco Use   • Smoking status: Former Smoker     Packs/day: 2.00     Years: 30.00     Pack years: 60.00     Types: Cigarettes     Last attempt to quit:      Years since quittin.1   • Smokeless tobacco: Never Used   Substance and Sexual Activity   • Alcohol use: No     Comment: None due to blood thinners, patient would really like to drink non-alcoholic beer 1 x day   • Drug use: No   • Sexual activity: Defer   Social History Narrative    Enjoys golf.    LIVES WITH GALLITO GERONIMO       Educational Level (choose one that applies) high school diploma/GED Learning Barriers:Vision    Family Support:yes    Living Arrangement: lives with their spouse    Risk Factors: Stress  No, Clinical Depression  No, Heredity  Yes If Yes Father reportedly  of heart attack at age 56, but patient not sure that is correct., Hyperlipidemia  Yes, Diabetes  Yes If Yes: Do you check blood glucose daily  No Today's glucose level 114, Exercise prior to event  No If Yes: Activity NA, Minutes per Olivia, Days per week NA, Obesity  No and NA     Tobacco Adjunct: No        Comorbidities: Cerebrovascular disease-CVA 2016 with residual right arm/hand weakness, Renal disease- ESRD on H/D, Diabetes Mellitus x 24 yrs and Previous MI 2016, MR states CA of appendix which patient & wife deny.     ROHINI with CPAP, Palindrome tunnel dialysis cath placed in Oct 2019 and plans to have left arm dialysis shunt placed soon,  bilateral carotid endartectomies, vision loss right eye related to an injury at a young age, adrenal insufficiency, anemia.      PSYCHOSOCIAL  Clinical Depression: no    Stress: no     Assess presence or absence of depression using a valid screening tool: yes       PHYSICAL ASSESSMENT  Influenza vaccine: yes  Pneumococcal vaccine: yes          Angina: no    Describe angina scale of 0 - 4: 0 = none    Today are you having incisional pain? No. If, Yes, Scale: NA        Today are you having any other pain? Yes. If, Yes, Scale: 7    Back pain due to osteoarthritis Diagnosed with Hypertension:yes    Heart Sounds: Normal     Lung Sounds: normal air entry, lungs clear to auscultation         Assessment: Alert & oriented x 3. Jovial and engaged in the interview session.  Orthopedic Problems: Osteoarthritis of back with sciatica.     Are you being hurt, hit, or frightened by anyone at home or in your life? no    Are you being neglected by a caregiver? N/A Shoulder flexibility/Range of motion: Average     Recommended arm activity: Any    Chair sit and reach within: 4 inches Leg flexibility: Poor    Leg Strength/Balance/Five times sit to stand: 12 seconds.     Chose one: Balance Dysfunction    Recommended stretching: Chair    Assessment: Average upper extremity flexibility and poor lower extremity flexibility.  Vertical midline incision, horizontal puncture wounds on chest,  and incision on inside of left knee are all healed.      Family attends [place]: yes, wife, Kesha Time of arrival: 9:45 am  Time of departure: 11:00 am     Patient Goals: MET 3.  To be able to drive again.  Regain some strength and stamina.  Develop and maintain a regular exercise program to include at least 150 minutes a week per AHA guidelines.  Be able to drink one non-alcoholic beer a day.   Return to golfing using a cart, if at all possible.           2/5/2020  9:22 AM  Cynthia Peña RN

## 2020-02-07 NOTE — PROGRESS NOTES
Anticoagulation Clinic Progress Note    Anticoagulation Summary  As of 2020    INR goal:   2.0-3.0   TTR:   29.6 % (2.2 mo)   INR used for dosin.3! (2020)   Warfarin maintenance plan:   2 mg every Sun, Tue, Thu; 4 mg all other days   Weekly warfarin total:   22 mg   Plan last modified:   Bk Lang RPH (2020)   Next INR check:   2020   Target end date:   Indefinite    Indications    Atrial fibrillation (CMS/HCC) [I48.91] [I48.91]             Anticoagulation Episode Summary     INR check location:       Preferred lab:       Send INR reminders to:    [facility]    Comments:         Anticoagulation Care Providers     Provider Role Specialty Phone number    Bennie Paul MD Referring Cardiology [phone]          Clinic Interview:  Patient Findings     Negatives:   Signs/symptoms of thrombosis, Signs/symptoms of bleeding,   Laboratory test error suspected, Change in health, Change in alcohol use,   Change in activity, Upcoming invasive procedure, Emergency department   visit, Upcoming dental procedure, Missed doses, Extra doses, Change in   medications, Change in diet/appetite, Hospital admission, Bruising, Other   complaints      Clinical Outcomes     Negatives:   Major bleeding event, Thromboembolic event,   Anticoagulation-related hospital admission, Anticoagulation-related ED   visit, Anticoagulation-related fatality        INR History:  Anticoagulation Monitoring 2020   INR 1.90 1.4 1.3   INR Date 2020   INR Goal 2.0-3.0 2.0-3.0 2.0-3.0   Trend Same Up Up   Last Week Total 18 mg 14 mg 18 mg   Next Week Total 14 mg 18 mg 22 mg   Sun - 4 mg 2 mg   Mon 2 mg 2 mg 4 mg   Tue 2 mg 2 mg 2 mg   Wed 2 mg 4 mg -   Thu - 2 mg -   Fri - 2 mg 4 mg   Sat - 2 mg 4 mg   Visit Report - - -   Some recent data might be hidden       Plan:  1. INR is Subtherapeutic today- see above in Anticoagulation Summary.  Will instruct Santino DEWITT  Mazin to Increase their warfarin regimen- see above in Anticoagulation Summary.  2. Follow up in 5 days  3. Patient declines warfarin refills.  4. Verbal and written information provided. Patient expresses understanding and has no further questions at this time.    Bk Lang [facility]

## 2020-02-09 NOTE — PROGRESS NOTES
Date of Office Visit: 2020  Encounter Provider: Bennie Paul MD  Place of Service: [facility] CARDIOLOGY  Patient Name: Santino Retana  :1940    Chief complaint: Follow-up for coronary artery disease, tissue mitral valve   replacement, ischemic cardiomyopathy, paroxysmal atrial fibrillation, carotid   artery disease, and CVA.    History of Present Illness:    I again had the pleasure of seeing the patient in cardiology office on 2020.  He is a very pleasant 79 year-old white male with a past medical history significant for coronary artery disease, tissue mitral valve replacement, ischemic cardiomyopathy, paroxysmal atrial fibrillation, former CVA, and end-stage renal disease who presents for follow-up.  I initially saw the patient on 2014.    The patient was to undergo an elective right hip replacement, and as part of his work-up, he had screening of his carotid arteries.  This showed significant bilateral disease, with a CT angiogram in 2016 confirming 85 to 90% stenosis in the right internal carotid artery and 70 to 75% stenosis in the left carotid artery.  He subsequently underwent a right carotid endarterectomy on 2016 at [facility].  He had his hip replacement surgery on 2016, and had an elective left carotid endarterectomy on 2017 at [facility].  Postoperatively, he was found to have right-sided hemiparesis and aphasia.  He was subsequently found to have a significant perioperative stroke.  He suffered multiple complications afterwards, including hypoxic respiratory failure, urinary retention, acute on chronic kidney injury, and NSTEMI.  However, his ejection fraction remained normal at 62%, and a cardiac catheterization was not pursued at that time given his lack of symptoms and the recent stroke.  He also later had a TIA in May 2018.    He presented on 10/6/2019 after an episode of burning chest  discomfort across his precordium while walking across a parking lot.  He ruled in for a NSTEMI, and was treated for ACS.  He eventually underwent a left heart catheterization on 10/7/2019 which showed severe coronary artery disease, including a 70 to 80% ostial left main lesion, a 100% occluded RCA, and 80% disease in a large OM1 branch.  He also was found to have severe mitral regurgitation.  He ultimately underwent a four-vessel CABG and tissue mitral valve replacement on 10/10/2019 by Dr. Serra.  His bypass graft anatomy consisted of a LIMA to LAD, SVG to OM1, and sequential SVG to PDA and distal RCA.  His tissue mitral valve replacement is a #31 [employer] Epic porcine valve.  Unfortunately, he developed multiple postoperative complications.  This included acute on chronic kidney injury which resulted in initiation of dialysis and ultimately led to end-stage renal disease.  He also developed recurrent paroxysmal atrial fibrillation and atypical atrial flutter.  He was ultimately discharged on Coumadin.    The patient presents today for follow-up.  He is still undergoing dialysis, and is seen Dr. Dutton in nephrology.  He has not had any chest pain or shortness of breath.  He actually started cardiac rehab today.  His main issue has been hip pain with exertion, but no cardiac symptoms.  He is currently taking aspirin, Plavix, and Coumadin.  He has not had any bleeding issues thus far.    Past Medical History:   Diagnosis Date   • EMMA (acute kidney injury) (CMS/HCC)    • Arthritis    • Atrial fibrillation (CMS/HCC)    • CAD (coronary artery disease)     Status post 4 vessel CABG on 10/10/19 with Dr. Serra (LIMA-LAD, SVG-OM1, sequential SVG to PDA and distal RCA)   • Carotid artery disease (CMS/HCC)     Right CEA 9/26/16.  Left CEA 7/18/17 (with dionte-op CVA)   • CHF (congestive heart failure) (CMS/HCC)    • CVA (cerebral vascular accident) (CMS/HCC)     on 7/18/17 dionte-operatively from left CEA.     • Diabetes  mellitus (CMS/HCC) 1998    DR CAROLINE DURON   • Elevated cholesterol    • ESRD (end stage renal disease) (CMS/HCC)    • ESRD on hemodialysis (CMS/HCC)    • Hyperlipidemia    • Hypertension    • Low back pain    • NSTEMI (non-ST elevated myocardial infarction) (CMS/HCC)     NSTEMI following left CEA and dionte-op stroke in 7/18 ([facility]).     • Renal disorder    • Sleep apnea    • Status post mitral valve replacement with tissue valve     10/10/2019 with Dr. Serra (#31 [employer] Epic porcine valve)   • TIA (transient ischemic attack) 05/2018   • Vision loss of right eye        Past Surgical History:   Procedure Laterality Date   • APPENDECTOMY  02/19/2016    [facility], Dr. Zimmerman   • CARDIAC CATHETERIZATION N/A 10/7/2019    Procedure: Left Heart Cath NO LV gram;  Surgeon: Yolande Kelley MD;  Location: [facility] CATH INVASIVE LOCATION;  Service: Cardiovascular   • CARDIAC CATHETERIZATION N/A 10/7/2019    Procedure: Coronary angiography;  Surgeon: Yolande Kelley MD;  Location: [facility] CATH INVASIVE LOCATION;  Service: Cardiovascular   • CAROTID ENDARTERECTOMY Right 09/26/2016    [facility]    • CAROTID ENDARTERECTOMY Left 07/18/2017    [facility]    • CATARACT EXTRACTION     • CHOLECYSTECTOMY  11/1989    [facility]   • CORONARY ARTERY BYPASS GRAFT WITH MITRAL VALVE REPAIR/REPLACEMENT N/A 10/10/2019    Procedure: JERRY STERNOTOMY CORONARY ARTERY BYPASS GRAFT TIMES 4 USING LEFT INTERNAL MAMMARY ARTERY AND LEFT GREATER SAPHENOUS VEIN GRAFT PER ENDOSCOPIC VEIN HARVESTING, MITRAL VALVE REPLACEMENT AND PRP;  Surgeon: Lewis Serra MD;  Location: [facility] MAIN OR;  Service: Cardiothoracic   • INSERTION HEMODIALYSIS CATHETER N/A 10/22/2019    Procedure: TUNNEL DIALYSIS CATHETER INSERTION;  Surgeon: Alexx Monteiro MD;  Location: [facility] MAIN OR;  Service: Vascular   • INTERVENTIONAL RADIOLOGY PROCEDURE N/A 10/7/2019    Procedure: Intravascular  Ultrasound;  Surgeon: Yolande Kelley MD;  Location: [facility] INVASIVE LOCATION;  Service: Cardiovascular   • JOINT REPLACEMENT     • LUMBAR EPIDURAL INJECTION     • TOTAL HIP ARTHROPLASTY Right 11/16/2016    [facility]       Current Outpatient Medications on File Prior to Visit   Medication Sig Dispense Refill   • ACCU-CHEK SOFTCLIX LANCETS lancets   0   • aspirin 81 MG EC tablet Take 1 tablet by mouth Daily. 30 tablet 5   • atorvastatin (LIPITOR) 20 MG tablet take 1 tablet by mouth every evening 90 tablet 3   • carvedilol (COREG) 3.125 MG tablet   0   • cholecalciferol (VITAMIN D3) 1000 UNITS tablet Take 1,000 Units by mouth Daily.     • Cinnamon 500 MG tablet Take 1 tablet by mouth Daily.     • Crisaborole (EUCRISA) 2 % ointment Apply 1 g topically 2 (Two) Times a Day. (Patient taking differently: Apply 1 g topically 2 (Two) Times a Day As Needed.) 60 g 3   • doxycycline (VIBRAMYCIN) 100 MG capsule Take 1 capsule by mouth 2 (Two) Times a Day. 14 capsule 0   • gabapentin (NEURONTIN) 400 MG capsule take 1 capsule by mouth twice a day 180 capsule 1   • glipizide (GLUCOTROL) 5 MG tablet Take 5 mg by mouth Every Morning.     • glucose blood (FREESTYLE LITE) test strip 1 each by Other route 4 (Four) Times a Day. e11.65 150 each 5   • Insulin Pen Needle (BD PEN NEEDLE CONSUELO U/F) 32G X 4 MM misc use to INJECT INSULIN once daily 100 each 1   • Lancets Misc. (ACCU-CHEK SOFTCLIX LANCET DEV) kit   0   • midodrine (PROAMATINE) 5 MG tablet Take 5 mg by mouth 2 (Two) Times a Day.  0   • mupirocin (BACTROBAN) 2 % ointment Apply  topically to the appropriate area as directed 3 (Three) Times a Day. To scalp 30 g 1   • polyethylene glycol (MIRALAX) packet Take 17 g by mouth Daily. (Patient taking differently: Take 17 g by mouth Daily As Needed.) 100 packet 11   • tamsulosin (FLOMAX) 0.4 MG capsule 24 hr capsule take 1 tablet by mouth every evening 90 capsule 1   • TRADJENTA 5 MG tablet tablet take 1 tablet by mouth  "once daily 30 tablet 5   • TRESIBA FLEXTOUCH 200 UNIT/ML solution pen-injector pen injection inject 14 units UNDER THE SKIN INTO THE APPROPRIATE AREA AS DIRECTED DAILY 9 mL 3   • warfarin (COUMADIN) 2 MG tablet Take 1 tablet (2 mg) by mouth each night or as directed 90 tablet 0     No current facility-administered medications on file prior to visit.      Allergies as of 2020 - Reviewed 2019   Allergen Reaction Noted   • Lamisil [terbinafine] Rash 2016   • Terbinafine Unknown (See Comments) 2016     Social History     Socioeconomic History   • Marital status:      Spouse name: Gallito   • Number of children: 2   • Years of education: GED   • Highest education level: GED or equivalent   Occupational History   • Occupation: Retired    Tobacco Use   • Smoking status: Former Smoker     Packs/day: 2.00     Years: 30.00     Pack years: 60.00     Types: Cigarettes     Last attempt to quit:      Years since quittin.1   • Smokeless tobacco: Never Used   Substance and Sexual Activity   • Alcohol use: No     Comment: None due to blood thinners, patient would really like to drink non-alcoholic beer 1 x day   • Drug use: No   • Sexual activity: Defer   Social History Narrative    Enjoys golf.    LIVES WITH GALLITO GERONIMO     Family History   Problem Relation Age of Onset   • Lymphoma Brother 65         at age 71   • Coronary artery disease Neg Hx        Review of Systems   Constitution: Positive for malaise/fatigue.   Musculoskeletal: Positive for arthritis and joint pain.   All other systems reviewed and are negative.     Objective:     Vitals:    20 1514   BP: 110/74   Pulse: 82   Weight: 89.3 kg (196 lb 12.8 oz)   Height: 172.7 cm (67.99\")     Body mass index is 29.93 kg/m².    Physical Exam   Constitutional: He is oriented to person, place, and time. He appears well-developed and well-nourished.   HENT:   Head: Normocephalic and atraumatic.   Eyes: Conjunctivae are " normal.   Neck: Neck supple.   Cardiovascular: Normal rate and regular rhythm. Exam reveals no gallop and no friction rub.   No murmur heard.  Pulmonary/Chest: Effort normal and breath sounds normal.   Abdominal: Soft. There is no tenderness.   Musculoskeletal:   Trace edema lower extremities    Neurological: He is alert and oriented to person, place, and time.   Skin: Skin is warm.   Psychiatric: He has a normal mood and affect. His behavior is normal.     Lab Review:   Procedures    Cardiac Procedures:  1.  Left heart catheterization on 10/7/2019: The left main had a 70 to 80% ostial lesion.  The LAD had 10% proximal disease.  The left circumflex had 20 to 30% proximal disease.  OM1 had an 80% ostial lesion and an 80% mid lesion.  The RCA was 100% occluded proximally with left-to-right collaterals noted.  2.  Status post 4 vessel CABG and tissue mitral valve replacement on 10/10/2019 by Dr. Serra.  His bypass graft anatomy consists of a LIMA to LAD, SVG to OM1, and sequential SVG to PDA and distal RCA.  His mitral valve replacement is a #31 [employer] Epic porcine prosthesis.  3.  Limited echocardiogram on 10/25/2019: There was a prominent septal bounce.  The ejection fraction was 45 to 50%.    Assessment:       Diagnosis Plan   1. Coronary artery disease involving coronary bypass graft of native heart without angina pectoris     2. Ischemic cardiomyopathy  Adult Transthoracic Echo Complete W/ Cont if Necessary Per Protocol   3. Carotid artery disease, unspecified laterality, unspecified type (CMS/HCC)     4. Cerebrovascular accident (CVA), unspecified mechanism (CMS/HCC)     5. Status post mitral valve replacement with tissue valve     6. Paroxysmal atrial fibrillation (CMS/HCC)       Plan:       He actually looks good considering his very difficult postoperative course.  He developed end-stage renal disease and is continuing on hemodialysis after the CABG.  He also continued to have intermittent paroxysmal  atrial fibrillation and atypical atrial flutter.  He is in sinus rhythm today.  He is currently on triple therapy with aspirin, Plavix, and Coumadin.  I feel that this increases his risk of bleeding unacceptably.  I am going to have him stop the Plavix and continue on aspirin for the coronary artery disease and carotid artery disease, and Coumadin for the atrial fibrillation.  He will need a postoperative echocardiogram to reevaluate his ejection fraction and the tissue mitral valve replacement.  His ejection fraction postoperatively was 45 to 50%.  He started cardiac rehab today, and will continue with this.  I am going to renew his Florinef as he has had adrenal insufficiency.  He will continue on the Lipitor 20 mg/day, and I have asked that a lipid panel and liver function tests be performed during 1 of his upcoming dialysis sessions.  He will continue on the Coreg at 3.125 mg twice a day and the midodrine at 5 mg twice a day.  He has not had significant hypotension with dialysis recently.  I will have him return to the office in 2 to 3 months.

## 2020-02-10 NOTE — TELEPHONE ENCOUNTER
[phone]  11:38 am    Pts wife called stating when they left after OV last week that the soonest available was Sept 2020.  Pt was to have 3 mos follow up and pts wife was told to call you if she could not get in on time. Thanks    [facility]

## 2020-02-12 NOTE — PROGRESS NOTES
Anticoagulation Clinic Progress Note    Anticoagulation Summary  As of 2020    INR goal:   2.0-3.0   TTR:   27.6 % (2.4 mo)   INR used for dosin.0 (2020)   Warfarin maintenance plan:   2 mg every Sun, Tue, Thu; 4 mg all other days   Weekly warfarin total:   22 mg   No change documented:   Doreen Shafer   Plan last modified:   Bk Lang RPH (2020)   Next INR check:   2020   Target end date:   Indefinite    Indications    Atrial fibrillation (CMS/HCC) [I48.91] [I48.91]             Anticoagulation Episode Summary     INR check location:       Preferred lab:       Send INR reminders to:    [facility]    Comments:         Anticoagulation Care Providers     Provider Role Specialty Phone number    Bennie Paul MD Referring Cardiology [phone]          Clinic Interview:  Patient Findings     Negatives:   Signs/symptoms of thrombosis, Signs/symptoms of bleeding,   Laboratory test error suspected, Change in health, Change in alcohol use,   Change in activity, Upcoming invasive procedure, Emergency department   visit, Upcoming dental procedure, Missed doses, Extra doses, Change in   medications, Change in diet/appetite, Hospital admission, Bruising, Other   complaints      Clinical Outcomes     Negatives:   Major bleeding event, Thromboembolic event,   Anticoagulation-related hospital admission, Anticoagulation-related ED   visit, Anticoagulation-related fatality        INR History:  Anticoagulation Monitoring 2020   INR 1.4 1.3 2.0   INR Date 2020   INR Goal 2.0-3.0 2.0-3.0 2.0-3.0   Trend Up Up Same   Last Week Total 14 mg 18 mg 22 mg   Next Week Total 18 mg 22 mg 22 mg   Sun 4 mg 2 mg 2 mg   Mon 2 mg 4 mg 4 mg   Tue 2 mg 2 mg 2 mg   Wed 4 mg - 4 mg   Thu 2 mg - 2 mg   Fri 2 mg 4 mg 4 mg   Sat 2 mg 4 mg 4 mg   Visit Report - - -   Some recent data might be hidden       Plan:  1. INR is therapeutic today- see above in  Anticoagulation Summary.   Will instruct Santino RENATE Retana to continue their warfarin regimen- see above in Anticoagulation Summary.  2. Follow up in 2 weeks.  3. Patient declines warfarin refills.  4. Verbal and written information provided. Patient expresses understanding and has no further questions at this time.    Doreen Shafer

## 2020-02-25 PROBLEM — Z99.2 END STAGE RENAL FAILURE ON DIALYSIS (HCC): Status: ACTIVE | Noted: 2020-01-01

## 2020-02-25 PROBLEM — N18.6 END STAGE RENAL FAILURE ON DIALYSIS (HCC): Status: ACTIVE | Noted: 2020-01-01

## 2020-02-25 NOTE — DISCHARGE INSTRUCTIONS
What to expect after a Nerve Block    Nerve blocks administered to block pain affect many types of nerves, including those nerves that control movement, pain, and normal sensation. Following a nerve block, you may notice some bruising at the site where the block was given. You may experience sensations such as: numbness of the affected area or limb, tingling, heaviness (that is the limb feels heavy to you), weakness or inability to move the affected arm or leg, or a feeling as if your arm or leg has “fallen asleep.”     A nerve block can last from 2 to 36 hours depending on the medications used.  Usually the weakness wears off first followed by the tingling and heaviness. As the block wears off, you may begin to notice pain; however, this sequence of events may occur in any order. Typically, you will be able to move your limb before you will feel it. Once a nerve block begins to wear off, the effects are usually completely gone within 60 minutes.  If you experience continued side effects that you believe are block related for longer than 48 hours, please call your healthcare provider. Please see block-specific instructions below.    Instructions for any block involving the shoulder or arm  • If you have had any kind of shoulder/arm block, you will go home with your arm in a sling. Wear the sling until the block has completely worn off. You may be required to wear it for a longer period of time per your surgeon’s recommendations.  • If you have had a shoulder/arm block, it is a good idea to sleep on a recliner with pillows under your arm.    You may experience symptoms such as:  Shortness of breath  Hoarseness   Blurry vision  Unequal pupils  Drooping of your face on the same side as the block was performed    These are side effects associated with this kind of block and should go away within 12 hours.    Note: If you have severe or prolonged shortness of breath, please seek medical assistance as soon as  possible.     Protection of a “blocked” arm or leg (limb)  • After a nerve block, you cannot feel pain, pressure, or extremes of temperature in the affected limb. And because of this, your blocked limb is at more risk for injury. For example, it is possible to burn your limb on an extremely hot surface without feeling it.     • When resting, it is important to reposition your limb periodically to avoid prolonged pressure on it. This may require the use of pillows and padding.    • While sleeping, you should avoid rolling onto the affected limb or putting too much pressure on it.     • If you have a cast or tight dressing, check the color of your fingers or toes of the affected limb. Call your surgeon if they look discolored (that is, dusky, dark colored).    • Use caution in cold weather. Cover your limb appropriately to protect it from the cold.      Pain Management:    Your surgeon will give you a prescription for pain medication. Begin taking this before the nerve block wears off. Bear in mind that sometimes the block can wear off in the middle of the night.       [facility]  Liu John, Kelly Bird Rachel, Scherrer Thomas  [address], [address]  [phone]    Post-Operative Instructions for AV Fistula / Graft   Diet: Regular Diet    Medications: Take your regularly scheduled medications on the day of your surgery, unless your doctor has directed you otherwise. You may be sent home with a prescription for pain medication, follow the directions as prescribed.    Activity Restrictions / Driving: Avoid lifting more than 15 pounds or other activities that stress or compress the access area. No driving for the remainder of the day after surgery. You may drive when you no longer are taking narcotic pain medications. If a nerve block was done to numb your arm for surgery, you will be placed in an arm sling.  This numbness and inability to move the arm can last for as little as 6  "hours but as many as 18.  The sling should be used during this time but can be removed when sensation and movement of your arm is normal and does not need to be used after that. Use of the arm is encouraged after the surgery.    Incision Care: Some bruising is normal. If you have drainage from the incision please notify the office. Dressing should be removed in 48 hours. After dressing is removed, it is OK to shower. Do not submerge incision until cleared by your surgeon (bath or swimming).    Bathing and Showering: You may shower after you remove your dressing.    Follow-up Appointments: You will need to return to the office for a follow-up visit within 1-3 weeks after your surgery. Please make sure you have your appointment scheduled, call [phone].    The patient (you) should:  1. Avoid wearing tight constrictive clothing over that arm.  2. Avoid wearing jewelry that is tight, such as a watch on the access arm.  3. Avoid carrying heavy objects.  4. Avoid purse straps over the fistula.  5. Avoid sleeping on the arm or keeping it bent for extended periods of time.  6. Each day, using your opposite hand, feel over the fistula for the \"thrill\" or vibration that is normally present.    Fistula Information / Care:  ·  It is normal to have swelling in the surgical area. To help control this swelling, you should elevate your arm on a pillow.  ·  Wiggle your fingers and clinch your fist 10 times every hour, while awake, for the first 5-7 days. Also, bend and straighten at the elbow to regain normal range of motion. These exercises are designed to promote circulation in the fingers and aid in draining away the excess fluid accumulation in the immediate area.  · No blood pressures or needle sticks in the arm with your access.    Call the office for the followin. Fever greater than 101.0  2. Uncontrolled pain. This is on a scale of 1-10 (10 being the worst pain imaginable) your pain is a level 7 or above.  3. It is " important that you notify our office if you are having numbness and significant pain in the extremity in which you have just had surgery!  4. Decreased or absent thrill.  5. Nausea, diarrhea, and/or vomiting that continue for 12-24 hours.  6. Signs of an infection: redness, increased swelling, drainage, fever and/or chills.  7. Chest pain or difficulty breathing.    The fistula or graft CAN NOT be used until the MD has given written approval. Generally, a graft will be ready to use in 2 weeks, and a fistula will be ready to use in 6-8 weeks.     If you have further questions after reading this handout, the office is open from 8:30am to 5:00pm Monday through Friday. Call [phone].

## 2020-02-25 NOTE — ANESTHESIA PROCEDURE NOTES
Peripheral Block      Patient location during procedure: holding area  Start time: 2/25/2020 10:35 AM  Stop time: 2/25/2020 10:42 AM  Reason for block: at surgeon's request and post-op pain management  Performed by  Anesthesiologist: Ayesha Bean MD  Preanesthetic Checklist  Completed: patient identified, site marked, surgical consent, pre-op evaluation, timeout performed, IV checked, risks and benefits discussed and monitors and equipment checked  Prep:  Sterile barriers:gloves  Prep: ChloraPrep  Patient monitoring: continuous pulse oximetry, blood pressure monitoring and EKG  Procedure  Sedation:yes  Performed under: PNB  Guidance:ultrasound guided  ULTRASOUND INTERPRETATION.  Using ultrasound guidance a 20 G gauge needle was placed in close proximity to the brachial plexus nerve, at which point, under ultrasound guidance anesthetic was injected in the area of the nerve and spread of the anesthesia was seen on ultrasound in close proximity thereto.  There were no abnormalities seen on ultrasound; a digital image was taken; and the patient tolerated the procedure with no complications. Images:still images obtained, printed/placed on chart    Laterality:left  Block Type:supraclavicular  Injection Technique:single-shotNeedle Gauge:20 G      Medications Used: ropivacaine (NAROPIN) 0.5 % injection, 30 mL  mepivacaine (CARBOCAINE) 1.5 % injection, 5 mL      Post Assessment  Injection Assessment: negative aspiration for heme, no paresthesia on injection and incremental injection  Patient Tolerance:comfortable throughout block  Complications:no

## 2020-02-25 NOTE — OP NOTE
Operative Note  Date of Admission:  2/25/2020  OR Date: 2/25/2020    Pre-op Diagnosis:   Stage renal failure with lack of access for hemodialysis    Post-Op Diagnosis Codes:  Same    Procedure:   1) left brachiocephalic AV fistula creation with cephalic vein transposition    Surgeon: Rosalino Bird MD    Assistant: Lyudmila MARTIN CSA and they provided critical assistance during the case including suctioning, exposure, retraction, and reduction of blood loss.    Anesthesia: Monitored Anesthesia Care    Staff:   Circulator: Candi Retana RN; Spurling, Shannon, RN  Scrub Person: Marialuisa Grajeda; Tiarra Quevedo  Assistant: Lyudmila Downs CSA    Estimated Blood Loss: minimal    Specimens:   Order Name Source Comment Collection Info Order Time   BASIC METABOLIC PANEL   Collected By: Elsa Johnson RN 2/25/2020  8:25 AM   PROTIME-INR   Collected By: Elsa Johnson RN 2/25/2020  8:25 AM        Complications: None    Findings: Good signal at the wrist at completion with excellent thrill in the fistula    Indications:    The patient is an 79 y.o. male seen for evaluation end-stage renal failure with need for the hemodialysis access.  Patient is undergoing placement of fistula.  He understands risk benefits complications of the procedure and consents to the procedure.       Procedure:    The patient was prepped and draped under sterile manner under supraclavicular block anesthetic.  Incision at the antecubital space allowed exposure of the cephalic vein just above its bifurcation at the elbow.  Cephalic was mobilized well up onto the biceps head and past the bifurcation.  The brachial artery was then exposed and controlled.  3000 units of heparin were given.  The vein was marked and divided at the level of the bifurcation.  Each of the 2 bifurcated segment veins were suture ligated with 3-0 silk stick ties.  The vein was flushed and dilated further and the bifurcation segment was opened widely  with the Hayes scissors.  Under clamp control of the artery was then longitudinally incised with 11 blade scalpel and possible scissors.  Using an end-to-side anastomosis we transposed the cephalic vein down onto the brachial artery.  We did have to ligate 1 branch of the cephalic vein high up onto the biceps head which gave us the external length needed to transpose it.  An end-to-side anastomosis was then performed with running 6-0 Prolene suture from the transposed cephalic vein onto the brachial artery.  With flow established there is initially obviously some steal but as the artery dilated up there was biphasic signal at the wrist and the radial and ulnar arteries  and good thrill in the fistula.  Fingers appeared viable.  Patient had the wound closed after FloSeal was placed.  We did not reverse the heparin.  Deep tissue closure with 3-0 Vicryl followed by closure of the skin with 4-0 Vicryl subcuticular closure and skin glue was performed.  Patient was transferred to the seated recovery area with no immediate complication evident.          Active Hospital Problems    Diagnosis  POA   • End stage renal failure on dialysis (CMS/[facility]) [N18.6, Z99.2]  Not Applicable      Resolved Hospital Problems   No resolved problems to display.      Silvestre Bird MD     Date: 2/25/2020  Time: 12:56 PM

## 2020-02-25 NOTE — ANESTHESIA PREPROCEDURE EVALUATION
Anesthesia Evaluation     NPO Solid Status: > 8 hours             Airway   Mallampati: III  TM distance: >3 FB  Neck ROM: full  No difficulty expected  Dental    (+) implants    Comment: Upper and lower permanent bridge    Pulmonary - normal exam   (+) sleep apnea on CPAP,   Cardiovascular - normal exam    (+) hypertension, past MI , CAD, dysrhythmias Atrial Fib, angina, CHF , hyperlipidemia,  carotid artery disease carotid bilateral    ROS comment: S/P MVR    Neuro/Psych  (+) TIA, CVA,     GI/Hepatic/Renal/Endo    (+)   renal disease ESRD, diabetes mellitus using insulin,     Musculoskeletal     (+) back pain,   Abdominal    Substance History      OB/GYN          Other   arthritis,                      Anesthesia Plan    ASA 4     MAC and regional     intravenous induction     Anesthetic plan, all risks, benefits, and alternatives have been provided, discussed and informed consent has been obtained with: patient.

## 2020-02-25 NOTE — BRIEF OP NOTE
ARTERIOVENOUS FISTULA FORMATION  Progress Note    Santino DEWITT Mazin  2/25/2020    Pre-op Diagnosis:   End stage renal failure       Post-Op Diagnosis Codes:  Same    Procedure/CPT® Codes:      Procedure(s):  LEFT BRACHIAL CEPHALIC ARTERIAL VENOUS FISTULA    Surgeon(s):  Silvestre Bird MD    Anesthesia: Monitored Anesthesia Care    Staff:   Circulator: Candi Retana RN; Spurling, Shannon, RN  Scrub Person: Marialuisa Grajeda; Tiarra Quevedo  Assistant: Lyudmila Downs CSA    Estimated Blood Loss: minimal    Urine Voided: * No values recorded between 2/25/2020 11:37 AM and 2/25/2020 12:51 PM *    Specimens:                None          Drains: * No LDAs found *    Findings: See Dictation    Complications: None      Silvestre Bird MD     Date: 2/25/2020  Time: 12:51 PM

## 2020-02-25 NOTE — ANESTHESIA POSTPROCEDURE EVALUATION
Patient: Santino Retana    Procedure Summary     Date:  02/25/20 Room / Location:  [facility] OR 02 / [facility] MAIN OR    Anesthesia Start:  1139 Anesthesia Stop:  1306    Procedure:  LEFT BRACHIAL CEPHALIC ARTERIAL VENOUS FISTULA (Left ) Diagnosis:      Surgeon:  Silvestre Bird MD Provider:  Arsen Jimenes MD    Anesthesia Type:  MAC, regional ASA Status:  4          Anesthesia Type: MAC, regional    Vitals  No vitals data found for the desired time range.          Post Anesthesia Care and Evaluation    Patient location during evaluation: PACU  Patient participation: complete - patient participated  Level of consciousness: awake and alert  Pain management: adequate  Airway patency: patent  Anesthetic complications: No anesthetic complications    Cardiovascular status: acceptable  Respiratory status: acceptable  Hydration status: acceptable    Comments: --------------------            02/25/20               1037     --------------------   BP:       101/50     Pulse:      84       Resp:       18       Temp:                SpO2:      97%      --------------------

## 2020-02-25 NOTE — H&P
Patient Care Team:  Fahad Murray Jr., MD as PCP - General (Family Medicine)  Alex Simpson MD as Consulting Physician (Nephrology)  Bk Lang RPH as Pharmacist (Pharmacy)  Bonita Martinez RPH as Pharmacist (Pharmacy)    Chief complaint end stage renal failure    Subjective     History of Present Illness 79-year-old man with end-stage renal failure in need of long-term dialysis access.  He has preserved his left arm vein.  We did not repeat his testing but will have the option to pursue angiography at the time of placement if needed.    Review of Systems     Past Medical History:   Diagnosis Date   • Adrenal insufficiency (CMS/[facility])    • Arthritis    • Atrial fibrillation (CMS/[facility])    • CAD (coronary artery disease)     Status post 4 vessel CABG on 10/10/19 with Dr. Serra (LIMA-LAD, SVG-OM1, sequential SVG to PDA and distal RCA)   • Carotid artery disease (CMS/[facility])     Right CEA 9/26/16.  Left CEA 7/18/17 (with dionte-op CVA)   • CHF (congestive heart failure) (CMS/[facility])    • CVA (cerebral vascular accident) (CMS/[facility])     on 7/18/17 dionte-operatively from left CEA.     • Diabetes mellitus (CMS/[facility]) 1998    DR CAROLINE DURON   • Elevated cholesterol    • ESRD (end stage renal disease) (CMS/[facility])     [facility]   • History of pressure ulcer 10/2019    SACRAL   • Hyperlipidemia    • Low back pain    • NSTEMI (non-ST elevated myocardial infarction) (CMS/[facility])     NSTEMI following left CEA and dionte-op stroke in 7/18 ([facility]).     • Sleep apnea     WEARS CPAP   • Slow to wake up after anesthesia    • Status post mitral valve replacement with tissue valve     10/10/2019 with Dr. Serra (#31 [facility] Epic porcine valve)   • STEMI (ST elevation myocardial infarction) (CMS/HCC) 10/2019    [facility]   • TIA (transient ischemic attack) 05/2018   • Vision loss of right eye      Past Surgical History:   Procedure Laterality Date   • APPENDECTOMY  02/19/2016    [facility], Dr. Zimmerman   •  CARDIAC CATHETERIZATION N/A 10/7/2019    Procedure: Left Heart Cath NO LV gram;  Surgeon: Yolande Kelley MD;  Location:  [facility];  Service: Cardiovascular   • CARDIAC CATHETERIZATION N/A 10/7/2019    Procedure: Coronary angiography;  Surgeon: Yoladne Kelley MD;  Location:  [facility];  Service: Cardiovascular   • CAROTID ENDARTERECTOMY Right 2016    [facility]    • CAROTID ENDARTERECTOMY Left 2017    [facility]    • CATARACT EXTRACTION     • CHOLECYSTECTOMY  1989    [facility]   • CORONARY ARTERY BYPASS GRAFT WITH MITRAL VALVE REPAIR/REPLACEMENT N/A 10/10/2019    Procedure: JERRY STERNOTOMY CORONARY ARTERY BYPASS GRAFT TIMES 4 USING LEFT INTERNAL MAMMARY ARTERY AND LEFT GREATER SAPHENOUS VEIN GRAFT PER ENDOSCOPIC VEIN HARVESTING, MITRAL VALVE REPLACEMENT AND PRP;  Surgeon: Lewis Serra MD;  Location: [facility] MAIN OR;  Service: Cardiothoracic   • INSERTION HEMODIALYSIS CATHETER N/A 10/22/2019    Procedure: TUNNEL DIALYSIS CATHETER INSERTION;  Surgeon: Alexx Monteiro MD;  [facility];  Service: Vascular   • INTERVENTIONAL RADIOLOGY PROCEDURE N/A 10/7/2019    Procedure: Intravascular Ultrasound;  Surgeon: Yolande Kelley MD;  Location:  [facility];  Service: Cardiovascular   • LUMBAR EPIDURAL INJECTION     • TOTAL HIP ARTHROPLASTY Right 2016    [facility]     Family History   Problem Relation Age of Onset   • Lymphoma Brother 65         at age 71   • Coronary artery disease Neg Hx    • Malig Hyperthermia Neg Hx      Social History     Tobacco Use   • Smoking status: Former Smoker     Packs/day: 2.00     Years: 30.00     Pack years: 60.00     Types: Cigarettes     Last attempt to quit:      Years since quittin.1   • Smokeless tobacco: Never Used   Substance Use Topics   • Alcohol use: No     Comment: None due to blood thinners, patient would really  like to drink non-alcoholic beer 1 x day   • Drug use: No     Medications Prior to Admission   Medication Sig Dispense Refill Last Dose   • carvedilol (COREG) 3.125 MG tablet Take 3.125 mg by mouth 2 (Two) Times a Day With Meals.  0 2/25/2020 at 0530   • cholecalciferol (VITAMIN D3) 1000 UNITS tablet Take 1,000 Units by mouth Daily.   2/24/2020   • fludrocortisone 0.1 MG tablet Take 1 tablet by mouth Daily. 90 tablet 3 2/25/2020 at 0530   • gabapentin (NEURONTIN) 400 MG capsule take 1 capsule by mouth twice a day (Patient taking differently: Take 400 mg by mouth 2 (Two) Times a Day.) 180 capsule 1 2/24/2020   • midodrine (PROAMATINE) 5 MG tablet Take 5 mg by mouth 2 (Two) Times a Day. ON DIALYSIS DAYS ONLY  0 2/25/2020 at 0530   • ACCU-CHEK SOFTCLIX LANCETS lancets   0 Taking   • aspirin 81 MG EC tablet Take 1 tablet by mouth Daily. (Patient taking differently: Take 81 mg by mouth Daily. AS DIRECTED BY DR. MCCARTHY'S OFFICE) 30 tablet 5 2/22/2020   • atorvastatin (LIPITOR) 20 MG tablet take 1 tablet by mouth every evening (Patient taking differently: Take 20 mg by mouth Every Night.) 90 tablet 3 Taking   • Cinnamon 500 MG tablet Take 1 tablet by mouth Daily. HOLD FOR SURGERY   2/18/2020   • Crisaborole (EUCRISA) 2 % ointment Apply 1 g topically 2 (Two) Times a Day. (Patient taking differently: Apply 1 g topically 2 (Two) Times a Day As Needed.) 60 g 3 Taking   • glipizide (GLUCOTROL) 5 MG tablet Take 5 mg by mouth Every Morning.   2/24/2020   • glucose blood (FREESTYLE LITE) test strip 1 each by Other route 4 (Four) Times a Day. e11.65 150 each 5 Taking   • Insulin Pen Needle (BD PEN NEEDLE CONSUELO U/F) 32G X 4 MM misc use to INJECT INSULIN once daily 100 each 1 Taking   • Lancets Misc. (ACCU-CHEK SOFTCLIX LANCET DEV) kit   0 Taking   • polyethylene glycol (MIRALAX) packet Take 17 g by mouth Daily. (Patient taking differently: Take 17 g by mouth Daily As Needed.) 100 packet 11 Taking   • TRADJENTA 5 MG tablet tablet take 1  tablet by mouth once daily 30 tablet 5 2/24/2020   • TRESIBA FLEXTOUCH 200 UNIT/ML solution pen-injector pen injection inject 14 units UNDER THE SKIN INTO THE APPROPRIATE AREA AS DIRECTED DAILY 9 mL 3 2/24/2020   • warfarin (COUMADIN) 2 MG tablet Take 1 tablet (2 mg) by mouth each night or as directed (Patient taking differently: Take 1 tablet (2 mg) by mouth each night or as directed  HOLDING FOR SURGERY) 90 tablet 0 2/21/2020     Allergies:  Lamisil [terbinafine]    Objective      Vital Signs  Temp:  [98 °F (36.7 °C)] 98 °F (36.7 °C)  Heart Rate:  [82-85] 84  Resp:  [17-20] 18  BP: ()/(38-59) 101/50    Physical Exam  Lungs clear and equal  Heart regular rate and rhythm  Abdomen benign    Results Review:   I reviewed the patient's new clinical results.      Assessment/Plan       End stage renal failure on dialysis (CMS/HCC)      Assessment & Plan  Plans for left brachiocephalic AV fistula under supraclavicular block.  Patient understands risk benefits complications and agrees to the procedure.  I discussed the patients findings and my recommendations with patient    Silvestre Bird MD  02/25/20  10:43 AM

## 2020-02-26 NOTE — PROGRESS NOTES
Anticoagulation Clinic Progress Note    Anticoagulation Summary  As of 2020    INR goal:   2.0-3.0   TTR:   23.4 % (2.8 mo)   INR used for dosin.30! (2020)   Warfarin maintenance plan:   2 mg every Sun, Tue, Thu; 4 mg all other days   Weekly warfarin total:   22 mg   Plan last modified:   Bk Lang RPH (2020)   Next INR check:   3/4/2020   Target end date:   Indefinite    Indications    Atrial fibrillation (CMS/HCC) [I48.91] [I48.91]             Anticoagulation Episode Summary     INR check location:       Preferred lab:       Send INR reminders to:    [facility]    Comments:         Anticoagulation Care Providers     Provider Role Specialty Phone number    Bennie Paul MD Referring Cardiology [phone]          Clinic Interview:  Patient Findings     Positives:   Upcoming invasive procedure, Change in diet/appetite    Negatives:   Signs/symptoms of thrombosis, Signs/symptoms of bleeding,   Laboratory test error suspected, Change in health, Change in alcohol use,   Change in activity, Emergency department visit, Upcoming dental procedure,   Missed doses, Extra doses, Change in medications, Hospital admission,   Bruising, Other complaints    Comments:   Pt hasn't been eating as well secondary to anesthesia from AV   fistula placement on       Clinical Outcomes     Negatives:   Major bleeding event, Thromboembolic event,   Anticoagulation-related hospital admission, Anticoagulation-related ED   visit, Anticoagulation-related fatality    Comments:   Pt hasn't been eating as well secondary to anesthesia from AV   fistula placement on         INR History:  Anticoagulation Monitoring 2020   INR 1.3 2.0 1.30   INR Date 2020   INR Goal 2.0-3.0 2.0-3.0 2.0-3.0   Trend Up Same Same   Last Week Total 18 mg 22 mg 6 mg   Next Week Total 22 mg 22 mg 26 mg   Sun 2 mg 2 mg 2 mg   Mon 4 mg 4 mg 4 mg   Tue 2 mg 2 mg 2 mg   Wed  - 4 mg 6 mg (2/26)   Thu - 2 mg 4 mg (2/27)   Fri 4 mg 4 mg 4 mg   Sat 4 mg 4 mg 4 mg   Visit Report - - -   Some recent data might be hidden       Plan:  1. INR is Subtherapeutic today- see above in Anticoagulation Summary.   Will instruct Santino Retana to Change their warfarin regimen and give 6 mg of warfarin today and 4 mg on 2/27 before resuming normal home regimen- see above in Anticoagulation Summary.  2. Follow up in 1 week  3.  Pt has agreed to only be called if INR out of range. They have been instructed to call if any changes in medications, doses, concerns, etc. Patient expresses understanding and has no further questions at this time.    Luis Angel Swenson [facility]

## 2020-03-04 NOTE — PROGRESS NOTES
Anticoagulation Clinic Progress Note    Anticoagulation Summary  As of 3/4/2020    INR goal:   2.0-3.0   TTR:   21.4 % (3.1 mo)   INR used for dosin.7! (3/4/2020)   Warfarin maintenance plan:   2 mg every Sun, Tue, Thu; 4 mg all other days   Weekly warfarin total:   22 mg   No change documented:   Bk Lang RPH   Plan last modified:   Bk Lang RPH (2020)   Next INR check:   3/18/2020   Target end date:   Indefinite    Indications    Atrial fibrillation (CMS/[facility]) [I48.91] [I48.91]             Anticoagulation Episode Summary     INR check location:       Preferred lab:       Send INR reminders to:   [facility] [place]    Comments:         Anticoagulation Care Providers     Provider Role Specialty Phone number    Bennie Paul MD Referring Cardiology [phone]          Clinic Interview:  Patient Findings     Positives:   Missed doses, Extra doses    Negatives:   Signs/symptoms of thrombosis, Signs/symptoms of bleeding,   Laboratory test error suspected, Change in health, Change in alcohol use,   Change in activity, Upcoming invasive procedure, Emergency department   visit, Upcoming dental procedure, Change in medications, Change in   diet/appetite, Hospital admission, Bruising, Other complaints    Comments:   Resumed warfarin  after holding x 5 days for procedure      Clinical Outcomes     Negatives:   Major bleeding event, Thromboembolic event,   Anticoagulation-related hospital admission, Anticoagulation-related ED   visit, Anticoagulation-related fatality    Comments:   Resumed warfarin  after holding x 5 days for procedure        INR History:  Anticoagulation Monitoring 2020 2020 3/4/2020   INR 2.0 1.30 1.7   INR Date 2020 2020 3/4/2020   INR Goal 2.0-3.0 2.0-3.0 2.0-3.0   Trend Same Same Same   Last Week Total 22 mg 6 mg 26 mg   Next Week Total 22 mg 26 mg 22 mg   Sun 2 mg 2 mg 2 mg   Mon 4 mg 4 mg 4 mg   Tue 2 mg 2 mg 2 mg   Wed 4 mg 6 mg ()  4 mg   Thu 2 mg 4 mg (2/27) 2 mg   Fri 4 mg 4 mg 4 mg   Sat 4 mg 4 mg 4 mg   Visit Report - - -   Some recent data might be hidden       Plan:  1. INR is Subtherapeutic today- see above in Anticoagulation Summary.  Will instruct Santino Retana to Continue their warfarin regimen (due for higher 4 mg dose today) - see above in Anticoagulation Summary.  2. Follow up in 2 weeks  3. Patient declines warfarin refills.  4. Verbal and written information provided. Patient expresses understanding and has no further questions at this time.    Bk Lang [facility]

## 2020-03-09 NOTE — PROGRESS NOTES
"CARDIOVASCULAR SURGERY FOLLOW-UP PROGRESS NOTE  Chief Complaint: post op follow up        HPI:   Dear Dr. Murray, Fahad JUDD Jr., MD and colleagues:    It was nice to see Santino Retana in follow up 5 months  after surgery.  As you know, he is a 79 y.o. male with CAD, mitral regurgitation who underwent CABGx4 MVR. He did well postoperatively and continues to do well. He comes in today complaining of nothing.  His activity level has been good.   He looks great.  He has been in rehab and states his stamina is much better.     Physical Exam:         BP (!) 84/51 (BP Location: Right arm, Patient Position: Sitting, Cuff Size: Adult) Comment: pt had recent implant in left arm  Pulse 65   Temp 97.8 °F (36.6 °C)   Ht 172.7 cm (68\")   Wt 96.2 kg (212 lb)   BMI 32.23 kg/m²   Heart:  regular rate and rhythm  Lungs:  clear to auscultation bilaterally  Extremities:  no edema  Incision(s):  sternum stable    Assessment/Plan:     S/P CABG and MVR. Overall, he is doing well.    Slow post-op rehabilitation progress    OK to drive if not taking narcotic pain medicine  OK to begin cardiac rehab  Follow-up as scheduled with cardiology  Follow-up as scheduled with PCP    No restrictions of activity.      Thank you for allowing me to participate in the care of your   patient.  Regards,  EJ Kapoor      "

## 2020-03-23 NOTE — PROGRESS NOTES
Anticoagulation Clinic Progress Note    Anticoagulation Summary  As of 3/23/2020    INR goal:   2.0-3.0   TTR:   27.4 % (3.7 mo)   INR used for dosin.4 (3/23/2020)   Warfarin maintenance plan:   2 mg every Sun, Tue, Thu; 4 mg all other days   Weekly warfarin total:   22 mg   No change documented:   Doreen Shafer   Plan last modified:   Bk Lang RP (2020)   Next INR check:   2020   Target end date:   Indefinite    Indications    Atrial fibrillation (CMS/HCC) [I48.91] [I48.91]             Anticoagulation Episode Summary     INR check location:       Preferred lab:       Send INR reminders to:    [facility]    Comments:         Anticoagulation Care Providers     Provider Role Specialty Phone number    Bennie Paul MD Referring Cardiology [phone]          Clinic Interview:  Patient Findings     Negatives:   Signs/symptoms of thrombosis, Signs/symptoms of bleeding,   Laboratory test error suspected, Change in health, Change in alcohol use,   Change in activity, Upcoming invasive procedure, Emergency department   visit, Upcoming dental procedure, Missed doses, Extra doses, Change in   medications, Change in diet/appetite, Hospital admission, Bruising, Other   complaints      Clinical Outcomes     Negatives:   Major bleeding event, Thromboembolic event,   Anticoagulation-related hospital admission, Anticoagulation-related ED   visit, Anticoagulation-related fatality        INR History:  Anticoagulation Monitoring 2020 3/4/2020 3/23/2020   INR 1.30 1.7 2.4   INR Date 2020 3/4/2020 3/23/2020   INR Goal 2.0-3.0 2.0-3.0 2.0-3.0   Trend Same Same Same   Last Week Total 6 mg 26 mg 22 mg   Next Week Total 26 mg 22 mg 22 mg   Sun 2 mg 2 mg 2 mg   Mon 4 mg 4 mg 4 mg   Tue 2 mg 2 mg 2 mg   Wed 6 mg () 4 mg 4 mg   Thu 4 mg () 2 mg 2 mg   Fri 4 mg 4 mg 4 mg   Sat 4 mg 4 mg 4 mg   Visit Report - - -   Some recent data might be hidden       Plan:  1. INR is  therapeutic today- see above in Anticoagulation Summary.   Will instruct Santino Retana to continue their warfarin regimen- see above in Anticoagulation Summary.  2. Follow up in 4 weeks.  3. Patient declines warfarin refills.  4. Verbal and written information provided. Patient expresses understanding and has no further questions at this time.    Doreen Shafer

## 2020-06-12 PROBLEM — E29.1 HYPOGONADISM IN MALE: Status: ACTIVE | Noted: 2020-01-01

## 2020-06-12 PROBLEM — I25.10 CAD (CORONARY ARTERY DISEASE): Status: ACTIVE | Noted: 2019-01-01

## 2020-06-12 NOTE — PROGRESS NOTES
Subjective   Santino Retana is a 80 y.o. male.     Chief Complaint   Patient presents with   • Coronary Artery Disease   • Atrial Fibrillation   • Diabetes   Low testosterone      History of Present Illness   Very pleasant patient with myriad medical problems found to have declined relating to muscle weakness and is on times a week dialysis.  He is found to have low testosterone as measured at dialysis.  Would like to try testosterone pros and cons of presented to him and his wife.  His testosterone level is 115 at dialysis.  He is on warfarin and also aspirin already therefore risk of thrombosis would be fairly low.  He has anemia so he is not going to become polycythemic secondary to anemia related to end-stage renal disease.  He also has hypersomnia.    Otherwise reviewed treatment of diabetes and CAD.  Treatment of atrial fibrillation also reviewed.      The following portions of the patient's history were reviewed and updated as appropriate: allergies, current medications, past social history and problem list.    Review of Systems   Constitutional: Positive for fatigue.   HENT: Negative.    Eyes: Negative.    Respiratory: Negative.    Cardiovascular: Negative.    Gastrointestinal: Negative.    Endocrine: Negative.    Genitourinary: Positive for decreased urine volume.   Musculoskeletal: Positive for arthralgias, back pain, gait problem and myalgias.   Skin: Negative.    Allergic/Immunologic: Negative.    Neurological: Positive for weakness.   Hematological: Negative.    Psychiatric/Behavioral: Positive for sleep disturbance.       Objective   Vitals:    06/12/20 1122   BP: 90/60   Pulse: 88   Resp: 16   Temp: 98.3 °F (36.8 °C)   SpO2: 93%     Physical Exam   Constitutional: He is oriented to person, place, and time. He appears well-developed and well-nourished.   HENT:   Head: Normocephalic and atraumatic.   Right Ear: Tympanic membrane and external ear normal.   Left Ear: Tympanic membrane and external ear  normal.   Nose: Nose normal.   Mouth/Throat: Oropharynx is clear and moist.   Eyes: Pupils are equal, round, and reactive to light. Conjunctivae and EOM are normal.   Neck: Normal range of motion. Neck supple. No JVD present. No thyromegaly present.   Cardiovascular: Normal rate, regular rhythm, normal heart sounds and intact distal pulses.   Pulmonary/Chest: Effort normal and breath sounds normal.   Abdominal: Soft. Bowel sounds are normal.   Musculoskeletal:        Right shoulder: He exhibits decreased range of motion and tenderness.        Lumbar back: He exhibits decreased range of motion.   Lymphadenopathy:     He has no cervical adenopathy.   Neurological: He is alert and oriented to person, place, and time. No cranial nerve deficit. He exhibits abnormal muscle tone. Coordination and gait abnormal.   Skin: Skin is warm and dry. No rash noted.   Psychiatric: He has a normal mood and affect. His behavior is normal. Judgment and thought content normal.   Vitals reviewed.      Assessment/Plan   Problem List Items Addressed This Visit        Cardiovascular and Mediastinum    Coronary artery disease involving native coronary artery of native heart with angina pectoris (CMS/HCC)    Atrial fibrillation (CMS/HCC) [I48.91] - Primary       Endocrine    Hypogonadism in male    Relevant Medications    Testosterone 10 MG/ACT (2%) gel    Diabetes mellitus type 2, uncontrolled, with complications (CMS/HCC)    Adrenal insufficiency (CMS/HCC)       Genitourinary    RESOLVED: Anemia in stage 3 chronic kidney disease (CMS/HCC)    ESRD on hemodialysis (CMS/HCC)      Plan: Labs testosterone topical 1 pump daily recheck in about 3 months.  Check testosterone level at the dialysis unit

## 2020-06-19 NOTE — TELEPHONE ENCOUNTER
GALLITO CALLED SAYING THAT THE PHARMACY HAS TOLD THEM THAT THEY HAVE NOT RECEIVED THE Testosterone 10 MG/ACT (2%) gel. CAN THIS RX BE RESENT? THEY HAVE BEEN TRYING TO GET IT FOR A WEEK?    [id number] - [facility] [address] AT [facility] [address] [facility] [phone] [facility] [phone]

## 2020-06-22 NOTE — PROGRESS NOTES
Anticoagulation Clinic Progress Note    Anticoagulation Summary  As of 2020    INR goal:   2.0-3.0   TTR:   59.8 % (6.8 mo)   INR used for dosin.9 (2020)   Warfarin maintenance plan:   2 mg every Sun, Tue, Thu; 4 mg all other days   Weekly warfarin total:   22 mg   No change documented:   Gloria Chavez   Plan last modified:   Bk Lang RPH (2020)   Next INR check:   2020   Target end date:   Indefinite    Indications    Atrial fibrillation (CMS/formerly [facility]) [I48.91] [I48.91]             Anticoagulation Episode Summary     INR check location:       Preferred lab:       Send INR reminders to:   [facility]    Comments:         Anticoagulation Care Providers     Provider Role Specialty Phone number    Bennie Paul MD Referring Cardiology [phone]          Clinic Interview:  Patient Findings     Negatives:   Signs/symptoms of thrombosis, Signs/symptoms of bleeding,   Laboratory test error suspected, Change in health, Change in alcohol use,   Change in activity, Upcoming invasive procedure, Emergency department   visit, Upcoming dental procedure, Missed doses, Extra doses, Change in   medications, Change in diet/appetite, Hospital admission, Bruising, Other   complaints      Clinical Outcomes     Negatives:   Major bleeding event, Thromboembolic event,   Anticoagulation-related hospital admission, Anticoagulation-related ED   visit, Anticoagulation-related fatality        INR History:  Anticoagulation Monitoring 3/4/2020 3/23/2020 2020   INR 1.7 2.4 2.9   INR Date 3/4/2020 3/23/2020 2020   INR Goal 2.0-3.0 2.0-3.0 2.0-3.0   Trend Same Same Same   Last Week Total 26 mg 22 mg 22 mg   Next Week Total 22 mg 22 mg 22 mg   Sun 2 mg 2 mg 2 mg   Mon 4 mg 4 mg 4 mg   Tue 2 mg 2 mg 2 mg   Wed 4 mg 4 mg 4 mg   Thu 2 mg 2 mg 2 mg   Fri 4 mg 4 mg 4 mg   Sat 4 mg 4 mg 4 mg   Visit Report - - -   Some recent data might be hidden       Plan:  1. INR is therapeutic  today- see above in Anticoagulation Summary.   Will instruct Santino Retana to continue their warfarin regimen- see above in Anticoagulation Summary.  2. Follow up in 4 weeks.  3. Patient declines warfarin refills.  4. Verbal and written information provided. Patient expresses understanding and has no further questions at this time.    Gloria Chavez

## 2020-06-23 NOTE — TELEPHONE ENCOUNTER
Pt wife called stating that pt fell twice yesterday 06/22/2020 and the wife is requesting home health because her  ca not get around     Please advise

## 2020-06-23 NOTE — TELEPHONE ENCOUNTER
I believe a phone visit is scheduled.  Does he have home health?  He needs home health if he does not.

## 2020-06-23 NOTE — PROGRESS NOTES
Subjective   Santino Retana is a 80 y.o. male.     Chief Complaint   Patient presents with   • falling   • Extremity Weakness         History of Present Illness   This visit has been rescheduled as a phone visit to comply with patient safety concerns in accordance with CDC recommendations. Total time of discussion was 24 minutes.    You have chosen to receive care through a telephone visit. Do you consent to use a telephone visit for your medical care today? Yes    Telephone visit with patient and his family putting daughter in wife.  Problem is been multiple falls with extreme weakness in the legs legs buckling.  We discussed getting urgent home health for physical therapy and gait training as well as trying to get testosterone approved which he has taken in the past for increasing strength and stamina.  We will consider readdressing topical testosterone if we can get that approved consider injectable testosterone with even trips to the office if we need to at least twice a month.  We will see if home health injecting testosterone but I doubt that will be the case.    There is a recumbent bike at the hospital he has difficulty getting on it.  I discussed exercise regimen with bicycle as well as bicycle pedal  from a chair which they have also..    He will discuss with dialysis about whether to take midodrine 5 mg twice daily on days when he is not getting dialysis.  He takes this only on dialysis days.  He is also on fludrocortisone help boost to blood pressure.    The following portions of the patient's history were reviewed and updated as appropriate: allergies, current medications, past social history and problem list.    Review of Systems   Constitutional: Positive for fatigue.   HENT: Negative.    Eyes: Negative.    Respiratory: Negative.    Cardiovascular: Negative.    Gastrointestinal: Negative.    Endocrine: Negative.    Genitourinary: Negative.    Musculoskeletal: Positive for arthralgias, back pain and  gait problem.   Skin: Negative.    Allergic/Immunologic: Negative.    Neurological: Positive for weakness.   Hematological: Negative.    Psychiatric/Behavioral: Negative.        Objective   There were no vitals filed for this visit.  Physical Exam   Patient is pleasant alert on the following.  He is in no distress and is oriented x3.      Assessment/Plan   Problem List Items Addressed This Visit        Endocrine    Hypogonadism in male      Other Visit Diagnoses     Bilateral leg weakness    -  Primary    Relevant Orders    Ambulatory Referral to Home Health    Falling episodes        Relevant Orders    Ambulatory Referral to Home Health      Plan: Readdress approved for topical testosterone.  Urgent referral to home health physical therapy and gait training and strengthening.  Follow-up appointment in October call for update.

## 2020-06-23 NOTE — TELEPHONE ENCOUNTER
Scheduled patient for telephone visit today, wife states he is having lower extremity weakness that is worsening, low energy level and just is not able to hardly do anything. She states she is worried and wonders was else could be done, telephone visit scheduled to discuss further

## 2020-06-26 NOTE — TELEPHONE ENCOUNTER
PATIENT'S WIFE GALLITO CALLED TO GET AN UPDATE ON THE PATIENT'S HOME HEALTH ORDERS.     GALLITO CALLBACK NUMBER [phone]

## 2020-07-09 NOTE — TELEPHONE ENCOUNTER
----- Message from Laurie Quintero sent at 7/9/2020  9:35 AM EDT -----  Contact: duglas Burr nurse    called in today in regards to her patient  today is her first day working with him and she has some questions and concerns regarding  medications and would like a call back at [phone] to discuss them with you.       Thank you

## 2020-07-09 NOTE — TELEPHONE ENCOUNTER
I talked to home health and he is off glipizide.  He is given Linzess 72 mcg daily for constipation.  Reviewed medications with home health.

## 2020-07-16 PROBLEM — R53.1 GENERAL WEAKNESS: Status: ACTIVE | Noted: 2020-01-01

## 2020-07-22 NOTE — DISCHARGE INSTRUCTIONS
Patient is here with son, Dallin.    If any information or results need to be relayed from today's visit, the best way to contact the patient is via [phone] (mobile) - Patient gives verbal permission to leave a detailed voicemail at the number provided.       Take the following medications the morning of surgery:  CARVEDILOL, FLUDROCORTISONE, MIDODRINE    PLEASE ARRIVE AT THE HOSPITAL AT 7:30 AM ON February 26, 2020    General Instructions:  • Do not eat solid food after midnight the night before surgery.  • You may drink clear liquids day of surgery but must stop at least one hour before your hospital arrival time.  • NOTHING TO DRINK AFTER 6:30 AM  • It is beneficial for you to have a clear drink that contains carbohydrates the day of surgery.  We suggest a 12 to 20 ounce bottle of Gatorade or Powerade for non-diabetic patients or a 12 to 20 ounce bottle of G2 or Powerade Zero for diabetic patients. (Pediatric patients, are not advised to drink a 12 to 20 ounce carbohydrate drink)    Clear liquids are liquids you can see through.  Nothing red in color.     Plain water                               Sports drinks  Sodas                                   Gelatin (Jell-O)  Fruit juices without pulp such as white grape juice and apple juice  Popsicles that contain no fruit or yogurt  Tea or coffee (no cream or milk added)  Gatorade / Powerade  G2 / Powerade Zero    • Infants may have breast milk up to four hours before surgery.  • Infants drinking formula may drink formula up to six hours before surgery.   • Patients who avoid smoking, chewing tobacco and alcohol for 4 weeks prior to surgery have a reduced risk of post-operative complications.  Quit smoking as many days before surgery as you can.  • Do not smoke, use chewing tobacco or drink alcohol the day of surgery.   • If applicable bring your C-PAP/ BI-PAP machine.  • Bring any papers given to you in the doctor’s office.  • Wear clean comfortable clothes.  • Do not wear contact lenses, false eyelashes or make-up.  Bring a case for your glasses.   • Bring crutches or walker if applicable.  • Remove all piercings.  Leave jewelry and any other valuables at home.  • Hair extensions with metal clips must be removed prior to  surgery.  • The Pre-Admission Testing nurse will instruct you to bring medications if unable to obtain an accurate list in Pre-Admission Testing.      Preventing a Surgical Site Infection:  • For 2 to 3 days before surgery, avoid shaving with a razor because the razor can irritate skin and make it easier to develop an infection.    • Any areas of open skin can increase the risk of a post-operative wound infection by allowing bacteria to enter and travel throughout the body.  Notify your surgeon if you have any skin wounds / rashes even if it is not near the expected surgical site.  The area will need assessed to determine if surgery should be delayed until it is healed.  • The night prior to surgery sleep in a clean bed with clean clothing.  Do not allow pets to sleep with you.  • Shower on the morning of surgery using a fresh bar of anti-bacterial soap (such as Dial) and clean washcloth.  Dry with a clean towel and dress in clean clothing.  • Ask your surgeon if you will be receiving antibiotics prior to surgery.  • Make sure you, your family, and all healthcare providers clean their hands with soap and water or an alcohol based hand  before caring for you or your wound.    2% CHLORHEXIDINE GLUCONATE* CLOTH  Preparing or “prepping” skin before surgery can reduce the risk of infection at the surgical site. To make the process easier, [facility] has chosen disposable cloths moistened with a rinse-free, 2% Chlorhexidine Gluconate (CHG) antiseptic solution. The steps below outline the prepping process and should be carefully followed.        Use the prep cloth on the area that is circled in the diagram             Directions Night before Surgery  1) Shower using a fresh bar of anti-bacterial soap (such as Dial) and clean washcloth.  Use a clean towel to completely dry your skin.  2) Do not use any lotions, oils or creams on your skin.  3) Open the package and remove 1 cloth, wipe your skin for  30 seconds in a circular motion.  Allow to dry for 3 minutes.  4) Repeat #3 with second cloth.  5) Do not touch your eyes, ears, or mouth with the prep cloth.  6) Allow the wet prep solution to air dry.  7) Discard the prep cloth and wash your hands with soap and water.   8) Dress in clean bed clothes and sleep on fresh clean bed sheets.   9) You may experience some temporary itching after the prep.    Directions Day of Surgery  1) Repeat steps 1,2,3,4,5,6,7, and 9.   2) Dress in clean clothes before coming to the hospital.      Day of surgery:  Your arrival time is approximately two hours before your scheduled surgery time.  Upon arrival, a Pre-op nurse and Anesthesiologist will review your health history, obtain vital signs, and answer questions you may have.  The only belongings needed at this time will be a list of your home medications and if applicable your C-PAP/BI-PAP machine.  If you are staying overnight your family can leave the rest of your belongings in the car and bring them to your room later.  A Pre-op nurse will start an IV and you may receive medication in preparation for surgery, including something to help you relax.  Your family will be able to see you in the Pre-op area.  Two visitors at a time will be allowed in the Pre-op room.  While you are in surgery your family should notify the waiting room  if they leave the waiting room area and provide a contact phone number.    Please be aware that surgery does come with discomfort.  We want to make every effort to control your discomfort so please discuss any uncontrolled symptoms with your nurse.   Your doctor will most likely have prescribed pain medications.      If you are going home after surgery you will receive individualized written care instructions before being discharged.  A responsible adult must drive you to and from the hospital on the day of your surgery and stay with you for 24 hours.    If you are staying overnight  following surgery, you will be transported to your hospital room following the recovery period.  [facility] has all private rooms.    If you have any questions please call Pre-Admission Testing at [phone].  Deductibles and co-payments are collected on the day of service. Please be prepared to pay the required co-pay, deductible or deposit on the day of service as defined by your plan.

## 2020-07-26 PROBLEM — R65.20 SEPSIS WITH ACUTE HYPOXIC RESPIRATORY FAILURE (HCC): Status: RESOLVED | Noted: 2020-01-01 | Resolved: 2020-01-01

## 2020-07-26 PROBLEM — J96.01 SEPSIS WITH ACUTE HYPOXIC RESPIRATORY FAILURE (HCC): Status: RESOLVED | Noted: 2020-01-01 | Resolved: 2020-01-01

## 2020-07-26 PROBLEM — A41.9 SHOCK, SEPTIC (HCC): Status: ACTIVE | Noted: 2020-01-01

## 2020-07-26 PROBLEM — G93.41 METABOLIC ENCEPHALOPATHY: Status: ACTIVE | Noted: 2020-01-01

## 2020-07-26 PROBLEM — J96.01 SEPSIS WITH ACUTE HYPOXIC RESPIRATORY FAILURE (HCC): Status: ACTIVE | Noted: 2020-01-01

## 2020-07-26 PROBLEM — A41.9 SEPSIS WITH ACUTE HYPOXIC RESPIRATORY FAILURE (HCC): Status: ACTIVE | Noted: 2020-01-01

## 2020-07-26 PROBLEM — R65.21 SHOCK, SEPTIC (HCC): Status: ACTIVE | Noted: 2020-01-01

## 2020-07-26 PROBLEM — R65.21 SHOCK, SEPTIC (HCC): Status: RESOLVED | Noted: 2020-01-01 | Resolved: 2020-01-01

## 2020-07-26 PROBLEM — A41.9 SEPSIS WITH ACUTE HYPOXIC RESPIRATORY FAILURE (HCC): Status: RESOLVED | Noted: 2020-01-01 | Resolved: 2020-01-01

## 2020-07-26 PROBLEM — A41.9 SHOCK, SEPTIC (HCC): Status: RESOLVED | Noted: 2020-01-01 | Resolved: 2020-01-01

## 2020-07-26 PROBLEM — R65.20 SEPSIS WITH ACUTE HYPOXIC RESPIRATORY FAILURE (HCC): Status: ACTIVE | Noted: 2020-01-01

## 2020-08-01 NOTE — THERAPY TREATMENT NOTE
Patient Name: Santino Retana  : 1940    MRN: [id number]                              Today's Date: 2020       Admit Date: 2020    Visit Dx:     ICD-10-CM ICD-9-CM   1. General weakness R53.1 780.79   2. Dysarthria R47.1 784.51     Patient Active Problem List   Diagnosis   • Hypertension   • Carcinoid tumor of appendix   • Arthritis   • Neuroendocrine carcinoma of small bowel (CMS/[facility])   • CKD (chronic kidney disease) stage 3, GFR 30-59 ml/min (CMS/[facility])   • History of ischemic stroke without residual deficits   • Osteoarthritis of spine with radiculopathy, lumbar region   • Chronic low back pain with sciatica   • TIA (transient ischemic attack)   • Carotid artery disease (CMS/[facility])   • Diabetes mellitus (CMS/[facility])   • HTN (hypertension)   • Hyperkalemia   • Adrenal insufficiency (CMS/[facility])   • Hyperinsulinism   • Diabetes mellitus type 2, uncontrolled, with complications (CMS/[facility])   • Encounter for long-term (current) use of insulin (CMS/[facility])   • Observed sleep apnea    • Coronary artery disease involving native coronary artery of native heart with angina pectoris (CMS/[facility])   • EMMA (acute kidney injury) (CMS/[facility])   • ESRD on hemodialysis (CMS/[facility])   • Atrial fibrillation (CMS/[facility]) [I48.91]   • S/P CABG x 4   • S/P MVR (mitral valve replacement)   • End stage renal failure on dialysis (CMS/[facility])   • Hypogonadism in male   • General weakness   • Metabolic encephalopathy     Past Medical History:   Diagnosis Date   • Adrenal insufficiency (CMS/[facility])    • Arthritis    • Atrial fibrillation (CMS/[facility])    • CAD (coronary artery disease)     Status post 4 vessel CABG on 10/10/19 with Dr. Serra (LIMA-LAD, SVG-OM1, sequential SVG to PDA and distal RCA)   • Carotid artery disease (CMS/HCC)     Right CEA 16.  Left CEA 17 (with dionte-op CVA)   • CHF (congestive heart failure) (CMS/HCC)    • CVA (cerebral vascular accident) (CMS/[facility])     on 17 dionte-operatively from left CEA.     • Diabetes mellitus  (CMS/HCC) 1998    DR CAROLINE DURON   • Elevated cholesterol    • ESRD (end stage renal disease) (CMS/[facility])     FRESENIUS TU/TH/SAT   • History of pressure ulcer 10/2019    SACRAL   • Hyperlipidemia    • Low back pain    • NSTEMI (non-ST elevated myocardial infarction) (CMS/HCC)     NSTEMI following left CEA and dionte-op stroke in 7/18 ([facility]).     • Sleep apnea     WEARS CPAP   • Slow to wake up after anesthesia    • Status post mitral valve replacement with tissue valve     10/10/2019 with Dr. Serra (#31 [facility] Epic porcine valve)   • STEMI (ST elevation myocardial infarction) (CMS/[facility]) 10/2019    BHE   • TIA (transient ischemic attack) 05/2018   • Vision loss of right eye      Past Surgical History:   Procedure Laterality Date   • APPENDECTOMY  02/19/2016    [facility], Dr. Zimmerman   • ARTERIOVENOUS FISTULA/SHUNT SURGERY Left 2/25/2020    Procedure: LEFT BRACHIAL CEPHALIC ARTERIAL VENOUS FISTULA;  Surgeon: Silvestre Bird MD;  Location: [facility] OR;  Service: Vascular;  Laterality: Left;   • CARDIAC CATHETERIZATION N/A 10/7/2019    Procedure: Left Heart Cath NO LV gram;  Surgeon: Yolande Kelley MD;  Location[facility]ASIVE LOC[facility];  Service: Cardiovascular   • CARDIAC CATHETERIZATION N/A 10/7/2019    Procedure: Coronary angiography;  Surgeon: Yolande Kelley MD;  Location: [facility]ASIVE LOC[facility];  Service: Cardiovascular   • CAROTID ENDARTERECTOMY Right 09/26/2016    [facility]    • CAROTID ENDARTERECTOMY Left 07/18/2017    [facility]    • CATARACT EXTRACTION     • CHOLECYSTECTOMY  11/1989    [facility]   • CORONARY ARTERY BYPASS GRAFT WITH MITRAL VALVE REPAIR/REPLACEMENT N/A 10/10/2019    Procedure: JERRY STERNOTOMY CORONARY ARTERY BYPASS GRAFT TIMES 4 USING LEFT INTERNAL MAMMARY ARTERY AND LEFT GREATER SAPHENOUS VEIN GRAFT PER ENDOSCOPIC VEIN HARVESTING, MITRAL VALVE REPLACEMENT AND PRP;  Surgeon: Lewis Serra MD;   Location: [facility] MAIN OR;  Service: Cardiothoracic   • INSERT CENTRAL LINE AT BEDSIDE  7/17/2020        • INSERTION HEMODIALYSIS CATHETER N/A 10/22/2019    Procedure: TUNNEL DIALYSIS CATHETER INSERTION;  Surgeon: Alexx Monteiro MD;  Location: [facility] MAIN OR;  Service: Vascular   • INTERVENTIONAL RADIOLOGY PROCEDURE N/A 10/7/2019    Procedure: Intravascular Ultrasound;  Surgeon: Yolande Kelley MD;  Location: [facility] CATH INVASIVE LOCATION;  Service: Cardiovascular   • LUMBAR EPIDURAL INJECTION     • TOTAL HIP ARTHROPLASTY Right 11/16/2016    [facility]     General Information     Row Name 08/01/20 1036          PT Evaluation Time/Intention    Document Type  therapy note (daily note)  -AL     Mode of Treatment  physical therapy  -AL       User Key  (r) = Recorded By, (t) = Taken By, (c) = Cosigned By    Initials Name Provider Type    AL Nadja Galvan, PT Physical Therapist        Mobility     Row Name 08/01/20 1036          Bed Mobility Assessment/Treatment    Bed Mobility Assessment/Treatment  supine-sit;sit-supine  -AL     Sit-Supine Union (Bed Mobility)  minimum assist (75% patient effort);2 person assist;verbal cues;nonverbal cues (demo/gesture)  -AL     Supine-Sit-Supine Union (Bed Mobility)  minimum assist (75% patient effort);2 person assist;verbal cues;nonverbal cues (demo/gesture)  -AL     Row Name 08/01/20 1036          Transfer Assessment/Treatment    Comment (Transfers)  Stood EOB 6-7 times.  -AL     Row Name 08/01/20 1036          Sit-Stand Transfer    Sit-Stand Union (Transfers)  minimum assist (75% patient effort);2 person assist;verbal cues;nonverbal cues (demo/gesture)  -AL     Assistive Device (Sit-Stand Transfers)  walker, front-wheeled  -AL     Row Name 08/01/20 1036          Gait/Stairs Assessment/Training    Gait/Stairs Assessment/Training  gait/ambulation independence;gait/ambulation assistive device  -AL     Union Level (Gait)  minimum assist (75%  patient effort);2 person assist;verbal cues;nonverbal cues (demo/gesture)  -AL     Assistive Device (Gait)  walker, front-wheeled  -AL     Distance in Feet (Gait)  3 steps to HOB  -AL       User Key  (r) = Recorded By, (t) = Taken By, (c) = Cosigned By    Initials Name Provider Type    Nadja Argueta, PT Physical Therapist        Obj/Interventions    No documentation.       Goals/Plan    No documentation.       Clinical Impression     [facility] Name 08/01/20 1037          Pain Assessment    Additional Documentation  Pain Scale: Numbers Pre/Post-Treatment (Group)  -AL     Row Name 08/01/20 1037          Pain Scale: Numbers Pre/Post-Treatment    Pain Scale: Numbers, Pretreatment  0/10 - no pain  -AL     Pain Scale: Numbers, Post-Treatment  0/10 - no pain  -AL     Pain Intervention(s)  Repositioned;Ambulation/increased activity  -AL     Row Name 08/01/20 1037          Plan of Care Review    Plan of Care Reviewed With  patient  -AL     Progress  improving  -AL     Row Name 08/01/20 1037          Physical Therapy Clinical Impression    Criteria for Skilled Interventions Met (PT Clinical Impression)  treatment indicated  -AL     Rehab Potential (PT Clinical Summary)  good, to achieve stated therapy goals  -AL     [facility] Name 08/01/20 1037          Positioning and Restraints    Pre-Treatment Position  in bed  -AL     Post Treatment Position  bed  -AL     In Bed  supine;call light within reach;exit alarm on;with family/caregiver  -AL       User Key  (r) = Recorded By, (t) = Taken By, (c) = Cosigned By    Initials Name Provider Type    Nadja Argueta, PT Physical Therapist        Outcome Measures     Row Name 08/01/20 1037          How much help from another person do you currently need...    Turning from your back to your side while in flat bed without using bedrails?  3  -AL     Moving from lying on back to sitting on the side of a flat bed without bedrails?  3  -AL     Moving to and from a bed to a chair (including a  wheelchair)?  3  -AL     Standing up from a chair using your arms (e.g., wheelchair, bedside chair)?  3  -AL     Climbing 3-5 steps with a railing?  1  -AL     To walk in hospital room?  2  -AL     AM-PAC 6 Clicks Score (PT)  15  -AL     Row Name 08/01/20 [facility]          Functional Assessment    Outcome Measure Options  AM-PAC 6 Clicks Basic Mobility (PT)  -AL       User Key  (r) = Recorded By, (t) = Taken By, (c) = Cosigned By    Initials Name Provider Type    Nadja Argueta, PT Physical Therapist        Physical Therapy Education                 Title: PT OT SLP Therapies (In Progress)     Topic: Physical Therapy (In Progress)     Point: Mobility training (In Progress)     Description:   Instruct learner(s) on safety and technique for assisting patient out of bed, chair or wheelchair.  Instruct in the proper use of assistive devices, such as walker, crutches, cane or brace.              Patient Friendly Description:   It's important to get you on your feet again, but we need to do so in a way that is safe for you. Falling has serious consequences, and your personal safety is the most important thing of all.        When it's time to get out of bed, one of us or a family member will sit next to you on the bed to give you support.     If your doctor or nurse tells you to use a walker, crutches, a cane, or a brace, be sure you use it every time you get out of bed, even if you think you don't need it.    Learning Progress Summary           Patient Acceptance, E, NR by AL at 8/1/2020 1039    Acceptance, E, VU,NR by MW at 7/31/2020 1640    Acceptance, E,TB, VU,NR by RACHEL at 7/30/2020 1035    Acceptance, E, NR by AR at 7/27/2020 1559    Acceptance, E, NR by RS at 7/26/2020 1113   Family Acceptance, E, VU,NR by ALBERTO at 7/31/2020 1640                   Point: Home exercise program (Done)     Description:   Instruct learner(s) on appropriate technique for monitoring, assisting and/or progressing patient with therapeutic exercises  and activities.              Learning Progress Summary           Patient Acceptance, E, VU,NR by MW at 7/31/2020 1640    Acceptance, E, NR by AR at 7/27/2020 1559    Acceptance, E, NR by RS at 7/26/2020 1113   Family Acceptance, E, VU,NR by MW at 7/31/2020 1640                   Point: Body mechanics (In Progress)     Description:   Instruct learner(s) on proper positioning and spine alignment for patient and/or caregiver during mobility tasks and/or exercises.              Learning Progress Summary           Patient Acceptance, E, NR by AL at 8/1/2020 1039    Acceptance, E, VU,NR by MW at 7/31/2020 1640    Acceptance, E,TB, VU,NR by  at 7/30/2020 1035    Acceptance, E, NR by AR at 7/27/2020 1559    Acceptance, E, NR by RS at 7/26/2020 1113   Family Acceptance, E, VU,NR by MW at 7/31/2020 1640                   Point: Precautions (In Progress)     Description:   Instruct learner(s) on prescribed precautions during mobility and gait tasks              Learning Progress Summary           Patient Acceptance, E, NR by AL at 8/1/2020 1039    Acceptance, E, VU,NR by MW at 7/31/2020 1640    Acceptance, E,TB, VU,NR by  at 7/30/2020 1035    Acceptance, E, NR by AR at 7/27/2020 1559    Acceptance, E, NR by RS at 7/26/2020 1113   Family Acceptance, E, VU,NR by  at 7/31/2020 1640                               User Key     Initials Effective Dates Name Provider Type Discipline    AL 04/03/18 -  Nadja Galvan, PT Physical Therapist PT    AR 04/03/18 -  Anne Marie Miller, PT Physical Therapist PT    RS 04/03/19 -  Kristen Roblero, PT Physical Therapist PT     08/23/19 -  Shante Teran, PT Physical Therapist PT     09/17/19 -  Shelly Keyes, PT Physical Therapist PT              PT Recommendation and Plan     Plan of Care Reviewed With: patient  Progress: improving  Outcome Summary: Patient participated well with skilled PT this visit. Able to stand EOB 6-7 times and took a few steps to the HOB. Decreased posterior lean  this morning. Needed less assistance. Will continue to progress patient towards goals.     Time Calculation:   PT Charges     Row Name 08/01/20 1038             Time Calculation    Start Time  0956  -AL      Stop Time  1009  -AL      Time Calculation (min)  13 min  -AL      PT Received On  08/01/20  -AL      PT - Next Appointment  08/03/20  -AL        User Key  (r) = Recorded By, (t) = Taken By, (c) = Cosigned By    Initials Name Provider Type    AL Nadja Galvan, PT Physical Therapist        Therapy Charges for Today     Code Description Service Date Service Provider Modifiers Qty    66631440710  PT THER SUPP EA 15 MIN 8/1/2020 Nadja Galvan, PT GP 1    18372010716 HC PT THER PROC EA 15 MIN 8/1/2020 Nadja Galvan, PT GP 1          PT G-Codes  Outcome Measure Options: AM-PAC 6 Clicks Basic Mobility (PT)  AM-PAC 6 Clicks Score (PT): 15    Nadja Galvan, PT  8/1/2020

## 2020-08-01 NOTE — PLAN OF CARE
Problem: Patient Care Overview  Goal: Plan of Care Review  Flowsheets (Taken 8/1/2020 5170)  Plan of Care Reviewed With: patient; daughter  Outcome Summary: OT eval completed. No pain c/o. Is oriented to person and exhibits some confusion. PLOF-Spouse assisted at times with ADL's. Requires cues/encouragement for follow through. RUE residual weakness from previous stroke, but BUE AROM is functional. LE dressing dependent seated EOB. Can wash face/hands Irais- set up. Pt would benefit from OT to maximize indep with self care, improve safety awareness and increase functional endurance. Anticipate SNF or home with HH.     Patient was not wearing a face mask during this therapy encounter. Therapist used appropriate personal protective equipment including mask and gloves.  Mask used was standard procedure mask. Appropriate PPE was worn during the entire therapy session. Hand hygiene was completed before and after therapy session. Patient is not in enhanced droplet precautions.

## 2020-08-01 NOTE — PROGRESS NOTES
Name: Santino Retana ADMIT: 2020   : 1940  PCP: Fahad Murray Jr., MD    MRN: [id number] LOS: 16 days   AGE/SEX: 80 y.o. male  ROOM: [facility]     Subjective   Subjective    Patient seen and examined chart reviewed discussed with nursing staff.  No new problems noted last evening.  Patient's only complaint this morning is that he did not sleep well last night    Review of Systems   Denies chest pain, shortness of air, abdominal pain, headache, visual changes, nausea vomiting diarrhea.   Objective   Objective   Vital Signs  Temp:  [97.5 °F (36.4 °C)-98.6 °F (37 °C)] 97.6 °F (36.4 °C)  Heart Rate:  [109-126] 109  Resp:  [18-22] 18  BP: ()/(51-72) 92/59  SpO2:  [94 %-98 %] 94 %  on  Flow (L/min):  [2] 2;   Device (Oxygen Therapy): nasal cannula  Body mass index is 30.78 kg/m².  Physical Exam  General elderly gentleman lying in bed in no acute hemodynamic distress  HEENT normocephalic atraumatic sclerae clear oropharynx is clear  Neck trachea is midline, supple, do not appreciate JVD or bruit  Lungs few bibasilar rhonchi but overall good air exchange  Cardiovascular regular rate and rhythm with normal S1 and S2  Abdomen is soft nontender nondistended with positive bowel sounds  Extremities moves extremities without rash or edema, extremities are warm and well-perfused  Neurologic grossly nonfocal  Psychiatric awake alert pleasant    Results Review:       I reviewed the patient's new clinical results.  Results from last 7 days   Lab Units 20  0720  0507 20  0449   WBC 10*3/mm3 9.15 10.15 8.27 9.40   HEMOGLOBIN g/dL 12.9* 12.6* 11.8* 11.2*   PLATELETS 10*3/mm3 324 353 300 268     Results from last 7 days   Lab Units 20  0713 20  0508 20  04120  0449   SODIUM mmol/L 133* 140 136 139   POTASSIUM mmol/L 4.1 3.5 4.4 3.7   CHLORIDE mmol/L 93* 98 99 102   CO2 mmol/L 25.9 22.3 23.4 21.0*   BUN mg/dL 28* 44* 29* 44*   CREATININE mg/dL 5.24* 6.91*  5.17* 7.09*   GLUCOSE mg/dL 98 115* 112* 57*   Estimated Creatinine Clearance: 12.8 mL/min (A) (by C-G formula based on SCr of 5.24 mg/dL (H)).  Results from last 7 days   Lab Units 07/29/20 1710 07/29/20 0508 07/28/20 0411 07/27/20 0449 07/26/20  0847   ALBUMIN g/dL 4.00 3.80 3.60 3.20* 3.80   BILIRUBIN mg/dL 0.7  --   --  0.5 0.6   ALK PHOS U/L 50  --   --  35* 39   AST (SGOT) U/L 15  --   --  18 17   ALT (SGPT) U/L 19  --   --  18 17     Results from last 7 days   Lab Units 07/30/20  0713 07/29/20 1710 07/29/20 0508 07/28/20 0411 07/27/20 0449 07/26/20  0847   CALCIUM mg/dL 9.1  --  8.8 8.4* 8.4* 8.6   ALBUMIN g/dL  --  4.00 3.80 3.60 3.20* 3.80   MAGNESIUM mg/dL  --   --   --   --   --  2.5*   PHOSPHORUS mg/dL  --   --  5.6* 5.7*  --   --        Results from last 7 days   Lab Units 07/29/20  1806   COVID19  Not Detected       Glucose   Date/Time Value Ref Range Status   08/01/2020 0618 121 70 - 130 mg/dL Final   07/31/2020 2124 179 (H) 70 - 130 mg/dL Final   07/31/2020 1615 153 (H) 70 - 130 mg/dL Final   07/31/2020 1435 123 70 - 130 mg/dL Final   07/31/2020 0637 112 70 - 130 mg/dL Final   07/30/2020 2126 137 (H) 70 - 130 mg/dL Final   07/30/2020 1640 236 (H) 70 - 130 mg/dL Final       FL Video Swallow With Speech Single Contrast  Narrative: VIDEO SWALLOW STUDY     HISTORY: Dysphagia.     Two minutes 38 seconds fluoroscopy was provided for the speech  pathologist during a video swallow study. 2700 images were saved.     No laryngeal penetration or aspiration was observed.     Impression: Fluoroscopy was provided for the speech pathologist during a  video swallow study. For full details please see the speech pathology  report     This report was finalized on 7/27/2020 10:22 AM by Dr. Duc Staley M.D.           arformoterol 15 mcg Nebulization BID - RT   aspirin 81 mg Nasogastric Daily   atorvastatin 20 mg Oral Q PM   famotidine 20 mg Oral Daily   hydrocortisone 10 mg Oral BID With Meals   insulin  lispro 2-4 Units Subcutaneous 4x Daily With Meals & Nightly   melatonin 10 mg Oral Nightly   midodrine 10 mg Oral TID AC   OLANZapine 5 mg Oral Daily Before Supper   pseudoephedrine 30 mg Oral Q12H   warfarin 2 mg Oral Daily       Pharmacy to dose warfarin    Diet Soft Texture; [employer]; Thin       Assessment/Plan     Active Hospital Problems    Diagnosis  POA   • Metabolic encephalopathy [G93.41]  Unknown   • General weakness [R53.1]  Yes   • End stage renal failure on dialysis (CMS/[facility]) [N18.6, Z99.2]  Not Applicable   • Atrial fibrillation (CMS/[facility]) [I48.91] [I48.91]  Yes   • Observed sleep apnea  [G47.30]  Yes   • Diabetes mellitus type 2, uncontrolled, with complications (CMS/[facility]) [E11.8, E11.65]  Yes   • Adrenal insufficiency (CMS/[facility]) [E27.40]  Yes      Resolved Hospital Problems    Diagnosis Date Resolved POA   • **Sepsis with acute hypoxic respiratory failure (CMS/[facility]) [A41.9, R65.20, J96.01] 07/26/2020 Unknown   • Shock, septic (CMS/[facility]) [A41.9, R65.21] 07/26/2020 Unknown       80 y.o. male admitted with Sepsis with acute hypoxic respiratory failure (CMS/[facility]).    · Acute hypoxic respiratory failure which required mechanical ventilation, patient's oxygen saturations in the 94-95 on room air with occasional use of 2 L via nasal cannula.  · Acute hypotensive septic shock resolved  · Bacterial pneumonia resolved completed antibiotic course.  · End-stage renal disease dialysis Monday Wednesday Friday  ·  for DVT prophylaxis.  · Coronary artery disease with prior two-vessel CABG: Chest pain-free continue statin aspirin.  Patient would likely benefit from resuming beta-blocker therapy but blood pressures continue to remain a little low.  Cardiology following.    · Atrial fibrillation with RVR heart rate continues to be around 105_110, continued anticoagulant with Coumadin  ·  patient with history of mitral valve replacement October 2019  · Autonomic dysfunction and adrenal insufficiency continue patient's  Florinef and midodrine patient is being followed by endocrinology.  · Anemia of chronic medical disease globin remained stable 0.9 yesterday  · Hematuria cystoscopy is planned in 6 weeks  · Metabolic encephalopathy improving  · Await placement at [facility] acute rehab.  · Full code.  · Discussed with nursing staff.  · Anticipate discharge to acute rehab timing yet to be determined.      Arthur Whyte MD  [facility]  08/01/20  09:44    Patient was placed in face mask on first look.  I wore protective equipment throughout this patient encounter including a face mask, gloves and protective eyewear.  Hand hygiene was performed before donning protective equipment and after removal when leaving the room.

## 2020-08-01 NOTE — PROGRESS NOTES
Patient Identification:  NAME:  Caroline Retana  Age:  80 y.o.   Sex:  male   :  1940   MRN:  [id number]       Chief complaint: Metabolic encephalopathy psychosis vascular dementia with behavioral changes  History of present illness: He is awake alert and still confused but seems a bit more appropriate than he has been over the last several days.      Past medical history:  Past Medical History:   Diagnosis Date   • Adrenal insufficiency (CMS/[facility])    • Arthritis    • Atrial fibrillation (CMS/[facility])    • CAD (coronary artery disease)     Status post 4 vessel CABG on 10/10/19 with Dr. Serra (LIMA-LAD, SVG-OM1, sequential SVG to PDA and distal RCA)   • Carotid artery disease (CMS/[facility])     Right CEA 16.  Left CEA 17 (with dionte-op CVA)   • CHF (congestive heart failure) (CMS/[facility])    • CVA (cerebral vascular accident) (CMS/[facility])     on 17 dionte-operatively from left CEA.     • Diabetes mellitus (CMS/[facility])     DR CAROLINE DURON   • Elevated cholesterol    • ESRD (end stage renal disease) (CMS/[facility])     FRESENIUS //SAT   • History of pressure ulcer 10/2019    SACRAL   • Hyperlipidemia    • Low back pain    • NSTEMI (non-ST elevated myocardial infarction) (CMS/[facility])     NSTEMI following left CEA and dionte-op stroke in  ([facility]).     • Sleep apnea     WEARS CPAP   • Slow to wake up after anesthesia    • Status post mitral valve replacement with tissue valve     10/10/2019 with Dr. Serra (#31 [facility] Epic porcine valve)   • STEMI (ST elevation myocardial infarction) (CMS/[facility]) 10/2019    B[facility]   • TIA (transient ischemic attack) 2018   • Vision loss of right eye        Allergies:  Lamisil [terbinafine]    Home medications:  Medications Prior to Admission   Medication Sig Dispense Refill Last Dose   • aspirin 81 MG EC tablet Take 1 tablet by mouth Daily. 30 tablet 5 7/15/2020 at Unknown time   • atorvastatin (LIPITOR) 20 MG tablet Take 20 mg by mouth Every Night.      • carvedilol  (COREG) 3.125 MG tablet Take 3.125 mg by mouth 2 (Two) Times a Day With Meals.  0 Taking   • cholecalciferol (VITAMIN D3) 1000 UNITS tablet Take 1,000 Units by mouth Daily.   Taking   • Cinnamon 500 MG tablet Take 1 tablet by mouth Daily. HOLD FOR SURGERY   Taking   • Crisaborole (EUCRISA) 2 % ointment Apply 1 g topically 2 (Two) Times a Day. (Patient taking differently: Apply 1 g topically 2 (Two) Times a Day As Needed (coccyx).) 60 g 3 Taking   • fludrocortisone 0.1 MG tablet Take 1 tablet by mouth Daily. 90 tablet 3 Taking   • gabapentin (NEURONTIN) 400 MG capsule take 1 capsule by mouth twice a day (Patient taking differently: Take 400 mg by mouth 2 (Two) Times a Day.) 180 capsule 1 Taking   • HYDROcodone-acetaminophen (NORCO) 5-325 MG per tablet Take 1-2 tablets by mouth Every 6 (Six) Hours As Needed (Pain). 30 tablet 0 Taking   • linaclotide (Linzess) 72 MCG capsule capsule Take 1 capsule by mouth Every Morning Before Breakfast. 30 capsule 5    • midodrine (PROAMATINE) 5 MG tablet Take 5 mg by mouth 2 (Two) Times a Day.  0 Taking   • polyethylene glycol (MIRALAX) packet Take 17 g by mouth Daily. (Patient taking differently: Take 17 g by mouth Daily As Needed.) 100 packet 11 Taking   • sevelamer (RENVELA) 800 MG tablet Take 800 mg by mouth 3 (Three) Times a Day With Meals.   Taking   • tamsulosin (FLOMAX) 0.4 MG capsule 24 hr capsule TAKE 1 CAPSULE BY MOUTH EVERY EVENING. 90 capsule 1 Taking   • TRADJENTA 5 MG tablet tablet TAKE 1 TABLET BY MOUTH ONCE DAILY. 30 tablet 5    • TRESIBA FLEXTOUCH 200 UNIT/ML solution pen-injector pen injection inject 14 units UNDER THE SKIN INTO THE APPROPRIATE AREA AS DIRECTED DAILY (Patient taking differently: Every Morning.) 9 mL 3 Taking   • vitamin D (ERGOCALCIFEROL) 1.25 MG (29162 UT) capsule capsule Next due in august 8   Taking   • warfarin (COUMADIN) 2 MG tablet TAKE 1 TABLET BY MOUTH EACH NIGHT OR AS DIRECTED 135 tablet 0    • ACCU-CHEK SOFTCLIX LANCETS lancets   0 Taking    • glucose blood (FREESTYLE LITE) test strip 1 each by Other route 4 (Four) Times a Day. e11.65 150 each 5 Taking   • Insulin Pen Needle (BD PEN NEEDLE CONSUELO U/F) 32G X 4 MM misc USE TO INJECT INSULIN ONCE DAILY 100 each 1 Taking   • Lancets Misc. (ACCU-CHEK SOFTCLIX LANCET DEV) kit   0 Taking   • Testosterone 10 MG/ACT (2%) gel Place 1 Pump on the skin as directed by provider Daily. 60 g 5         Hospital medications:    arformoterol 15 mcg Nebulization BID - RT   aspirin 81 mg Nasogastric Daily   atorvastatin 20 mg Oral Q PM   famotidine 20 mg Oral Daily   hydrocortisone 10 mg Oral BID With Meals   insulin lispro 2-4 Units Subcutaneous 4x Daily With Meals & Nightly   melatonin 10 mg Oral Nightly   midodrine 10 mg Oral TID AC   OLANZapine 5 mg Oral Daily Before Supper   pseudoephedrine 30 mg Oral Q12H   warfarin 2 mg Oral Daily       Pharmacy to dose warfarin      •  albumin human  •  calcium gluconate IVPB **OR** calcium gluconate IVPB  •  dextrose  •  dextrose  •  glucagon (human recombinant)  •  haloperidol lactate  •  heparin (porcine)  •  heparin (porcine)  •  HYDROcodone-acetaminophen  •  Pharmacy to dose warfarin  •  [COMPLETED] Insert peripheral IV **AND** sodium chloride  •  sodium phosphate IVPB      Objective:  Vitals Ranges:   Temp:  [97.5 °F (36.4 °C)-98.6 °F (37 °C)] 97.6 °F (36.4 °C)  Heart Rate:  [109-126] 109  Resp:  [18-22] 18  BP: ()/(51-72) 92/59      Physical Exam:  Awake and alert jokes around with me is not well-oriented oriented x1 normal cranial nerves II through VII tongue is midline good motor in 4 extremities but very poor effort reflexes absent throughout toes downgoing  Results review:   I reviewed the patient's new clinical results.    Data review:  Lab Results (last 24 hours)     Procedure Component Value Units Date/Time    POC Glucose Once [[id number]]  (Abnormal) Collected:  08/01/20 1124    Specimen:  Blood Updated:  08/01/20 1128     Glucose 184 mg/dL     POC Glucose Once  [[id number]]  (Normal) Collected:  08/01/20 0618    Specimen:  Blood Updated:  08/01/20 0620     Glucose 121 mg/dL     Protime-INR [[id number]]  (Abnormal) Collected:  08/01/20 0442    Specimen:  Blood Updated:  08/01/20 0613     Protime 28.9 Seconds      INR 2.84    POC Glucose Once [[id number]]  (Abnormal) Collected:  07/31/20 2124    Specimen:  Blood Updated:  07/31/20 2125     Glucose 179 mg/dL     POC Glucose Once [[id number]]  (Abnormal) Collected:  07/31/20 1615    Specimen:  Blood Updated:  07/31/20 1617     Glucose 153 mg/dL     POC Glucose Once [[id number]]  (Normal) Collected:  07/31/20 1435    Specimen:  Blood Updated:  07/31/20 1436     Glucose 123 mg/dL            Imaging:  Imaging Results (Last 24 Hours)     ** No results found for the last 24 hours. **             Assessment and Plan:       Adrenal insufficiency (CMS/HCC)    Diabetes mellitus type 2, uncontrolled, with complications (CMS/HCC)    Observed sleep apnea     Atrial fibrillation (CMS/HCC) [I48.91]    End stage renal failure on dialysis (CMS/HCC)    General weakness    Metabolic encephalopathy    Metabolic encephalopathy persist I am certain this gentleman has vascular dementia with  Behavioral changes related to history of A. fib end-stage renal disease on dialysis hypertension and diabetes.  Nonetheless he does not appear to have an acute stroke syndrome.  I believe he is a bit more appropriate today than he has been and for now we will continue the Zyprexa      Duc Witt MD  08/01/20  12:57

## 2020-08-01 NOTE — PLAN OF CARE
Problem: Patient Care Overview  Goal: Plan of Care Review  Flowsheets (Taken 8/1/2020 1039)  Progress: improving  Plan of Care Reviewed With: patient  Outcome Summary: Patient participated well with skilled PT this visit. Able to stand EOB 6-7 times and took a few steps to the HOB. Decreased posterior lean this morning. Needed less assistance. Will continue to progress patient towards goals. PT wore mask, gloves, and face shield.

## 2020-08-01 NOTE — PROGRESS NOTES
Pharmacy Consult: Warfarin Dosing/ Monitoring    Santino Retana is a 80 y.o. male, estimated creatinine clearance is 12.8 mL/min (A) (by C-G formula based on SCr of 5.24 mg/dL (H)). weighing 91.8 kg (202 lb 6.1 oz).    PMH: Adrenal insufficiency, Arthritis, Atrial fibrillation, Carotid artery disease, CHF, CVA, Diabetes mellitus (), Elevated cholesterol, ESRD, History of pressure ulcer (10/2019), Hyperlipidemia, Low back pain, NSTEMI, Sleep apnea, Slow to wake up after anesthesia, Status post mitral valve replacement with tissue valve, STEMI (10/2019), TIA (2018), and Vision loss of right eye.    Social History     Tobacco Use    Smoking status: Former Smoker     Packs/day: 2.00     Years: 30.00     Pack years: 60.00     Types: Cigarettes     Last attempt to quit: 1989     Years since quittin.6    Smokeless tobacco: Never Used   Substance Use Topics    Alcohol use: No     Comment: None due to blood thinners, patient would really like to drink non-alcoholic beer 1 x day    Drug use: No     Results from last 7 days   Lab Units 20  0442 20  0441 20  0713 20  0508 20  0507 20  04120  04420  0847   INR  2.84* 2.95* 3.12* 3.40*  --  3.05* 2.85* 2.54*   HEMOGLOBIN g/dL  --   --  12.9*  --  12.6* 11.8* 11.2* 11.9*   HEMATOCRIT %  --   --  41.7  --  41.2 36.0* 36.1* 37.1*   PLATELETS 10*3/mm3  --   --  324  --  353 300 268 259     Results from last 7 days   Lab Units 20  0713 20  1710 20  0508 20  0411 20  0449 20  0847   SODIUM mmol/L 133*  --  140 136 139 138   POTASSIUM mmol/L 4.1  --  3.5 4.4 3.7 3.6   CHLORIDE mmol/L 93*  --  98 99 102 99   CO2 mmol/L 25.9  --  22.3 23.4 21.0* 22.2   BUN mg/dL 28*  --  44* 29* 44* 41*   CREATININE mg/dL 5.24*  --  6.91* 5.17* 7.09* 6.42*   CALCIUM mg/dL 9.1  --  8.8 8.4* 8.4* 8.6   BILIRUBIN mg/dL  --  0.7  --   --  0.5 0.6   ALK PHOS U/L  --  50  --   --  35* 39   ALT (SGPT) U/L  --  19   --   --  18 17   AST (SGOT) U/L  --  15  --   --  18 17   GLUCOSE mg/dL 98  --  115* 112* 57* 84     Anticoagulation history: Home dose was Warfarin 2mg po qT/Th/[place] and Warfarin 4mg po qSaMWF     Hospital Anticoagulation:  Consulting provider: Aj Duke MD  Start date: 7/22/20 continued from home  Indication: Atrial fibrillation  Target INR: 2-3  Expected duration: Indefinitely  Bridge Therapy: No                Date 7/22 7/23 7/24 7/25 7/26 7/27 7/28 7/29 7/30 7/31 8/01     INR 1.8 1.41 1.5 2.18 2.54 2.85 3.05 3.4 3.12 2.95 2.84     Warfarin dose 2mg 4mg 4mg 4mg 2mg 2mg 2mg hold 2mg 2mg 2mg       Potential drug interactions:     Relevant nutrition status: soft texture + Boost    Other: dialysis, hematuria (traumatic)    Education complete?/ Date: No, confusion, to SNF    Assessment/Plan:  Dose: Continue warfarin 2 mg po daily  Monitor s/s bleed  Follow up INR daily    Pharmacy will continue to follow until discharge or discontinuation of warfarin.     Misty Barron [facility]  Clinical Staff Pharmacist

## 2020-08-01 NOTE — THERAPY EVALUATION
Acute Care - Occupational Therapy Initial Evaluation  [facility]     Patient Name: Santino Retana  : 1940  MRN: [id number]  Today's Date: 2020             Admit Date: 2020       ICD-10-CM ICD-9-CM   1. General weakness R53.1 780.79   2. Dysarthria R47.1 784.51     Patient Active Problem List   Diagnosis   • Hypertension   • Carcinoid tumor of appendix   • Arthritis   • Neuroendocrine carcinoma of small bowel (CMS/HCC)   • CKD (chronic kidney disease) stage 3, GFR 30-59 ml/min (CMS/[facility])   • History of ischemic stroke without residual deficits   • Osteoarthritis of spine with radiculopathy, lumbar region   • Chronic low back pain with sciatica   • TIA (transient ischemic attack)   • Carotid artery disease (CMS/[facility])   • Diabetes mellitus (CMS/[facility])   • HTN (hypertension)   • Hyperkalemia   • Adrenal insufficiency (CMS/[facility])   • Hyperinsulinism   • Diabetes mellitus type 2, uncontrolled, with complications (CMS/[facility])   • Encounter for long-term (current) use of insulin (CMS/[facility])   • Observed sleep apnea    • Coronary artery disease involving native coronary artery of native heart with angina pectoris (CMS/[facility])   • EMMA (acute kidney injury) (CMS/[facility])   • ESRD on hemodialysis (CMS/[facility])   • Atrial fibrillation (CMS/[facility]) [I48.91]   • S/P CABG x 4   • S/P MVR (mitral valve replacement)   • End stage renal failure on dialysis (CMS/[facility])   • Hypogonadism in male   • General weakness   • Metabolic encephalopathy     Past Medical History:   Diagnosis Date   • Adrenal insufficiency (CMS/[facility])    • Arthritis    • Atrial fibrillation (CMS/[facility])    • CAD (coronary artery disease)     Status post 4 vessel CABG on 10/10/19 with Dr. Serra (LIMA-LAD, SVG-OM1, sequential SVG to PDA and distal RCA)   • Carotid artery disease (CMS/HCC)     Right CEA 16.  Left CEA 17 (with dionte-op CVA)   • CHF (congestive heart failure) (CMS/HCC)    • CVA (cerebral vascular accident) (CMS/[facility])     on 17 dionte-operatively from  left CEA.     • Diabetes mellitus (CMS/[facility]) 1998    DR CAROLINE DURON   • Elevated cholesterol    • ESRD (end stage renal disease) (CMS/[facility])     FRESENIUS TU/TH/SAT   • History of pressure ulcer 10/2019    SACRAL   • Hyperlipidemia    • Low back pain    • NSTEMI (non-ST elevated myocardial infarction) (CMS/HCC)     NSTEMI following left CEA and dionte-op stroke in 7/18 ([facility]).     • Sleep apnea     WEARS CPAP   • Slow to wake up after anesthesia    • Status post mitral valve replacement with tissue valve     10/10/2019 with Dr. Serra (#31 [facility] Epic porcine valve)   • STEMI (ST elevation myocardial infarction) (CMS/[facility]) 10/2019    B[facility]   • TIA (transient ischemic attack) 05/2018   • Vision loss of right eye      Past Surgical History:   Procedure Laterality Date   • APPENDECTOMY  02/19/2016    [facility], Dr. Zimmerman   • ARTERIOVENOUS FISTULA/SHUNT SURGERY Left 2/25/2020    Procedure: LEFT BRACHIAL CEPHALIC ARTERIAL VENOUS FISTULA;  Surgeon: Silvestre iBrd MD;  Location: [facility] MAIN OR;  Service: Vascular;  Laterality: Left;   • CARDIAC CATHETERIZATION N/A 10/7/2019    Procedure: Left Heart Cath NO LV gram;  Surgeon: Yolande Kelley MD;  Location: [facility] CATH INVASIVE LOCATION;  Service: Cardiovascular   • CARDIAC CATHETERIZATION N/A 10/7/2019    Procedure: Coronary angiography;  Surgeon: Yolande Kelley MD;  Location: [facility] CATH INVASIVE LOCATION;  Service: Cardiovascular   • CAROTID ENDARTERECTOMY Right 09/26/2016    [facility]    • CAROTID ENDARTERECTOMY Left 07/18/2017    [facility]    • CATARACT EXTRACTION     • CHOLECYSTECTOMY  11/1989    [facility]   • CORONARY ARTERY BYPASS GRAFT WITH MITRAL VALVE REPAIR/REPLACEMENT N/A 10/10/2019    Procedure: JERRY STERNOTOMY CORONARY ARTERY BYPASS GRAFT TIMES 4 USING LEFT INTERNAL MAMMARY ARTERY AND LEFT GREATER SAPHENOUS VEIN GRAFT PER ENDOSCOPIC VEIN HARVESTING, MITRAL VALVE REPLACEMENT AND PRP;   Surgeon: Lewis Serra MD;  Location: [facility] MAIN OR;  Service: Cardiothoracic   • INSERT CENTRAL LINE AT BEDSIDE  7/17/2020        • INSERTION HEMODIALYSIS CATHETER N/A 10/22/2019    Procedure: TUNNEL DIALYSIS CATHETER INSERTION;  Surgeon: Alexx Monteiro MD;  Location: [facility] MAIN OR;  Service: Vascular   • INTERVENTIONAL RADIOLOGY PROCEDURE N/A 10/7/2019    Procedure: Intravascular Ultrasound;  Surgeon: Yolande Kelley MD;  Location: [facility] CATH INVASIVE LOCATION;  Service: Cardiovascular   • LUMBAR EPIDURAL INJECTION     • TOTAL HIP ARTHROPLASTY Right 11/16/2016    [facility]          OT ASSESSMENT FLOWSHEET (last 12 hours)      Occupational Therapy Evaluation     Row Name 08/01/20 1043                   OT Evaluation Time/Intention    Subjective Information  no complaints  -CW        Document Type  evaluation  -CW        Mode of Treatment  individual therapy;occupational therapy  -CW        Patient Effort  good  -CW        Symptoms Noted During/After Treatment  none  -CW           General Information    Patient Profile Reviewed?  yes  -CW        Patient Observations  cooperative;agree to therapy  -CW        Patient/Family Observations  Daughter present  -CW        General Observations of Patient  Pt. sitting up bed level. No acute distress.   -CW        Prior Level of Function  min assist:;ADL's  -CW        Equipment Currently Used at Home  shower chair built in elevated commode seat  -CW        Existing Precautions/Restrictions  fall  -CW           Cognitive Assessment/Intervention- PT/OT    Orientation Status (Cognition)  oriented to;person;verbal cues/prompts needed for orientation  -CW        Follows Commands (Cognition)  does not follow one step commands;50-74% accuracy;increased processing time needed;repetition of directions required;verbal cues/prompting required  -CW        Safety Deficit (Cognitive)  awareness of need for assistance;safety precautions awareness  -CW            Bed Mobility Assessment/Treatment    Rolling Right [place] (Bed Mobility)  verbal cues;minimum assist (75% patient effort)  -CW        Supine-Sit [place] (Bed Mobility)  verbal cues;moderate assist (50% patient effort)  -CW        Sit-Supine [place] (Bed Mobility)  verbal cues;moderate assist (50% patient effort) fatigued  -CW        Assistive Device (Bed Mobility)  bed rails;head of bed elevated  -CW           ADL Assessment/Intervention    BADL Assessment/Intervention  lower body dressing;grooming;feeding  -CW           Lower Body Dressing Assessment/Training    Lower Body Dressing [place] Level  doff;socks;verbal cues;dependent (less than 25% patient effort)  -CW        Lower Body Dressing Position  edge of bed sitting;supported sitting  -CW           Grooming Assessment/Training    [place] Level (Grooming)  wash face, hands;set up;minimum assist (75% patient effort)  -CW        Grooming Position  sitting up in bed  -CW           Self-Feeding Assessment/Training    Comment (Feeding)  Per daughter he can feed self. May need help to open packages on tray.   -CW           General ROM    GENERAL ROM COMMENTS  RUE weakness from previous stroke. RUE AROM 3/4 elbow 3/4. Can make a full fist bilaterally R  weak. LUE AROM WFL's.   -CW           MMT (Manual Muscle Testing)    General MMT Comments  3+/5  LUE. RUE 3/5  -CW           Motor Assessment/Interventions    Additional Documentation  Functional Endurance Training (Group)  -CW           Functional Endurance Training    Comment, Functional Endurance  fair  -CW           Positioning and Restraints    Pre-Treatment Position  in bed  -CW        Post Treatment Position  bed  -CW        In Bed  supine;call light within reach;encouraged to call for assist;with family/caregiver  -CW           Pain Scale: Numbers Pre/Post-Treatment    Pain Scale: Numbers, Pretreatment  0/10 - no pain  -CW           Wound 07/25/20 0956 medial coccyx Pressure Injury     Wound - Properties Group Date first assessed: 07/25/20  -RL Time first assessed: 0956  -RL Present on Hospital Admission: N  -RL Orientation: medial  -RL Location: coccyx  -RL Primary Wound Type: Pressure inj  -RL Stage, Pressure Injury: Stage 2  -RL       Wound 07/25/20 0959 Left gluteal Pressure Injury    Wound - Properties Group Date first assessed: 07/25/20  -RL Time first assessed: 0959  -RL Present on Hospital Admission: N  -RL Side: Left  -RL Location: gluteal  -RL Primary Wound Type: Pressure inj  -RL Stage, Pressure Injury: Stage 2  -RL       Wound 07/25/20 1741 Right anterior groin Puncture    Wound - Properties Group Date first assessed: 07/25/20  -RL Time first assessed: 1741  -RL Side: Right  -RL Orientation: anterior  -RL Location: groin  -RL Primary Wound Type: Puncture  -RL       Plan of Care Review    Plan of Care Reviewed With  patient;daughter  -CW           Clinical Impression (OT)    Therapy Frequency (OT Eval)  5 times/wk  -CW        Anticipated Discharge Disposition (OT)  home with home health;skilled nursing facility  -CW           Vital Signs    O2 Delivery Pre Treatment  supplemental O2  -CW        O2 Delivery Intra Treatment  supplemental O2  -CW        O2 Delivery Post Treatment  supplemental O2  -CW           Planned OT Interventions    Planned Therapy Interventions (OT Eval)  activity tolerance training;BADL retraining;transfer/mobility retraining  -CW           OT Goals    Bed Mobility Goal Selection (OT)  bed mobility, OT goal 1  -CW        Transfer Goal Selection (OT)  transfer, OT goal 1  -CW        Dressing Goal Selection (OT)  dressing, OT goal 1  -CW           Bed Mobility Goal 1 (OT)    Activity/Assistive Device (Bed Mobility Goal 1, OT)  rolling to left;rolling to right;sit to supine/supine to sit;bed rails  -CW        [place] Level/Cues Needed (Bed Mobility Goal 1, OT)  contact guard assist  -CW        Time Frame (Bed Mobility Goal 1, OT)  2 weeks  -CW            Transfer Goal 1 (OT)    Activity/Assistive Device (Transfer Goal 1, OT)  bed-to-chair/chair-to-bed;toilet;commode, 3-in-1;walker, rolling  -CW        [place] Level/Cues Needed (Transfer Goal 1, OT)  verbal cues required;contact guard assist  -CW        Time Frame (Transfer Goal 1, OT)  2 weeks  -CW           Dressing Goal 1 (OT)    Activity/Assistive Device (Dressing Goal 1, OT)  upper body dressing;lower body dressing  -CW        [place]/Cues Needed (Dressing Goal 1, OT)  set-up required;verbal cues required;moderate assist (50-74% patient effort)  -CW        Time Frame (Dressing Goal 1, OT)  2 weeks  -CW          User Key  (r) = Recorded By, (t) = Taken By, (c) = Cosigned By    Initials Name Effective Dates     Elsy Monet OTR 06/08/18 -     RL Lhotsky, Rachel Corinne, RN 07/23/18 -          Occupational Therapy Education                 Title: PT OT SLP Therapies (In Progress)     Topic: Occupational Therapy (In Progress)     Point: ADL training (In Progress)     Description:   Instruct learner(s) on proper safety adaptation and remediation techniques during self care or transfers.   Instruct in proper use of assistive devices.              Learning Progress Summary           Patient Acceptance, E, NR by  at 8/1/2020 1058    Comment:  Reviewed role of OT and poc with pt. and daughter. Discussed safety during bed mobility, self care.   Family Acceptance, E, NR by  at 8/1/2020 1058    Comment:  Reviewed role of OT and poc with pt. and daughter. Discussed safety during bed mobility, self care.                               User Key     Initials Effective Dates Name Provider Type Discipline     06/08/18 -  Elsy Monet OTR Occupational Therapist OT                  OT Recommendation and Plan  Outcome Summary/Treatment Plan (OT)  Anticipated Discharge Disposition (OT): home with home health, skilled nursing facility  Planned Therapy Interventions (OT Eval): activity tolerance training, BADL  retraining, transfer/mobility retraining  Therapy Frequency (OT Eval): 5 times/wk  Plan of Care Review  Plan of Care Reviewed With: patient, daughter  Plan of Care Reviewed With: patient, daughter  Outcome Summary: OT eval completed. No pain c/o. Is oriented to person and exhibits some confusion. PLOF-Spouse assisted at times for ADL's. Requires cues/encouragement for follow through. RUE residual weakness from previous stroke, but AROM is functional. LE dressing dependent seated EOB. Can wash face/hands Irais and set up. Pt would benefit from OT to maximize indep with self care and increase functional endurance.    Outcome Measures     Row Name 08/01/20 1100             How much help from another is currently needed...    Putting on and taking off regular lower body clothing?  1  -CW      Bathing (including washing, rinsing, and drying)  2  -CW      Toileting (which includes using toilet bed pan or urinal)  1  -CW      Putting on and taking off regular upper body clothing  2  -CW      Taking care of personal grooming (such as brushing teeth)  3  -CW      Eating meals  3  -CW      AM-PAC 6 Clicks Score (OT)  12  -CW         Functional Assessment    Outcome Measure Options  AM-PAC 6 Clicks Daily Activity (OT)  -CW        User Key  (r) = Recorded By, (t) = Taken By, (c) = Cosigned By    Initials Name Provider Type    Elsy Marin OTR Occupational Therapist          Time Calculation:   Time Calculation- OT     Row Name 08/01/20 1108             Time Calculation- OT    OT Start Time  1028  -CW      OT Stop Time  1053  -CW      OT Time Calculation (min)  25 min  -CW      Total Timed Code Minutes- OT  10 minute(s)  -CW        User Key  (r) = Recorded By, (t) = Taken By, (c) = Cosigned By    Initials Name Provider Type    Elsy Marin OTR Occupational Therapist        Therapy Charges for Today     Code Description Service Date Service Provider Modifiers Qty    [id number]  OT SELF CARE/MGMT/TRAIN EA 15 MIN  8/1/2020 Elsy Monet OTR GO 1    [id number] HC OT EVAL MOD COMPLEXITY 2 8/1/2020 Elsy Monet OTR GO 1               Elsy Monet, OTRENATE  8/1/2020

## 2020-08-01 NOTE — PLAN OF CARE
Resting today. Family at bedside. Patient still anxious to get out of bed, but able to be redirected. 2L NC O2 when sleeping. Awaiting placement.

## 2020-08-01 NOTE — PLAN OF CARE
Problem: Patient Care Overview  Goal: Plan of Care Review  Outcome: Ongoing (interventions implemented as appropriate)  Flowsheets (Taken 8/1/2020 8939)  Progress: no change  Plan of Care Reviewed With: patient  Outcome Summary: Pt is alert with confusion. Pt had a unwitness fall no injury noted around 0010. Wife,HM,J Terence sAPRN and CN aware. Called and talked to wife agreed to send someone to stay with the pt. Pt denies any pain or discomfort.pt still after the fall tried to get up.Will continue to monitor.     Problem: Patient Care Overview  Goal: Individualization and Mutuality  Outcome: Ongoing (interventions implemented as appropriate)     Problem: Confusion, Acute (Adult)  Goal: Identify Related Risk Factors and Signs and Symptoms  Outcome: Ongoing (interventions implemented as appropriate)     Problem: Kidney Disease, Chronic/End Stage Renal Disease (Adult)  Goal: Signs and Symptoms of Listed Potential Problems Will be Absent, Minimized or Managed (Kidney Disease, Chronic/End Stage Renal Disease)  Outcome: Ongoing (interventions implemented as appropriate)     Problem: Confusion, Acute (Adult)  Goal: Safety  Outcome: Ongoing (interventions implemented as appropriate)     Problem: Fall Risk (Adult)  Goal: Absence of Fall  Outcome: Ongoing (interventions implemented as appropriate)

## 2020-08-01 NOTE — PROGRESS NOTES
"  ENDOCRINE    Subjective   AND PLANS  Santino Retana is a 80 y.o. male.     Follow-up diabetes    Dietitian's notes noted  Patient started on nova source renal dietary supplement.  Fasting glucose 121.  Random glucose 123-184.  Continue Humalog correction scale only.      Objective   BP 99/53 (BP Location: Right arm, Patient Position: Lying)   Pulse 119   Temp 97.8 °F (36.6 °C) (Oral)   Resp 18   Ht 172.7 cm (67.99\")   Wt 91.8 kg (202 lb 6.1 oz)   SpO2 94%   BMI 30.78 kg/m²   Physical Exam    Asleep.  Not tachypneic, not in distress.  Neck veins are not visible at 20 degrees.  No rales or wheezes.  Regular heart rate and rhythm.  Sinus rhythm per monitor.  No rubs.  Abdomen soft.  Active bowel sounds.  Extremities warm.  No cyanosis or clubbing.    Lab Results (last 24 hours)     Procedure Component Value Units Date/Time    POC Glucose Once [[id number]]  (Abnormal) Collected:  08/01/20 1124    Specimen:  Blood Updated:  08/01/20 1128     Glucose 184 mg/dL     POC Glucose Once [[id number]]  (Normal) Collected:  08/01/20 0618    Specimen:  Blood Updated:  08/01/20 0620     Glucose 121 mg/dL     Protime-INR [[id number]]  (Abnormal) Collected:  08/01/20 0442    Specimen:  Blood Updated:  08/01/20 0613     Protime 28.9 Seconds      INR 2.84    POC Glucose Once [[id number]]  (Abnormal) Collected:  07/31/20 2124    Specimen:  Blood Updated:  07/31/20 2125     Glucose 179 mg/dL     POC Glucose Once [[id number]]  (Abnormal) Collected:  07/31/20 1615    Specimen:  Blood Updated:  07/31/20 1617     Glucose 153 mg/dL               Adrenal insufficiency (CMS/HCC)    Diabetes mellitus type 2, uncontrolled, with complications (CMS/HCC)    Observed sleep apnea     Atrial fibrillation (CMS/HCC) [I48.91]    End stage renal failure on dialysis (CMS/HCC)    General weakness    Metabolic encephalopathy    Continue Humalog correction scale.        "

## 2020-08-02 NOTE — PROGRESS NOTES
Pharmacy Consult: Warfarin Dosing/ Monitoring    Santino Retana is a 80 y.o. male, estimated creatinine clearance is 12.7 mL/min (A) (by C-G formula based on SCr of 5.24 mg/dL (H)). weighing 89.9 kg (198 lb 3.2 oz).    PMH: Adrenal insufficiency, Arthritis, Atrial fibrillation, Carotid artery disease, CHF, CVA, Diabetes mellitus (), Elevated cholesterol, ESRD, History of pressure ulcer (10/2019), Hyperlipidemia, Low back pain, NSTEMI, Sleep apnea, Slow to wake up after anesthesia, Status post mitral valve replacement with tissue valve, STEMI (10/2019), TIA (2018), and Vision loss of right eye.    Social History     Tobacco Use    Smoking status: Former Smoker     Packs/day: 2.00     Years: 30.00     Pack years: 60.00     Types: Cigarettes     Last attempt to quit: 1989     Years since quittin.6    Smokeless tobacco: Never Used   Substance Use Topics    Alcohol use: No     Comment: None due to blood thinners, patient would really like to drink non-alcoholic beer 1 x day    Drug use: No     Results from last 7 days   Lab Units 20  0453 20  0442 20  0441 20  0713 20  0508 20  0507 20   INR  3.23* 2.84* 2.95* 3.12* 3.40*  --  3.05* 2.85*   HEMOGLOBIN g/dL  --   --   --  12.9*  --  12.6* 11.8* 11.2*   HEMATOCRIT %  --   --   --  41.7  --  41.2 36.0* 36.1*   PLATELETS 10*3/mm3  --   --   --  324  --  353 300 268     Results from last 7 days   Lab Units 20  0713 20  1710 20  0508 20  04120  0449   SODIUM mmol/L 133*  --  140 136 139   POTASSIUM mmol/L 4.1  --  3.5 4.4 3.7   CHLORIDE mmol/L 93*  --  98 99 102   CO2 mmol/L 25.9  --  22.3 23.4 21.0*   BUN mg/dL 28*  --  44* 29* 44*   CREATININE mg/dL 5.24*  --  6.91* 5.17* 7.09*   CALCIUM mg/dL 9.1  --  8.8 8.4* 8.4*   BILIRUBIN mg/dL  --  0.7  --   --  0.5   ALK PHOS U/L  --  50  --   --  35*   ALT (SGPT) U/L  --  19  --   --  18   AST (SGOT) U/L  --  15  --   --  18    GLUCOSE mg/dL 98  --  115* 112* 57*     Anticoagulation history: Home dose was Warfarin 2mg po qT/Th/[place] and Warfarin 4mg po qSaMWF     Hospital Anticoagulation:  Consulting provider: Aj Duke MD  Start date: 7/22/20 continued from home  Indication: Atrial fibrillation- MVR on Oct. 2019  Target INR: 2.5 - 3.5  Expected duration: Indefinitely  Bridge Therapy: No                Date 7/22 7/23 7/24 7/25 7/26 7/27 7/28 7/29 7/30 7/31 8/01 8/02    INR 1.8 1.41 1.5 2.18 2.54 2.85 3.05 3.4 3.12 2.95 2.84 3.23    Warfarin dose 2mg 4mg 4mg 4mg 2mg 2mg 2mg hold 2mg 2mg 2mg 2 mg      Potential drug interactions:     Relevant nutrition status: Soft texture diet + Boost    Other: dialysis, hematuria (traumatic)    Education complete?/ Date: No, confusion, to SNF    Assessment/Plan:  Dose: Continue warfarin 2 mg po daily. INR within desired range today  Monitor s/s bleed  Follow up INR daily    Pharmacy will continue to follow until discharge or discontinuation of warfarin.     Misty Barron [facility]  Clinical Staff Pharmacist

## 2020-08-02 NOTE — PLAN OF CARE
Problem: Patient Care Overview  Goal: Plan of Care Review  Outcome: Ongoing (interventions implemented as appropriate)  Flowsheets (Taken 8/2/2020 0284)  Progress: improving  Plan of Care Reviewed With: patient  Outcome Summary: Pt is alert with confusion. Pt able to sleep most of the night. Continue on fall precaution. Family at bedside. Will continue to monitor.     Problem: Patient Care Overview  Goal: Individualization and Mutuality  Outcome: Ongoing (interventions implemented as appropriate)     Problem: Kidney Disease, Chronic/End Stage Renal Disease (Adult)  Goal: Signs and Symptoms of Listed Potential Problems Will be Absent, Minimized or Managed (Kidney Disease, Chronic/End Stage Renal Disease)  Outcome: Ongoing (interventions implemented as appropriate)     Problem: Confusion, Acute (Adult)  Goal: Identify Related Risk Factors and Signs and Symptoms  Outcome: Ongoing (interventions implemented as appropriate)     Problem: Confusion, Acute (Adult)  Goal: Cognitive/Functional Impairments Minimized  Outcome: Ongoing (interventions implemented as appropriate)     Problem: Confusion, Acute (Adult)  Goal: Safety  Outcome: Ongoing (interventions implemented as appropriate)     Problem: Fall Risk (Adult)  Goal: Absence of Fall  Outcome: Ongoing (interventions implemented as appropriate)

## 2020-08-02 NOTE — PROGRESS NOTES
Patient Identification:  NAME:  Caroline Retana  Age:  80 y.o.   Sex:  male   :  1940   MRN:  [id number]       Chief complaint: Metabolic encephalopathy    History of present illness: His wife is here notes he still confused but awakens some and says a few words and goes back to sleep.  He does not appear to be actively hallucinating at this time.      Past medical history:  Past Medical History:   Diagnosis Date   • Adrenal insufficiency (CMS/[facility])    • Arthritis    • Atrial fibrillation (CMS/[facility])    • CAD (coronary artery disease)     Status post 4 vessel CABG on 10/10/19 with Dr. Serra (LIMA-LAD, SVG-OM1, sequential SVG to PDA and distal RCA)   • Carotid artery disease (CMS/[facility])     Right CEA 16.  Left CEA 17 (with dionte-op CVA)   • CHF (congestive heart failure) (CMS/[facility])    • CVA (cerebral vascular accident) (CMS/[facility])     on 17 dionte-operatively from left CEA.     • Diabetes mellitus (CMS/[facility])     DR CAROLINE DURON   • Elevated cholesterol    • ESRD (end stage renal disease) (CMS/[facility])     FRESENIUS //SAT   • History of pressure ulcer 10/2019    SACRAL   • Hyperlipidemia    • Low back pain    • NSTEMI (non-ST elevated myocardial infarction) (CMS/[facility])     NSTEMI following left CEA and dionte-op stroke in  ([facility]).     • Sleep apnea     WEARS CPAP   • Slow to wake up after anesthesia    • Status post mitral valve replacement with tissue valve     10/10/2019 with Dr. Serra (#31 [facility] Epic porcine valve)   • STEMI (ST elevation myocardial infarction) (CMS/[facility]) 10/2019    BHE   • TIA (transient ischemic attack) 2018   • Vision loss of right eye        Allergies:  Lamisil [terbinafine]    Home medications:  Medications Prior to Admission   Medication Sig Dispense Refill Last Dose   • aspirin 81 MG EC tablet Take 1 tablet by mouth Daily. 30 tablet 5 7/15/2020 at Unknown time   • atorvastatin (LIPITOR) 20 MG tablet Take 20 mg by mouth Every Night.      • carvedilol  (COREG) 3.125 MG tablet Take 3.125 mg by mouth 2 (Two) Times a Day With Meals.  0 Taking   • cholecalciferol (VITAMIN D3) 1000 UNITS tablet Take 1,000 Units by mouth Daily.   Taking   • Cinnamon 500 MG tablet Take 1 tablet by mouth Daily. HOLD FOR SURGERY   Taking   • Crisaborole (EUCRISA) 2 % ointment Apply 1 g topically 2 (Two) Times a Day. (Patient taking differently: Apply 1 g topically 2 (Two) Times a Day As Needed (coccyx).) 60 g 3 Taking   • fludrocortisone 0.1 MG tablet Take 1 tablet by mouth Daily. 90 tablet 3 Taking   • gabapentin (NEURONTIN) 400 MG capsule take 1 capsule by mouth twice a day (Patient taking differently: Take 400 mg by mouth 2 (Two) Times a Day.) 180 capsule 1 Taking   • HYDROcodone-acetaminophen (NORCO) 5-325 MG per tablet Take 1-2 tablets by mouth Every 6 (Six) Hours As Needed (Pain). 30 tablet 0 Taking   • linaclotide (Linzess) 72 MCG capsule capsule Take 1 capsule by mouth Every Morning Before Breakfast. 30 capsule 5    • midodrine (PROAMATINE) 5 MG tablet Take 5 mg by mouth 2 (Two) Times a Day.  0 Taking   • polyethylene glycol (MIRALAX) packet Take 17 g by mouth Daily. (Patient taking differently: Take 17 g by mouth Daily As Needed.) 100 packet 11 Taking   • sevelamer (RENVELA) 800 MG tablet Take 800 mg by mouth 3 (Three) Times a Day With Meals.   Taking   • tamsulosin (FLOMAX) 0.4 MG capsule 24 hr capsule TAKE 1 CAPSULE BY MOUTH EVERY EVENING. 90 capsule 1 Taking   • TRADJENTA 5 MG tablet tablet TAKE 1 TABLET BY MOUTH ONCE DAILY. 30 tablet 5    • TRESIBA FLEXTOUCH 200 UNIT/ML solution pen-injector pen injection inject 14 units UNDER THE SKIN INTO THE APPROPRIATE AREA AS DIRECTED DAILY (Patient taking differently: Every Morning.) 9 mL 3 Taking   • vitamin D (ERGOCALCIFEROL) 1.25 MG (19198 UT) capsule capsule Next due in august 8   Taking   • warfarin (COUMADIN) 2 MG tablet TAKE 1 TABLET BY MOUTH EACH NIGHT OR AS DIRECTED 135 tablet 0    • ACCU-CHEK SOFTCLIX LANCETS lancets   0 Taking    • glucose blood (FREESTYLE LITE) test strip 1 each by Other route 4 (Four) Times a Day. e11.65 150 each 5 Taking   • Insulin Pen Needle (BD PEN NEEDLE CONSUELO U/F) 32G X 4 MM misc USE TO INJECT INSULIN ONCE DAILY 100 each 1 Taking   • Lancets Misc. (ACCU-CHEK SOFTCLIX LANCET DEV) kit   0 Taking   • Testosterone 10 MG/ACT (2%) gel Place 1 Pump on the skin as directed by provider Daily. 60 g 5         Hospital medications:    arformoterol 15 mcg Nebulization BID - RT   aspirin 81 mg Nasogastric Daily   atorvastatin 20 mg Oral Q PM   famotidine 20 mg Oral Daily   hydrocortisone 10 mg Oral BID With Meals   insulin lispro 2-4 Units Subcutaneous 4x Daily With Meals & Nightly   melatonin 10 mg Oral Nightly   midodrine 10 mg Oral TID AC   OLANZapine 5 mg Oral Daily Before Supper   pseudoephedrine 30 mg Oral Q12H   warfarin 2 mg Oral Daily       Pharmacy to dose warfarin    Pharmacy to dose warfarin      •  albumin human  •  calcium gluconate IVPB **OR** calcium gluconate IVPB  •  dextrose  •  dextrose  •  glucagon (human recombinant)  •  haloperidol lactate  •  heparin (porcine)  •  heparin (porcine)  •  HYDROcodone-acetaminophen  •  Pharmacy to dose warfarin  •  Pharmacy to dose warfarin  •  [COMPLETED] Insert peripheral IV **AND** sodium chloride  •  sodium phosphate IVPB      Objective:  Vitals Ranges:   Temp:  [97.4 °F (36.3 °C)-98.3 °F (36.8 °C)] 97.4 °F (36.3 °C)  Heart Rate:  [110-116] 114  Resp:  [16-18] 16  BP: (90-99)/(54-61) 99/54      Physical Exam:  Initially asleep but awakens says hello and shakes his head yes and no speaks without dysarthria or aphasia he still confused and poorly oriented does give  strength equal and some toe wiggle reflexes absent throughout toes downgoing    Results review:   I reviewed the patient's new clinical results.    Data review:  Lab Results (last 24 hours)     Procedure Component Value Units Date/Time    POC Glucose Once [[id number]]  (Abnormal) Collected:  08/02/20 1111     Specimen:  Blood Updated:  08/02/20 1114     Glucose 169 mg/dL     Protime-INR [[id number]]  (Abnormal) Collected:  08/02/20 0453    Specimen:  Blood Updated:  08/02/20 0606     Protime 31.8 Seconds      INR 3.23    POC Glucose Once [[id number]]  (Normal) Collected:  08/02/20 0514    Specimen:  Blood Updated:  08/02/20 0515     Glucose 116 mg/dL     POC Glucose Once [[id number]]  (Abnormal) Collected:  08/01/20 2112    Specimen:  Blood Updated:  08/01/20 2114     Glucose 205 mg/dL     POC Glucose Once [[id number]]  (Abnormal) Collected:  08/01/20 1608    Specimen:  Blood Updated:  08/01/20 1609     Glucose 202 mg/dL            Imaging:  Imaging Results (Last 24 Hours)     ** No results found for the last 24 hours. **             Assessment and Plan:       Adrenal insufficiency (CMS/HCC)    Diabetes mellitus type 2, uncontrolled, with complications (CMS/HCC)    Observed sleep apnea     Atrial fibrillation (CMS/HCC) [I48.91]    End stage renal failure on dialysis (CMS/HCC)    General weakness    Metabolic encephalopathy    He remains lethargic but awakens jokes around with me a little bit he still encephalopathic but at this point he does not look like an acute stroke syndrome nor central nervous system infection this may be about as good as we are going to get him mentally while he is in the hospital.  I will decrease the Zyprexa down to 2.5 mg p.o. q. supper and I will sign off and follow-up PRN reconsult thank      Duc Witt MD  08/02/20  13:58

## 2020-08-02 NOTE — PROGRESS NOTES
"   LOS: 17 days    Patient Care Team:  Fahad Murray Jr., MD as PCP - General (Family Medicine)  Alex Simpson MD as Consulting Physician (Nephrology)  Bk Lang RP as Pharmacist (Pharmacy)  Bonita Martinez RPH as Pharmacist (Pharmacy)  Bennie Paul MD as Consulting Physician (Cardiology)  Miah Jordan MD as Consulting Physician (Pulmonary Disease)  Trevor Whyte MD as Consulting Physician (Endocrinology)    Chief Complaint:    Chief Complaint   Patient presents with   • Altered Mental Status   • Weakness - Generalized     Follow-up ESRD  Subjective     Interval History:   Agitated and confused through most of the day; wife at bedside; she states that he ate dinner fairly well this evening.  He denies shortness of breath on room air    Review of Systems:   See above     Objective     Vital Signs  Temp:  [97.4 °F (36.3 °C)-98.5 °F (36.9 °C)] 98.5 °F (36.9 °C)  Heart Rate:  [110-116] 111  Resp:  [16-18] 17  BP: (83-99)/(48-61) 83/48    Flowsheet Rows      First Filed Value   Admission Height  172.7 cm (68\") Documented at 07/16/2020 1357   Admission Weight  96.6 kg (213 lb) Documented at 07/16/2020 1357          I/O this shift:  In: 330 [P.O.:330]  Out: -   I/O last 3 completed shifts:  In: 210 [P.O.:210]  Out: -     Intake/Output Summary (Last 24 hours) at 8/2/2020 1858  Last data filed at 8/2/2020 1814  Gross per 24 hour   Intake 330 ml   Output --   Net 330 ml       Physical Exam:  General Appearance: NAD, chronically ill-appearing, overweight; confused  Skin: warm and dry  HEENT: pupils round and reactive to light.   Neck: JVP elevated, trachea midline. TDC RIJ  Lungs: Clear to auscultation, no wheezing.  Good air movement.  Not labored on room air  Heart: Irreg, tachycardic; no S3, no rub  Abdomen: soft, no guarding,+bs, body wall edema.   Extremities: trace upper and lower ext edema.   Vascular: AVF left upper ext patent. Large ecchymosis LUE .         Results Review:    Results " from last 7 days   Lab Units 07/30/20  0713 07/29/20  1710 07/29/20  0508 07/28/20  0411 07/27/20  0449   SODIUM mmol/L 133*  --  140 136 139   POTASSIUM mmol/L 4.1  --  3.5 4.4 3.7   CHLORIDE mmol/L 93*  --  98 99 102   CO2 mmol/L 25.9  --  22.3 23.4 21.0*   BUN mg/dL 28*  --  44* 29* 44*   CREATININE mg/dL 5.24*  --  6.91* 5.17* 7.09*   CALCIUM mg/dL 9.1  --  8.8 8.4* 8.4*   BILIRUBIN mg/dL  --  0.7  --   --  0.5   ALK PHOS U/L  --  50  --   --  35*   ALT (SGPT) U/L  --  19  --   --  18   AST (SGOT) U/L  --  15  --   --  18   GLUCOSE mg/dL 98  --  115* 112* 57*       Estimated Creatinine Clearance: 12.7 mL/min (A) (by C-G formula based on SCr of 5.24 mg/dL (H)).    Results from last 7 days   Lab Units 07/29/20  0508 07/28/20  0411   PHOSPHORUS mg/dL 5.6* 5.7*             Results from last 7 days   Lab Units 07/30/20  0713 07/29/20  0507 07/28/20  0411 07/27/20  0449   WBC 10*3/mm3 9.15 10.15 8.27 9.40   HEMOGLOBIN g/dL 12.9* 12.6* 11.8* 11.2*   PLATELETS 10*3/mm3 324 353 300 268       Results from last 7 days   Lab Units 08/02/20  0453 08/01/20  0442 07/31/20  0441 07/30/20  0713 07/29/20  0508   INR  3.23* 2.84* 2.95* 3.12* 3.40*         Imaging Results (Last 24 Hours)     ** No results found for the last 24 hours. **          arformoterol 15 mcg Nebulization BID - RT   aspirin 81 mg Nasogastric Daily   atorvastatin 20 mg Oral Q PM   famotidine 20 mg Oral Daily   hydrocortisone 10 mg Oral BID With Meals   insulin lispro 2-4 Units Subcutaneous 4x Daily With Meals & Nightly   melatonin 10 mg Oral Nightly   midodrine 10 mg Oral TID AC   OLANZapine 2.5 mg Oral Daily Before Supper   pseudoephedrine 30 mg Oral Q12H   warfarin 2 mg Oral Daily       Pharmacy to dose warfarin    Pharmacy to dose warfarin        Medication Review:   Current Facility-Administered Medications   Medication Dose Route Frequency Provider Last Rate Last Dose   • albumin human 25 % IV SOLN 12.5 g  12.5 g Intravenous PRN Migel Brown  mL/hr  at 07/22/20 2004 12.5 g at 07/24/20 1314   • arformoterol (BROVANA) nebulizer solution 15 mcg  15 mcg Nebulization BID - RT Migel Brown MD   15 mcg at 08/02/20 0737   • aspirin chewable tablet 81 mg  81 mg Nasogastric Daily Migel Brown MD   81 mg at 08/02/20 0935   • atorvastatin (LIPITOR) tablet 20 mg  20 mg Oral Q PM Migel Brown MD   20 mg at 08/02/20 1744   • calcium gluconate 1 g in sodium chloride 0.9 % 50 mL IVPB  1 g Intravenous PRN Migel Brown MD        Or   • calcium gluconate 2 g in sodium chloride 0.9 % 50 mL IVPB  2 g Intravenous PRN Migel Brown MD       • dextrose (D50W) 25 g/ 50mL Intravenous Solution 25 g  25 g Intravenous Q15 Min PRN Migel Brown MD       • dextrose (GLUTOSE) oral gel 15 g  15 g Oral Q15 Min PRN Migel Brown MD   15 g at 07/26/20 1108   • famotidine (PEPCID) tablet 20 mg  20 mg Oral Daily Migel Brown MD   20 mg at 08/02/20 0935   • glucagon (human recombinant) (GLUCAGEN DIAGNOSTIC) injection 1 mg  1 mg Subcutaneous Q15 Min PRN Migel Brown MD       • haloperidol lactate (HALDOL) injection 2 mg  2 mg Intravenous Q2H PRN Duc Witt MD   2 mg at 07/31/20 0418   • heparin (porcine) injection 1,000-2,000 Units  1,000-2,000 Units Intravenous PRN Migel Brown MD       • heparin (porcine) injection 3,800 Units  3,800 Units Intracatheter PRN Migel Brown MD   3,800 Units at 07/31/20 1257   • HYDROcodone-acetaminophen (NORCO) 5-325 MG per tablet 1 tablet  1 tablet Oral Q6H PRN Migel Brown MD   1 tablet at 07/31/20 2127   • hydrocortisone (CORTEF) tablet 10 mg  10 mg Oral BID With Meals Trevor Whyte MD   10 mg at 08/02/20 1744   • insulin lispro (humaLOG) injection 2-4 Units  2-4 Units Subcutaneous 4x Daily With Meals & Nightly Trevor Whyte MD   2 Units at 08/01/20 2237   • melatonin tablet 10 mg  10 mg Oral Nightly Migel Brown MD   10 mg at 08/01/20 2040   • midodrine (PROAMATINE) tablet 10 mg  10 mg Oral TID AC Migel Brown MD   10 mg at 08/02/20 1744   •  OLANZapine (zyPREXA) tablet 2.5 mg  2.5 mg Oral Daily Before Supper Duc Witt MD   2.5 mg at 08/02/20 1745   • Pharmacy to dose warfarin   Does not apply Continuous PRN Migel Brown MD       • Pharmacy to dose warfarin   Does not apply Continuous PRN Arthur Whyte MD       • pseudoephedrine (SUDAFED) tablet 30 mg  30 mg Oral Q12H Edy Garcia MD   30 mg at 08/02/20 1745   • sodium chloride 0.9 % flush 10 mL  10 mL Intravenous PRN Migel Brown MD   10 mL at 07/26/20 0805   • sodium phosphates 10 mmol in sodium chloride 0.9 % 100 mL IVPB  10 mmol Intravenous PRN Migel Brown MD       • warfarin (COUMADIN) tablet 2 mg  2 mg Oral Daily Arthur Whyte MD   2 mg at 08/02/20 1745       Assessment/Plan   1.  ESRD.  Stable central volume; minimal peripheral edema.  Electrolytes acceptable.  Recent AVF balloon angioplasty 7/13. Using TDC for now     2.  Acute hypoxic respiratory failure . Resolved.   3.  Chronic hypotension.  On midodrine and sudafed   4.  Anemia of chronic kidney disease  5.  Coronary artery disease with prior four-vessel CABG and mitral valve replacement in October 2019.  6.  Diabetes mellitus type 2  7.  Peripheral vascular disease  8.  Secondary adrenal insufficiency. On steroid.  He was on florinef at home for orthostasis after question of adrenal insufficiency 8/19 because of inadequate response to cosyntropin test .  Dr. Whyte thinks secondary adrenal insufficiency. On midodrine and pseudoephedrine  9.  Paroxysmal atrial fibrillation, anticoagulated: not rate-controlled.  His low blood pressure limits options  10. Hematuria.  Schroeder placed 7/17. Initially for strict output measurement in ICU.  Developed hematuria.   evaluated and planned cysto in 6 weeks and advised keeping schroeder until more ambulatory.  Schroeder dc 7/29 because patient pulling on it and causing more trauma. Does make some urine despite dialysis .      Plan:  1.  Dialysis MWF  2.  Discussed with wife at bedside  3.   [facility] pending      Jean Mcconnell MD  08/02/20  18:58

## 2020-08-02 NOTE — PROGRESS NOTES
"   LOS: 16 days    Patient Care Team:  Fahad Murray Jr., MD as PCP - General (Family Medicine)  Alex Simpson MD as Consulting Physician (Nephrology)  Bk Lang RPH as Pharmacist (Pharmacy)  Bonita Martinez RPH as Pharmacist (Pharmacy)  Bennie Paul MD as Consulting Physician (Cardiology)  Miah Jordan MD as Consulting Physician (Pulmonary Disease)  Trevor Whyte MD as Consulting Physician (Endocrinology)    Chief Complaint:    Chief Complaint   Patient presents with   • Altered Mental Status   • Weakness - Generalized     Follow-up ESRD  Subjective     Interval History:   Eating well; no shortness of breath on 2 L/mn; remains a bit confused and continues to try to get out of bed, but easily re-directed.  Son at bedside.  Last HD was yesterday    Review of Systems:   See above     Objective     Vital Signs  Temp:  [97.6 °F (36.4 °C)-98 °F (36.7 °C)] 98 °F (36.7 °C)  Heart Rate:  [109-126] 112  Resp:  [18-20] 18  BP: ()/(53-72) 90/55    Flowsheet Rows      First Filed Value   Admission Height  172.7 cm (68\") Documented at 07/16/2020 1357   Admission Weight  96.6 kg (213 lb) Documented at 07/16/2020 1357          No intake/output data recorded.  I/O last 3 completed shifts:  In: 210 [P.O.:210]  Out: -     Intake/Output Summary (Last 24 hours) at 8/1/2020 2036  Last data filed at 7/31/2020 2100  Gross per 24 hour   Intake 210 ml   Output --   Net 210 ml       Physical Exam:  General Appearance: NAD, chronically ill-appearing, overweight; confused  Skin: warm and dry  HEENT: pupils round and reactive to light.   Neck: JVP elevated, trachea midline. TDC RIJ  Lungs: Clear to auscultation, no wheezing.  Good air movement.  Not labored  Heart: Irreg, tachycardic; no S3, no rub  Abdomen: soft, no guarding,+bs, body wall edema.   Extremities: trace upper and lower ext edema. AVF left upper ext patent. Large ecchymosis LUE .         Results Review:    Results from last 7 days   Lab Units " 07/30/20  0713 07/29/20  1710 07/29/20  0508 07/28/20  0411 07/27/20  0449 07/26/20  0847   SODIUM mmol/L 133*  --  140 136 139 138   POTASSIUM mmol/L 4.1  --  3.5 4.4 3.7 3.6   CHLORIDE mmol/L 93*  --  98 99 102 99   CO2 mmol/L 25.9  --  22.3 23.4 21.0* 22.2   BUN mg/dL 28*  --  44* 29* 44* 41*   CREATININE mg/dL 5.24*  --  6.91* 5.17* 7.09* 6.42*   CALCIUM mg/dL 9.1  --  8.8 8.4* 8.4* 8.6   BILIRUBIN mg/dL  --  0.7  --   --  0.5 0.6   ALK PHOS U/L  --  50  --   --  35* 39   ALT (SGPT) U/L  --  19  --   --  18 17   AST (SGOT) U/L  --  15  --   --  18 17   GLUCOSE mg/dL 98  --  115* 112* 57* 84       Estimated Creatinine Clearance: 12.8 mL/min (A) (by C-G formula based on SCr of 5.24 mg/dL (H)).    Results from last 7 days   Lab Units 07/29/20  0508 07/28/20 0411 07/26/20  0847   MAGNESIUM mg/dL  --   --  2.5*   PHOSPHORUS mg/dL 5.6* 5.7*  --              Results from last 7 days   Lab Units 07/30/20  0713 07/29/20  0507 07/28/20  0411 07/27/20  0449 07/26/20  0847   WBC 10*3/mm3 9.15 10.15 8.27 9.40 10.67   HEMOGLOBIN g/dL 12.9* 12.6* 11.8* 11.2* 11.9*   PLATELETS 10*3/mm3 324 353 300 268 259       Results from last 7 days   Lab Units 08/01/20  0442 07/31/20  0441 07/30/20  0713 07/29/20  0508 07/28/20  0411   INR  2.84* 2.95* 3.12* 3.40* 3.05*         Imaging Results (Last 24 Hours)     ** No results found for the last 24 hours. **          arformoterol 15 mcg Nebulization BID - RT   aspirin 81 mg Nasogastric Daily   atorvastatin 20 mg Oral Q PM   famotidine 20 mg Oral Daily   hydrocortisone 10 mg Oral BID With Meals   insulin lispro 2-4 Units Subcutaneous 4x Daily With Meals & Nightly   melatonin 10 mg Oral Nightly   midodrine 10 mg Oral TID AC   OLANZapine 5 mg Oral Daily Before Supper   pseudoephedrine 30 mg Oral Q12H   warfarin 2 mg Oral Daily       Pharmacy to dose warfarin        Medication Review:   Current Facility-Administered Medications   Medication Dose Route Frequency Provider Last Rate Last Dose   •  albumin human 25 % IV SOLN 12.5 g  12.5 g Intravenous PRN Migel Brown  mL/hr at 07/22/20 2004 12.5 g at 07/24/20 1314   • arformoterol (BROVANA) nebulizer solution 15 mcg  15 mcg Nebulization BID - RT Migel Brown MD   15 mcg at 08/01/20 0734   • aspirin chewable tablet 81 mg  81 mg Nasogastric Daily Migel Brown MD   81 mg at 08/01/20 0921   • atorvastatin (LIPITOR) tablet 20 mg  20 mg Oral Q PM Migel Brown MD   20 mg at 08/01/20 1732   • calcium gluconate 1 g in sodium chloride 0.9 % 50 mL IVPB  1 g Intravenous PRN Migel Brown MD        Or   • calcium gluconate 2 g in sodium chloride 0.9 % 50 mL IVPB  2 g Intravenous PRN Migel Brown MD       • dextrose (D50W) 25 g/ 50mL Intravenous Solution 25 g  25 g Intravenous Q15 Min PRN Migel Brown MD       • dextrose (GLUTOSE) oral gel 15 g  15 g Oral Q15 Min PRN Migel Brown MD   15 g at 07/26/20 1108   • famotidine (PEPCID) tablet 20 mg  20 mg Oral Daily Migel Brown MD   20 mg at 08/01/20 0921   • glucagon (human recombinant) (GLUCAGEN DIAGNOSTIC) injection 1 mg  1 mg Subcutaneous Q15 Min PRN Migel Brown MD       • haloperidol lactate (HALDOL) injection 2 mg  2 mg Intravenous Q2H PRN Duc Witt MD   2 mg at 07/31/20 0418   • heparin (porcine) injection 1,000-2,000 Units  1,000-2,000 Units Intravenous PRN Migel Brown MD       • heparin (porcine) injection 3,800 Units  3,800 Units Intracatheter PRN Migel Brown MD   3,800 Units at 07/31/20 1257   • HYDROcodone-acetaminophen (NORCO) 5-325 MG per tablet 1 tablet  1 tablet Oral Q6H PRN Migel Brown MD   1 tablet at 07/31/20 2127   • hydrocortisone (CORTEF) tablet 10 mg  10 mg Oral BID With Meals Trevor Whyte MD   10 mg at 08/01/20 1732   • insulin lispro (humaLOG) injection 2-4 Units  2-4 Units Subcutaneous 4x Daily With Meals & Nightly Trevor Whyte MD   2 Units at 08/01/20 1731   • melatonin tablet 10 mg  10 mg Oral Nightly Migel Brown MD   10 mg at 07/31/20 2000   • midodrine  (PROAMATINE) tablet 10 mg  10 mg Oral TID AC Migel Brown MD   10 mg at 08/01/20 1732   • OLANZapine (zyPREXA) tablet 5 mg  5 mg Oral Daily Before Supper Duc Witt MD   5 mg at 08/01/20 1732   • Pharmacy to dose warfarin   Does not apply Continuous PRN Migel Brown MD       • pseudoephedrine (SUDAFED) tablet 30 mg  30 mg Oral Q12H Edy Garcia MD   30 mg at 08/01/20 1731   • sodium chloride 0.9 % flush 10 mL  10 mL Intravenous PRN Migel Brown MD   10 mL at 07/26/20 0805   • sodium phosphates 10 mmol in sodium chloride 0.9 % 100 mL IVPB  10 mmol Intravenous PRN Migel Brown MD       • warfarin (COUMADIN) tablet 2 mg  2 mg Oral Daily Edy Garcia MD   2 mg at 08/01/20 1731       Assessment/Plan   1.  ESRD.  Stable central volume; peripheral edema.  Electrolytes acceptable.  Recent AVF balloon angioplasty 7/13. Using TDC.     2.  Acute hypoxic respiratory failure . Resolved.   3.  Chronic hypotension.  On midodrine and sudafed   4.  Anemia of chronic kidney disease  5.  Coronary artery disease with prior four-vessel CABG and mitral valve replacement in October 2019.  6.  Diabetes mellitus type 2  7.  Peripheral vascular disease  8.  Secondary adrenal insufficiency. On steroid.  He was on fluorinef at home for orthostasis after question of adrenal insufficiency 8/19 because of inadequate response to cosyntropin test .  Dr. Whyte thinks secondary adrenal insufficiency. On midodrine and Sudafed. BP a little better .  9.  Paroxysmal atrial fibrillation, anticoagulated: not rate-controlled.  His low blood pressure limits options  10. Hematuria.  Schroeder placed 7/17. Initially for strict output measurement in ICU.  Developed hematuria.   evaluated and planned cysto in 6 weeks and advised keeping schroeder until more ambulatory.  Schroeder dc 7/29 because patient pulling on it and causing more trauma. Does make some urine despite dialysis .      Plan:  1.  Dialysis MWF  2.  Discussed with son at bedside  3.   [facility] pending      Jean Mcconnell MD  08/01/20  20:36

## 2020-08-02 NOTE — PROGRESS NOTES
"  ENDOCRINE    Subjective   AND PLANS  Santino Retana is a 80 y.o. male.     Follow-up diabetes    Having intermittent confusion  Patient is on hydrocortisone 10 mg twice daily.  Fasting glucose 116.  Random glucose 169-190  Continue Humalog correction scale before meals and at bedtime      Objective   BP (!) 83/48 (BP Location: Right arm, Patient Position: Lying)   Pulse 111   Temp 98.5 °F (36.9 °C) (Oral)   Resp 17   Ht 172.7 cm (67.99\")   Wt 89.9 kg (198 lb 3.2 oz)   SpO2 96%   BMI 30.14 kg/m²   Physical Exam    Awake, alert, not in distress.  No rales or wheezes  Regular heart rate and rhythm.  No gallop.  Abdomen soft, nontender.  No palpable masses  Extremities warm.  No cyanosis or pedal edema  No calf tenderness    Lab Results (last 24 hours)     Procedure Component Value Units Date/Time    POC Glucose Once [[id number]]  (Abnormal) Collected:  08/02/20 1606    Specimen:  Blood Updated:  08/02/20 1607     Glucose 190 mg/dL     POC Glucose Once [[id number]]  (Abnormal) Collected:  08/02/20 1111    Specimen:  Blood Updated:  08/02/20 1114     Glucose 169 mg/dL     Protime-INR [[id number]]  (Abnormal) Collected:  08/02/20 0453    Specimen:  Blood Updated:  08/02/20 0606     Protime 31.8 Seconds      INR 3.23    POC Glucose Once [[id number]]  (Normal) Collected:  08/02/20 0514    Specimen:  Blood Updated:  08/02/20 0515     Glucose 116 mg/dL     POC Glucose Once [[id number]]  (Abnormal) Collected:  08/01/20 2112    Specimen:  Blood Updated:  08/01/20 2114     Glucose 205 mg/dL               Adrenal insufficiency (CMS/HCC)    Diabetes mellitus type 2, uncontrolled, with complications (CMS/HCC)    Observed sleep apnea     Atrial fibrillation (CMS/HCC) [I48.91]    End stage renal failure on dialysis (CMS/HCC)    General weakness    Metabolic encephalopathy    Diabetes therapy as discussed above.        "

## 2020-08-02 NOTE — PLAN OF CARE
Patient resting, intermittently sleeping. Family at bedside. Up x1 for 1-2 steps. High falls risk. Meds as ordered. VSS.

## 2020-08-02 NOTE — PROGRESS NOTES
Name: Santino Retana ADMIT: 2020   : 1940  PCP: Fahad Murray Jr., MD    MRN: [id number] LOS: 17 days   AGE/SEX: 80 y.o. male  ROOM: [facility]     Subjective   Subjective   Patient seen and examined chart reviewed.  Discussed with nursing staff.  No new problems noted last evening patient without complaint this morning.    Review of Systems   Denies chest pain, less short of air,   Objective   Objective   Vital Signs  Temp:  [97.4 °F (36.3 °C)-98.3 °F (36.8 °C)] 97.4 °F (36.3 °C)  Heart Rate:  [110-119] 114  Resp:  [16-18] 16  BP: (90-99)/(53-61) 99/54  SpO2:  [93 %-96 %] 94 %  on  Flow (L/min):  [1-2] 1;   Device (Oxygen Therapy): nasal cannula  Body mass index is 30.14 kg/m².  Physical Exam  General elderly gentleman lying in bed in no acute hemodynamic distress  HEENT normocephalic atraumatic sclerae clear oropharynx is clear  Neck trachea is midline, supple, do not appreciate JVD or bruit  Lungs few bibasilar rhonchi but overall good air exchange  Cardiovascular regular rate and rhythm with normal S1 and S2  Abdomen is soft nontender nondistended with positive bowel sounds  Extremities moves extremities without rash or edema, extremities are warm and well-perfused  Neurologic grossly nonfocal  Psychiatric awake alert pleasant  Results Review:       I reviewed the patient's new clinical results.  Results from last 7 days   Lab Units 20  0720  0507 20  0449   WBC 10*3/mm3 9.15 10.15 8.27 9.40   HEMOGLOBIN g/dL 12.9* 12.6* 11.8* 11.2*   PLATELETS 10*3/mm3 324 353 300 268     Results from last 7 days   Lab Units 20  0713 20  0508 20  0449   SODIUM mmol/L 133* 140 136 139   POTASSIUM mmol/L 4.1 3.5 4.4 3.7   CHLORIDE mmol/L 93* 98 99 102   CO2 mmol/L 25.9 22.3 23.4 21.0*   BUN mg/dL 28* 44* 29* 44*   CREATININE mg/dL 5.24* 6.91* 5.17* 7.09*   GLUCOSE mg/dL 98 115* 112* 57*   Estimated Creatinine Clearance: 12.7 mL/min (A) (by C-G  formula based on SCr of 5.24 mg/dL (H)).  Results from last 7 days   Lab Units 07/29/20  1710 07/29/20  0508 07/28/20 0411 07/27/20  0449   ALBUMIN g/dL 4.00 3.80 3.60 3.20*   BILIRUBIN mg/dL 0.7  --   --  0.5   ALK PHOS U/L 50  --   --  35*   AST (SGOT) U/L 15  --   --  18   ALT (SGPT) U/L 19  --   --  18     Results from last 7 days   Lab Units 07/30/20  0713 07/29/20  1710 07/29/20  0508 07/28/20 0411 07/27/20  0449   CALCIUM mg/dL 9.1  --  8.8 8.4* 8.4*   ALBUMIN g/dL  --  4.00 3.80 3.60 3.20*   PHOSPHORUS mg/dL  --   --  5.6* 5.7*  --        Results from last 7 days   Lab Units 07/29/20  1806   COVID19  Not Detected       Glucose   Date/Time Value Ref Range Status   08/02/2020 0514 116 70 - 130 mg/dL Final   08/01/2020 2112 205 (H) 70 - 130 mg/dL Final   08/01/2020 1608 202 (H) 70 - 130 mg/dL Final   08/01/2020 1124 184 (H) 70 - 130 mg/dL Final   08/01/2020 0618 121 70 - 130 mg/dL Final   07/31/2020 2124 179 (H) 70 - 130 mg/dL Final   07/31/2020 1615 153 (H) 70 - 130 mg/dL Final       FL Video Swallow With Speech Single Contrast  Narrative: VIDEO SWALLOW STUDY     HISTORY: Dysphagia.     Two minutes 38 seconds fluoroscopy was provided for the speech  pathologist during a video swallow study. 2700 images were saved.     No laryngeal penetration or aspiration was observed.     Impression: Fluoroscopy was provided for the speech pathologist during a  video swallow study. For full details please see the speech pathology  report     This report was finalized on 7/27/2020 10:22 AM by Dr. Duc Staley M.D.           arformoterol 15 mcg Nebulization BID - RT   aspirin 81 mg Nasogastric Daily   atorvastatin 20 mg Oral Q PM   famotidine 20 mg Oral Daily   hydrocortisone 10 mg Oral BID With Meals   insulin lispro 2-4 Units Subcutaneous 4x Daily With Meals & Nightly   melatonin 10 mg Oral Nightly   midodrine 10 mg Oral TID AC   OLANZapine 5 mg Oral Daily Before Supper   pseudoephedrine 30 mg Oral Q12H   warfarin 2 mg  Oral Daily       Pharmacy to dose warfarin    Diet Soft Texture; [employer]; Thin       Assessment/Plan     Active Hospital Problems    Diagnosis  POA   • Metabolic encephalopathy [G93.41]  Unknown   • General weakness [R53.1]  Yes   • End stage renal failure on dialysis (CMS/[facility]) [N18.6, Z99.2]  Not Applicable   • Atrial fibrillation (CMS/[facility]) [I48.91] [I48.91]  Yes   • Observed sleep apnea  [G47.30]  Yes   • Diabetes mellitus type 2, uncontrolled, with complications (CMS/[facility]) [E11.8, E11.65]  Yes   • Adrenal insufficiency (CMS/[facility]) [E27.40]  Yes      Resolved Hospital Problems    Diagnosis Date Resolved POA   • **Sepsis with acute hypoxic respiratory failure (CMS/[facility]) [A41.9, R65.20, J96.01] 07/26/2020 Unknown   • Shock, septic (CMS/[facility]) [A41.9, R65.21] 07/26/2020 Unknown       80 y.o. male admitted with Sepsis with acute hypoxic respiratory failure (CMS/[facility]).  · Acute hypoxic respiratory failure which required mechanical ventilation, patient's oxygen saturations in the 94-95 on room air with occasional use of 2 L via nasal cannula.  · Acute hypotensive septic shock resolved  · Bacterial pneumonia resolved completed antibiotic course.  · End-stage renal disease dialysis Monday Wednesday Friday  · Coronary artery disease with prior two-vessel CABG: Chest pain-free continue statin aspirin.  Patient would likely benefit from resuming beta-blocker therapy but blood pressures continue to remain a little low.  Cardiology following.    · Atrial fibrillation with RVR heart rate continues to be around 105_110, continued anticoagulant with Coumadin  ·  patient with history of mitral valve replacement October 2019  · Autonomic dysfunction and adrenal insufficiency continue patient's Florinef and midodrine patient is being followed by endocrinology.  · Anemia of chronic medical disease: We will repeat CBC BMP in a.m.  ·  yesterday  · Dementia: Likely vascular.  · Chronic anticoagulant therapy with Coumadin: INR 3.23  today  · Hematuria cystoscopy is planned in 6 weeks  · Metabolic encephalopathy improving, neurology is following, neurology is following, recommends continuing his Zyprexa  · Await placement at [facility].  · **Warfarin (home med) for DVT prophylaxis.  · Full code.  · Discussed with patient and nursing staff.  · Anticipate discharge to acute rehab when cleared by consultants.      Arthur Whyte MD  [facility]  08/02/20  09:44    Patient was wearing facemask when I entered the room and throughout our encounter.  I wore protective equipment throughout this patient encounter including a face mask, gloves and protective eyewear.  Hand hygiene was performed before donning protective equipment and after removal when leaving the room.

## 2020-08-03 PROBLEM — I95.9 SYMPTOMATIC HYPOTENSION: Status: ACTIVE | Noted: 2020-01-01

## 2020-08-03 PROBLEM — I47.1 SVT (SUPRAVENTRICULAR TACHYCARDIA) (HCC): Status: ACTIVE | Noted: 2020-01-01

## 2020-08-03 PROBLEM — I47.10 SVT (SUPRAVENTRICULAR TACHYCARDIA): Status: ACTIVE | Noted: 2020-01-01

## 2020-08-03 NOTE — PROGRESS NOTES
80 y.o.   LOS: 18 days   Patient Care Team:  Fahad Murray Jr., MD as PCP - General (Family Medicine)  Alex Simpson MD as Consulting Physician (Nephrology)  Bk Lang RP as Pharmacist (Pharmacy)  Bonita Martinez RPH as Pharmacist (Pharmacy)  Bennie Paul MD as Consulting Physician (Cardiology)  Miah Jordan MD as Consulting Physician (Pulmonary Disease)  Trevor Whyte MD as Consulting Physician (Endocrinology)    Chief Complaint: Elevated blood sugars    Chief Complaint   Patient presents with   • Altered Mental Status   • Weakness - Generalized       Subjective  Patient is on dialysis.  On hydrocortisone 10 mg twice daily.      Interval History:    Review of Systems:   Constitutional: Positive for appetite change and fatigue.   Eyes: Negative for visual disturbance.   Respiratory: no shortness of breath.    Cardiovascular: no leg swelling.   Gastrointestinal: Negative for abdominal pain.   Endocrine: Negative for polydipsia and polyuria.   Musculoskeletal: Positive for arthralgias and myalgias.   Skin: Negative for pallor.   Neurological: Positive for weakness and numbness.   Psychiatric/Behavioral: Positive for sleep disturbance.  Review of Systems  Objective     Vital Signs   Temp:  [97.6 °F (36.4 °C)-98.7 °F (37.1 °C)] 98 °F (36.7 °C)  Heart Rate:  [111-129] 120  Resp:  [16-20] 16  BP: ()/(48-82) 117/71    Physical Exam:  Constitutional - No distress, appears stated age  Eyes - PERRL, anicteric sclera  Ear nose and throat - External ears/nose normal, No adenopathy, midline trachea  Respiratory - Normal chest on inspection, palpation and diminished bilateral breath sounds on auscultation  Cardiovascular - Normal S1S2, without murmur  GI - Non tender, non distended, no hepatosplenomegaly, +BS  Musculoskeletal - No edema, cyanosis or clubbing  Neuro - confused at times        Physical ExamResults Review:     I reviewed the patient's new clinical results and summarized them  in subjective and in plan.      Glucose   Date/Time Value Ref Range Status   08/03/2020 0543 147 (H) 65 - 99 mg/dL Final     Lab Results (last 24 hours)     Procedure Component Value Units Date/Time    Renal Function Panel [[id number]]  (Abnormal) Collected:  08/03/20 0543    Specimen:  Blood Updated:  08/03/20 0712     Glucose 147 mg/dL      BUN 63 mg/dL      Creatinine 8.30 mg/dL      Sodium 134 mmol/L      Potassium 4.8 mmol/L      Chloride 95 mmol/L      CO2 22.2 mmol/L      Calcium 9.4 mg/dL      Albumin 3.70 g/dL      Phosphorus 6.4 mg/dL      Anion Gap 16.8 mmol/L      BUN/Creatinine Ratio 7.6     eGFR Non African Amer 6 mL/min/1.73      Comment: <15 Indicative of kidney failure.        eGFR   Amer --     Comment: <15 Indicative of kidney failure.       Narrative:       GFR Normal >60  Chronic Kidney Disease <60  Kidney Failure <15      Magnesium [[id number]]  (Abnormal) Collected:  08/03/20 0543    Specimen:  Blood Updated:  08/03/20 0649     Magnesium 2.6 mg/dL     Protime-INR [[id number]]  (Abnormal) Collected:  08/03/20 0543    Specimen:  Blood from Arm, Right Updated:  08/03/20 0641     Protime 36.9 Seconds      INR 3.92    CBC & Differential [[id number]] Collected:  08/03/20 0543    Specimen:  Blood Updated:  08/03/20 0624    Narrative:       The following orders were created for panel order CBC & Differential.  Procedure                               Abnormality         Status                     ---------                               -----------         ------                     CBC Auto Differential[[id number]]        Abnormal            Final result                 Please view results for these tests on the individual orders.    CBC Auto Differential [[id number]]  (Abnormal) Collected:  08/03/20 0543    Specimen:  Blood Updated:  08/03/20 0624     WBC 9.90 10*3/mm3      RBC 4.44 10*6/mm3      Hemoglobin 13.2 g/dL      Hematocrit 41.5 %      MCV 93.5 fL      MCH 29.7 pg      MCHC 31.8 g/dL       RDW 14.2 %      RDW-SD 48.8 fl      MPV 11.0 fL      Platelets 287 10*3/mm3      Neutrophil % 66.1 %      Lymphocyte % 17.2 %      Monocyte % 14.4 %      Eosinophil % 1.0 %      Basophil % 0.9 %      Immature Grans % 0.4 %      Neutrophils, Absolute 6.54 10*3/mm3      Lymphocytes, Absolute 1.70 10*3/mm3      Monocytes, Absolute 1.43 10*3/mm3      Eosinophils, Absolute 0.10 10*3/mm3      Basophils, Absolute 0.09 10*3/mm3      Immature Grans, Absolute 0.04 10*3/mm3      nRBC 0.0 /100 WBC     POC Glucose Once [[id number]]  (Abnormal) Collected:  08/02/20 2120    Specimen:  Blood Updated:  08/02/20 2122     Glucose 212 mg/dL     POC Glucose Once [[id number]]  (Abnormal) Collected:  08/02/20 1606    Specimen:  Blood Updated:  08/02/20 1607     Glucose 190 mg/dL         Imaging Results (Last 24 Hours)     ** No results found for the last 24 hours. **          Medication Review: done      Current Facility-Administered Medications:   •  albumin human 25 % IV SOLN 12.5 g, 12.5 g, Intravenous, PRN, Migel Brown MD, Last Rate: 100 mL/hr at 07/22/20 2004, 12.5 g at 07/24/20 1314  •  albumin human 25 % IV SOLN 12.5 g, 12.5 g, Intravenous, PRN, Jean Mcconnell MD, 12.5 g at 08/03/20 0859  •  arformoterol (BROVANA) nebulizer solution 15 mcg, 15 mcg, Nebulization, BID - RT, Migel Brown MD, 15 mcg at 08/03/20 0812  •  aspirin chewable tablet 81 mg, 81 mg, Nasogastric, Daily, Migel Brown MD, 81 mg at 08/02/20 0935  •  atorvastatin (LIPITOR) tablet 20 mg, 20 mg, Oral, Q PM, Migel Brown MD, 20 mg at 08/02/20 1744  •  calcium gluconate 1 g in sodium chloride 0.9 % 50 mL IVPB, 1 g, Intravenous, PRN **OR** calcium gluconate 2 g in sodium chloride 0.9 % 50 mL IVPB, 2 g, Intravenous, PRN, Migel Brown MD  •  dextrose (D50W) 25 g/ 50mL Intravenous Solution 25 g, 25 g, Intravenous, Q15 Min PRNKevin Subin, MD  •  dextrose (GLUTOSE) oral gel 15 g, 15 g, Oral, Q15 Min PRN, Migel Brown MD, 15 g at 07/26/20 1108  •  famotidine (PEPCID)  tablet 20 mg, 20 mg, Oral, Daily, Migel Brown MD, 20 mg at 08/02/20 0935  •  glucagon (human recombinant) (GLUCAGEN DIAGNOSTIC) injection 1 mg, 1 mg, Subcutaneous, Q15 Min PRN, Migel Brown MD  •  haloperidol lactate (HALDOL) injection 2 mg, 2 mg, Intravenous, Q2H PRN, Duc Witt MD, 2 mg at 07/31/20 0418  •  heparin (porcine) injection 1,000-2,000 Units, 1,000-2,000 Units, Intravenous, PRN, Migel Brown MD  •  heparin (porcine) injection 3,800 Units, 3,800 Units, Intracatheter, PRN, Migel Brown MD, 3,800 Units at 07/31/20 1257  •  HYDROcodone-acetaminophen (NORCO) 5-325 MG per tablet 1 tablet, 1 tablet, Oral, Q6H PRN, Migel Brown MD, 1 tablet at 07/31/20 2127  •  hydrocortisone (CORTEF) tablet 10 mg, 10 mg, Oral, BID With Meals, Trevor Whyte MD, 10 mg at 08/02/20 1744  •  insulin lispro (humaLOG) injection 2-4 Units, 2-4 Units, Subcutaneous, 4x Daily With Meals & Nightly, Trevor Whyte MD, 2 Units at 08/02/20 2212  •  melatonin tablet 10 mg, 10 mg, Oral, Nightly, Migel Brown MD, 10 mg at 08/02/20 2050  •  midodrine (PROAMATINE) tablet 10 mg, 10 mg, Oral, TID AC, Migel Brown MD, 10 mg at 08/03/20 0648  •  OLANZapine (zyPREXA) tablet 2.5 mg, 2.5 mg, Oral, Daily Before Supper, Duc Witt MD, 2.5 mg at 08/02/20 1745  •  Pharmacy to dose warfarin, , Does not apply, Continuous PRN, Migel Brown MD  •  Pharmacy to dose warfarin, , Does not apply, Continuous PRN, Arthur Whyte MD  •  pseudoephedrine (SUDAFED) tablet 30 mg, 30 mg, Oral, Q12H, Jose, Ryanr H, MD, 30 mg at 08/03/20 0648  •  [COMPLETED] Insert peripheral IV, , , Once **AND** sodium chloride 0.9 % flush 10 mL, 10 mL, Intravenous, PRN, Migel Brown MD, 10 mL at 07/26/20 0805  •  sodium phosphates 10 mmol in sodium chloride 0.9 % 100 mL IVPB, 10 mmol, Intravenous, PRN, Migel Brown MD    Assessment/Plan     Active Hospital Problems    Diagnosis  POA   • Metabolic encephalopathy [G93.41]  Unknown   • General weakness [R53.1]  " Yes   • End stage renal failure on dialysis (CMS/[facility]) [N18.6, Z99.2]  Not Applicable   • Atrial fibrillation (CMS/[facility]) [I48.91] [I48.91]  Yes   • Observed sleep apnea  [G47.30]  Yes   • Diabetes mellitus type 2, uncontrolled, with complications (CMS/[facility]) [E11.8, E11.65]  Yes   • Adrenal insufficiency (CMS/[facility]) [E27.40]  Yes      Resolved Hospital Problems    Diagnosis Date Resolved POA   • **Sepsis with acute hypoxic respiratory failure (CMS/[facility]) [A41.9, R65.20, J96.01] 07/26/2020 Unknown   • Shock, septic (CMS/[facility]) [A41.9, R65.21] 07/26/2020 Unknown     Type 2 diabetes mellitus-complicated with steroids  Continue the Humalog sliding scale before meals and at bedtime.      Discussed plan with Nurse.     Eulalia Jamison MD.  08/03/20  12:29 PM      EMR Dragon / transcription disclaimer:    \"Dictated utilizing Dragon dictation\".   "

## 2020-08-03 NOTE — PROGRESS NOTES
Pharmacy Consult: Warfarin Dosing/ Monitoring    Santino Retana is a 80 y.o. male, estimated creatinine clearance is 8 mL/min (A) (by C-G formula based on SCr of 8.3 mg/dL (H)). weighing 90 kg (198 lb 6.4 oz).    PMH: Adrenal insufficiency, Arthritis, Atrial fibrillation, Carotid artery disease, CHF, CVA, Diabetes mellitus (), Elevated cholesterol, ESRD, History of pressure ulcer (10/2019), Hyperlipidemia, Low back pain, NSTEMI, Sleep apnea, Slow to wake up after anesthesia, Status post mitral valve replacement with tissue valve, STEMI (10/2019), TIA (2018), and Vision loss of right eye.    Social History     Tobacco Use    Smoking status: Former Smoker     Packs/day: 2.00     Years: 30.00     Pack years: 60.00     Types: Cigarettes     Last attempt to quit: 1989     Years since quittin.6    Smokeless tobacco: Never Used   Substance Use Topics    Alcohol use: No     Comment: None due to blood thinners, patient would really like to drink non-alcoholic beer 1 x day    Drug use: No     Results from last 7 days   Lab Units 20  0543 20  0453 20  0442 20  0441 20  0713 20  0508 20  0507 20  0411   INR  3.92* 3.23* 2.84* 2.95* 3.12* 3.40*  --  3.05*   HEMOGLOBIN g/dL 13.2  --   --   --  12.9*  --  12.6* 11.8*   HEMATOCRIT % 41.5  --   --   --  41.7  --  41.2 36.0*   PLATELETS 10*3/mm3 287  --   --   --  324  --  353 300     Results from last 7 days   Lab Units 20  0543 20  0713 20  1710 20  0508   SODIUM mmol/L 134* 133*  --  140   POTASSIUM mmol/L 4.8 4.1  --  3.5   CHLORIDE mmol/L 95* 93*  --  98   CO2 mmol/L 22.2 25.9  --  22.3   BUN mg/dL 63* 28*  --  44*   CREATININE mg/dL 8.30* 5.24*  --  6.91*   CALCIUM mg/dL 9.4 9.1  --  8.8   BILIRUBIN mg/dL  --   --  0.7  --    ALK PHOS U/L  --   --  50  --    ALT (SGPT) U/L  --   --  19  --    AST (SGOT) U/L  --   --  15  --    GLUCOSE mg/dL 147* 98  --  115*     Anticoagulation history: Home  dose was Warfarin 2mg po qT/Th/[place] and Warfarin 4mg po qSaMWF     Hospital Anticoagulation:  Consulting provider: Aj Duke MD  Start date: 7/22/20 continued from home  Indication: Atrial fibrillation- MVR on Oct. 2019  Target INR: 2.5 - 3.5  Expected duration: Indefinitely  Bridge Therapy: No                Date 7/22 7/23 7/24 7/25 7/26 7/27 7/28 7/29 7/30 7/31 8/01 8/02 8/3   INR 1.8 1.41 1.5 2.18 2.54 2.85 3.05 3.4 3.12 2.95 2.84 3.23 3.92   Warfarin dose 2mg 4mg 4mg 4mg 2mg 2mg 2mg hold 2mg 2mg 2mg 2 mg hold     Potential drug interactions: none    Relevant nutrition status: Soft texture diet + Boost    Other: dialysis, hematuria (traumatic)    Education complete?/ Date: No, confusion, to SNF    Assessment/Plan:  - INR has trended up above goal range. Will plan to hold dose today and patient will likely need to have a lower daily dose starting back tomorrow, which has already been adjusted down from home dose.    Hold warfarin today  INR in am     Monitor s/s bleed  Follow up INR daily    Pharmacy will continue to follow until discharge or discontinuation of warfarin.     Lee Knapp, PharmD  Clinical Staff Pharmacist

## 2020-08-03 NOTE — NURSING NOTE
Insurance denied acute rehab, recommending SNU.. May appeal within 3 days--message left with [facility].   [phone]--call to set up appeal.  Will f/u in AM

## 2020-08-03 NOTE — PLAN OF CARE
Problem: Patient Care Overview  Goal: Plan of Care Review  Outcome: Ongoing (interventions implemented as appropriate)  Flowsheets (Taken 8/3/2020 4164)  Progress: no change  Plan of Care Reviewed With: patient  Outcome Summary: Pt had off and on sleep. Family at bedside. Pt denies any pain or discomfort. [facility]. Will continue to monitor.     Problem: Patient Care Overview  Goal: Individualization and Mutuality  Outcome: Ongoing (interventions implemented as appropriate)     Problem: Pain, Chronic (Adult)  Goal: Acceptable Pain/Comfort Level and Functional Ability  Outcome: Ongoing (interventions implemented as appropriate)     Problem: Ventilation, Mechanical Invasive (Adult)  Goal: Signs and Symptoms of Listed Potential Problems Will be Absent, Minimized or Managed (Ventilation, Mechanical Invasive)  Outcome: Ongoing (interventions implemented as appropriate)     Problem: Kidney Disease, Chronic/End Stage Renal Disease (Adult)  Goal: Signs and Symptoms of Listed Potential Problems Will be Absent, Minimized or Managed (Kidney Disease, Chronic/End Stage Renal Disease)  Outcome: Ongoing (interventions implemented as appropriate)

## 2020-08-03 NOTE — PROGRESS NOTES
Continued Stay Note  [facility]     Patient Name: Santino Retana  MRN: [id number]  Today's Date: 8/3/2020    Admit Date: 7/16/2020    Discharge Plan     Row Name 08/03/20 1152       Plan    Plan  [facility] pending pre-cert from [facility]    Plan Comments  Pre-cert pending from [employer] for [facility]. Spoke with Lola/Karyn and pre-cert sent to Medical director for review, anticipate determination later today. CCP to follow. Jovani reinoso/ccp        Discharge Codes    No documentation.             Mackenzie Davidson, RN

## 2020-08-03 NOTE — PROGRESS NOTES
Adult Nutrition  Assessment/PES    Patient Name:  Santino Retana  YOB: 1940  MRN: [id number]  Admit Date:  7/16/2020    Assessment Date:  8/3/2020    Comments:  Follow up note. Pt receiving Soft Texture whole foods thin liquids and Novasource Renal supplements. Pt intake fair 50%. Skin reviewed. Will continue to encourage good po and offer supplements.    Reason for Assessment     Row Name 08/03/20 1007          Reason for Assessment    Reason For Assessment  follow-up protocol         Nutrition/Diet History     Row Name 08/03/20 1007          Nutrition/Diet History    Typical Food/Fluid Intake  fair po         Anthropometrics     Row Name 08/03/20 0600          Anthropometrics    Weight  90 kg (198 lb 6.4 oz)         Labs/Tests/Procedures/Meds     Row Name 08/03/20 1007          Labs/Procedures/Meds    Lab Results Reviewed  reviewed     Lab Results Comments  na, glu, bun, cr, phos, mg        Diagnostic Tests/Procedures    Diagnostic Test/Procedure Reviewed  reviewed        Medications    Pertinent Medications Reviewed  reviewed         Physical Findings     Row Name 08/03/20 1008          Physical Findings    Overall Physical Appearance  obese     Skin  poor skin integrity/turgor;pressure injury;edema B=16, st II coccyx, st II L gluteal, R groin puncture           Nutrition Prescription Ordered     Row Name 08/03/20 1008          Nutrition Prescription PO    Current PO Diet  Soft Texture     Texture  Whole foods     Fluid Consistency  Thin     Supplement  Novasource Renal ([place])     Supplement Frequency  3 times a day         Evaluation of Received Nutrient/Fluid Intake     Row Name 08/03/20 1008          PO Evaluation    % PO Intake  fair kpo 50%               Problem/Interventions:          Intervention Goal     Row Name 08/03/20 1010          Intervention Goal    General  Maintain nutrition     PO  Increase intake;PO intake (%)     PO Intake %  75 %         Nutrition Intervention     Row Name  08/03/20 1010          Nutrition Intervention    RD/Tech Action  Follow Tx progress;Care plan reviewd           Education/Evaluation     Row Name 08/03/20 1011          Monitor/Evaluation    Monitor  Per protocol;PO intake;Supplement intake;Pertinent labs;Weight;Skin status           Electronically signed by:  Yasmin Douglas RD  08/03/20 10:12

## 2020-08-03 NOTE — PLAN OF CARE
Problem: Patient Care Overview  Goal: Plan of Care Review  Flowsheets (Taken 8/3/2020 4947)  Outcome Summary: Limited progress towards goals during PT today due to low BP.  BP was 60s/40s, RN reported giving pressor about half our before PT attempted.  PT took BP - 70s/40s.  Attempted supine exercises, pt following few commands for AROM.  Currently rec DC to SNU.         ..Patient was intermittently wearing a face mask during this therapy encounter. Therapist used appropriate personal protective equipment including eye protection, mask, and gloves.  Mask used was standard procedure mask. Appropriate PPE was worn during the entire therapy session. Hand hygiene was completed before and after therapy session. Patient is not in enhanced droplet precautions.

## 2020-08-04 NOTE — THERAPY TREATMENT NOTE
Patient Name: Santino Retana  : 1940    MRN: [id number]                              Today's Date: 2020       Admit Date: 2020    Visit Dx:     ICD-10-CM ICD-9-CM   1. General weakness R53.1 780.79   2. Dysarthria R47.1 784.51     Patient Active Problem List   Diagnosis   • Hypertension   • Carcinoid tumor of appendix   • Arthritis   • Neuroendocrine carcinoma of small bowel (CMS/[facility])   • CKD (chronic kidney disease) stage 3, GFR 30-59 ml/min (CMS/[facility])   • History of ischemic stroke without residual deficits   • Osteoarthritis of spine with radiculopathy, lumbar region   • Chronic low back pain with sciatica   • TIA (transient ischemic attack)   • Carotid artery disease (CMS/[facility])   • Diabetes mellitus (CMS/[facility])   • HTN (hypertension)   • Hyperkalemia   • Adrenal insufficiency (CMS/[facility])   • Hyperinsulinism   • Diabetes mellitus type 2, uncontrolled, with complications (CMS/[facility])   • Encounter for long-term (current) use of insulin (CMS/[facility])   • Observed sleep apnea    • Coronary artery disease involving native coronary artery of native heart with angina pectoris (CMS/[facility])   • EMMA (acute kidney injury) (CMS/[facility])   • ESRD on hemodialysis (CMS/[facility])   • Atrial fibrillation (CMS/[facility]) [I48.91]   • S/P CABG x 4   • S/P MVR (mitral valve replacement)   • End stage renal failure on dialysis (CMS/[facility])   • Hypogonadism in male   • General weakness   • Metabolic encephalopathy   • Symptomatic hypotension   • SVT (supraventricular tachycardia) (CMS/[facility])     Past Medical History:   Diagnosis Date   • Adrenal insufficiency (CMS/[facility])    • Arthritis    • Atrial fibrillation (CMS/[facility])    • CAD (coronary artery disease)     Status post 4 vessel CABG on 10/10/19 with Dr. Serra (LIMA-LAD, SVG-OM1, sequential SVG to PDA and distal RCA)   • Carotid artery disease (CMS/[facility])     Right CEA 16.  Left CEA 17 (with dionte-op CVA)   • CHF (congestive heart failure) (CMS/[facility])    • CVA (cerebral vascular accident)  (CMS/[facility])     on 7/18/17 dionte-operatively from left CEA.     • Diabetes mellitus (CMS/[facility]) 1998    DR CAROLINE DURON   • Elevated cholesterol    • ESRD (end stage renal disease) (CMS/[facility])     FRESENIUS TU/TH/SAT   • History of pressure ulcer 10/2019    SACRAL   • Hyperlipidemia    • Low back pain    • NSTEMI (non-ST elevated myocardial infarction) (CMS/HCC)     NSTEMI following left CEA and dionte-op stroke in 7/18 ([facility]).     • Sleep apnea     WEARS CPAP   • Slow to wake up after anesthesia    • Status post mitral valve replacement with tissue valve     10/10/2019 with Dr. Serra (#31 [facility] Epic porcine valve)   • STEMI (ST elevation myocardial infarction) (CMS/[facility]) 10/2019    B[facility]   • TIA (transient ischemic attack) 05/2018   • Vision loss of right eye      Past Surgical History:   Procedure Laterality Date   • APPENDECTOMY  02/19/2016    [facility], Dr. Zimmerman   • ARTERIOVENOUS FISTULA/SHUNT SURGERY Left 2/25/2020    Procedure: LEFT BRACHIAL CEPHALIC ARTERIAL VENOUS FISTULA;  Surgeon: Silvestre Bird MD;  Location: [facility] MAIN OR;  Service: Vascular;  Laterality: Left;   • CARDIAC CATHETERIZATION N/A 10/7/2019    Procedure: Left Heart Cath NO LV gram;  Surgeon: Yolande Kelley MD;  Location: [facility] CATH INVASIVE LOCATION;  Service: Cardiovascular   • CARDIAC CATHETERIZATION N/A 10/7/2019    Procedure: Coronary angiography;  Surgeon: Yolande Kelley MD;  Location: [facility] CATH INVASIVE LOCATION;  Service: Cardiovascular   • CAROTID ENDARTERECTOMY Right 09/26/2016    [facility]    • CAROTID ENDARTERECTOMY Left 07/18/2017    [facility]    • CATARACT EXTRACTION     • CHOLECYSTECTOMY  11/1989    [facility]   • CORONARY ARTERY BYPASS GRAFT WITH MITRAL VALVE REPAIR/REPLACEMENT N/A 10/10/2019    Procedure: JERRY STERNOTOMY CORONARY ARTERY BYPASS GRAFT TIMES 4 USING LEFT INTERNAL MAMMARY ARTERY AND LEFT GREATER SAPHENOUS VEIN GRAFT PER ENDOSCOPIC VEIN  HARVESTING, MITRAL VALVE REPLACEMENT AND PRP;  Surgeon: Lewis Serra MD;  Location: [facility] MAIN OR;  Service: Cardiothoracic   • INSERT CENTRAL LINE AT BEDSIDE  7/17/2020        • INSERTION HEMODIALYSIS CATHETER N/A 10/22/2019    Procedure: TUNNEL DIALYSIS CATHETER INSERTION;  Surgeon: Alexx Monteiro MD;  Location: [facility] MAIN OR;  Service: Vascular   • INTERVENTIONAL RADIOLOGY PROCEDURE N/A 10/7/2019    Procedure: Intravascular Ultrasound;  Surgeon: Yolande Kelley MD;  Location: [facility] CATH INVASIVE LOCATION;  Service: Cardiovascular   • LUMBAR EPIDURAL INJECTION     • TOTAL HIP ARTHROPLASTY Right 11/16/2016    [facility]     General Information     Row Name 08/04/20 1155          PT Evaluation Time/Intention    Document Type  therapy note (daily note)  -AR     Mode of Treatment  physical therapy  -AR     Row Name 08/04/20 1155          General Information    Patient Profile Reviewed?  yes  -AR     Existing Precautions/Restrictions  fall  -AR     Row Name 08/04/20 1157          Cognitive Assessment/Intervention- PT/OT    Orientation Status (Cognition)  oriented to;person;verbal cues/prompts needed for orientation  -AR     Row Name 08/04/20 1158          Safety Issues, Functional Mobility    Impairments Affecting Function (Mobility)  balance;cognition;coordination;endurance/activity tolerance;postural/trunk control  -AR       User Key  (r) = Recorded By, (t) = Taken By, (c) = Cosigned By    Initials Name Provider Type    AR Anne Marie Miller, PT Physical Therapist        Mobility     Row Name 08/04/20 1151          Bed Mobility Assessment/Treatment    Bed Mobility Assessment/Treatment  supine-sit;sit-supine  -AR     Supine-Sit [place] (Bed Mobility)  minimum assist (75% patient effort)  -AR     Sit-Supine [place] (Bed Mobility)  moderate assist (50% patient effort)  -AR     Assistive Device (Bed Mobility)  bed rails;head of bed elevated  -AR     Row Name 08/04/20 4312           Transfer Assessment/Treatment    Comment (Transfers)  sit<>stand once, returned to sitting due to feeling dizzy.  BP from 90s/60s in sitting to 64/52 right after standing once    -AR     Row Name 08/04/20 1155          Gait/Stairs Assessment/Training    Gait/Stairs Assessment/Training  gait/ambulation independence  -AR     [place] Level (Gait)  minimum assist (75% patient effort);2 person assist  -AR     Assistive Device (Gait)  walker, front-wheeled  -AR     Distance in Feet (Gait)  few small shuffling steps to HOB, limited by dizziness.  distance less than 1'   -AR     Pattern (Gait)  swing-to  -AR     Deviations/Abnormal Patterns (Gait)  flaquito decreased;festinating/shuffling  -AR     Bilateral Gait Deviations  heel strike decreased;forward flexed posture  -AR       User Key  (r) = Recorded By, (t) = Taken By, (c) = Cosigned By    Initials Name Provider Type    AR Anne Marie Miller, PT Physical Therapist        Obj/Interventions     Row Name 08/04/20 1157          Therapeutic Exercise    Comment (Therapeutic Exercise)  B AP, LAQ 10x.  partial ROM for LAQ  -AR     Row Name 08/04/20 1157          Static Sitting Balance    Level of [place] (Unsupported Sitting, Static Balance)  minimal assist, 75% patient effort;contact guard assist  -AR     Sitting Position (Unsupported Sitting, Static Balance)  sitting on edge of bed  -AR     Time Able to Maintain Position (Unsupported Sitting, Static Balance)  more than 5 minutes  -AR     Comment (Unsupported Sitting, Static Balance)  frequent verbal cues for keeping balance due to posterior lean  -AR       User Key  (r) = Recorded By, (t) = Taken By, (c) = Cosigned By    Initials Name Provider Type    Anne Marie Mcrae, PT Physical Therapist        Goals/Plan     Row Name 08/04/20 1201          Bed Mobility Goal 1 (PT)    Activity/Assistive Device (Bed Mobility Goal 1, PT)  bed mobility activities, all  -AR     [place] Level/Cues Needed (Bed Mobility Goal 1, PT)   supervision required  -AR     Time Frame (Bed Mobility Goal 1, PT)  1 week  -AR     Row Name 08/04/20 1201          Transfer Goal 1 (PT)    Activity/Assistive Device (Transfer Goal 1, PT)  sit-to-stand/stand-to-sit;walker, rolling  -AR     [place] Level/Cues Needed (Transfer Goal 1, PT)  minimum assist (75% or more patient effort)  -AR     Time Frame (Transfer Goal 1, PT)  1 week  -AR     Row Name 08/04/20 1201          Gait Training Goal 1 (PT)    Activity/Assistive Device (Gait Training Goal 1, PT)  gait (walking locomotion);walker, rolling  -AR     [place] Level (Gait Training Goal 1, PT)  minimum assist (75% or more patient effort)  -AR     Distance (Gait Goal 1, PT)  20  -AR     Time Frame (Gait Training Goal 1, PT)  1 week  -AR     Progress/Outcome (Gait Training Goal 1, PT)  goal revised this date  -AR       User Key  (r) = Recorded By, (t) = Taken By, (c) = Cosigned By    Initials Name Provider Type    AR Anne Marie Miller, PT Physical Therapist        Clinical Impression     Row Name 08/04/20 9851          Pain Assessment    Additional Documentation  Pain Scale: Numbers Pre/Post-Treatment (Group)  -AR     Row Name 08/04/20 0379          Pain Scale: Numbers Pre/Post-Treatment    Pain Scale: Numbers, Pretreatment  0/10 - no pain  -AR     Pain Scale: Numbers, Post-Treatment  0/10 - no pain  -AR     Pain Intervention(s)  Repositioned  -AR     Row Name 08/04/20 5295          Plan of Care Review    Plan of Care Reviewed With  patient  -AR     Outcome Summary  Slow progress towards goals during PT.  Able to stand once at EOB but limited by dizziness with BP from 90s/60s to 64/52 after standing briefly.  Continued impairments in strength, balance, and independence w/ mobility.  May benefit from DC to acute rehab.    -AR     Row Name 08/04/20 7058          Physical Therapy Clinical Impression    Criteria for Skilled Interventions Met (PT Clinical Impression)  yes  -AR     Row Name 08/04/20 4188           Vital Signs    Pre Systolic BP Rehab  -- 90s  -AR     Pre Treatment Diastolic BP  -- 60s  -AR     Intra Systolic BP Rehab  (S) 64  -AR     Intra Treatment Diastolic BP  (S) 52  -AR     Post Systolic BP Rehab  102  -AR     Post Treatment Diastolic BP  -- 60s  -AR     O2 Delivery Pre Treatment  supplemental O2  -AR     O2 Delivery Intra Treatment  supplemental O2  -AR     O2 Delivery Post Treatment  supplemental O2  -AR     Row Name 08/04/20 1157          Positioning and Restraints    Pre-Treatment Position  in bed  -AR     Post Treatment Position  bed  -AR     In Bed  notified nsg;supine;call light within reach;encouraged to call for assist;exit alarm on;with family/caregiver  -AR       User Key  (r) = Recorded By, (t) = Taken By, (c) = Cosigned By    Initials Name Provider Type    Anne Marie Mcrae PT Physical Therapist        Outcome Measures     Row Name 08/04/20 1202          How much help from another person do you currently need...    Turning from your back to your side while in flat bed without using bedrails?  3  -AR     Moving from lying on back to sitting on the side of a flat bed without bedrails?  2  -AR     Moving to and from a bed to a chair (including a wheelchair)?  2  -AR     Standing up from a chair using your arms (e.g., wheelchair, bedside chair)?  2  -AR     Climbing 3-5 steps with a railing?  1  -AR     To walk in hospital room?  1  -AR     AM-PAC 6 Clicks Score (PT)  11  -AR     Row Name 08/04/20 1202          Functional Assessment    Outcome Measure Options  AM-PAC 6 Clicks Basic Mobility (PT)  -AR       User Key  (r) = Recorded By, (t) = Taken By, (c) = Cosigned By    Initials Name Provider Type    Anne Marie Mcrae PT Physical Therapist        Physical Therapy Education                 Title: PT OT SLP Therapies (In Progress)     Topic: Physical Therapy (In Progress)     Point: Mobility training (In Progress)     Description:   Instruct learner(s) on safety and technique for assisting  patient out of bed, chair or wheelchair.  Instruct in the proper use of assistive devices, such as walker, crutches, cane or brace.              Patient Friendly Description:   It's important to get you on your feet again, but we need to do so in a way that is safe for you. Falling has serious consequences, and your personal safety is the most important thing of all.        When it's time to get out of bed, one of us or a family member will sit next to you on the bed to give you support.     If your doctor or nurse tells you to use a walker, crutches, a cane, or a brace, be sure you use it every time you get out of bed, even if you think you don't need it.    Learning Progress Summary           Patient Acceptance, E, NR by AR at 8/4/2020 1203    Acceptance, E, NR by AR at 8/3/2020 1552    Acceptance, E, NR by AL at 8/1/2020 1039    Acceptance, E, VU,NR by MW at 7/31/2020 1640    Acceptance, E,TB, VU,NR by  at 7/30/2020 1035    Acceptance, E, NR by AR at 7/27/2020 1559    Acceptance, E, NR by RS at 7/26/2020 1113   Family Acceptance, E, VU,NR by MW at 7/31/2020 1640                   Point: Home exercise program (In Progress)     Description:   Instruct learner(s) on appropriate technique for monitoring, assisting and/or progressing patient with therapeutic exercises and activities.              Learning Progress Summary           Patient Acceptance, E, NR by AR at 8/4/2020 1203    Acceptance, E, NR by AR at 8/3/2020 1552    Acceptance, E, VU,NR by MW at 7/31/2020 1640    Acceptance, E, NR by AR at 7/27/2020 1559    Acceptance, E, NR by RS at 7/26/2020 1113   Family Acceptance, E, VU,NR by MW at 7/31/2020 1640                   Point: Body mechanics (In Progress)     Description:   Instruct learner(s) on proper positioning and spine alignment for patient and/or caregiver during mobility tasks and/or exercises.              Learning Progress Summary           Patient Acceptance, E, NR by AR at 8/4/2020 1203     Acceptance, E, NR by AR at 8/3/2020 1552    Acceptance, E, NR by AL at 8/1/2020 1039    Acceptance, E, VU,NR by MW at 7/31/2020 1640    Acceptance, E,TB, VU,NR by  at 7/30/2020 1035    Acceptance, E, NR by AR at 7/27/2020 1559    Acceptance, E, NR by RS at 7/26/2020 1113   Family Acceptance, E, VU,NR by  at 7/31/2020 1640                   Point: Precautions (In Progress)     Description:   Instruct learner(s) on prescribed precautions during mobility and gait tasks              Learning Progress Summary           Patient Acceptance, E, NR by AR at 8/4/2020 1203    Acceptance, E, NR by AR at 8/3/2020 1552    Acceptance, E, NR by AL at 8/1/2020 1039    Acceptance, E, VU,NR by  at 7/31/2020 1640    Acceptance, E,TB, VU,NR by  at 7/30/2020 1035    Acceptance, E, NR by AR at 7/27/2020 1559    Acceptance, E, NR by RS at 7/26/2020 1113   Family Acceptance, E, VU,NR by  at 7/31/2020 1640                               User Key     Initials Effective Dates Name Provider Type Discipline    AL 04/03/18 -  Nadja Galvan, PT Physical Therapist PT    AR 04/03/18 -  Anne Marie Miller, PT Physical Therapist PT    RS 04/03/19 -  Kristen Roblero, PT Physical Therapist PT     08/23/19 -  Shante Teran, PT Physical Therapist PT     09/17/19 -  Shelly Keyes, PT Physical Therapist PT              PT Recommendation and Plan  Planned Therapy Interventions (PT Eval): balance training, bed mobility training, gait training, home exercise program, strengthening, stair training, ROM (range of motion), patient/family education, transfer training  Outcome Summary/Treatment Plan (PT)  Anticipated Discharge Disposition (PT): skilled nursing facility, inpatient rehabilitation facility  Plan of Care Reviewed With: patient  Outcome Summary: Slow progress towards goals during PT.  Able to stand once at EOB but limited by dizziness with BP from 90s/60s to 64/52 after standing briefly.  Continued impairments in strength, balance, and  independence w/ mobility.  May benefit from DC to acute rehab.       Time Calculation:   PT Charges     Row Name 08/04/20 1154             Time Calculation    Start Time  1045  -AR      Stop Time  1112  -AR      Time Calculation (min)  27 min  -AR      PT Received On  08/04/20  -AR      PT - Next Appointment  08/05/20  -AR        User Key  (r) = Recorded By, (t) = Taken By, (c) = Cosigned By    Initials Name Provider Type    AR Anne Marie Miller PT Physical Therapist        Therapy Charges for Today     Code Description Service Date Service Provider Modifiers Qty    64830919972 HC PT THER PROC EA 15 MIN 8/3/2020 Anne Marie Miller, PT GP 1    23293953241 HC PT THER PROC EA 15 MIN 8/4/2020 Anne Marie Miller, PT GP 1    34438021115 HC PT THERAPEUTIC ACT EA 15 MIN 8/4/2020 Anne Marie Miller, PT GP 1          PT G-Codes  Outcome Measure Options: AM-PAC 6 Clicks Basic Mobility (PT)  AM-PAC 6 Clicks Score (PT): 11  AM-PAC 6 Clicks Score (OT): 12    Anne Marie Miller PT  8/4/2020

## 2020-08-04 NOTE — PROGRESS NOTES
"   LOS: 19 days    Patient Care Team:  Fahad Murray Jr., MD as PCP - General (Family Medicine)  Alex Simpson MD as Consulting Physician (Nephrology)  Bk Lang RP as Pharmacist (Pharmacy)  Bonita Martinez RPH as Pharmacist (Pharmacy)  Bennie Paul MD as Consulting Physician (Cardiology)  Miah Jordan MD as Consulting Physician (Pulmonary Disease)  Trevor Whyte MD as Consulting Physician (Endocrinology)    Chief Complaint:    Chief Complaint   Patient presents with   • Altered Mental Status   • Weakness - Generalized     Follow-up ESRD  Subjective     Interval History:     Dialysis yesterday.  2.6 kg off.  BP dropped to 87 at the end.  Tachycardic until the end. BP in 90's today.  Trend of tachycardia more so after sudafed started for hypotension .  Wife at bedside.  Says he did not eat much.  On side of bed with PT earlier.  Stood very briefly, then exhausted and back to bed .  Review of Systems:   See above     Objective     Vital Signs  Temp:  [97.3 °F (36.3 °C)-98.2 °F (36.8 °C)] 98.2 °F (36.8 °C)  Heart Rate:  [110-121] 114  Resp:  [16-20] 16  BP: ()/(44-72) 98/65    Flowsheet Rows      First Filed Value   Admission Height  172.7 cm (68\") Documented at 07/16/2020 1357   Admission Weight  96.6 kg (213 lb) Documented at 07/16/2020 1357          No intake/output data recorded.  I/O last 3 completed shifts:  In: 470 [P.O.:420; IV Piggyback:50]  Out: 2300 [Other:2300]    Intake/Output Summary (Last 24 hours) at 8/4/2020 1357  Last data filed at 8/3/2020 2045  Gross per 24 hour   Intake 210 ml   Output --   Net 210 ml       Physical Exam:  General Appearance: NAD, chronically ill-appearing,voice very hoarse. Not oriented.   Skin: warm and dry  HEENT: pupils round and reactive to light.   Neck: JVP elevated, trachea midline. TDC RIJ  Lungs: Clear to auscultation, no wheezing.  Good air movement.  Not labored on room air  Heart: RRR, tachycardic; no S3, no rub  Abdomen: soft, " no guarding,+bs, body wall edema.   Extremities: trace upper and lower ext edema.   Vascular: AVF left upper ext patent. Large ecchymosis LUE .         Results Review:    Results from last 7 days   Lab Units 08/03/20  0543 07/30/20  0713 07/29/20  1710 07/29/20  0508   SODIUM mmol/L 134* 133*  --  140   POTASSIUM mmol/L 4.8 4.1  --  3.5   CHLORIDE mmol/L 95* 93*  --  98   CO2 mmol/L 22.2 25.9  --  22.3   BUN mg/dL 63* 28*  --  44*   CREATININE mg/dL 8.30* 5.24*  --  6.91*   CALCIUM mg/dL 9.4 9.1  --  8.8   BILIRUBIN mg/dL  --   --  0.7  --    ALK PHOS U/L  --   --  50  --    ALT (SGPT) U/L  --   --  19  --    AST (SGOT) U/L  --   --  15  --    GLUCOSE mg/dL 147* 98  --  115*       Estimated Creatinine Clearance: 7.9 mL/min (A) (by C-G formula based on SCr of 8.3 mg/dL (H)).    Results from last 7 days   Lab Units 08/03/20  0543 07/29/20  0508   MAGNESIUM mg/dL 2.6*  --    PHOSPHORUS mg/dL 6.4* 5.6*             Results from last 7 days   Lab Units 08/03/20  0543 07/30/20  0713 07/29/20  0507   WBC 10*3/mm3 9.90 9.15 10.15   HEMOGLOBIN g/dL 13.2 12.9* 12.6*   PLATELETS 10*3/mm3 287 324 353       Results from last 7 days   Lab Units 08/04/20  0518 08/03/20  0543 08/02/20  0453 08/01/20  0442 07/31/20  0441   INR  3.39* 3.92* 3.23* 2.84* 2.95*         Imaging Results (Last 24 Hours)     ** No results found for the last 24 hours. **          arformoterol 15 mcg Nebulization BID - RT   aspirin 81 mg Nasogastric Daily   atorvastatin 20 mg Oral Q PM   famotidine 20 mg Oral Daily   hydrocortisone 10 mg Oral BID With Meals   insulin lispro 2-4 Units Subcutaneous 4x Daily With Meals & Nightly   melatonin 10 mg Oral Nightly   midodrine 10 mg Oral TID AC   OLANZapine 2.5 mg Oral Daily Before Supper   pseudoephedrine 15 mg Oral Q12H   warfarin 1.5 mg Oral Daily       Pharmacy to dose warfarin    Pharmacy to dose warfarin        Medication Review:   Current Facility-Administered Medications   Medication Dose Route Frequency  Provider Last Rate Last Dose   • albumin human 25 % IV SOLN 12.5 g  12.5 g Intravenous PRN Migel Brown  mL/hr at 07/22/20 2004 12.5 g at 07/24/20 1314   • arformoterol (BROVANA) nebulizer solution 15 mcg  15 mcg Nebulization BID - RT Migel Brown MD   15 mcg at 08/04/20 0807   • aspirin chewable tablet 81 mg  81 mg Nasogastric Daily Migel Brown MD   81 mg at 08/04/20 0843   • atorvastatin (LIPITOR) tablet 20 mg  20 mg Oral Q PM Migel Brown MD   20 mg at 08/03/20 1744   • calcium gluconate 1 g in sodium chloride 0.9 % 50 mL IVPB  1 g Intravenous PRN Migel Brown MD        Or   • calcium gluconate 2 g in sodium chloride 0.9 % 50 mL IVPB  2 g Intravenous PRN Migel Brown MD       • dextrose (D50W) 25 g/ 50mL Intravenous Solution 25 g  25 g Intravenous Q15 Min PRN Migel Brown MD       • dextrose (GLUTOSE) oral gel 15 g  15 g Oral Q15 Min PRN Migel Brown MD   15 g at 07/26/20 1108   • famotidine (PEPCID) tablet 20 mg  20 mg Oral Daily Migel Brown MD   20 mg at 08/04/20 0843   • glucagon (human recombinant) (GLUCAGEN DIAGNOSTIC) injection 1 mg  1 mg Subcutaneous Q15 Min PRN Migel Brown MD       • haloperidol lactate (HALDOL) injection 2 mg  2 mg Intravenous Q2H PRN Duc Witt MD   2 mg at 07/31/20 0418   • heparin (porcine) injection 1,000-2,000 Units  1,000-2,000 Units Intravenous PRN Migel Brown MD       • heparin (porcine) injection 3,800 Units  3,800 Units Intracatheter PRN Migel Brown MD   3,800 Units at 08/03/20 1325   • HYDROcodone-acetaminophen (NORCO) 5-325 MG per tablet 1 tablet  1 tablet Oral Q6H PRN Migel Brown MD   1 tablet at 07/31/20 2127   • hydrocortisone (CORTEF) tablet 10 mg  10 mg Oral BID With Meals Trevor Whyte MD   10 mg at 08/04/20 0843   • insulin lispro (humaLOG) injection 2-4 Units  2-4 Units Subcutaneous 4x Daily With Meals & Nightly Trevor Whyte MD   2 Units at 08/03/20 2230   • melatonin tablet 10 mg  10 mg Oral Nightly Migel Brown MD   10 mg at  08/03/20 2050   • midodrine (PROAMATINE) tablet 10 mg  10 mg Oral TID AC Migel Brown MD   10 mg at 08/04/20 1305   • OLANZapine (zyPREXA) tablet 2.5 mg  2.5 mg Oral Daily Before Supper Duc Witt MD   2.5 mg at 08/03/20 1745   • Pharmacy to dose warfarin   Does not apply Continuous PRN Migel Brown MD       • Pharmacy to dose warfarin   Does not apply Continuous PRN Arthur Whyte MD       • pseudoephedrine (SUDAFED) tablet 15 mg  15 mg Oral Q12H Lyudmila Schaffer MD       • sodium chloride 0.9 % flush 10 mL  10 mL Intravenous PRN Migel Brown MD   10 mL at 07/26/20 0805   • sodium phosphates 10 mmol in sodium chloride 0.9 % 100 mL IVPB  10 mmol Intravenous PRN Migel Brown MD       • warfarin (COUMADIN) tablet 1.5 mg  1.5 mg Oral Daily Luz Mcconnell MD           Assessment/Plan   1.  ESRD.  Stable central volume; minimal peripheral edema.  Electrolytes acceptable.  Recent AVF balloon angioplasty 7/13. Using TDC for now .   2.  Acute hypoxic respiratory failure . Resolved.   3.  Chronic hypotension.  On midodrine, hydrocortisone, and pseudoephedrine  4.  Anemia of chronic kidney disease compensated.   5.  Coronary artery disease with prior four-vessel CABG and mitral valve replacement in October 2019.  6.  Diabetes mellitus type 2  7.  Peripheral vascular disease  8.  Secondary adrenal insufficiency. On steroid.  Had been on flurinef, too.  BP was still too low. On midodrine, hydrocortisone, and pseudoephedrine.  I have reduced sudafed to 15 mg bid for tachycardia .  9.  Paroxysmal atrial fibrillation, anticoagulated: not rate-controlled.  His low blood pressure limits options.   10. Hematuria.  Schroeder placed 7/17. Initially for strict output measurement in ICU.  Developed hematuria.   evaluated and planned cysto in 6 weeks and advised keeping schroeder until more ambulatory.  Schroeder dc 7/29 because patient pulling on it and causing more trauma. Does make some urine despite dialysis .      Plan:  1.   Dialysis tomorrow.  2. Will need subacute rehab as he cannot go home.    3. DW wife at bedside.       Lyudmila Schaffer MD  08/04/20  13:57

## 2020-08-04 NOTE — PROGRESS NOTES
"   LOS: 18 days    Patient Care Team:  Fahad Murray Jr., MD as PCP - General (Family Medicine)  Alex Simpson MD as Consulting Physician (Nephrology)  Bk Lang RP as Pharmacist (Pharmacy)  Bonita Martinez RPH as Pharmacist (Pharmacy)  Bennie Paul MD as Consulting Physician (Cardiology)  Miah Jordan MD as Consulting Physician (Pulmonary Disease)  Trevor Whyte MD as Consulting Physician (Endocrinology)    Chief Complaint:    Chief Complaint   Patient presents with   • Altered Mental Status   • Weakness - Generalized     Follow-up ESRD  Subjective     Interval History:   Had HD earlier today with labile blood pressures and tachycardia again; remains intermittently confused but still pleasant and cooperative; appetite fair today, per his daughter at the bedside    Review of Systems:   See above     Objective     Vital Signs  Temp:  [97.6 °F (36.4 °C)-98 °F (36.7 °C)] 97.6 °F (36.4 °C)  Heart Rate:  [] 110  Resp:  [16-24] 20  BP: ()/(44-82) 102/59    Flowsheet Rows      First Filed Value   Admission Height  172.7 cm (68\") Documented at 07/16/2020 1357   Admission Weight  96.6 kg (213 lb) Documented at 07/16/2020 1357          No intake/output data recorded.  I/O last 3 completed shifts:  In: 590 [P.O.:540; IV Piggyback:50]  Out: 2300 [Other:2300]    Intake/Output Summary (Last 24 hours) at 8/3/2020 2101  Last data filed at 8/3/2020 1320  Gross per 24 hour   Intake 260 ml   Output 2300 ml   Net -2040 ml       Physical Exam:  General Appearance: NAD, chronically ill-appearing, overweight; confused  Skin: warm and dry  HEENT: pupils round and reactive to light.   Neck: JVP elevated, trachea midline. TDC RIJ  Lungs: Clear to auscultation, no wheezing.  Good air movement.  Not labored on room air  Heart: Irreg, tachycardic; no S3, no rub  Abdomen: soft, no guarding,+bs, body wall edema.   Extremities: trace upper and lower ext edema.   Vascular: AVF left upper ext patent. " Large ecchymosis LUE .         Results Review:    Results from last 7 days   Lab Units 08/03/20  0543 07/30/20  0713 07/29/20  1710 07/29/20  0508   SODIUM mmol/L 134* 133*  --  140   POTASSIUM mmol/L 4.8 4.1  --  3.5   CHLORIDE mmol/L 95* 93*  --  98   CO2 mmol/L 22.2 25.9  --  22.3   BUN mg/dL 63* 28*  --  44*   CREATININE mg/dL 8.30* 5.24*  --  6.91*   CALCIUM mg/dL 9.4 9.1  --  8.8   BILIRUBIN mg/dL  --   --  0.7  --    ALK PHOS U/L  --   --  50  --    ALT (SGPT) U/L  --   --  19  --    AST (SGOT) U/L  --   --  15  --    GLUCOSE mg/dL 147* 98  --  115*       Estimated Creatinine Clearance: 8 mL/min (A) (by C-G formula based on SCr of 8.3 mg/dL (H)).    Results from last 7 days   Lab Units 08/03/20  0543 07/29/20  0508 07/28/20  0411   MAGNESIUM mg/dL 2.6*  --   --    PHOSPHORUS mg/dL 6.4* 5.6* 5.7*             Results from last 7 days   Lab Units 08/03/20  0543 07/30/20  0713 07/29/20  0507 07/28/20  0411   WBC 10*3/mm3 9.90 9.15 10.15 8.27   HEMOGLOBIN g/dL 13.2 12.9* 12.6* 11.8*   PLATELETS 10*3/mm3 287 324 353 300       Results from last 7 days   Lab Units 08/03/20  0543 08/02/20  0453 08/01/20  0442 07/31/20  0441 07/30/20  0713   INR  3.92* 3.23* 2.84* 2.95* 3.12*         Imaging Results (Last 24 Hours)     ** No results found for the last 24 hours. **          arformoterol 15 mcg Nebulization BID - RT   aspirin 81 mg Nasogastric Daily   atorvastatin 20 mg Oral Q PM   famotidine 20 mg Oral Daily   hydrocortisone 10 mg Oral BID With Meals   insulin lispro 2-4 Units Subcutaneous 4x Daily With Meals & Nightly   melatonin 10 mg Oral Nightly   midodrine 10 mg Oral TID AC   OLANZapine 2.5 mg Oral Daily Before Supper   pseudoephedrine 30 mg Oral Q12H       Pharmacy to dose warfarin    Pharmacy to dose warfarin        Medication Review:   Current Facility-Administered Medications   Medication Dose Route Frequency Provider Last Rate Last Dose   • albumin human 25 % IV SOLN 12.5 g  12.5 g Intravenous PRN Migel Brown,   mL/hr at 07/22/20 2004 12.5 g at 07/24/20 1314   • albumin human 25 % IV SOLN 12.5 g  12.5 g Intravenous PRN Jean Mcconnell MD   12.5 g at 08/03/20 0859   • arformoterol (BROVANA) nebulizer solution 15 mcg  15 mcg Nebulization BID - RT Migel Brown MD   15 mcg at 08/03/20 2033   • aspirin chewable tablet 81 mg  81 mg Nasogastric Daily Migel Brown MD   81 mg at 08/03/20 1454   • atorvastatin (LIPITOR) tablet 20 mg  20 mg Oral Q PM Migel Brown MD   20 mg at 08/03/20 1744   • calcium gluconate 1 g in sodium chloride 0.9 % 50 mL IVPB  1 g Intravenous PRN Migel Brown MD        Or   • calcium gluconate 2 g in sodium chloride 0.9 % 50 mL IVPB  2 g Intravenous PRN Migel Brown MD       • dextrose (D50W) 25 g/ 50mL Intravenous Solution 25 g  25 g Intravenous Q15 Min PRN Migel Brown MD       • dextrose (GLUTOSE) oral gel 15 g  15 g Oral Q15 Min PRN Migel Brown MD   15 g at 07/26/20 1108   • famotidine (PEPCID) tablet 20 mg  20 mg Oral Daily Migel Brown MD   20 mg at 08/03/20 1453   • glucagon (human recombinant) (GLUCAGEN DIAGNOSTIC) injection 1 mg  1 mg Subcutaneous Q15 Min PRN Migel Brown MD       • haloperidol lactate (HALDOL) injection 2 mg  2 mg Intravenous Q2H PRN Duc Witt MD   2 mg at 07/31/20 0418   • heparin (porcine) injection 1,000-2,000 Units  1,000-2,000 Units Intravenous PRN Migel Brown MD       • heparin (porcine) injection 3,800 Units  3,800 Units Intracatheter PRN Migel Brown MD   3,800 Units at 08/03/20 1325   • HYDROcodone-acetaminophen (NORCO) 5-325 MG per tablet 1 tablet  1 tablet Oral Q6H PRN Migel Brown MD   1 tablet at 07/31/20 2127   • hydrocortisone (CORTEF) tablet 10 mg  10 mg Oral BID With Meals Trevor Whyte MD   10 mg at 08/03/20 1745   • insulin lispro (humaLOG) injection 2-4 Units  2-4 Units Subcutaneous 4x Daily With Meals & Nightly Trevor Whyte MD   2 Units at 08/02/20 2212   • melatonin tablet 10 mg  10 mg Oral Nightly Migel Brown MD    10 mg at 08/03/20 2050   • midodrine (PROAMATINE) tablet 10 mg  10 mg Oral TID AC Migel Brown MD   10 mg at 08/03/20 1745   • OLANZapine (zyPREXA) tablet 2.5 mg  2.5 mg Oral Daily Before Supper Duc Witt MD   2.5 mg at 08/03/20 1745   • Pharmacy to dose warfarin   Does not apply Continuous PRN Migel Brown MD       • Pharmacy to dose warfarin   Does not apply Continuous PRN Arthur Whyte MD       • pseudoephedrine (SUDAFED) tablet 30 mg  30 mg Oral Q12H Edy Garcia MD   30 mg at 08/03/20 1744   • sodium chloride 0.9 % flush 10 mL  10 mL Intravenous PRN Migel Brown MD   10 mL at 07/26/20 0805   • sodium phosphates 10 mmol in sodium chloride 0.9 % 100 mL IVPB  10 mmol Intravenous PRN Migel Brown MD           Assessment/Plan   1.  ESRD.  Stable central volume; minimal peripheral edema.  Electrolytes acceptable.  Recent AVF balloon angioplasty 7/13. Using TDC for now     2.  Acute hypoxic respiratory failure . Resolved.   3.  Chronic hypotension.  On midodrine, hydrocortisone, and pseudoephedrine  4.  Anemia of chronic kidney disease  5.  Coronary artery disease with prior four-vessel CABG and mitral valve replacement in October 2019.  6.  Diabetes mellitus type 2  7.  Peripheral vascular disease  8.  Secondary adrenal insufficiency. On steroid.  He was on florinef at home for orthostasis after question of adrenal insufficiency 8/19 because of inadequate response to cosyntropin test .  Dr. Whyte thinks secondary adrenal insufficiency. On midodrine, hydrocortisone, and pseudoephedrine  9.  Paroxysmal atrial fibrillation, anticoagulated: not rate-controlled.  His low blood pressure limits options  10. Hematuria.  Schroeder placed 7/17. Initially for strict output measurement in ICU.  Developed hematuria.   evaluated and planned cysto in 6 weeks and advised keeping schroeder until more ambulatory.  Schroeder dc 7/29 because patient pulling on it and causing more trauma. Does make some urine despite dialysis  .      Plan:  1.  Dialysis MWF  2.  Discussed with daughter at bedside:  I broached subject of discontinuing dialysis and transitioning to comfort-based care in the event that his hemodynamics do not permit safe dialysis going forward.  We also talked about the difficulty in performing rehab in setting of hypotension      Jean Mcconnell MD  08/03/20  21:01

## 2020-08-04 NOTE — PLAN OF CARE
Alert, disoriented to time, place and situation, hypotensive when he stands, family to bedside, dialysis tomorrow, room air, will CTM

## 2020-08-04 NOTE — PROGRESS NOTES
Name: Santino Retana ADMIT: 2020   : 1940  PCP: Fahad Murray Jr., MD    MRN: [id number] LOS: 18 days   AGE/SEX: 80 y.o. male  ROOM: [facility]     Subjective   Subjective   I saw the patient earlier today.  He had just gotten back from dialysis.  Wife at bedside.  Nurse also at bedside.  Patient's blood pressure is usually low after dialysis and heart rate is usually high.  Patient's wife said he has had a rough time since dialysis was started last fall.  He feels very tired.  He is a little confused    Review of Systems     Objective   Objective   Vital Signs  Temp:  [97.6 °F (36.4 °C)-98 °F (36.7 °C)] 97.6 °F (36.4 °C)  Heart Rate:  [] 110  Resp:  [16-24] 20  BP: ()/(44-82) 102/59  SpO2:  [92 %-96 %] 96 %  on  Flow (L/min):  [2] 2;   Device (Oxygen Therapy): room air  Body mass index is 30.17 kg/m².  Physical Exam   Constitutional: He appears well-developed. No distress.   Chronically ill-appearing   HENT:   Head: Normocephalic.   Mouth/Throat: No oropharyngeal exudate.   Neck: Neck supple. No JVD present.   Cardiovascular:   No murmur heard.  Tachycardic   Pulmonary/Chest: No stridor. No respiratory distress. He has no wheezes. He has rales.   Shallow breath sounds.  A few crackles right base   Abdominal: Soft. Bowel sounds are normal. He exhibits no distension. There is no tenderness.   Obese.  No hepatosplenomegaly   Neurological: He is alert.   Oriented to self.  Confused   Skin: Skin is warm. He is not diaphoretic.   Nursing note and vitals reviewed.      Results Review:       I reviewed the patient's new clinical results.  Results from last 7 days   Lab Units 20  0543 20  0713 20  0507 20  0411   WBC 10*3/mm3 9.90 9.15 10.15 8.27   HEMOGLOBIN g/dL 13.2 12.9* 12.6* 11.8*   PLATELETS 10*3/mm3 287 324 353 300     Results from last 7 days   Lab Units 20  0543 20  0713 20  0508 20  0411   SODIUM mmol/L 134* 133* 140 136   POTASSIUM mmol/L  4.8 4.1 3.5 4.4   CHLORIDE mmol/L 95* 93* 98 99   CO2 mmol/L 22.2 25.9 22.3 23.4   BUN mg/dL 63* 28* 44* 29*   CREATININE mg/dL 8.30* 5.24* 6.91* 5.17*   GLUCOSE mg/dL 147* 98 115* 112*   Estimated Creatinine Clearance: 8 mL/min (A) (by C-G formula based on SCr of 8.3 mg/dL (H)).  Results from last 7 days   Lab Units 08/03/20 0543 07/29/20 1710 07/29/20 0508 07/28/20  0411   ALBUMIN g/dL 3.70 4.00 3.80 3.60   BILIRUBIN mg/dL  --  0.7  --   --    ALK PHOS U/L  --  50  --   --    AST (SGOT) U/L  --  15  --   --    ALT (SGPT) U/L  --  19  --   --      Results from last 7 days   Lab Units 08/03/20 0543 07/30/20 0713 07/29/20 1710 07/29/20 0508 07/28/20  0411   CALCIUM mg/dL 9.4 9.1  --  8.8 8.4*   ALBUMIN g/dL 3.70  --  4.00 3.80 3.60   MAGNESIUM mg/dL 2.6*  --   --   --   --    PHOSPHORUS mg/dL 6.4*  --   --  5.6* 5.7*       Glucose   Date/Time Value Ref Range Status   08/03/2020 1610 156 (H) 70 - 130 mg/dL Final   08/02/2020 2120 212 (H) 70 - 130 mg/dL Final   08/02/2020 1606 190 (H) 70 - 130 mg/dL Final   08/02/2020 1111 169 (H) 70 - 130 mg/dL Final   08/02/2020 0514 116 70 - 130 mg/dL Final   08/01/2020 2112 205 (H) 70 - 130 mg/dL Final   08/01/2020 1608 202 (H) 70 - 130 mg/dL Final         arformoterol 15 mcg Nebulization BID - RT   aspirin 81 mg Nasogastric Daily   atorvastatin 20 mg Oral Q PM   famotidine 20 mg Oral Daily   hydrocortisone 10 mg Oral BID With Meals   insulin lispro 2-4 Units Subcutaneous 4x Daily With Meals & Nightly   melatonin 10 mg Oral Nightly   midodrine 10 mg Oral TID AC   OLANZapine 2.5 mg Oral Daily Before Supper   pseudoephedrine 30 mg Oral Q12H       Pharmacy to dose warfarin    Pharmacy to dose warfarin    Diet Soft Texture; [employer]; Thin       Assessment/Plan     Active Hospital Problems    Diagnosis  POA   • **Symptomatic hypotension [I95.89]  Unknown   • SVT (supraventricular tachycardia) (CMS/HCC) [I47.1]  Unknown   • Metabolic encephalopathy [G93.41]  Unknown   • General  weakness [R53.1]  Yes   • End stage renal failure on dialysis (CMS/[facility]) [N18.6, Z99.2]  Not Applicable   • Atrial fibrillation (CMS/[facility]) [I48.91] [I48.91]  Yes   • Observed sleep apnea  [G47.30]  Yes   • Diabetes mellitus type 2, uncontrolled, with complications (CMS/[facility]) [E11.8, E11.65]  Yes   • Adrenal insufficiency (CMS/[facility]) [E27.40]  Yes      Resolved Hospital Problems    Diagnosis Date Resolved POA   • Shock, septic (CMS/[facility]) [A41.9, R65.21] 07/26/2020 Unknown   • Sepsis with acute hypoxic respiratory failure (CMS/[facility]) [A41.9, R65.20, J96.01] 07/26/2020 Unknown       · Recurrent hypotension and SVT associated with adrenal insufficiency and dialysis.  He is already on midodrine, hydrocortisone and Sudafed.   · Weakness and failure to thrive.  Not sure he is going to be much better than he is currently.  His insurance company has refused acute rehab request.  · End-stage renal disease, on dialysis  · Suspected vascular dementia  · Atrial fibrillation, continue Coumadin.  Blood pressure will not tolerate beta-blocker, calcium channel blocker etc.   · Diabetes mellitus type 2.  Insulin adjusted per endocrine  · Hematuria.  Outpatient follow-up with urology.  Cystoscopy recommended      Luz Mcconnell MD  [facility]  08/03/20  20:41

## 2020-08-04 NOTE — PROGRESS NOTES
Pharmacy Consult: Warfarin Dosing/ Monitoring    Santino Retana is a 80 y.o. male, estimated creatinine clearance is 7.9 mL/min (A) (by C-G formula based on SCr of 8.3 mg/dL (H)). weighing 89 kg (196 lb 3.4 oz).     has a past medical history of Adrenal insufficiency (CMS/[facility]), Arthritis, Atrial fibrillation (CMS/[facility]), CAD (coronary artery disease), Carotid artery disease (CMS/HCC), CHF (congestive heart failure) (CMS/HCC), CVA (cerebral vascular accident) (CMS/HCC), Diabetes mellitus (CMS/HCC) (), Elevated cholesterol, ESRD (end stage renal disease) (CMS/HCC), History of pressure ulcer (10/2019), Hyperlipidemia, Low back pain, NSTEMI (non-ST elevated myocardial infarction) (CMS/HCC), Sleep apnea, Slow to wake up after anesthesia, Status post mitral valve replacement with tissue valve, STEMI (ST elevation myocardial infarction) (CMS/HCC) (10/2019), TIA (transient ischemic attack) (2018), and Vision loss of right eye.    Social History     Tobacco Use   • Smoking status: Former Smoker     Packs/day: 2.00     Years: 30.00     Pack years: 60.00     Types: Cigarettes     Last attempt to quit: 1989     Years since quittin.6   • Smokeless tobacco: Never Used   Substance Use Topics   • Alcohol use: No     Comment: None due to blood thinners, patient would really like to drink non-alcoholic beer 1 x day   • Drug use: No       Results from last 7 days   Lab Units 20  0518 20  0543 20  0453 20  0442 20  0441 20  0713 20  0508 20  0507   INR  3.39* 3.92* 3.23* 2.84* 2.95* 3.12* 3.40*  --    HEMOGLOBIN g/dL  --  13.2  --   --   --  12.9*  --  12.6*   HEMATOCRIT %  --  41.5  --   --   --  41.7  --  41.2   PLATELETS 10*3/mm3  --  287  --   --   --  324  --  353     Results from last 7 days   Lab Units 20  0543 20  0713 20  1710 20  0508   SODIUM mmol/L 134* 133*  --  140   POTASSIUM mmol/L 4.8 4.1  --  3.5   CHLORIDE mmol/L 95* 93*  --  98   CO2  mmol/L 22.2 25.9  --  22.3   BUN mg/dL 63* 28*  --  44*   CREATININE mg/dL 8.30* 5.24*  --  6.91*   CALCIUM mg/dL 9.4 9.1  --  8.8   BILIRUBIN mg/dL  --   --  0.7  --    ALK PHOS U/L  --   --  50  --    ALT (SGPT) U/L  --   --  19  --    AST (SGOT) U/L  --   --  15  --    GLUCOSE mg/dL 147* 98  --  115*     Anticoagulation history: Home dose was Warfarin 2mg po qT/Th/[facility] and Warfarin 4mg po qSaMWF     Hospital Anticoagulation:  Consulting provider: Aj Duke MD  Start date: 7/22/20 continued from home  Indication: Atrial fibrillation- MVR on Oct. 2019  Target INR: 2.5 - 3.5  Expected duration: Indefinitely  Bridge Therapy: No                Date 7/28 7/29 7/30 7/31 8/1 8/2 8/3 8/4     INR 3.05 3.4 3.12 2.95 2.84 3.23 3.92 3.39     Warfarin dose 2mg hold 2mg 2mg 2mg 2mg hold 1.5mg       Potential drug interactions:     Relevant nutrition status: Soft texture diet + Boost    Other: dialysis, hematuria (traumatic)    Education complete?/ Date: No, confusion, to SNF    Assessment/Plan:  Dose: will reduce dose to 1.5mg daily  Monitor INR daily  Follow up daily    Pharmacy will continue to follow until discharge or discontinuation of warfarin.   Alfonzo Orta PharmD

## 2020-08-04 NOTE — PLAN OF CARE
Problem: Patient Care Overview  Goal: Plan of Care Review  Flowsheets (Taken 8/4/2020 6143)  Outcome Summary: Slow progress towards goals during PT.  Able to stand once at EOB but limited by dizziness with BP from 90s/60s to 64/52 after standing briefly.  Continued impairments in strength, balance, and independence w/ mobility.  May benefit from DC to acute rehab.       ..Patient was intermittently wearing a face mask during this therapy encounter. Therapist used appropriate personal protective equipment including eye protection, mask, and gloves.  Mask used was standard procedure mask. Appropriate PPE was worn during the entire therapy session. Hand hygiene was completed before and after therapy session. Patient is not in enhanced droplet precautions.  Ok Laureano SPT present throughout tx.

## 2020-08-04 NOTE — PROGRESS NOTES
Continued Stay Note  [facility]     Patient Name: Santino Retana  MRN: [id number]  Today's Date: 8/4/2020    Admit Date: 7/16/2020    Discharge Plan     Row Name 08/04/20 1239       Plan    Plan  Awaiting Medical stability- [facility] following- possible Peer to Peer with [facility] when appropriate    Plan Comments  CCP spoke with Mario/[facility] pt was denied for acute rehab by [facility]. SPoke with Dr. Mcconnell, pt not medically ready. Updated her Peer to Peer to be completed within 3 days of denial (8/3/2020 1523) [facility] Peer to Peer # [phone]. CCP to follow. kathia reinoso/ccp        Discharge Codes    No documentation.             Mackenzie Davidson, RN

## 2020-08-04 NOTE — PLAN OF CARE
Problem: Patient Care Overview  Goal: Plan of Care Review  Outcome: Ongoing (interventions implemented as appropriate)  Flowsheets (Taken 8/4/2020 0628)  Progress: no change  Plan of Care Reviewed With: patient  Outcome Summary: Pt had off and on sleep last hs. Family at bedside. Pt alert with confusion. Continue to monitor for low bp.     Problem: Patient Care Overview  Goal: Individualization and Mutuality  Outcome: Ongoing (interventions implemented as appropriate)     Problem: Kidney Disease, Chronic/End Stage Renal Disease (Adult)  Goal: Signs and Symptoms of Listed Potential Problems Will be Absent, Minimized or Managed (Kidney Disease, Chronic/End Stage Renal Disease)  Outcome: Ongoing (interventions implemented as appropriate)     Problem: Confusion, Acute (Adult)  Goal: Identify Related Risk Factors and Signs and Symptoms  Outcome: Ongoing (interventions implemented as appropriate)

## 2020-08-04 NOTE — NURSING NOTE
Discussion with CCP.  Stated that Dr. MIREYA Mcconnell has decided not to appeal ins denial as patient could not tolerate therapies of acute rehab.  Will sign off

## 2020-08-04 NOTE — PROGRESS NOTES
80 y.o.   LOS: 19 days   Patient Care Team:  Fahad Murray Jr., MD as PCP - General (Family Medicine)  Alex Simpson MD as Consulting Physician (Nephrology)  Bk Lang RP as Pharmacist (Pharmacy)  Bonita Martinez RP as Pharmacist (Pharmacy)  Bennie Paul MD as Consulting Physician (Cardiology)  Miah Jordan MD as Consulting Physician (Pulmonary Disease)  Trevor Whyte MD as Consulting Physician (Endocrinology)    Chief Complaint: Elevated blood sugars    Chief Complaint   Patient presents with   • Altered Mental Status   • Weakness - Generalized       Subjective  Patient's appetite is okay.  Reviewed nephrology documentation weight he had issues with low blood pressure and tachycardia with the dialysis.  Blood sugars are anywhere between 150-200s.      Interval History:    Review of Systems:   Pleasantly confused today.  A limited history for review of systems was obtained  Review of Systems  Objective     Vital Signs   Temp:  [97.3 °F (36.3 °C)-98.2 °F (36.8 °C)] 98.2 °F (36.8 °C)  Heart Rate:  [] 114  Resp:  [16-20] 16  BP: ()/(44-72) 98/65    Physical Exam:  Constitutional - No distress, appears stated age  Eyes - PERRL, anicteric sclera  Ear nose and throat - External ears/nose normal, No adenopathy, midline trachea  Respiratory - Normal chest on inspection, palpation and diminished bilateral breath sounds on auscultation  Cardiovascular - Normal S1S2, without murmur  GI - Non tender, non distended, no hepatosplenomegaly, +BS  Musculoskeletal - No edema, cyanosis or clubbing  Neuro -confused    Physical ExamResults Review:     I reviewed the patient's new clinical results and summarized them in subjective and in plan.      Glucose   Date/Time Value Ref Range Status   08/03/2020 0543 147 (H) 65 - 99 mg/dL Final     Lab Results (last 24 hours)     Procedure Component Value Units Date/Time    POC Glucose Once [[id number]]  (Abnormal) Collected:  08/04/20 1052     Specimen:  Blood Updated:  08/04/20 1057     Glucose 178 mg/dL     POC Glucose Once [[id number]]  (Abnormal) Collected:  08/04/20 0638    Specimen:  Blood Updated:  08/04/20 0701     Glucose 152 mg/dL     Protime-INR [[id number]]  (Abnormal) Collected:  08/04/20 0518    Specimen:  Blood Updated:  08/04/20 0614     Protime 33.1 Seconds      INR 3.39    POC Glucose Once [[id number]]  (Abnormal) Collected:  08/03/20 2125    Specimen:  Blood Updated:  08/03/20 2136     Glucose 239 mg/dL     POC Glucose Once [[id number]]  (Abnormal) Collected:  08/03/20 1610    Specimen:  Blood Updated:  08/03/20 1612     Glucose 156 mg/dL         Imaging Results (Last 24 Hours)     ** No results found for the last 24 hours. **          Medication Review: done      Current Facility-Administered Medications:   •  albumin human 25 % IV SOLN 12.5 g, 12.5 g, Intravenous, PRN, Migel Brown MD, Last Rate: 100 mL/hr at 07/22/20 2004, 12.5 g at 07/24/20 1314  •  arformoterol (BROVANA) nebulizer solution 15 mcg, 15 mcg, Nebulization, BID - RT, Migel Brown MD, 15 mcg at 08/04/20 0807  •  aspirin chewable tablet 81 mg, 81 mg, Nasogastric, Daily, Migel Brown MD, 81 mg at 08/04/20 0843  •  atorvastatin (LIPITOR) tablet 20 mg, 20 mg, Oral, Q PM, Migel Brown MD, 20 mg at 08/03/20 1744  •  calcium gluconate 1 g in sodium chloride 0.9 % 50 mL IVPB, 1 g, Intravenous, PRN **OR** calcium gluconate 2 g in sodium chloride 0.9 % 50 mL IVPB, 2 g, Intravenous, PRN, Migel Brown MD  •  dextrose (D50W) 25 g/ 50mL Intravenous Solution 25 g, 25 g, Intravenous, Q15 Min PRN, Migel Brown MD  •  dextrose (GLUTOSE) oral gel 15 g, 15 g, Oral, Q15 Min PRN, Migel Brown MD, 15 g at 07/26/20 1108  •  famotidine (PEPCID) tablet 20 mg, 20 mg, Oral, Daily, Migel Brown MD, 20 mg at 08/04/20 0843  •  glucagon (human recombinant) (GLUCAGEN DIAGNOSTIC) injection 1 mg, 1 mg, Subcutaneous, Q15 Min PRN, Migel Brown MD  •  haloperidol lactate (HALDOL) injection 2 mg, 2 mg,  Intravenous, Q2H PRN, Duc Witt MD, 2 mg at 07/31/20 0418  •  heparin (porcine) injection 1,000-2,000 Units, 1,000-2,000 Units, Intravenous, PRN, Migel Brown MD  •  heparin (porcine) injection 3,800 Units, 3,800 Units, Intracatheter, PRN, Migel Brown MD, 3,800 Units at 08/03/20 1325  •  HYDROcodone-acetaminophen (NORCO) 5-325 MG per tablet 1 tablet, 1 tablet, Oral, Q6H PRN, Migel Brown MD, 1 tablet at 07/31/20 2127  •  hydrocortisone (CORTEF) tablet 10 mg, 10 mg, Oral, BID With Meals, Trevor Whyte MD, 10 mg at 08/04/20 0843  •  insulin lispro (humaLOG) injection 2-4 Units, 2-4 Units, Subcutaneous, 4x Daily With Meals & Nightly, Trevor Whyte MD, 2 Units at 08/03/20 2230  •  melatonin tablet 10 mg, 10 mg, Oral, Nightly, Migel Brown MD, 10 mg at 08/03/20 2050  •  midodrine (PROAMATINE) tablet 10 mg, 10 mg, Oral, TID AC, Migel Brown MD, 10 mg at 08/04/20 0646  •  OLANZapine (zyPREXA) tablet 2.5 mg, 2.5 mg, Oral, Daily Before Supper, Duc Witt MD, 2.5 mg at 08/03/20 1745  •  Pharmacy to dose warfarin, , Does not apply, Continuous PRN, Migel Brown MD  •  Pharmacy to dose warfarin, , Does not apply, Continuous PRN, Arthur Whyte MD  •  pseudoephedrine (SUDAFED) tablet 15 mg, 15 mg, Oral, Q12H, Lyudmila Schaffer MD  •  [COMPLETED] Insert peripheral IV, , , Once **AND** sodium chloride 0.9 % flush 10 mL, 10 mL, Intravenous, PRN, Migel Brown MD, 10 mL at 07/26/20 0805  •  sodium phosphates 10 mmol in sodium chloride 0.9 % 100 mL IVPB, 10 mmol, Intravenous, PRN, Migel Brown MD    Assessment/Plan     Active Hospital Problems    Diagnosis  POA   • **Symptomatic hypotension [I95.89]  Unknown   • SVT (supraventricular tachycardia) (CMS/HCC) [I47.1]  Unknown   • Metabolic encephalopathy [G93.41]  Unknown   • General weakness [R53.1]  Yes   • End stage renal failure on dialysis (CMS/HCC) [N18.6, Z99.2]  Not Applicable   • Atrial fibrillation (CMS/HCC) [I48.91] [I48.91]  Yes   • Observed  "sleep apnea  [G47.30]  Yes   • Diabetes mellitus type 2, uncontrolled, with complications (CMS/HCC) [E11.8, E11.65]  Yes   • Adrenal insufficiency (CMS/HCC) [E27.40]  Yes      Resolved Hospital Problems    Diagnosis Date Resolved POA   • Shock, septic (CMS/HCC) [A41.9, R65.21] 07/26/2020 Unknown   • Sepsis with acute hypoxic respiratory failure (CMS/[facility]) [A41.9, R65.20, J96.01] 07/26/2020 Unknown     Type 2 diabetes mellitus-complicated with steroids  Continue the Humalog sliding scale before meals and at bedtime.  No changes recommended at this time.    We will see the patient every 1 to 2 days as his blood sugars are reasonable.  Please call us if there are any questions in the interim.    Discussed plan with Nurse.     Eulalia Jamison MD.  08/04/20  12:29 PM      EMR Dragon / transcription disclaimer:    \"Dictated utilizing Dragon dictation\".   "

## 2020-08-04 NOTE — PROGRESS NOTES
Continued Stay Note  [facility]     Patient Name: Santino Retana  MRN: [id number]  Today's Date: 8/4/2020    Admit Date: 7/16/2020    Discharge Plan     Row Name 08/04/20 1533       Plan    Plan  Pending Hospital course    Patient/Family in Agreement with Plan  yes    Plan Comments  Spoke with Dr. Mcconnell, based of pts BP and therapy, unsure he would tolerate 3 hours of therapy for acute rehab and recommends SNF or futher goals of care. CCP requested she speak with pts daughter Lola with update. CCP called Lola back and updated her awaiting MD rounding and pts BP. Explained MD did not anticipate pt would tolerate acute rehab. CCP explained spoke with [facility] SNF to continue to follow pending on pts progress. Spoke with Mario/[facility] with update and she agreed. CCP will continue to follow based on clinical course. Nixon reinoso/ccp        Discharge Codes    No documentation.             Mackenzie Davidson RN

## 2020-08-05 PROBLEM — Z51.5 PALLIATIVE CARE PATIENT: Status: ACTIVE | Noted: 2020-01-01

## 2020-08-05 NOTE — SIGNIFICANT NOTE
08/05/20 1402   Rehab Treatment   Discipline physical therapy assistant   Reason Treatment Not Performed unavailable for treatment  (pt off floor for dialysis, spoke w/ family and they want to hold until tomorrow d/t patient usually very fatigued afterwards; PT will f/u 8/6)   Recommendation   PT - Next Appointment 08/06/20

## 2020-08-05 NOTE — PLAN OF CARE
Problem: Patient Care Overview  Goal: Plan of Care Review  Outcome: Ongoing (interventions implemented as appropriate)  Flowsheets (Taken 8/5/2020 2366)  Progress: no change  Plan of Care Reviewed With: patient  Outcome Summary: Continue to monitor for symptomatic hypotension. Pt denies any pain or discomfort. Family at bedside. MD recommend Palliative for pt. Family will still decide today.     Problem: Patient Care Overview  Goal: Individualization and Mutuality  Outcome: Ongoing (interventions implemented as appropriate)     Problem: Pain, Chronic (Adult)  Goal: Acceptable Pain/Comfort Level and Functional Ability  Outcome: Ongoing (interventions implemented as appropriate)     Problem: Kidney Disease, Chronic/End Stage Renal Disease (Adult)  Goal: Signs and Symptoms of Listed Potential Problems Will be Absent, Minimized or Managed (Kidney Disease, Chronic/End Stage Renal Disease)  Outcome: Ongoing (interventions implemented as appropriate)     Problem: Confusion, Acute (Adult)  Goal: Identify Related Risk Factors and Signs and Symptoms  Outcome: Ongoing (interventions implemented as appropriate)     Problem: Confusion, Acute (Adult)  Goal: Safety  Outcome: Ongoing (interventions implemented as appropriate)

## 2020-08-05 NOTE — PROGRESS NOTES
Pharmacy Consult: Warfarin Dosing/ Monitoring    Santino Retana is a 80 y.o. male, estimated creatinine clearance is 6.9 mL/min (A) (by C-G formula based on SCr of 9.35 mg/dL (H)). weighing 85.2 kg (187 lb 13.3 oz).     has a past medical history of Adrenal insufficiency (CMS/[facility]), Arthritis, Atrial fibrillation (CMS/HCC), CAD (coronary artery disease), Carotid artery disease (CMS/HCC), CHF (congestive heart failure) (CMS/HCC), CVA (cerebral vascular accident) (CMS/HCC), Diabetes mellitus (CMS/HCC) (), Elevated cholesterol, ESRD (end stage renal disease) (CMS/HCC), History of pressure ulcer (10/2019), Hyperlipidemia, Low back pain, NSTEMI (non-ST elevated myocardial infarction) (CMS/HCC), Sleep apnea, Slow to wake up after anesthesia, Status post mitral valve replacement with tissue valve, STEMI (ST elevation myocardial infarction) (CMS/HCC) (10/2019), TIA (transient ischemic attack) (2018), and Vision loss of right eye.    Social History     Tobacco Use   • Smoking status: Former Smoker     Packs/day: 2.00     Years: 30.00     Pack years: 60.00     Types: Cigarettes     Last attempt to quit: 1989     Years since quittin.6   • Smokeless tobacco: Never Used   Substance Use Topics   • Alcohol use: No     Comment: None due to blood thinners, patient would really like to drink non-alcoholic beer 1 x day   • Drug use: No       Results from last 7 days   Lab Units 20  0434 20  0518 20  0543 20  0453 20  0442 20  0441 20  0713   INR  3.91* 3.39* 3.92* 3.23* 2.84* 2.95* 3.12*   HEMOGLOBIN g/dL  --   --  13.2  --   --   --  12.9*   HEMATOCRIT %  --   --  41.5  --   --   --  41.7   PLATELETS 10*3/mm3  --   --  287  --   --   --  324     Results from last 7 days   Lab Units 20  0709 20  0543 20  0713 20  1710   SODIUM mmol/L 141 134* 133*  --    POTASSIUM mmol/L 5.2 4.8 4.1  --    CHLORIDE mmol/L 100 95* 93*  --    CO2 mmol/L 19.8* 22.2 25.9  --     BUN mg/dL 64* 63* 28*  --    CREATININE mg/dL 9.35* 8.30* 5.24*  --    CALCIUM mg/dL 9.5 9.4 9.1  --    BILIRUBIN mg/dL  --   --   --  0.7   ALK PHOS U/L  --   --   --  50   ALT (SGPT) U/L  --   --   --  19   AST (SGOT) U/L  --   --   --  15   GLUCOSE mg/dL 175* 147* 98  --      Anticoagulation history: Home dose was Warfarin 2mg po qT/Th/[facility] and Warfarin 4mg po qSaMWF     [facility] Anticoagulation:  Consulting provider: Aj Duke MD  Start date: 7/22/20 continued from home  Indication: Atrial fibrillation- MVR on Oct. 2019  Target INR: 2.5 - 3.5  Expected duration: Indefinitely  Bridge Therapy: No                Date 7/28 7/29 7/30 7/31 8/1 8/2 8/3 8/4 8/5    INR 3.05 3.4 3.12 2.95 2.84 3.23 3.92 3.39 3.91    Warfarin dose 2mg held 2mg 2mg 2mg 2mg held 1.5mg hold      Potential drug interactions:     Relevant nutrition status: Soft texture diet + Boost    Other: dialysis, considering palliative    Education complete?/ Date: No, confusion, to SNF    Assessment/Plan:  Dose: will hold today's dose and consider decreasing to 1mg daily  Monitor INR daily  Follow up daily    Pharmacy will continue to follow until discharge or discontinuation of warfarin.   Alfonzo Orta PharmD

## 2020-08-05 NOTE — PROGRESS NOTES
"   LOS: 20 days    Patient Care Team:  Fahad Murray Jr., MD as PCP - General (Family Medicine)  Alex Simpson MD as Consulting Physician (Nephrology)  Bk Lang RP as Pharmacist (Pharmacy)  Bonita Martinez RP as Pharmacist (Pharmacy)  Bennie Paul MD as Consulting Physician (Cardiology)  Miah Jordan MD as Consulting Physician (Pulmonary Disease)  Trevor Whyte MD as Consulting Physician (Endocrinology)    Chief Complaint:    Chief Complaint   Patient presents with   • Altered Mental Status   • Weakness - Generalized     Follow-up ESRD  Subjective     Interval History:     On dialysis.  .  Confused.  Talking about his grandchildren and his pension.  Does tell me he ate cereal for breakfast. Denies pain. Denies SOA .  Review of Systems:   See above     Objective     Vital Signs  Temp:  [97.5 °F (36.4 °C)-98.7 °F (37.1 °C)] 97.5 °F (36.4 °C)  Heart Rate:  [105-115] 115  Resp:  [18-20] 18  BP: ()/(56-67) 107/66    Flowsheet Rows      First Filed Value   Admission Height  172.7 cm (68\") Documented at 07/16/2020 1357   Admission Weight  96.6 kg (213 lb) Documented at 07/16/2020 1357          I/O this shift:  In: 100 [IV Piggyback:100]  Out: -   I/O last 3 completed shifts:  In: 420 [P.O.:420]  Out: -     Intake/Output Summary (Last 24 hours) at 8/5/2020 0946  Last data filed at 8/5/2020 0938  Gross per 24 hour   Intake 310 ml   Output --   Net 310 ml       Physical Exam:  General Appearance:On dialysis.  Using TDC.  systolic after 12.5 grams Albumin,  No UF.  Voice very hoarse. Not oriented.   Skin: warm and dry  HEENT: pupils round and reactive to light.   Neck: JVP elevated, trachea midline. TDC RIJ  Lungs: Clear to auscultation, no wheezing.  Good air movement.  Not labored on room air  Heart: RRR, tachycardic; no S3, no rub  Abdomen: soft, no guarding,+bs, no body wall edema.   Extremities: trace upper and lower ext edema.   Vascular: AVF left upper ext patent. " Large ecchymosis LUE .         Results Review:    Results from last 7 days   Lab Units 08/05/20  0709 08/03/20  0543 07/30/20  0713 07/29/20  1710   SODIUM mmol/L 141 134* 133*  --    POTASSIUM mmol/L 5.2 4.8 4.1  --    CHLORIDE mmol/L 100 95* 93*  --    CO2 mmol/L 19.8* 22.2 25.9  --    BUN mg/dL 64* 63* 28*  --    CREATININE mg/dL 9.35* 8.30* 5.24*  --    CALCIUM mg/dL 9.5 9.4 9.1  --    BILIRUBIN mg/dL  --   --   --  0.7   ALK PHOS U/L  --   --   --  50   ALT (SGPT) U/L  --   --   --  19   AST (SGOT) U/L  --   --   --  15   GLUCOSE mg/dL 175* 147* 98  --        Estimated Creatinine Clearance: 6.9 mL/min (A) (by C-G formula based on SCr of 9.35 mg/dL (H)).    Results from last 7 days   Lab Units 08/05/20  0709 08/03/20  0543   MAGNESIUM mg/dL  --  2.6*   PHOSPHORUS mg/dL 8.6* 6.4*             Results from last 7 days   Lab Units 08/03/20  0543 07/30/20  0713   WBC 10*3/mm3 9.90 9.15   HEMOGLOBIN g/dL 13.2 12.9*   PLATELETS 10*3/mm3 287 324       Results from last 7 days   Lab Units 08/05/20  0434 08/04/20  0518 08/03/20  0543 08/02/20  0453 08/01/20  0442   INR  3.91* 3.39* 3.92* 3.23* 2.84*         Imaging Results (Last 24 Hours)     ** No results found for the last 24 hours. **          arformoterol 15 mcg Nebulization BID - RT   aspirin 81 mg Nasogastric Daily   atorvastatin 20 mg Oral Q PM   famotidine 20 mg Oral Daily   hydrocortisone 10 mg Oral BID With Meals   insulin lispro 2-4 Units Subcutaneous 4x Daily With Meals & Nightly   melatonin 10 mg Oral Nightly   midodrine 10 mg Oral TID AC   OLANZapine 2.5 mg Oral Daily Before Supper   pseudoephedrine 15 mg Oral Q12H   warfarin 1.5 mg Oral Daily       Pharmacy to dose warfarin    Pharmacy to dose warfarin        Medication Review:   Current Facility-Administered Medications   Medication Dose Route Frequency Provider Last Rate Last Dose   • albumin human 25 % IV SOLN 12.5 g  12.5 g Intravenous PRN Migel Brown  mL/hr at 07/22/20 2004 12.5 g at 08/05/20  0938   • arformoterol (BROVANA) nebulizer solution 15 mcg  15 mcg Nebulization BID - RT Migel Brown MD   15 mcg at 08/04/20 0807   • aspirin chewable tablet 81 mg  81 mg Nasogastric Daily Migel Brown MD   81 mg at 08/04/20 0843   • atorvastatin (LIPITOR) tablet 20 mg  20 mg Oral Q PM Migel Brwon MD   20 mg at 08/04/20 2101   • calcium gluconate 1 g in sodium chloride 0.9 % 50 mL IVPB  1 g Intravenous PRN Migel Brown MD        Or   • calcium gluconate 2 g in sodium chloride 0.9 % 50 mL IVPB  2 g Intravenous PRN Migel Brown MD       • dextrose (D50W) 25 g/ 50mL Intravenous Solution 25 g  25 g Intravenous Q15 Min PRN Migel Brown MD       • dextrose (GLUTOSE) oral gel 15 g  15 g Oral Q15 Min PRN Migel Brown MD   15 g at 07/26/20 1108   • famotidine (PEPCID) tablet 20 mg  20 mg Oral Daily Migel Brown MD   20 mg at 08/04/20 0843   • glucagon (human recombinant) (GLUCAGEN DIAGNOSTIC) injection 1 mg  1 mg Subcutaneous Q15 Min PRN Migel Borwn MD       • haloperidol lactate (HALDOL) injection 2 mg  2 mg Intravenous Q2H PRN Duc Witt MD   2 mg at 07/31/20 0418   • heparin (porcine) injection 1,000-2,000 Units  1,000-2,000 Units Intravenous PRN Migel Brown MD       • heparin (porcine) injection 3,800 Units  3,800 Units Intracatheter PRN Migel Brown MD   3,800 Units at 08/03/20 1325   • HYDROcodone-acetaminophen (NORCO) 5-325 MG per tablet 1 tablet  1 tablet Oral Q6H PRN Migel Brown MD   1 tablet at 07/31/20 2127   • hydrocortisone (CORTEF) tablet 10 mg  10 mg Oral BID With Meals Trevor Whyte MD   10 mg at 08/04/20 1814   • insulin lispro (humaLOG) injection 2-4 Units  2-4 Units Subcutaneous 4x Daily With Meals & Nightly Trevor Whyte MD   3 Units at 08/04/20 2225   • melatonin tablet 10 mg  10 mg Oral Nightly Migel Brown MD   10 mg at 08/04/20 2101   • midodrine (PROAMATINE) tablet 10 mg  10 mg Oral TID AC Migel Brown MD   10 mg at 08/05/20 0633   • OLANZapine (zyPREXA) tablet 2.5 mg   2.5 mg Oral Daily Before Supper Duc Witt MD   2.5 mg at 08/04/20 1814   • Pharmacy to dose warfarin   Does not apply Continuous PRN Migel Brown MD       • Pharmacy to dose warfarin   Does not apply Continuous PRN Arthur Whyte MD       • pseudoephedrine (SUDAFED) tablet 15 mg  15 mg Oral Q12H Lyudmila Schaffer MD   15 mg at 08/05/20 0633   • sodium chloride 0.9 % flush 10 mL  10 mL Intravenous PRN Migel Brown MD   10 mL at 07/26/20 0805   • sodium phosphates 10 mmol in sodium chloride 0.9 % 100 mL IVPB  10 mmol Intravenous PRN Migel Brown MD       • warfarin (COUMADIN) tablet 1.5 mg  1.5 mg Oral Daily Luz Mcconnell MD   1.5 mg at 08/04/20 1814       Assessment/Plan   1.  ESRD.  Dialysis today with albumin support. Not removing volume.   Recent AVF balloon angioplasty 7/13. Using TDC for now .   Chronic hypotension limits dialysis and ability to remove volume.  His weight is not correct today .  2.  Acute hypoxic respiratory failure . Resolved.   3.  Chronic hypotension.  On midodrine, hydrocortisone, and pseudoephedrine  4.  Anemia of chronic kidney disease compensated.   5.  Coronary artery disease with prior four-vessel CABG and mitral valve replacement in October 2019.  6.  Diabetes mellitus type 2  7.  Peripheral vascular disease  8.  Secondary adrenal insufficiency. On steroid.  Had been on flurinef, too.  BP was still too low. On midodrine, hydrocortisone, and pseudoephedrine.  I have reduced sudafed to 15 mg bid for tachycardia .  9.  Paroxysmal atrial fibrillation, anticoagulated: not rate-controlled.  Check EKG after dialysis today.  Looks like sinus tach on monitor and by exam.        Plan:  1. Dialysis today.   2. He is declining overall.  Very weak, BP low and intolerant of volume removal with dialysis.  I think that his family is so used to seeing him debilitated, it is difficult to accept that he is declining. I agree with Palliative care consult . I had a long discussion with   "Mazin and her daughter by phone.  Explained that I think that he is declining.  He is so weak and debilitated that he cannot participate in acute rehab.  Although his wife told me yesterday that he stood for only a brief time with PT and was exhausted, today she tells me he did better yesterday.  She thinks that his confusion   \" comes and goes\".    I discussed again his intolerance to dialysis and volume removal, need for med support and risk of cardiac arrest on dialysis .    I told them to ask themselves what his true quality of life is.  They told me that he has a living will on file and does not want to be resuscitated.  I explained that he is currently a full code.  His wife wants to change to no CPR per his wishes, but wants to wait until the meeting with palliative at noon .    Lyudmila Schaffer MD  08/05/20  09:46    "

## 2020-08-05 NOTE — PROGRESS NOTES
Continued Stay Note  [facility]     Patient Name: Santino Retana  MRN: [id number]  Today's Date: 8/5/2020    Admit Date: 7/16/2020    Discharge Plan     Row Name 08/05/20 1249       Plan    Plan  Awaiting family decision    Plan Comments  CCP spoke with daughter Lola, pts wife in room, (pt in HD), along with Palliative Care nurse Nadja Thornton RN, then pts son Daniel and son shelbi Choi via speaker phone. They state they spoke with Dr. Schaffer this morning. Discussed palliaitive care, ongoing HD, and Rehab vs comfort care. Wife does not think that pt going to [facility] SNF with no visitors will be helpful to pt. Family to discuss further. Dr. Mcconnell to speak with daughter Lola when rounding. CCP to follow. [facility] continues to follow. If snf will need [employer] mcr pre-cert. kathia reinoso/ccp    Row Name 08/05/20 1009       Plan    Plan  Care Conference at noon today with pts wife and daughter Lola    Patient/Family in Agreement with Plan  yes    Plan Comments  CCP spoke with daughter Lola this morning and she requested care conference to further discuss goals of care/palliative with MD's. Updated Dr. Mcconnell and Hillary/Palliative care to coordinate times. Daughter and wife to be here at noon. Spoke with Leigh/Patient Safety, approved pts wife and daughter Lola for care conference only, then return back to 1 visitor for 24 hours. Spoke with Lola daughter via outbound call with update of time and permission granted for visitation. Also explained sent message to Dr. Schaffer if she is able to joing care conference at noon. Updated RN Nadja. Kathia rn/ccp        Discharge Codes    No documentation.             Mackenzie Davidson RN

## 2020-08-05 NOTE — PROGRESS NOTES
Name: Santino Retana ADMIT: 2020   : 1940  PCP: Fahad Murray Jr., MD    MRN: [id number] LOS: 20 days   AGE/SEX: 80 y.o. male  ROOM: [facility]     Subjective   Subjective   Patient now on palliative care.  Wife and daughter present.  Patient is awake but a little confused.  Cooperative.  Having cramps in the left hand.  This has been happening after dialysis.    Review of Systems     Objective   Objective   Vital Signs  Temp:  [96.8 °F (36 °C)-98.3 °F (36.8 °C)] 98.3 °F (36.8 °C)  Heart Rate:  [] 114  Resp:  [18-20] 18  BP: ()/(33-67) 84/57  SpO2:  [94 %-97 %] 97 %  on  Flow (L/min):  [2] 2;   Device (Oxygen Therapy): nasal cannula  Body mass index is 28.57 kg/m².  Physical Exam   Constitutional: He appears well-developed and well-nourished. No distress.   Chronically ill-appearing.  Looks older than age   HENT:   Head: Normocephalic.   Neck: Neck supple. No JVD present.   Cardiovascular: Normal rate and regular rhythm.   No murmur heard.  Pulmonary/Chest: No stridor. No respiratory distress. He has no wheezes. He has no rales.   Diminished.  Anteriorly clear   Abdominal: Soft. Bowel sounds are normal. He exhibits no distension. There is no tenderness.   No hepatosplenomegaly   Musculoskeletal: He exhibits no edema.   Neurological: He is alert.   Oriented to self   Skin: He is not diaphoretic.   Nursing note and vitals reviewed.      Results Review:       I reviewed the patient's new clinical results.  Results from last 7 days   Lab Units 20  0543 20  0713   WBC 10*3/mm3 9.90 9.15   HEMOGLOBIN g/dL 13.2 12.9*   PLATELETS 10*3/mm3 287 324     Results from last 7 days   Lab Units 20  0709 20  0543 20  0713   SODIUM mmol/L 141 134* 133*   POTASSIUM mmol/L 5.2 4.8 4.1   CHLORIDE mmol/L 100 95* 93*   CO2 mmol/L 19.8* 22.2 25.9   BUN mg/dL 64* 63* 28*   CREATININE mg/dL 9.35* 8.30* 5.24*   GLUCOSE mg/dL 175* 147* 98   Estimated Creatinine Clearance: 6.9 mL/min (A) (by  C-G formula based on SCr of 9.35 mg/dL (H)).  Results from last 7 days   Lab Units 08/05/20  0709 08/03/20  0543   ALBUMIN g/dL 4.20 3.70     Results from last 7 days   Lab Units 08/05/20  0709 08/03/20  0543 07/30/20  0713   CALCIUM mg/dL 9.5 9.4 9.1   ALBUMIN g/dL 4.20 3.70  --    MAGNESIUM mg/dL  --  2.6*  --    PHOSPHORUS mg/dL 8.6* 6.4*  --        Glucose   Date/Time Value Ref Range Status   08/05/2020 1402 139 (H) 70 - 130 mg/dL Final   08/05/2020 0623 151 (H) 70 - 130 mg/dL Final   08/04/2020 2044 305 (H) 70 - 130 mg/dL Final   08/04/2020 1630 237 (H) 70 - 130 mg/dL Final   08/04/2020 1052 178 (H) 70 - 130 mg/dL Final   08/04/2020 0638 152 (H) 70 - 130 mg/dL Final   08/03/2020 2125 239 (H) 70 - 130 mg/dL Final         arformoterol 15 mcg Nebulization BID - RT   famotidine 20 mg Oral Daily   hydrocortisone 10 mg Oral BID With Meals   melatonin 10 mg Oral Nightly   midodrine 10 mg Oral TID AC   OLANZapine 2.5 mg Oral Daily Before Supper   pseudoephedrine 15 mg Oral BID      Diet Soft Texture; [employer]; Thin       Assessment/Plan     Active Hospital Problems    Diagnosis  POA   • **Palliative care patient [Z51.5]  Not Applicable   • Symptomatic hypotension [I95.89]  Unknown   • SVT (supraventricular tachycardia) (CMS/HCC) [I47.1]  Unknown   • Metabolic encephalopathy [G93.41]  Unknown   • General weakness [R53.1]  Yes   • End stage renal failure on dialysis (CMS/[facility]) [N18.6, Z99.2]  Not Applicable   • Atrial fibrillation (CMS/HCC) [I48.91] [I48.91]  Yes   • Observed sleep apnea  [G47.30]  Yes   • Diabetes mellitus type 2, uncontrolled, with complications (CMS/HCC) [E11.8, E11.65]  Yes   • Adrenal insufficiency (CMS/[facility]) [E27.40]  Yes      Resolved Hospital Problems    Diagnosis Date Resolved POA   • Shock, septic (CMS/[facility]) [A41.9, R65.21] 07/26/2020 Unknown   • Sepsis with acute hypoxic respiratory failure (CMS/[facility]) [A41.9, R65.20, J96.01] 07/26/2020 Unknown       · Palliative care.  Continue comfort  measures.  Family requests that we continue midodrine, hydrocortisone, Sudafed and Zyprexa.  I have continued these medications.  Discussed earlier with CCP and both of patient's nurses  · Other medical issues as noted above.  No further aggressive intervention planned.  No further dialysis.      Luz Mcconnell MD  [facility]  08/05/20  19:33

## 2020-08-05 NOTE — PROGRESS NOTES
Continued Stay Note  [facility]     Patient Name: Santino Retana  MRN: [id number]  Today's Date: 8/5/2020    Admit Date: 7/16/2020    Discharge Plan     Row Name 08/05/20 1009       Plan    Plan  Care Conference at noon today with pts wife and daughter Lola    Patient/Family in Agreement with Plan  yes    Plan Comments  CCP spoke with daughter Lola this morning and she requested care conference to further discuss goals of care/palliative with MD's. Updated Dr. Mcconnell and Hillary/Palliative care to coordinate times. Daughter and wife to be here at noon. Spoke with Leigh/Patient Safety, approved pts wife and daughter Lola for care conference only, then return back to 1 visitor for 24 hours. Spoke with Lola daughter via outbound call with update of time and permission granted for visitation. Also explained sent message to Dr. Schaffer if she is able to joing care conference at noon. Updated DOC Hahn rn/ccp        Discharge Codes    No documentation.             Mackenzie Davidson RN

## 2020-08-05 NOTE — PROGRESS NOTES
Name: Santino Retana ADMIT: 2020   : 1940  PCP: Fahad Murray Jr., MD    MRN: [id number] LOS: 19 days   AGE/SEX: 80 y.o. male  ROOM: [facility]     Subjective   Subjective   Saw the patient earlier this afternoon.  Wife at bedside.  Patient is still very weak.  Dizzy when he sits up.  Confused at times    Review of Systems     Objective   Objective   Vital Signs  Temp:  [97.3 °F (36.3 °C)-98.7 °F (37.1 °C)] 97.6 °F (36.4 °C)  Heart Rate:  [105-119] 109  Resp:  [16-20] 20  BP: (88-98)/(52-72) 96/67  SpO2:  [94 %-100 %] 94 %  on  Flow (L/min):  [2] 2;   Device (Oxygen Therapy): room air  Body mass index is 29.84 kg/m².  Physical Exam   Constitutional: He appears well-developed. No distress.   Chronically ill-appearing.  Looks older than age   HENT:   Head: Normocephalic.   Neck: Neck supple. No JVD present.   Cardiovascular: Normal rate and regular rhythm.   No murmur heard.  Pulmonary/Chest: No stridor. No respiratory distress. He has no wheezes. He has no rales.   Diminished.  Scattered light rhonchi   Abdominal: Soft. Bowel sounds are normal. He exhibits no distension. There is no tenderness.   No hepatosplenomegaly   Musculoskeletal: He exhibits no edema.   Neurological: He is alert.   Skin: He is not diaphoretic.   Nursing note and vitals reviewed.      Results Review:       I reviewed the patient's new clinical results.  Results from last 7 days   Lab Units 20  0543 20  0713 20  0507   WBC 10*3/mm3 9.90 9.15 10.15   HEMOGLOBIN g/dL 13.2 12.9* 12.6*   PLATELETS 10*3/mm3 287 324 353     Results from last 7 days   Lab Units 20  0543 20  0713 20  0508   SODIUM mmol/L 134* 133* 140   POTASSIUM mmol/L 4.8 4.1 3.5   CHLORIDE mmol/L 95* 93* 98   CO2 mmol/L 22.2 25.9 22.3   BUN mg/dL 63* 28* 44*   CREATININE mg/dL 8.30* 5.24* 6.91*   GLUCOSE mg/dL 147* 98 115*   Estimated Creatinine Clearance: 7.9 mL/min (A) (by C-G formula based on SCr of 8.3 mg/dL (H)).  Results from last  7 days   Lab Units 08/03/20  0543 07/29/20  1710 07/29/20  0508   ALBUMIN g/dL 3.70 4.00 3.80   BILIRUBIN mg/dL  --  0.7  --    ALK PHOS U/L  --  50  --    AST (SGOT) U/L  --  15  --    ALT (SGPT) U/L  --  19  --      Results from last 7 days   Lab Units 08/03/20  0543 07/30/20  0713 07/29/20  1710 07/29/20  0508   CALCIUM mg/dL 9.4 9.1  --  8.8   ALBUMIN g/dL 3.70  --  4.00 3.80   MAGNESIUM mg/dL 2.6*  --   --   --    PHOSPHORUS mg/dL 6.4*  --   --  5.6*       Glucose   Date/Time Value Ref Range Status   08/04/2020 1630 237 (H) 70 - 130 mg/dL Final   08/04/2020 1052 178 (H) 70 - 130 mg/dL Final   08/04/2020 0638 152 (H) 70 - 130 mg/dL Final   08/03/2020 2125 239 (H) 70 - 130 mg/dL Final   08/03/2020 1610 156 (H) 70 - 130 mg/dL Final   08/02/2020 2120 212 (H) 70 - 130 mg/dL Final   08/02/2020 1606 190 (H) 70 - 130 mg/dL Final         arformoterol 15 mcg Nebulization BID - RT   aspirin 81 mg Nasogastric Daily   atorvastatin 20 mg Oral Q PM   famotidine 20 mg Oral Daily   hydrocortisone 10 mg Oral BID With Meals   insulin lispro 2-4 Units Subcutaneous 4x Daily With Meals & Nightly   melatonin 10 mg Oral Nightly   midodrine 10 mg Oral TID AC   OLANZapine 2.5 mg Oral Daily Before Supper   pseudoephedrine 15 mg Oral Q12H   warfarin 1.5 mg Oral Daily       Pharmacy to dose warfarin    Pharmacy to dose warfarin    Diet Soft Texture; [employer]; Thin       Assessment/Plan     Active Hospital Problems    Diagnosis  POA   • **Symptomatic hypotension [I95.89]  Unknown   • SVT (supraventricular tachycardia) (CMS/[facility]) [I47.1]  Unknown   • Metabolic encephalopathy [G93.41]  Unknown   • General weakness [R53.1]  Yes   • End stage renal failure on dialysis (CMS/[facility]) [N18.6, Z99.2]  Not Applicable   • Atrial fibrillation (CMS/HCC) [I48.91] [I48.91]  Yes   • Observed sleep apnea  [G47.30]  Yes   • Diabetes mellitus type 2, uncontrolled, with complications (CMS/HCC) [E11.8, E11.65]  Yes   • Adrenal insufficiency (CMS/HCC) [E27.40]   Yes      Resolved Hospital Problems    Diagnosis Date Resolved POA   • Shock, septic (CMS/[facility]) [A41.9, R65.21] 07/26/2020 Unknown   • Sepsis with acute hypoxic respiratory failure (CMS/[facility]) [A41.9, R65.20, J96.01] 07/26/2020 Unknown       · Frail patient with end-stage renal disease.  He is not tolerating dialysis very well.  He remains hypotensive despite midodrine and Sudafed.  Blood pressures are even lower on the days of dialysis.  I discussed this with the wife at length and then again by phone with the daughter.  I recommend consideration for palliative care.  I do not expect he will improve.  He has had poor tolerance of dialysis going back to the fall when it was initiated.  Daughter wants to discuss with patient's wife prior to contacting palliative care nurse.  I discussed with CCP and with patient's nurse.  Also have discussed with Dr. Jean Mcconnell and Dr. Lyudmila Schaffer.  · Suspected vascular dementia  · Atrial fibrillation on Coumadin  · Diabetes mellitus type 2 and adrenal insufficiency.  Endocrine following.  Meds as noted  · Hematuria.  Outpatient follow-up with urology for cystoscopy if able      Luz Mcconnell MD  [facility]  08/04/20  20:22

## 2020-08-05 NOTE — CONSULTS
Purpose of the visit was to evaluate for: goals of care/advanced care planning, support for patient/family, hospice referral/discussion, pain/symptom management, withdrawal of interventions, transfer to comfort care bed/unit and comfort care. Spoke with RN and CCP as well as family and discussed palliative care, goals of care, care options, resuscitation status, [facility], [facility] scattered bed status and discharge options.      Assessment:  Patient is palliative care appropriate for inpatient care given ESRD, on dialysis, adrenal insufficiency, hypotensive, vascular dementia, frail and weak, only standing with PT at bedside.     Recommendations/Plan: His wife verifies DNR/DNI, no CPR or ventilator.     They are considering stopping dialysis as it is not giving him a good quality of life but don't want to stop dialysis until all other avenues have been exhausted.    They feel going to rehab at [facility] would not benefit him as he would get more confused not seeing anyone he recognizes.     We talked about Palliative Care for end stage renal patients. If they felt there was an opportunity for him to be home at end of life, we could have [facility] evaluate and assist with discharge planning.      When the decision is made to stop dialysis, we recommend moving to [facility] for comfort care and allowing a natural death. The family needs some time to discuss privately and will let us know in the next day or two.     Other Comments: We met with his wife and daughter in the room. His son and son in law were on the phone. CCP and his nurse were also present in the room.

## 2020-08-05 NOTE — PROGRESS NOTES
Continued Stay Note  [facility]     Patient Name: Santino Retana  MRN: [id number]  Today's Date: 8/5/2020    Admit Date: 7/16/2020    Discharge Plan     Row Name 08/05/20 9142       Plan    Plan  Transfer to Palliative/4p    Patient/Family in Agreement with Plan  yes    Plan Comments  Recieved inbound call from angela Davila, as a family she states they have decided to pursue Palliative care. CCP explained would notify Dr. Mcconnell and Hillary/ Palliative Care. CCP called Hillary/Palliative Care with update and notified Dr. Mcconnell. kathia rn/ccp    Row Name 08/05/20 4166       Plan    Plan  Awaiting family decision    Plan Comments  CCP spoke with angela Davila, pts wife in room, (pt in HD), along with Palliative Care nurse Nadja Thornton RN, then pts son Daniel and son shelbi Choi via speaker phone. They state they spoke with Dr. Schaffer this morning. Discussed palliaitive care, ongoing HD, and Rehab vs comfort care. Wife does not think that pt going to [facility] with no visitors will be helpful to pt. Family to discuss further. Dr. Mcconnell to speak with angela Davila when rounding. CCP to follow. [facility] continues to follow. If snf will need [employer] mcr pre-cert. kathia reinoso/ccp        Discharge Codes    No documentation.             Mackenzie Davidson RN

## 2020-08-05 NOTE — PLAN OF CARE
Problem: Patient Care Overview  Goal: Plan of Care Review  Outcome: Ongoing (interventions implemented as appropriate)  Flowsheets  Taken 8/5/2020 1629 by Natividad Walker, RN  Progress: no change  Outcome Summary: pt tranferred from [facility], recd HD today but per nephrology situation not improving, family wants palliative care, alert oriented to person only, mech soft diet,thin liquids, takes meds whole, per family restless at times, hypotensive, takes midodrine, right av fisutua, right chest shiley,  Taken 8/5/2020 0800 by Nadja Retana, RN  Plan of Care Reviewed With: patient

## 2020-08-06 NOTE — PROGRESS NOTES
Name: Santino Retana ADMIT: 2020   : 1940  PCP: Fahad Murray Jr., MD    MRN: [id number] LOS: 21 days   AGE/SEX: 80 y.o. male  ROOM: [facility]/     Subjective   Subjective   Awake.  Says he feels okay.  Son at bedside    Review of Systems     Objective   Objective   Vital Signs  Temp:  [96.8 °F (36 °C)-98.3 °F (36.8 °C)] 97.2 °F (36.2 °C)  Heart Rate:  [] 115  Resp:  [18] 18  BP: ()/(33-66) 91/65  SpO2:  [96 %-97 %] 96 %  on  Flow (L/min):  [2] 2;   Device (Oxygen Therapy): room air  Body mass index is 28.57 kg/m².  Physical Exam   Constitutional: He appears well-developed and well-nourished. No distress.   Chronically ill-appearing   HENT:   Head: Normocephalic.   Neck: Neck supple. No JVD present.   Cardiovascular: Normal rate and regular rhythm.   No murmur heard.  Pulmonary/Chest: No stridor. No respiratory distress. He has no wheezes. He has no rales.   Diminished.  Anteriorly clear   Abdominal: Soft. Bowel sounds are normal. He exhibits no distension. There is no tenderness.   Musculoskeletal: He exhibits no edema.   Neurological: He is alert.   Skin: He is not diaphoretic.   Nursing note and vitals reviewed.            arformoterol 15 mcg Nebulization BID - RT   famotidine 20 mg Oral Daily   hydrocortisone 10 mg Oral BID With Meals   melatonin 10 mg Oral Nightly   midodrine 10 mg Oral TID AC   OLANZapine 2.5 mg Oral Daily Before Supper   pseudoephedrine 15 mg Oral BID      Diet Soft Texture; Whole Foods; Thin       Assessment/Plan     Active Hospital Problems    Diagnosis  POA   • **Palliative care patient [Z51.5]  Not Applicable   • Symptomatic hypotension [I95.89]  Unknown   • SVT (supraventricular tachycardia) (CMS/HCC) [I47.1]  Unknown   • Metabolic encephalopathy [G93.41]  Unknown   • General weakness [R53.1]  Yes   • End stage renal failure on dialysis (CMS/HCC) [N18.6, Z99.2]  Not Applicable   • Atrial fibrillation (CMS/HCC) [I48.91] [I48.91]  Yes   • Observed sleep apnea   [G47.30]  Yes   • Diabetes mellitus type 2, uncontrolled, with complications (CMS/[facility]) [E11.8, E11.65]  Yes   • Adrenal insufficiency (CMS/[facility]) [E27.40]  Yes      Resolved Hospital Problems    Diagnosis Date Resolved POA   • Shock, septic (CMS/[facility]) [A41.9, R65.21] 07/26/2020 Unknown   • Sepsis with acute hypoxic respiratory failure (CMS/[facility]) [A41.9, R65.20, J96.01] 07/26/2020 Unknown       · End-stage renal disease with failure to thrive, chronic hypotension.  Palliative care.  Continue comfort measures.  Patient has adrenal insufficiency.  Continuing hydrocortisone as well as midodrine and Sudafed for hypotension.  He has multiple family members who are traveling from out of state to see him.  · Other medical issues as noted above.  No active intervention.      Luz Mcconnell MD  [facility]  08/06/20  08:00

## 2020-08-06 NOTE — PROGRESS NOTES
Continued Stay Note  [facility]     Patient Name: Santino Retana  MRN: [id number]  Today's Date: 8/6/2020    Admit Date: 7/16/2020    Discharge Plan     Row Name 08/06/20 1522       Plan    Plan  Follow for [facility] consult.      Plan Comments  Palliative patient.  [facility] consult pending.  CCP will follow for [facility] consult and will assist as needed.  SEBASTIEN Liang        Discharge Codes    No documentation.             SEBASTIEN Tai

## 2020-08-06 NOTE — PLAN OF CARE
Problem: Patient Care Overview  Goal: Plan of Care Review  Outcome: Ongoing (interventions implemented as appropriate)  Flowsheets (Taken 8/6/2020 6109)  Progress: no change  Plan of Care Reviewed With: patient; family  Outcome Summary: Maintained comfort measures per palliative care protocol. No PRN meds needed this shift. Patient slept between care. Son at bedside. Will continue to monitor vital signs and comfort.

## 2020-08-06 NOTE — PLAN OF CARE
Problem: Patient Care Overview  Goal: Plan of Care Review  Outcome: Ongoing (interventions implemented as appropriate)  Flowsheets (Taken 8/6/2020 1819)  Progress: no change  Plan of Care Reviewed With: patient  Outcome Summary: Patient appears comfortable at rest and denies need for any PRN symptom management. Family at bedside supportive of  care. He remains alert, but disoriented. Will continue to monitor per palliative goals of care.

## 2020-08-06 NOTE — CONSULTS
"[place]    [place]   *  [employer]    Importance  *  Strong part of his world view and shaper of his character.  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      gris Anderson  "

## 2020-08-07 NOTE — PROGRESS NOTES
Discharge Planning Assessment  [facility]     Patient Name: Santino Retana  MRN: [id number]  Today's Date: 8/7/2020    Admit Date: 7/16/2020    Discharge Needs Assessment    No documentation.       Discharge Plan     Row Name 08/07/20 1209       Plan    Plan Comments  The patient was transferred to [facility] from [address] [place] [address] on 8/5/2020. The patient is palliative. The patient stopped Dialysis. [facility] here to evaluate for a \[facility]\"" scattered bed. LOAN Woody RN, CCP.         Destination - Selection Complete      Service Provider Request Status Selected Services Address Phone Number Fax Number    [facility] Selected Skilled Nursing [address] [address] [phone]      Durable Medical Equipment      Coordination has not been started for this encounter.      Dialysis/Infusion      Coordination has not been started for this encounter.      Home Medical Care      Service Provider Request Status Selected Services Address Phone Number Fax Number    [facility] [address] [address] [phone]      Therapy      Coordination has not been started for this encounter.      Community Resources      Coordination has not been started for this encounter.          Demographic Summary    No documentation.       Functional Status    No documentation.       Psychosocial    No documentation.       Abuse/Neglect    No documentation.       Legal    No documentation.       Substance Abuse    No documentation.       Patient Forms    No documentation.           Ni Woody RN

## 2020-08-07 NOTE — CONSULTS
Met with spouse and dgtr and provided explanation of services. Patient is hospice eligible but not HSB eligible today. There is potential for patient to develop symptoms that would qualify him for HSB. Family is agreeable to follow up on Monday for possible HSB. [facility] to follow up on Monday.    Thank you for the referral.    Janine Vaughn RN  [facility]  [phone]

## 2020-08-07 NOTE — PROGRESS NOTES
Name: Santino Retana ADMIT: 2020   : 1940  PCP: Fahad Murray Jr., MD    MRN: [id number] LOS: 22 days   AGE/SEX: 80 y.o. male  ROOM: [facility]     Subjective   Subjective   I saw the patient this morning.  He complained of the left ear feeling full.  Wife and daughter at bedside.    Review of Systems     Objective   Objective   Vital Signs  Temp:  [97.5 °F (36.4 °C)-97.9 °F (36.6 °C)] 97.9 °F (36.6 °C)  Heart Rate:  [111-120] 111  Resp:  [22] 22  BP: (91-95)/(54-62) 91/62  SpO2:  [93 %-95 %] 95 %  on   ;   Device (Oxygen Therapy): room air  Body mass index is 28.57 kg/m².  Physical Exam   Constitutional: He appears well-developed and well-nourished.   HENT:   Head: Normocephalic.   Eyes:   Wax obscuring left TM.  Some wax in the right but TM visible   Neck: Neck supple. No JVD present.   Cardiovascular: Normal rate and regular rhythm.   No murmur heard.  Pulmonary/Chest: No stridor. No respiratory distress. He has no wheezes. He has rales.   Crackles at bases   Abdominal: Soft.   Musculoskeletal: He exhibits no edema.   Neurological: He is alert.   Oriented to self   Nursing note and vitals reviewed.      Results Review:       I reviewed the patient's new clinical results.  Results from last 7 days   Lab Units 20  0543   WBC 10*3/mm3 9.90   HEMOGLOBIN g/dL 13.2   PLATELETS 10*3/mm3 287     Results from last 7 days   Lab Units 20  0709 20  0543   SODIUM mmol/L 141 134*   POTASSIUM mmol/L 5.2 4.8   CHLORIDE mmol/L 100 95*   CO2 mmol/L 19.8* 22.2   BUN mg/dL 64* 63*   CREATININE mg/dL 9.35* 8.30*   GLUCOSE mg/dL 175* 147*   Estimated Creatinine Clearance: 6.9 mL/min (A) (by C-G formula based on SCr of 9.35 mg/dL (H)).  Results from last 7 days   Lab Units 20  0709 20  0543   ALBUMIN g/dL 4.20 3.70     Results from last 7 days   Lab Units 20  0709 20  0543   CALCIUM mg/dL 9.5 9.4   ALBUMIN g/dL 4.20 3.70   MAGNESIUM mg/dL  --  2.6*   PHOSPHORUS mg/dL 8.6* 6.4*        Glucose   Date/Time Value Ref Range Status   08/05/2020 1402 139 (H) 70 - 130 mg/dL Final   08/05/2020 0623 151 (H) 70 - 130 mg/dL Final   08/04/2020 2044 305 (H) 70 - 130 mg/dL Final         carbamide peroxide 5 drop Both Ears BID   famotidine 20 mg Oral Daily   hydrocortisone 10 mg Oral BID With Meals   melatonin 10 mg Oral Nightly   midodrine 10 mg Oral TID AC   OLANZapine 2.5 mg Oral Daily Before Supper   pseudoephedrine 15 mg Oral BID      Diet Soft Texture; Whole Foods; Thin       Assessment/Plan     Active Hospital Problems    Diagnosis  POA   • **Palliative care patient [Z51.5]  Not Applicable   • Symptomatic hypotension [I95.89]  Unknown   • SVT (supraventricular tachycardia) (CMS/[facility]) [I47.1]  Unknown   • Metabolic encephalopathy [G93.41]  Unknown   • General weakness [R53.1]  Yes   • End stage renal failure on dialysis (CMS/[facility]) [N18.6, Z99.2]  Not Applicable   • Atrial fibrillation (CMS/[facility]) [I48.91] [I48.91]  Yes   • Observed sleep apnea  [G47.30]  Yes   • Diabetes mellitus type 2, uncontrolled, with complications (CMS/[facility]) [E11.8, E11.65]  Yes   • Adrenal insufficiency (CMS/[facility]) [E27.40]  Yes      Resolved Hospital Problems    Diagnosis Date Resolved POA   • Shock, septic (CMS/[facility]) [A41.9, R65.21] 07/26/2020 Unknown   • Sepsis with acute hypoxic respiratory failure (CMS/[facility]) [A41.9, R65.20, J96.01] 07/26/2020 Unknown       · Palliative care.  Continue comfort measures.  Discussed with family members at bedside.  · Cerumen impaction left ear greater than right.  Drops ordered      Luz Mcconnell MD  [facility]  08/07/20  17:58

## 2020-08-07 NOTE — PLAN OF CARE
Problem: Patient Care Overview  Goal: Plan of Care Review  Outcome: Ongoing (interventions implemented as appropriate)  Flowsheets (Taken 8/7/2020 9404)  Progress: no change  Plan of Care Reviewed With: patient  Outcome Summary: Ear drops started per patient's request. Patient denies need for symptom management. He appears comfortable at rest. Family met with [facility] today. Will continue to monitor per palliative goals of care.

## 2020-08-07 NOTE — PLAN OF CARE
Problem: Patient Care Overview  Goal: Plan of Care Review  Flowsheets  Taken 8/7/2020 0558  Progress: no change  Taken 8/6/2020 1954  Plan of Care Reviewed With: patient;spouse;daughter;friend  Note:   Pt rested well with family at bedside. Pt denies need for pain meds but shows s/s of discomfort. Family refused repositionings. Educated on risks for skin breakdown. Continue comfort care.

## 2020-08-08 NOTE — PROGRESS NOTES
Name: Santino Retana ADMIT: 2020   : 1940  PCP: Fahad Murray Jr., MD    MRN: [id number] LOS: 23 days   AGE/SEX: 80 y.o. male  ROOM: [facility]     Subjective   Subjective   I saw the patient this morning.  Wife and son at bedside.  Patient was awake, pleasant and conversational though confused    Review of Systems     Objective   Objective   Vital Signs  Temp:  [96.9 °F (36.1 °C)] 96.9 °F (36.1 °C)  Heart Rate:  [102-110] 102  Resp:  [16-20] 16  BP: ()/(60-69) 105/69  SpO2:  [91 %-93 %] 91 %  on   ;   Device (Oxygen Therapy): room air  Body mass index is 28.57 kg/m².  Physical Exam   Constitutional: He appears well-developed and well-nourished. No distress.   HENT:   Head: Normocephalic.   Neck: Neck supple. No JVD present.   Cardiovascular: Regular rhythm.   No murmur heard.  Tachycardic   Pulmonary/Chest: No stridor. No respiratory distress. He has no wheezes. He has rales.   Diminished.  Light scattered crackles   Abdominal: Soft. Bowel sounds are normal. He exhibits no distension. There is no tenderness. There is no guarding.   No hepatosplenomegaly   Musculoskeletal: He exhibits edema.   Trace left ankle   Neurological: He is alert.   Skin: He is not diaphoretic.   Nursing note and vitals reviewed.      Results Review:       I reviewed the patient's new clinical results.  Results from last 7 days   Lab Units 20  0543   WBC 10*3/mm3 9.90   HEMOGLOBIN g/dL 13.2   PLATELETS 10*3/mm3 287     Results from last 7 days   Lab Units 20  0709 20  0543   SODIUM mmol/L 141 134*   POTASSIUM mmol/L 5.2 4.8   CHLORIDE mmol/L 100 95*   CO2 mmol/L 19.8* 22.2   BUN mg/dL 64* 63*   CREATININE mg/dL 9.35* 8.30*   GLUCOSE mg/dL 175* 147*   Estimated Creatinine Clearance: 6.9 mL/min (A) (by C-G formula based on SCr of 9.35 mg/dL (H)).  Results from last 7 days   Lab Units 20  0709 20  0543   ALBUMIN g/dL 4.20 3.70     Results from last 7 days   Lab Units 20  0709 20  0543    CALCIUM mg/dL 9.5 9.4   ALBUMIN g/dL 4.20 3.70   MAGNESIUM mg/dL  --  2.6*   PHOSPHORUS mg/dL 8.6* 6.4*       No results found for: HGBA1C, POCGLU      famotidine 20 mg Oral Daily   hydrocortisone 10 mg Oral BID With Meals   melatonin 10 mg Oral Nightly   midodrine 10 mg Oral TID AC   OLANZapine 2.5 mg Oral Daily Before Supper   pseudoephedrine 15 mg Oral BID      Diet Soft Texture; Whole Foods; Thin       Assessment/Plan     Active Hospital Problems    Diagnosis  POA   • **Palliative care patient [Z51.5]  Not Applicable   • Symptomatic hypotension [I95.89]  Unknown   • SVT (supraventricular tachycardia) (CMS/[facility]) [I47.1]  Unknown   • Metabolic encephalopathy [G93.41]  Unknown   • General weakness [R53.1]  Yes   • End stage renal failure on dialysis (CMS/[facility]) [N18.6, Z99.2]  Not Applicable   • Atrial fibrillation (CMS/[facility]) [I48.91] [I48.91]  Yes   • Observed sleep apnea  [G47.30]  Yes   • Diabetes mellitus type 2, uncontrolled, with complications (CMS/HCC) [E11.8, E11.65]  Yes   • Adrenal insufficiency (CMS/[facility]) [E27.40]  Yes      Resolved Hospital Problems    Diagnosis Date Resolved POA   • Shock, septic (CMS/HCC) [A41.9, R65.21] 07/26/2020 Unknown   • Sepsis with acute hypoxic respiratory failure (CMS/[facility]) [A41.9, R65.20, J96.01] 07/26/2020 Unknown       · End-stage renal disease with failure to thrive, hypotension, SVT, adrenal insufficiency.  Continue comfort measures.  · Other medical problems as noted above.      Luz Mcconnell MD  [facility]  08/08/20  19:08

## 2020-08-08 NOTE — PLAN OF CARE
Problem: Patient Care Overview  Goal: Plan of Care Review  Outcome: Ongoing (interventions implemented as appropriate)  Flowsheets (Taken 8/8/2020 1819)  Progress: no change  Outcome Summary: VSS, blood pressure still on the low side, midodrine continued. Pt skin c/d/i w/o signs of skin breakdown. Family @ bedside. Pt resting comfortably. Continue to monitor.

## 2020-08-08 NOTE — PLAN OF CARE
Problem: Patient Care Overview  Goal: Plan of Care Review  Outcome: Ongoing (interventions implemented as appropriate)  Flowsheets (Taken 8/8/2020 4612)  Progress: no change  Plan of Care Reviewed With: patient; family  Outcome Summary: Family at bsd through shift. Pt has been asymptomatic and w/o c/o. Pt pleasanty confused, able to follow directions and communicate needs appropriately. Pt appears to have rested/slept comfortably between care. Continue to monitor and tx per POC and MD orders.

## 2020-08-09 NOTE — PROGRESS NOTES
Name: Santino Retana ADMIT: 2020   : 1940  PCP: Fahad Murray Jr., MD    MRN: [id number] LOS: 24 days   AGE/SEX: 80 y.o. male  ROOM: [facility]     Subjective   Subjective   I saw the patient this morning.  Wife and another family member present at bedside.  Patient was awake.  Said he was doing okay    Review of Systems     Objective   Objective   Vital Signs  Temp:  [97.7 °F (36.5 °C)] 97.7 °F (36.5 °C)  Heart Rate:  [102-112] 111  Resp:  [16-26] 16  BP: ()/(62-82) 130/82  SpO2:  [91 %-94 %] 94 %  on   ;   Device (Oxygen Therapy): room air  Body mass index is 28.57 kg/m².  Physical Exam   Constitutional: He appears well-developed. No distress.   Chronically ill-appearing   HENT:   Head: Normocephalic.   Cardiovascular: Regular rhythm.   No murmur heard.  Tachycardic   Pulmonary/Chest: No stridor. No respiratory distress. He has no wheezes.   Diminished.  Tachypnea.  Shallow respirations   Abdominal: Soft. Bowel sounds are normal. He exhibits no distension. There is no tenderness.   Musculoskeletal: He exhibits no edema.   Neurological: He is alert.   Skin: He is not diaphoretic.   Nursing note and vitals reviewed.      Results Review:       I reviewed the patient's new clinical results.  Results from last 7 days   Lab Units 20  0543   WBC 10*3/mm3 9.90   HEMOGLOBIN g/dL 13.2   PLATELETS 10*3/mm3 287     Results from last 7 days   Lab Units 20  0709 20  0543   SODIUM mmol/L 141 134*   POTASSIUM mmol/L 5.2 4.8   CHLORIDE mmol/L 100 95*   CO2 mmol/L 19.8* 22.2   BUN mg/dL 64* 63*   CREATININE mg/dL 9.35* 8.30*   GLUCOSE mg/dL 175* 147*   Estimated Creatinine Clearance: 6.9 mL/min (A) (by C-G formula based on SCr of 9.35 mg/dL (H)).  Results from last 7 days   Lab Units 20  0709 20  0543   ALBUMIN g/dL 4.20 3.70     Results from last 7 days   Lab Units 20  0709 20  0543   CALCIUM mg/dL 9.5 9.4   ALBUMIN g/dL 4.20 3.70   MAGNESIUM mg/dL  --  2.6*   PHOSPHORUS  mg/dL 8.6* 6.4*       No results found for: HGBA1C, POCGLU      famotidine 20 mg Oral Daily   hydrocortisone 10 mg Oral BID With Meals   melatonin 10 mg Oral Nightly   midodrine 10 mg Oral TID AC   OLANZapine 2.5 mg Oral Daily Before Supper   pseudoephedrine 15 mg Oral BID      Diet Soft Texture; Whole Foods; Thin       Assessment/Plan     Active Hospital Problems    Diagnosis  POA   • **Palliative care patient [Z51.5]  Not Applicable   • Symptomatic hypotension [I95.89]  Unknown   • SVT (supraventricular tachycardia) (CMS/[facility]) [I47.1]  Unknown   • Metabolic encephalopathy [G93.41]  Unknown   • General weakness [R53.1]  Yes   • End stage renal failure on dialysis (CMS/[facility]) [N18.6, Z99.2]  Not Applicable   • Atrial fibrillation (CMS/HCC) [I48.91] [I48.91]  Yes   • Observed sleep apnea  [G47.30]  Yes   • Diabetes mellitus type 2, uncontrolled, with complications (CMS/HCC) [E11.8, E11.65]  Yes   • Adrenal insufficiency (CMS/HCC) [E27.40]  Yes      Resolved Hospital Problems    Diagnosis Date Resolved POA   • Shock, septic (CMS/HCC) [A41.9, R65.21] 07/26/2020 Unknown   • Sepsis with acute hypoxic respiratory failure (CMS/[facility]) [A41.9, R65.20, J96.01] 07/26/2020 Unknown       · Palliative care.  Patient and family request transfer to Dr. Juan's service.  Continue comfort measures      Luz Mcconnell MD  [facility]  08/09/20  17:43

## 2020-08-09 NOTE — PLAN OF CARE
Problem: Patient Care Overview  Goal: Plan of Care Review  Outcome: Ongoing (interventions implemented as appropriate)  Flowsheets (Taken 8/9/2020 9264)  Progress: no change  Plan of Care Reviewed With: patient; family  Outcome Summary: Pt. continued with palliative care.  No complaints of pain, anxiety, or nausea at this time.  Family at bedside throughout the day.  Will continue to monitor.

## 2020-08-09 NOTE — PLAN OF CARE
Problem: Patient Care Overview  Goal: Plan of Care Review  Outcome: Ongoing (interventions implemented as appropriate)  Flowsheets (Taken 8/9/2020 8511)  Progress: no change  Plan of Care Reviewed With: patient; spouse; family  Outcome Summary: Pt has been asymptomatic and w/o c/o. Family at bsd through shift. Continue to monitor and tx per POC and MD orders.

## 2020-08-10 PROBLEM — J15.6 PNEUMONIA DUE TO GRAM-NEGATIVE BACTERIA (HCC): Status: ACTIVE | Noted: 2020-01-01

## 2020-08-10 PROBLEM — J15.69 PNEUMONIA DUE TO GRAM-NEGATIVE BACTERIA: Status: ACTIVE | Noted: 2020-01-01

## 2020-08-10 PROBLEM — J15.6 PNEUMONIA DUE TO GRAM-NEGATIVE BACTERIA (HCC): Status: RESOLVED | Noted: 2020-01-01 | Resolved: 2020-01-01

## 2020-08-10 PROBLEM — A41.9 SEPSIS, UNSPECIFIED ORGANISM (HCC): Status: RESOLVED | Noted: 2020-01-01 | Resolved: 2020-01-01

## 2020-08-10 PROBLEM — A41.9 SEPSIS, UNSPECIFIED ORGANISM (HCC): Status: ACTIVE | Noted: 2020-01-01

## 2020-08-10 PROBLEM — Z92.89 HISTORY OF HEMODIALYSIS: Status: ACTIVE | Noted: 2020-01-01

## 2020-08-10 PROBLEM — I35.0 AORTIC VALVE STENOSIS, MILD: Status: ACTIVE | Noted: 2020-01-01

## 2020-08-10 PROBLEM — Z86.73 HISTORY OF CVA (CEREBROVASCULAR ACCIDENT): Status: ACTIVE | Noted: 2020-01-01

## 2020-08-10 PROBLEM — G47.33 OSA TREATED WITH BIPAP: Status: ACTIVE | Noted: 2019-01-01

## 2020-08-10 PROBLEM — J96.01 ACUTE RESPIRATORY FAILURE WITH HYPOXIA (HCC): Status: RESOLVED | Noted: 2020-01-01 | Resolved: 2020-01-01

## 2020-08-10 PROBLEM — I95.3 HEMODIALYSIS-ASSOCIATED HYPOTENSION: Status: ACTIVE | Noted: 2020-01-01

## 2020-08-10 PROBLEM — I27.20 PULMONARY HYPERTENSION (HCC): Status: ACTIVE | Noted: 2020-01-01

## 2020-08-10 PROBLEM — E27.49 SECONDARY ADRENAL INSUFFICIENCY (HCC): Status: ACTIVE | Noted: 2020-01-01

## 2020-08-10 PROBLEM — I95.89 CHRONIC HYPOTENSION: Status: ACTIVE | Noted: 2020-01-01

## 2020-08-10 PROBLEM — I50.22 CHRONIC SYSTOLIC CONGESTIVE HEART FAILURE (HCC): Status: ACTIVE | Noted: 2020-01-01

## 2020-08-10 PROBLEM — J15.69 PNEUMONIA DUE TO GRAM-NEGATIVE BACTERIA: Status: RESOLVED | Noted: 2020-01-01 | Resolved: 2020-01-01

## 2020-08-10 PROBLEM — J96.01 ACUTE RESPIRATORY FAILURE WITH HYPOXIA (HCC): Status: ACTIVE | Noted: 2020-01-01

## 2020-08-10 NOTE — PLAN OF CARE
Problem: Patient Care Overview  Goal: Plan of Care Review  Outcome: Ongoing (interventions implemented as appropriate)  Flowsheets (Taken 8/10/2020 1803)  Progress: no change  Plan of Care Reviewed With: spouse; family; patient  Outcome Summary: Patient medicated once with ativan for restlessness; otherwise, patient appears comfortable at rest. He was mostly alert throughout shift with baseline confusion. Awareness did decline this evening. Family at bedside supportive of care. Will continue to monitor per palliative goals of care.

## 2020-08-10 NOTE — PLAN OF CARE
Problem: Patient Care Overview  Goal: Plan of Care Review  Outcome: Ongoing (interventions implemented as appropriate)  Flowsheets (Taken 8/10/2020 6935)  Progress: no change  Plan of Care Reviewed With: patient; spouse; family  Outcome Summary: Pt asymptomatic and w/o c/o. Family at bsd. Continue to monitor and tx per POC and MD orders.

## 2020-08-11 NOTE — PLAN OF CARE
Problem: Patient Care Overview  Goal: Plan of Care Review  Outcome: Ongoing (interventions implemented as appropriate)  Flowsheets (Taken 8/11/2020 1806)  Progress: no change  Plan of Care Reviewed With: patient; family  Outcome Summary: Patient medicated twice with ativan for restlessness - family agreeable to PRN symptom management. He has had several BMs. Avoid turns to back due to sleep apnea. Will continue to monitor per palliative goals of care.

## 2020-08-11 NOTE — H&P
Palliative Care/Hospice Admit/Consult Note       Referring Provider: Luz Mcconnell MD  Reason for Consultation: Palliative/Hospice Care  Date of Admission:  7/16/2020    Patient Care Team:  Fahad Murray Jr., MD as PCP - General (Family Medicine)  Alex Simpson MD as Consulting Physician (Nephrology)  Bennie Paul MD as Consulting Physician (Cardiology)  Miah Jordan MD as Consulting Physician (Pulmonary Disease)  Trevor Whyte MD as Consulting Physician (Endocrinology)  Clemente Juan MD as Consulting Physician (Hospice and Palliative Medicine)    Chief complaint: End-stage renal disease; Last HD 8/5/2020    History of present illness:  The patient is a 80 y.o. male who presenteds to the ED 7/16/2020 with weakness with onset 2 weeks PTA. Reports generalized body  weakness. Initially mild, now severe. Walking and movement worsens the symptoms. Wife reported the patient has garbled speech which she reported was worse than normal since 4 am PTA. Hx IDDM. Has not checked his BG today.  Wife stated his speech has improved since ED admission. HD performed yesterday PTA.  Pulmonary consultation obtained due to intermittent hypoxia and hypotension.  Respiratory viral panel and Covid not detected.  The patient did require intubation and mechanical ventilation.  Based on focused assessed echocardiogram, findings suggestive of shock not responsive to intravascular volume resuscitation.  The patient does have adrenal insufficiency secondary to intermittent steroid use on fludrocortisone upon admission.  The patient was started on CRRT.  The patient was treated for left lower lobe pneumonia.  With aggressive therapy, the patient was extubated 7/23/2020.  Cardiology evaluated the patient.  Neurology saw the patient for pneumaturia.  It was thought secondary to local catheter trauma.  The patient was transfusion back to 3 times a week HD.  The patient has a history of vascular dementia.   The patient needed neurology consultation and was thought to have acute psychosis with metabolic encephalopathy.  Even with improvement of his acute illness, the patient did not tolerate dialysis well.  The patient continued to be hypotensive despite midodrine and Sudafed.  Nephrologist and hospitalist started discussion with the wife and family concerning palliative care.  Despite all therapy being given, the patient not making significant improvement.  Last hemodialysis administered 8/5/2020.  Subsequently, the patient was transitioned to palliative care unit.  I was asked to assume his care.    At the time of my examination, the patient was on his back with head of bed elevated 30 degrees or so.  The patient demonstrated some confusion.  The patient could speak 3 of 5 words at times short of breath.  The patient states his shortness of breath was worse than his pain.  With palpation, he states he had 8 out of 10 abdominal pain?  The patient's wife and family were at bedside during my examination.    Review of Systems  Pertinent items are noted in HPI    Palliative Performance Scale  Palliative Performance Scale Score: 20%  [place] Symptom Assessment System Revised  Pain Score: no pain   ESAS Tiredness Score: 6  ESAS Nausea Score: No nausea  ESAS Depression Score: unable to assess  ESAS Anxiety Score: No anxiety  ESAS Drowsiness Score: 6  ESAS Lack of Appetite Score: 7  ESAS Wellbeing Score: unable to assess  ESAS Dyspnea Score: No shortness of breath  ESAS Other Problem Score: Best possible response  ESAS Source of Information: family caregiver, healthcare professional caregiver, patient  ESAS Intervention: medicated/see MAR  ESAS Intervention Response: tolerated    History  Past Medical History:   Diagnosis Date   • Adrenal insufficiency (CMS/HCC)    • Arthritis    • Atrial fibrillation (CMS/HCC)    • CAD (coronary artery disease)     Status post 4 vessel CABG on 10/10/19 with Dr. Serra (BRAVO-LAD, SVG-OM1,  sequential SVG to PDA and distal RCA)   • Carotid artery disease (CMS/[facility])     Right CEA 9/26/16.  Left CEA 7/18/17 (with dionte-op CVA)   • CHF (congestive heart failure) (CMS/[facility])    • CVA (cerebral vascular accident) (CMS/[facility])     on 7/18/17 dionte-operatively from left CEA.     • Diabetes mellitus (CMS/[facility]) 1998    DR CAROLINE DURON   • Elevated cholesterol    • ESRD (end stage renal disease) (CMS/[facility])     FRESENIUS TU/TH/SAT   • History of pressure ulcer 10/2019    SACRAL   • Hyperlipidemia    • Low back pain    • NSTEMI (non-ST elevated myocardial infarction) (CMS/[facility])     NSTEMI following left CEA and dionte-op stroke in 7/18 ([facility]).     • Sleep apnea     WEARS CPAP   • Slow to wake up after anesthesia    • Status post mitral valve replacement with tissue valve     10/10/2019 with Dr. Serra (#31 [facility] Epic porcine valve)   • STEMI (ST elevation myocardial infarction) (CMS/[facility]) 10/2019    [facility]   • TIA (transient ischemic attack) 05/2018   • Vision loss of right eye    ,   Past Surgical History:   Procedure Laterality Date   • APPENDECTOMY  02/19/2016    [facility], Dr. Zimmerman   • ARTERIOVENOUS FISTULA/SHUNT SURGERY Left 2/25/2020    Procedure: LEFT BRACHIAL CEPHALIC ARTERIAL VENOUS FISTULA;  Surgeon: Silvestre Bird MD;  Location: [facility] OR;  Service: Vascular;  Laterality: Left;   • CARDIAC CATHETERIZATION N/A 10/7/2019    Procedure: Left Heart Cath NO LV gram;  Surgeon: Yolande Kelley MD;  Location: [facility] CATH INVASIVE LOCATION;  Service: Cardiovascular   • CARDIAC CATHETERIZATION N/A 10/7/2019    Procedure: Coronary angiography;  Surgeon: Yolande Kelley MD;  Location:  [facility] CATH INVASIVE LOCATION;  Service: Cardiovascular   • CAROTID ENDARTERECTOMY Right 09/26/2016    [facility]    • CAROTID ENDARTERECTOMY Left 07/18/2017    [facility]    • CATARACT EXTRACTION     • CHOLECYSTECTOMY  11/1989    [facility]   • CORONARY ARTERY  BYPASS GRAFT WITH MITRAL VALVE REPAIR/REPLACEMENT N/A 10/10/2019    Procedure: JERRY STERNOTOMY CORONARY ARTERY BYPASS GRAFT TIMES 4 USING LEFT INTERNAL MAMMARY ARTERY AND LEFT GREATER SAPHENOUS VEIN GRAFT PER ENDOSCOPIC VEIN HARVESTING, MITRAL VALVE REPLACEMENT AND PRP;  Surgeon: Lewis Serra MD;  Location: [facility] OR;  Service: Cardiothoracic   • INSERT CENTRAL LINE AT BEDSIDE  2020        • INSERTION HEMODIALYSIS CATHETER N/A 10/22/2019    Procedure: TUNNEL DIALYSIS CATHETER INSERTION;  Surgeon: Alexx Monteiro MD;  Location: [facility] MAIN OR;  Service: Vascular   • INTERVENTIONAL RADIOLOGY PROCEDURE N/A 10/7/2019    Procedure: Intravascular Ultrasound;  Surgeon: Yolande Kelley MD;  Location: [facility] CATH INVASIVE LOCATION;  Service: Cardiovascular   • LUMBAR EPIDURAL INJECTION     • TOTAL HIP ARTHROPLASTY Right 2016    [facility]   ,   Family History   Problem Relation Age of Onset   • Lymphoma Brother 65         at age 71   • Coronary artery disease Neg Hx    • Malig Hyperthermia Neg Hx     and   Social History     Tobacco Use   • Smoking status: Former Smoker     Packs/day: 2.00     Years: 30.00     Pack years: 60.00     Types: Cigarettes     Last attempt to quit:      Years since quittin.6   • Smokeless tobacco: Never Used   Substance Use Topics   • Alcohol use: No     Comment: None due to blood thinners, patient would really like to drink non-alcoholic beer 1 x day   • Drug use: No       Vital Signs   Temp:  [97.3 °F (36.3 °C)-97.7 °F (36.5 °C)] 97.7 °F (36.5 °C)  Heart Rate:  [105-108] 105  Resp:  [20-26] 20  BP: (88-96)/(58-65) 96/58  Device (Oxygen Therapy): room air SpO2:  [91 %-95 %] 91 %    Physical Exam:  General Appearance:   Awake and appears SOA with conversation   Head:    Normocephalic, without obvious abnormality, atraumatic   Eyes:            Lids and lashes normal, conjunctivae and sclerae normal, no   icterus   Ears:    Ears appear intact  with no abnormalities noted   Throat:   No oral lesions, oral mucosa somewhat moist   Neck:   No adenopathy, supple, trachea midline, no thyromegaly   Back:     No scoliosis present   Lungs:     Clear to auscultation with occasional scattered crackle, slightly coarse high-pitched inspiratory sounds audible over the larynx, respirations slightly labored    Heart:    Irregular rhythm and tachycardia        Abdomen:   Occasional bowel sounds, soft and non-tender, non-distended   Genitalia:    Deferred   Extremities:  No significant edema, no cyanosis    Pulses:  Radial pulses palpable and equal bilaterally   Skin:   No bleeding         Neurologic:   Some confusion evident and no new focal weaknesses      Results Review:   I reviewed the patient's new clinical results.      Impression:      ESRD (end stage renal disease) (CMS/[facility])    Hemodialysis-associated hypotension    Palliative care patient    History of hemodialysis; last 8/5/2020    Chronic hypotension    Diabetes mellitus with peripheral vascular disease (CMS/[facility])    Diabetes mellitus type 2, uncontrolled, with complications (CMS/[facility])    Atrial fibrillation (CMS/[facility]) [I48.91]    Metabolic encephalopathy    Secondary adrenal insufficiency (CMS/[facility])    Aortic valve stenosis, mild    Pulmonary hypertension (CMS/[facility])    Chronic systolic congestive heart failure (CMS/[facility])    Anemia due to chronic kidney disease    Encounter for long-term (current) use of insulin (CMS/[facility])    ROHINI treated with BiPAP    Coronary artery disease involving native coronary artery of native heart with angina pectoris (CMS/[facility])    S/P CABG (coronary artery bypass graft)    History of mitral valve replacement    SVT (supraventricular tachycardia) (CMS/[facility])    History of CVA (cerebrovascular accident); left frontal lobes & smaller right frontal; left occiput        Plan:  I reviewed the patient's chart.  The patient's left lower lung pneumonia with sepsis and shock causing acute hypoxic  respiratory failure requiring intubation and mechanical ventilation has resolved.  The patient's metabolic encephalopathy is also resolved.  The patient's wife reverse the patient's wishes of no intubation.  The patient was intubated and extubated.  However, since he did not improve, he is now again do not attempt resuscitation.    Hospice evaluated the patient several days ago but he does not meet inpatient criteria.  The patient is symptomatic with shortness of breath, 3-5 words at a time.  He also describes pain which is not as bad as his shortness of air.  Some of his p.o. medicines are being continued.  Thus far today, the patient has not taken any narcotics or anxiolytics.    The patient is day 5 of being off hemodialysis.     I talked with the patient, his wife and family at bedside.  Will reevaluate 8/11/2020.  I encouraged symptom management for shortness of air and or pain.  Consider medicines for anxiety.      Clemente Juan MD  Hospice and Palliative Medicine  08/10/20  21:04

## 2020-08-11 NOTE — PLAN OF CARE
Problem: Patient Care Overview  Goal: Plan of Care Review  Outcome: Ongoing (interventions implemented as appropriate)  Flowsheets  Taken 8/10/2020 2100  Plan of Care Reviewed With: patient  Taken 8/11/2020 1705  Outcome Summary: Pt received no medication tonight-he was restless and swinging his legs over the side of the bed but his wife stated that was his baseline and she did not wish to medicate him. He answers questions, makes his needs known and is able to follow commands-he is very hoarse and whispers when communicating-he did have trouble remembering how to drink from a straw. Family at bedside throughout the shift and feel he is comfortable.  Will continue to monitor and offer comfort and support per palliative POC.

## 2020-08-11 NOTE — PROGRESS NOTES
Palliative Care/Hospice Follow Up Note       LOS: 26 days   Patient Care Team:  Fahad Murray Jr., MD as PCP - General (Family Medicine)  Alex Simpson MD as Consulting Physician (Nephrology)  Bennie Paul MD as Consulting Physician (Cardiology)  Miah Jordan MD as Consulting Physician (Pulmonary Disease)  Trevor Whyte MD as Consulting Physician (Endocrinology)  Clemente Juan MD as Consulting Physician (Hospice and Palliative Medicine)    Chief Complaint:  End-stage renal disease; Last HD 8/5/2020    Interval History:     Patient Complaints: None  Patient Denies: None  History taken from: Wife and daughter and RN    Review of Systems:  As above.    Palliative Performance Scale  Palliative Performance Scale Score: 20%  [place] Symptom Assessment System Revised  Pain Score: no pain   ESAS Tiredness Score: 6  ESAS Nausea Score: No nausea  ESAS Depression Score: unable to assess  ESAS Anxiety Score: 4  ESAS Drowsiness Score: 6  ESAS Lack of Appetite Score: 7  ESAS Wellbeing Score: unable to assess  ESAS Dyspnea Score: No shortness of breath  ESAS Other Problem Score: unable to assess  ESAS Source of Information: patient, family caregiver, healthcare professional caregiver  ESAS Intervention: medicated/see MAR  ESAS Intervention Response: tolerated    Vital Signs  Temp:  [97 °F (36.1 °C)-97.9 °F (36.6 °C)] 97.9 °F (36.6 °C)  Heart Rate:  [104-114] 104  Resp:  [20-22] 20  BP: ()/(53-71) 87/57  Device (Oxygen Therapy): room air SpO2:  [91 %-95 %] 91 %    Physical Exam:  General Appearance:    Awake but not aware and seems more confused and does not appear comfortable, short of air    Throat:   No oral lesions, oral mucosa moist   Neck:   No adenopathy, supple, trachea midline   Lungs:     Clear to auscultation with occasional crackle, coarse inspiratory sounds, respirations diminished and slightly labored    Heart:    Irregular rhythm and tachycardia    Abdomen:     Occasional  bowel sounds, soft and non-tender, non-distended   Extremities:   No significant edema, no cyanosis   Pulses:   Radial pulses palpable and equal bilaterally          Results Review:     I reviewed the patient's new clinical results.    Medication Reviewed.    Assessment/Plan       ESRD (end stage renal disease) (CMS/[facility])    Hemodialysis-associated hypotension    Palliative care patient    History of hemodialysis; last 8/5/2020    Chronic hypotension    Diabetes mellitus with peripheral vascular disease (CMS/[facility])    Diabetes mellitus type 2, uncontrolled, with complications (CMS/HCC)    Atrial fibrillation (CMS/HCC) [I48.91]    Metabolic encephalopathy    Secondary adrenal insufficiency (CMS/[facility])    Aortic valve stenosis, mild    Pulmonary hypertension (CMS/HCC)    Chronic systolic congestive heart failure (CMS/HCC)    Anemia due to chronic kidney disease    Encounter for long-term (current) use of insulin (CMS/HCC)    ROHINI treated with BiPAP    Coronary artery disease involving native coronary artery of native heart with angina pectoris (CMS/HCC)    S/P CABG (coronary artery bypass graft)    History of mitral valve replacement    SVT (supraventricular tachycardia) (CMS/HCC)    History of CVA (cerebrovascular accident); left frontal lobes & smaller right frontal; left occiput    I reviewed with the patient's wife and daughter outside the room.  I told them that the patient appears worse today.  He seems to be more short of air and restless.  He seems more confused than yesterday.  The patient received 1 dose of Ativan 0.5 mg yesterday and 1 dose at 1206 hrs. today.  I told the patient's wife and daughter that he will be more sleepy from his worsening renal function.  Morphine is beneficial for shortness of breath.  Ativan will help his agitation which I think is from his worsening breathing.  I recommend that the patient be kept off of his back.  The patient does have underlying ROHINI.    Plan for disposition:  Pending.    Clemente Juan MD  Hospice and Palliative Medicine  08/11/20  18:03

## 2020-08-11 NOTE — PROGRESS NOTES
Continued Stay Note  [facility]     Patient Name: Santino Retana  MRN: [id number]  Today's Date: 8/11/2020    Admit Date: 7/16/2020    Discharge Plan     Row Name 08/11/20 1100       Plan    Plan  On going assessment for DCP/needs    Plan Comments  Following pt's progress with treatment team.  [facility] evaluation completed on  8/7/2020 and [facility] continues to follow. CCP will follow to assist as needed with any DC needs.         Discharge Codes    No documentation.             CEZAR Layton

## 2020-08-12 NOTE — PROGRESS NOTES
Case Management Discharge Note      Final Note: pronounced the patient 2020 at 0613 hours           Final Discharge Disposition Code: 20 -      Calin Colon RN

## 2020-08-12 NOTE — DISCHARGE SUMMARY
Discharge As      Date of Admisssion:  2020  Date of Death:  2020  Time of Death:  6:13 AM    Patient Care Team:  Fahad Murray Jr., MD as PCP - General (Family Medicine)  Alex Simpson MD as Consulting Physician (Nephrology)  Bennie Paul MD as Consulting Physician (Cardiology)  Miah Jordan MD as Consulting Physician (Pulmonary Disease)  Clemente Juan MD as Consulting Physician (Hospice and Palliative Medicine)    Final Diagnosis:     ESRD (end stage renal disease) (CMS/[facility])    Hemodialysis-associated hypotension    Palliative care patient    History of hemodialysis; last 2020    Chronic hypotension    Diabetes mellitus with peripheral vascular disease (CMS/[facility])    Diabetes mellitus type 2, uncontrolled, with complications (CMS/[facility])    Atrial fibrillation (CMS/[facility]) [I48.91]    Metabolic encephalopathy    Secondary adrenal insufficiency (CMS/[facility])    Aortic valve stenosis, mild    Pulmonary hypertension (CMS/[facility])    Chronic systolic congestive heart failure (CMS/[facility])    Anemia due to chronic kidney disease    Encounter for long-term (current) use of insulin (CMS/[facility])    ROHINI treated with BiPAP    Coronary artery disease involving native coronary artery of native heart with angina pectoris (CMS/[facility])    S/P CABG (coronary artery bypass graft)    History of mitral valve replacement    SVT (supraventricular tachycardia) (CMS/[facility])    History of CVA (cerebrovascular accident); left frontal lobes & smaller right frontal; left occiput      Hospital Course  Patient was a 80 y.o. male who presented to the ED 2020 with weakness with onset 2 weeks PTA. Reports generalized body  weakness. Initially mild, now severe. Walking and movement worsens the symptoms. Wife reported the patient has garbled speech which she reported was worse than normal since 4 am PTA. Hx IDDM. Has not checked his BG on the ay of admission.  Wife stated his speech has improved since ED admission. HD  performed yesterday PTA.  Pulmonary consultation obtained due to intermittent hypoxia and hypotension.  Respiratory viral panel and Covid not detected.  The patient did require intubation and mechanical ventilation.  Based on focused assessed echocardiogram, findings suggestive of shock not responsive to intravascular volume resuscitation.  The patient does have adrenal insufficiency secondary to intermittent steroid use on fludrocortisone upon admission.  The patient was started on CRRT.  The patient was treated for left lower lobe pneumonia.  With aggressive therapy, the patient was extubated 7/23/2020.  Cardiology evaluated the patient.  Neurology saw the patient for pneumaturia.  It was thought secondary to local catheter trauma.  The patient was transfusion back to 3 times a week HD.  The patient has a history of vascular dementia.  The patient needed neurology consultation and was thought to have acute psychosis with metabolic encephalopathy.  Even with improvement of his acute illness, the patient did not tolerate dialysis well.  The patient continued to be hypotensive despite midodrine and Sudafed.  Nephrologist and hospitalist started discussion with the wife and family concerning palliative care.  Despite all therapy being given, the patient not making significant improvement.  Last hemodialysis administered 8/5/2020.  Subsequently, the patient was transitioned to palliative care unit. Initially, not a lot of meds accepted by the family to treat symptoms. The patient really did not have a lot of pain but did have SOA. Some Ativan given for restlessness.  See my note from 8/11/2020 about my discussion with the patient's wife and daughter. After midnight, the patient did receive 2 doses of 2 mg morphine and 0.5 mg Ativan. Subsequently, I was called that the patient's respirations ceased and no pulse palpable. No heart sounds audible. I pronounced the patient at 0613 hours.      Clemente Juan,  MD  Hospice and Palliative Medicine  08/12/20  07:30

## 2023-09-29 NOTE — TELEPHONE ENCOUNTER
"Phoned pt secondary to referral for cardiac rehab.  First spoke to patient about the program.  He is interested, but has one more home health visit to complete.  He also has HD on Tuesday, Thursday, Saturday.  Would need to schedule his initial assessment on [id number].  Pt turned phone over to his wife to schedule.  Wife tells me pt has a h/o falling and uses walker or wheelchair to get around.  She thinks he is too debilitated to attend program. He would not be able to get himself on and off equipment.  Wife says pt's legs will just \"give out\".  She says it takes 2 or 3 of them to get him to a doctor's appointment.  Discussed that pt may need more PT first before starting cardiac rehab.  Discussed ability to have in-home PT and then transition to outpatient PT.  Recommended she discuss with one of his healthcare providers.  I left her my name and telephone number and encouraged her to call us back for appointment if he becomes more stable on his feet and more independent.    " [employer] Size Used: 2 x 2 cm sheet

## (undated) DEVICE — GLV SURG BIOGEL LTX PF 7 1/2

## (undated) DEVICE — SOL IRR NACL 0.9PCT BT 1000ML

## (undated) DEVICE — CVR PROB 96IN LF STRL

## (undated) DEVICE — SUT ETHLN 2/0 PS 18IN 585H

## (undated) DEVICE — NDL HYPO PRECISIONGLIDE REG 25G 1 1/2

## (undated) DEVICE — OPTIFOAM GENTLE SA, POSTOP, 4X12: Brand: MEDLINE

## (undated) DEVICE — SUT VIC 0 CT1 CR8 27IN JJ41G

## (undated) DEVICE — SUT PROLN 5/0 RB1 D/A 36IN 8556H

## (undated) DEVICE — RADIFOCUS GLIDEWIRE: Brand: GLIDEWIRE

## (undated) DEVICE — SUT SILK 0 CT1 CR8 18IN C021D

## (undated) DEVICE — CORONARY ARTERY BYPASS GRAFT MARKERS, STAINLESS STEEL, DISTAL, WITHOUT HOLDER: Brand: ANASTOMARK CORONARY ARTERY BYPASS GRAFT MARKERS, STAINLESS STEEL, DISTAL

## (undated) DEVICE — INSUFFLATION TUBING,LAPAROSCOPIC: Brand: DEROYAL

## (undated) DEVICE — SUT PROLN 4/0 BB D/A 36IN 8581H

## (undated) DEVICE — SYR LUERLOK 20CC BX/50

## (undated) DEVICE — PK AV FISTL/SHNT 40

## (undated) DEVICE — TOWEL,OR,DSP,ST,WHITE,DLX,4/PK,20PK/CS: Brand: MEDLINE

## (undated) DEVICE — GLV SURG BIOGEL M LTX PF 7 1/2

## (undated) DEVICE — ROTATING SURGICAL PUNCHES, 1 PER POUCH: Brand: A&E MEDICAL / ROTATING SURGICAL PUNCHES

## (undated) DEVICE — CANN ART EOPA 3D NV W/CONN 20F

## (undated) DEVICE — ST TOURNI COMPL A/ 7IN

## (undated) DEVICE — GOWN,NON-REINFORCED,SIRUS,SET IN SLV,XXL: Brand: MEDLINE

## (undated) DEVICE — BIOPATCH™ ANTIMICROBIAL DRESSING WITH CHLORHEXIDINE GLUCONATE IS A HYDROPHILLIC POLYURETHANE ABSORPTIVE FOAM WITH CHLORHEXIDINE GLUCONATE (CHG) WHICH INHIBITS BACTERIAL GROWTH UNDER THE DRESSING. THE DRESSING IS INTENDED TO BE USED TO ABSORB EXUDATE, COVER A WOUND CAUSED BY VASCULAR AND NONVASCULAR PERCUTANEOUS MEDICAL DEVICES DURING SURGERY, AS WELL AS REDUCE LOCAL INFECTION AND COLONIZATION OF MICROORGANISMS.: Brand: BIOPATCH

## (undated) DEVICE — APPL CHLORAPREP W/TINT 10.5ML PERC STRL

## (undated) DEVICE — DRSNG SURESITE WNDW 2.38X2.75

## (undated) DEVICE — CATH DIAG IMPULSE FR4 5F 100CM

## (undated) DEVICE — DRSNG WND GEL FIBR OPTICELL AG PLS W/SLV LF 4X5IN  STRL

## (undated) DEVICE — KT CATH TAL PALINDROME SAPPHIRE 14.5F23CM

## (undated) DEVICE — Device

## (undated) DEVICE — HEMOCONCENTRATOR PERFUS LPS06

## (undated) DEVICE — SOL ISO/ALC RUB 70PCT 4OZ

## (undated) DEVICE — CATH IMG IVUS EAGLE EYE PLATIN RX DIGITAL .014IN 5FR

## (undated) DEVICE — HI-TORQUE BALANCE MIDDLEWEIGHT GUIDE WIRE .014 STRAIGHT TIP 3.0 CM X 190 CM: Brand: HI-TORQUE BALANCE MIDDLEWEIGHT

## (undated) DEVICE — ST. SORBAVIEW ULTIMATE IJ SYSTEM A,C: Brand: CENTURION

## (undated) DEVICE — SOL NS 500ML

## (undated) DEVICE — SUT SILK 3/0 SH CR5 18IN C0135

## (undated) DEVICE — BLOWER/MISTER AXIOUS OPCAB W/TBG

## (undated) DEVICE — CATH VENT MIV RADL PIG ST TIP 5F 110CM

## (undated) DEVICE — ADHS SKIN DERMABOND TOP ADVANCED

## (undated) DEVICE — SUT SILK 3/0 TIES 18IN A184H

## (undated) DEVICE — ANTIBACTERIAL UNDYED BRAIDED (POLYGLACTIN 910), SYNTHETIC ABSORBABLE SUTURE: Brand: COATED VICRYL

## (undated) DEVICE — CLAMP INSERT: Brand: STEALTH® CLAMP INSERT

## (undated) DEVICE — GLIDESHEATH SLENDER STAINLESS STEEL KIT: Brand: GLIDESHEATH SLENDER

## (undated) DEVICE — PK ATS CUST W CARDIOTOMY RESEVOIR

## (undated) DEVICE — RADIFOCUS OPTITORQUE ANGIOGRAPHIC CATHETER: Brand: OPTITORQUE

## (undated) DEVICE — GW EMR FIX EXCHG J STD .035 3MM 260CM

## (undated) DEVICE — CATHETER,URETHRAL,REDRUBBER,STERILE,20FR: Brand: MEDLINE

## (undated) DEVICE — CATH DIAG IMPULSE FL3.5 5F 100CM

## (undated) DEVICE — LOU MINOR PROCEDURE: Brand: MEDLINE INDUSTRIES, INC.

## (undated) DEVICE — PK HEART OPN 40

## (undated) DEVICE — GLV SURG BIOGEL SENSR LTX PF SZ7.5

## (undated) DEVICE — PK CATH CARD 40

## (undated) DEVICE — SYR MEDALLION SALINE PLUNGR/WHITE 10ML

## (undated) DEVICE — SUT PROLN 6/0 BV1 D/A 30IN 8709H

## (undated) DEVICE — DRN WND CH RND FUL/FLUT NO/TROC 3/8IN 28F

## (undated) DEVICE — VASOVIEW HEMOPRO: Brand: VASOVIEW HEMOPRO

## (undated) DEVICE — INTENDED FOR TISSUE SEPARATION, AND OTHER PROCEDURES THAT REQUIRE A SHARP SURGICAL BLADE TO PUNCTURE OR CUT.: Brand: BARD-PARKER ® CARBON RIB-BACK BLADES

## (undated) DEVICE — ISOLATION BAG: Brand: CONVERTORS

## (undated) DEVICE — GLV SURG TRIUMPH GREEN W/ALOE PF LTX 7 STRL

## (undated) DEVICE — GLV SURG BIOGEL LTX PF 6 1/2

## (undated) DEVICE — PK PERFUS CUST W/CARDIOPLEGIA

## (undated) DEVICE — SYS PERFUS SEP PLATLT W TIPS CUST

## (undated) DEVICE — LOU OPEN HEART DR POLLOCK: Brand: MEDLINE INDUSTRIES, INC.

## (undated) DEVICE — IRRIGATOR BULB ASEPTO 60CC STRL

## (undated) DEVICE — OASIS DRAIN, DUAL, IN-LINE, ATS COMPATIBLE: Brand: OASIS

## (undated) DEVICE — BLAKE SILICONE DRAINS CARDIO CONNECTOR 2:1: Brand: BLAKE

## (undated) DEVICE — GLV SURG BIOGEL LTX PF 7

## (undated) DEVICE — HARMONIC SYNERGY DISSECTING HOOK WITH TORQUE WRENCH. FOR USE WITH BLUE HAND PIECE ONLY: Brand: HARMONIC SYNERGY

## (undated) DEVICE — KT MANIFLD CARDIAC

## (undated) DEVICE — SYR LUERLOK 5CC

## (undated) DEVICE — 3M™ MEDIPORE™ H SOFT CLOTH SURGICAL TAPE 2862, 2 INCH X 10 YARD (5CM X 9,1M), 12 ROLLS/CASE: Brand: 3M™ MEDIPORE™

## (undated) DEVICE — SUT PROLN 3/0 SH D/A 36IN 8522H

## (undated) DEVICE — SPNG GZ WOVN 4X4IN 12PLY 10/BX STRL

## (undated) DEVICE — SENSR CERBRL O2 PK/2

## (undated) DEVICE — SOL IRR H2O BTL 1000ML STRL

## (undated) DEVICE — 6F .070 XB 3 100CM: Brand: VISTA BRITE TIP

## (undated) DEVICE — MEDICINE CUP, GRADUATED, STER: Brand: MEDLINE

## (undated) DEVICE — 32 FR RIGHT ANGLE – SOFT PVC CATHETER: Brand: PVC THORACIC CATHETERS

## (undated) DEVICE — DECANT BG O JET

## (undated) DEVICE — SUT PROLN MO.5 7/0 DBLARM BV175 6 2X30 BX/12

## (undated) DEVICE — SUT SILK 2/0 TIES 18IN A185H

## (undated) DEVICE — GLV SURG PREMIERPRO ORTHO LTX PF SZ7.5 BRN